# Patient Record
Sex: MALE | Race: WHITE | Employment: OTHER | URBAN - METROPOLITAN AREA
[De-identification: names, ages, dates, MRNs, and addresses within clinical notes are randomized per-mention and may not be internally consistent; named-entity substitution may affect disease eponyms.]

---

## 2017-01-12 ENCOUNTER — TELEPHONE (OUTPATIENT)
Dept: FAMILY MEDICINE CLINIC | Facility: CLINIC | Age: 72
End: 2017-01-12

## 2017-01-12 DIAGNOSIS — B00.1 COLD SORE: Primary | ICD-10-CM

## 2017-01-12 NOTE — TELEPHONE ENCOUNTER
Pt is calling state that he have a appt to see the Derm Dr Hayley Tamez for cold sore on mouth  Pt state that he have a appt on Monday and need this referral ASAP

## 2017-01-16 ENCOUNTER — OFFICE VISIT (OUTPATIENT)
Dept: DERMATOLOGY CLINIC | Facility: CLINIC | Age: 72
End: 2017-01-16

## 2017-01-16 DIAGNOSIS — K13.0 ANGULAR CHEILITIS: Primary | ICD-10-CM

## 2017-01-16 PROCEDURE — 99212 OFFICE O/P EST SF 10 MIN: CPT | Performed by: DERMATOLOGY

## 2017-01-16 PROCEDURE — 99202 OFFICE O/P NEW SF 15 MIN: CPT | Performed by: DERMATOLOGY

## 2017-01-16 NOTE — PROGRESS NOTES
HPI:     Chief Complaint     Derm Problem        HPI     Derm Problem    Additional comments: cracked skin and blistery eruption both corners of the mouth, started about 3 mo ago, never had blistery eruption       Last edited by Joe Rodriguez RN on 1/16/2 Disp:  Rfl:    Multiple Vitamins-Minerals (CENTRUM SILVER) Oral Tab Take 1 tablet by mouth daily. Disp:  Rfl:    TH COD LIVER OIL Oral Cap Take  by mouth. Disp:  Rfl:    Cinnamon (SM CINNAMON) 500 MG Oral Cap Take 500 mg by mouth daily.  Disp:  Rfl:      Al HISTORY      Comment esophageal dilatation    HAND/FINGER SURGERY UNLISTED Left 9/29/2011    Comment LRF trigger finger release    OTHER SURGICAL HISTORY  6/15/2010    Comment release triggers: RMF, RRF, LMF/ RRF Dupuytren's nodule excision    FOREARM/WRIS his mouth. Given Econizole cream to use twice daily along with hydrocortisone cream.  Patient will let me know in a couple weeks if not healing. No orders of the defined types were placed in this encounter.        Results From Past 48 Hours:  No results

## 2017-01-16 NOTE — PROGRESS NOTES
Past Medical History   Diagnosis Date   • Type II or unspecified type diabetes mellitus without mention of complication, not stated as uncontrolled    • Other and unspecified hyperlipidemia    • Unspecified essential hypertension    • Arthritis    • BPH (b surgery    EYE SURGERY Right 2013    Comment repair detached retina    LASER SURGERY OF EYE  2014    Comment OD YAG laser    ELECTROCARDIOGRAM, COMPLETE  4/25/2013    KNEE ARTHROSCOPY  1990       Social History   Marital Status:   Spouse Name: N/A

## 2017-01-20 ENCOUNTER — APPOINTMENT (OUTPATIENT)
Dept: LAB | Facility: HOSPITAL | Age: 72
End: 2017-01-20
Attending: UROLOGY
Payer: COMMERCIAL

## 2017-01-20 PROCEDURE — 84154 ASSAY OF PSA FREE: CPT | Performed by: UROLOGY

## 2017-01-20 PROCEDURE — 36415 COLL VENOUS BLD VENIPUNCTURE: CPT | Performed by: UROLOGY

## 2017-01-20 PROCEDURE — 84153 ASSAY OF PSA TOTAL: CPT | Performed by: UROLOGY

## 2017-02-01 ENCOUNTER — OFFICE VISIT (OUTPATIENT)
Dept: SURGERY | Facility: CLINIC | Age: 72
End: 2017-02-01

## 2017-02-01 VITALS
BODY MASS INDEX: 27.57 KG/M2 | TEMPERATURE: 98 F | RESPIRATION RATE: 16 BRPM | HEART RATE: 66 BPM | DIASTOLIC BLOOD PRESSURE: 74 MMHG | HEIGHT: 73 IN | SYSTOLIC BLOOD PRESSURE: 154 MMHG | WEIGHT: 208 LBS

## 2017-02-01 DIAGNOSIS — N35.9 URETHRAL STRICTURE, UNSPECIFIED STRICTURE TYPE: Primary | ICD-10-CM

## 2017-02-01 DIAGNOSIS — N40.1 BENIGN NON-NODULAR PROSTATIC HYPERPLASIA WITH LOWER URINARY TRACT SYMPTOMS: ICD-10-CM

## 2017-02-01 DIAGNOSIS — R97.20 ELEVATED PSA: ICD-10-CM

## 2017-02-01 DIAGNOSIS — R35.1 NOCTURIA: ICD-10-CM

## 2017-02-01 PROCEDURE — 99214 OFFICE O/P EST MOD 30 MIN: CPT | Performed by: UROLOGY

## 2017-02-01 PROCEDURE — 99213 OFFICE O/P EST LOW 20 MIN: CPT | Performed by: UROLOGY

## 2017-02-01 RX ORDER — ALFUZOSIN HYDROCHLORIDE 10 MG/1
10 TABLET, EXTENDED RELEASE ORAL DAILY
Qty: 90 TABLET | Refills: 3 | Status: SHIPPED | OUTPATIENT
Start: 2017-02-01 | End: 2017-02-24

## 2017-02-01 NOTE — PATIENT INSTRUCTIONS
1.  Please start alfuzosin 10 mg daily, immediately after same meal every day. This belonged to the same family of medications such as Flomax/tamsulosin but it is not identical.  Stop medication if you develop rash or worsening in your sinusitis    2.   Co

## 2017-02-01 NOTE — PROGRESS NOTES
HPI:    Patient ID: Jasvir Minaya is a 66year old male. HPI     1. Nocturia  Patient's AUA score today was 15, moderate voiding dysfunction category better than AUA 19 on chart review (4/18/2016).   The patient has urinary frequency less than every 2 erich previous records:     PSA was 2.2 on April 9, 2008, corrected for finasteride PSA was 4.4. The patient had the same PSA as that of September 20, 2007.  The patient started finasteride July 10, 2006, because of voiding dysfunction, and he could not tolerate possible urethroplasty/reconstruction of urethra.  9/28/2016 [facility] cystoscopy under general anesthesia by Dr. Brian Mckeon  for possible urethral stricture, no stricture found; prostatic urethra showed minimal 3-lobe BPH with a shortened ( ?)  length, consisten Capsular opacification 2014     YAG laser OD          Past Surgical History    HIP REPLACEMENT SURGERY  2008    HERNIA SURGERY      Comment hernia repair, herniorraphy    REPAIR ROTATOR CUFF,ACUTE Left 2011    CATARACT  2012, 2010    Comment 2012 Phaco w/ Rfl: 1   Lovastatin 40 MG Oral Tab Take 1 tablet (40 mg total) by mouth nightly. Disp: 90 tablet Rfl: 3   NIFEdipine ER 60 MG Oral Tablet 24 Hr Take 1 tablet (60 mg total) by mouth once daily.  Disp: 90 tablet Rfl: 3   lansoprazole 30 MG Oral Capsule Delaye 1/20/2017 PSA = 6.2;  20 % free PSA (16-20 % probability of prostate cancer)  9/13/2016 UA RBC < 1  1/2/2016 PSA 5.4:  30 % free PSA (8 % probability of prostate cancer)     I spent 25 minutes with patient, and majority of this time was spent discussing he will stop it. I ordered urinalysis before next visit in 1 year. 3. (R97.20) Elevated PSA  Plan: History of 2 negative prostate biopsies. On 1/20/2017 PSA was 6.2 and % free PSA was 20 % (16-20 % probability of prostate cancer). No nodules on LAURA. this documentation has been prepared under the direction and in the presence of Connie Martin MD.   Electronically Signed: Beckie Lynn, 2/1/2017, 9:07 AM.    Naren Catalan M.D., have reviewed and agree with this document transcribed by [facility]

## 2017-02-03 ENCOUNTER — PATIENT MESSAGE (OUTPATIENT)
Dept: INTERNAL MEDICINE CLINIC | Facility: CLINIC | Age: 72
End: 2017-02-03

## 2017-02-07 NOTE — TELEPHONE ENCOUNTER
From: Marylee Nettles  To: Shashi Sharif MD  Sent: 2/3/2017 6:34 PM CST  Subject: Prescription Question    Hello, I called in a prescription for \"Breo\" that Dr. Zena Alvarez prescribed for me over a week ago.  I used this medication to help with the flem

## 2017-02-07 NOTE — TELEPHONE ENCOUNTER
Refill Protocol Appointment Criteria  · Appointment scheduled in the past 6 months or in the next 3 months  Recent Visits       Provider Department Primary Dx    2 months ago Shashi Sharif MD [facility], [facility], [address], [place] Type 2 diabetes nicolas

## 2017-02-24 ENCOUNTER — OFFICE VISIT (OUTPATIENT)
Dept: INTERNAL MEDICINE CLINIC | Facility: CLINIC | Age: 72
End: 2017-02-24

## 2017-02-24 VITALS
OXYGEN SATURATION: 95 % | BODY MASS INDEX: 27.9 KG/M2 | DIASTOLIC BLOOD PRESSURE: 82 MMHG | WEIGHT: 210.5 LBS | HEART RATE: 76 BPM | SYSTOLIC BLOOD PRESSURE: 146 MMHG | HEIGHT: 73 IN | TEMPERATURE: 98 F

## 2017-02-24 DIAGNOSIS — J01.90 ACUTE SINUSITIS, RECURRENCE NOT SPECIFIED, UNSPECIFIED LOCATION: Primary | ICD-10-CM

## 2017-02-24 PROCEDURE — 99212 OFFICE O/P EST SF 10 MIN: CPT | Performed by: INTERNAL MEDICINE

## 2017-02-24 PROCEDURE — 99213 OFFICE O/P EST LOW 20 MIN: CPT | Performed by: INTERNAL MEDICINE

## 2017-02-24 RX ORDER — FLUTICASONE PROPIONATE 50 MCG
2 SPRAY, SUSPENSION (ML) NASAL DAILY
Qty: 1 INHALER | Refills: 0 | Status: SHIPPED | OUTPATIENT
Start: 2017-02-24 | End: 2018-02-05

## 2017-02-24 RX ORDER — DOXYCYCLINE HYCLATE 50 MG/1
50 CAPSULE ORAL 2 TIMES DAILY
Qty: 14 CAPSULE | Refills: 0 | Status: SHIPPED | OUTPATIENT
Start: 2017-02-24 | End: 2017-03-03

## 2017-02-24 NOTE — PATIENT INSTRUCTIONS
Please take doxycycline twice daily for 7 days. Please use Flonase 2 sprays each nostril once daily. Call if not soon better. Follow-up with Dr. Megha Bliss soon as your blood pressure was elevated today.

## 2017-02-24 NOTE — PROGRESS NOTES
Cassandra Ascencio is a 66year old male. Patient presents with:  Sinus Problem  Cough    HPI:   For the past 3 weeks, he has had persistent symptoms of nasal congestion with yellow drainage, sore throat, and occasional cough productive of yellow sputum.   Sympto mouth daily. Disp:  Rfl:    TH COD LIVER OIL Oral Cap Take  by mouth. Disp:  Rfl:    Cholecalciferol (VITAMIN D) 1000 UNITS Oral Cap Take  by mouth daily. Disp:  Rfl:    Cinnamon (SM CINNAMON) 500 MG Oral Cap Take 500 mg by mouth daily.  Disp:  Rfl: (95.482 kg)  BMI 27.78 kg/m2  SpO2 95%  HEENT: Anicteric, conjunctiva pink, canals and TMs normal bilaterally, turbinates moderately swollen bilaterally without purulent drainage, no maxillary or frontal sinus tenderness, oropharynx normal without erythema

## 2017-04-24 ENCOUNTER — OFFICE VISIT (OUTPATIENT)
Dept: INTERNAL MEDICINE CLINIC | Facility: CLINIC | Age: 72
End: 2017-04-24

## 2017-04-24 VITALS
WEIGHT: 213 LBS | SYSTOLIC BLOOD PRESSURE: 132 MMHG | HEIGHT: 73 IN | RESPIRATION RATE: 16 BRPM | DIASTOLIC BLOOD PRESSURE: 60 MMHG | HEART RATE: 90 BPM | BODY MASS INDEX: 28.23 KG/M2

## 2017-04-24 DIAGNOSIS — E11.319 TYPE 2 DIABETES MELLITUS WITH RIGHT EYE AFFECTED BY RETINOPATHY WITHOUT MACULAR EDEMA, WITHOUT LONG-TERM CURRENT USE OF INSULIN, UNSPECIFIED RETINOPATHY SEVERITY (HCC): Primary | ICD-10-CM

## 2017-04-24 DIAGNOSIS — G89.29 CHRONIC LEFT-SIDED LOW BACK PAIN WITH LEFT-SIDED SCIATICA: ICD-10-CM

## 2017-04-24 DIAGNOSIS — I10 ESSENTIAL HYPERTENSION WITH GOAL BLOOD PRESSURE LESS THAN 130/85: ICD-10-CM

## 2017-04-24 DIAGNOSIS — M54.42 CHRONIC LEFT-SIDED LOW BACK PAIN WITH LEFT-SIDED SCIATICA: ICD-10-CM

## 2017-04-24 DIAGNOSIS — J30.2 SEASONAL ALLERGIC RHINITIS, UNSPECIFIED ALLERGIC RHINITIS TRIGGER: ICD-10-CM

## 2017-04-24 PROBLEM — M54.41 CHRONIC BILATERAL LOW BACK PAIN WITH BILATERAL SCIATICA: Status: ACTIVE | Noted: 2017-04-24

## 2017-04-24 PROCEDURE — 99212 OFFICE O/P EST SF 10 MIN: CPT | Performed by: INTERNAL MEDICINE

## 2017-04-24 PROCEDURE — 99214 OFFICE O/P EST MOD 30 MIN: CPT | Performed by: INTERNAL MEDICINE

## 2017-04-24 NOTE — PROGRESS NOTES
Anne Reynolds is a 66year old male. HPI:   1.  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  Disp: 90 tablet Rfl: 3   GlipiZIDE ER (GLIPIZIDE XL) 2.5 MG Oral Tablet 24 Hr Take 1 tablet (2.5 mg total) by mouth daily with breakfast. 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 Use neti pot as you have been doing to help loosen drainage.      3. Essential hypertension with goal blood pressure less than 130/85    Patient instructed to take anti-hypertensive medicines exactly as prescr

## 2017-05-12 ENCOUNTER — APPOINTMENT (OUTPATIENT)
Dept: LAB | Facility: HOSPITAL | Age: 72
End: 2017-05-12
Attending: INTERNAL MEDICINE
Payer: COMMERCIAL

## 2017-05-12 DIAGNOSIS — E11.319 TYPE 2 DIABETES MELLITUS WITH RIGHT EYE AFFECTED BY RETINOPATHY WITHOUT MACULAR EDEMA, WITHOUT LONG-TERM CURRENT USE OF INSULIN, UNSPECIFIED RETINOPATHY SEVERITY (HCC): ICD-10-CM

## 2017-05-12 PROCEDURE — 36415 COLL VENOUS BLD VENIPUNCTURE: CPT

## 2017-05-12 PROCEDURE — 83036 HEMOGLOBIN GLYCOSYLATED A1C: CPT

## 2017-05-16 ENCOUNTER — OFFICE VISIT (OUTPATIENT)
Dept: INTERNAL MEDICINE CLINIC | Facility: CLINIC | Age: 72
End: 2017-05-16

## 2017-05-16 VITALS
SYSTOLIC BLOOD PRESSURE: 136 MMHG | BODY MASS INDEX: 28.23 KG/M2 | HEIGHT: 73 IN | WEIGHT: 213 LBS | DIASTOLIC BLOOD PRESSURE: 72 MMHG | RESPIRATION RATE: 16 BRPM | HEART RATE: 87 BPM

## 2017-05-16 DIAGNOSIS — M81.0 OSTEOPOROSIS WITHOUT CURRENT PATHOLOGICAL FRACTURE, UNSPECIFIED OSTEOPOROSIS TYPE: ICD-10-CM

## 2017-05-16 DIAGNOSIS — I10 ESSENTIAL HYPERTENSION WITH GOAL BLOOD PRESSURE LESS THAN 140/90: ICD-10-CM

## 2017-05-16 DIAGNOSIS — M25.561 ACUTE PAIN OF RIGHT KNEE: ICD-10-CM

## 2017-05-16 DIAGNOSIS — E11.9 TYPE 2 DIABETES MELLITUS WITHOUT COMPLICATION, WITHOUT LONG-TERM CURRENT USE OF INSULIN (HCC): Primary | ICD-10-CM

## 2017-05-16 DIAGNOSIS — E78.2 MIXED HYPERLIPIDEMIA: ICD-10-CM

## 2017-05-16 PROCEDURE — 99214 OFFICE O/P EST MOD 30 MIN: CPT | Performed by: INTERNAL MEDICINE

## 2017-05-16 PROCEDURE — 99212 OFFICE O/P EST SF 10 MIN: CPT | Performed by: INTERNAL MEDICINE

## 2017-05-16 NOTE — PROGRESS NOTES
Elaina Brumfield is a 66year old male. HPI:   1.  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 Disp: 90 capsule Rfl: 3   Diclofenac Sodium 1 % Transdermal Gel Apply 2 g topically 4 (four) times daily.  Disp: 1 IOL OD   • Posterior subcapsular cataract    • Posterior subcapsular cataract 2010, 1998 2010 Phaco w/ PC IOL OS   • Capsular opacification 2014     YAG laser OD      Social History:    Smoking Status: Never Smoker                      Smokeless Status at least 3 times weekly will help to curb one's appetite, control weight and lead to better blood pressure control. Record blood pressures at ,me and bring readings to your next office visit to review.     3. Mixed hyperlipidemia    Patient instructed to

## 2017-05-22 ENCOUNTER — TELEPHONE (OUTPATIENT)
Dept: INTERNAL MEDICINE CLINIC | Facility: CLINIC | Age: 72
End: 2017-05-22

## 2017-05-22 DIAGNOSIS — M25.561 RIGHT KNEE PAIN, UNSPECIFIED CHRONICITY: Primary | ICD-10-CM

## 2017-05-22 NOTE — TELEPHONE ENCOUNTER
Please see my chart message:    Comments: To Dr. Marti Henley,  From: Aquiles Pulse  I have completed taking the Ibuprofen for  3 days. There has not been much improvement in my conduction.  My right knee hurts to walk on and ached me at night, making going to s

## 2017-05-22 NOTE — TELEPHONE ENCOUNTER
Call and tell patient the refrral to orthopedics is in system. I would wait for orthopedics evaluation to order x rays and they may want to do an MRI as well.

## 2017-05-22 NOTE — TELEPHONE ENCOUNTER
Spoke with patient informed ortho referral completed by Dr GLORIA and that they will order testing for him. Pt verbalized understanding.

## 2017-05-22 NOTE — TELEPHONE ENCOUNTER
Please advise/see Pt's Message,unable to reach to Triage and ask if he has called Ortho yet per OV Note below

## 2017-05-22 NOTE — TELEPHONE ENCOUNTER
5. Acute pain of right knee    Ice joint area intermittently as tolerated for short periods of time to decrease any inflammation and give some pain relief.  Take ibuprofen 400 mg (2 of the 200 mg pills or capsules) every 6 hours or tylenol XS (500 mg) up

## 2017-05-22 NOTE — TELEPHONE ENCOUNTER
Spoke with patient still having persistent knee pain, worse with walking. Pain can vary from 2-8. Has been taking OTC ibuprofen, only helps a little. Referral for ortho pended.  Pt would like to see ortho specialist. Also wants to know if Dr Ayesha Lindsey can or

## 2017-05-30 ENCOUNTER — HOSPITAL ENCOUNTER (OUTPATIENT)
Dept: GENERAL RADIOLOGY | Facility: HOSPITAL | Age: 72
Discharge: HOME | End: 2017-05-30
Attending: ORTHOPAEDIC SURGERY
Payer: COMMERCIAL

## 2017-05-30 ENCOUNTER — OFFICE VISIT (OUTPATIENT)
Dept: ORTHOPEDICS CLINIC | Facility: CLINIC | Age: 72
End: 2017-05-30

## 2017-05-30 DIAGNOSIS — M25.561 ACUTE PAIN OF RIGHT KNEE: ICD-10-CM

## 2017-05-30 DIAGNOSIS — M25.561 ACUTE PAIN OF RIGHT KNEE: Primary | ICD-10-CM

## 2017-05-30 PROCEDURE — 99243 OFF/OP CNSLTJ NEW/EST LOW 30: CPT | Performed by: ORTHOPAEDIC SURGERY

## 2017-05-30 PROCEDURE — 73560 X-RAY EXAM OF KNEE 1 OR 2: CPT | Performed by: ORTHOPAEDIC SURGERY

## 2017-05-30 PROCEDURE — 73565 X-RAY EXAM OF KNEES: CPT | Performed by: ORTHOPAEDIC SURGERY

## 2017-05-30 PROCEDURE — 99212 OFFICE O/P EST SF 10 MIN: CPT | Performed by: ORTHOPAEDIC SURGERY

## 2017-05-30 NOTE — PROGRESS NOTES
5/30/2017  Amber Solders  3/17/1945  67year old   male  Susi Manley MD    HPI:   Patient presents with:  Consult: here for evaluation of right knee pain. no known injury, just woke up with pain after working one day. this was about 3 weeks ago. 1 Tube Rfl: 3   Multiple Vitamins-Minerals (EYE VITAMINS) Oral Cap Take  by mouth daily. Disp:  Rfl:    Cholecalciferol (VITAMIN D) 1000 UNITS Oral Cap Take  by mouth daily.  Disp:  Rfl:    Sildenafil Citrate (VIAGRA) 100 MG Oral Tab Take 1 - 2 hours before Comment hernia repair, herniorraphy    REPAIR ROTATOR CUFF,ACUTE Left 2011    CATARACT  2012, 2010    Comment 2012 Phaco w/ PC IOL OD, 2010 Phaco w/ PC IOL OS    HIP SURGERY  12/2008    Comment left hip surface repair    Evelyn 5258 strength in tibialis anterior, gastrocsoleus complex, EHL, and FHL. The patient has medial joint line tenderness to palpation.   Patient has pain with hyperflexion with medial joint line and a positive Denise's test.  Patient has medial patella facet ten

## 2017-06-02 ENCOUNTER — HOSPITAL ENCOUNTER (OUTPATIENT)
Dept: MRI IMAGING | Age: 72
Discharge: HOME | End: 2017-06-02
Attending: ORTHOPAEDIC SURGERY
Payer: COMMERCIAL

## 2017-06-02 DIAGNOSIS — M25.561 ACUTE PAIN OF RIGHT KNEE: ICD-10-CM

## 2017-06-02 PROCEDURE — 73721 MRI JNT OF LWR EXTRE W/O DYE: CPT | Performed by: ORTHOPAEDIC SURGERY

## 2017-06-06 ENCOUNTER — OFFICE VISIT (OUTPATIENT)
Dept: OPHTHALMOLOGY | Facility: CLINIC | Age: 72
End: 2017-06-06

## 2017-06-06 DIAGNOSIS — E11.9 DIABETES MELLITUS TYPE 2 WITHOUT RETINOPATHY (HCC): ICD-10-CM

## 2017-06-06 DIAGNOSIS — Z86.69 HISTORY OF RETINAL TEAR: ICD-10-CM

## 2017-06-06 DIAGNOSIS — H35.371 EPIRETINAL MEMBRANE, RIGHT: ICD-10-CM

## 2017-06-06 DIAGNOSIS — H40.003 GLAUCOMA SUSPECT OF BOTH EYES: Primary | ICD-10-CM

## 2017-06-06 DIAGNOSIS — Z96.1 PSEUDOPHAKIA OF BOTH EYES: ICD-10-CM

## 2017-06-06 PROBLEM — E11.319: Status: RESOLVED | Noted: 2017-04-24 | Resolved: 2017-06-06

## 2017-06-06 PROCEDURE — 92250 FUNDUS PHOTOGRAPHY W/I&R: CPT | Performed by: OPHTHALMOLOGY

## 2017-06-06 PROCEDURE — 99243 OFF/OP CNSLTJ NEW/EST LOW 30: CPT | Performed by: OPHTHALMOLOGY

## 2017-06-06 PROCEDURE — 99212 OFFICE O/P EST SF 10 MIN: CPT | Performed by: OPHTHALMOLOGY

## 2017-06-06 NOTE — PATIENT INSTRUCTIONS
Glaucoma suspect of both eyes  Discussed with patient that he is a glaucoma suspect based on increased cupping of the optic nerves in both eyes. Retinal photos taken today to document optic nerves.   Will have patient back in 1 year for visual field, OCT a

## 2017-06-06 NOTE — ASSESSMENT & PLAN NOTE
Discussed with patient that he is a glaucoma suspect based on increased cupping of the optic nerves in both eyes. Retinal photos taken today to document optic nerves.   Will have patient back in 1 year for visual field, OCT and diabetic eye exam.

## 2017-06-06 NOTE — PROGRESS NOTES
Cydney Piedra is a 67year old male.     HPI:     HPI     Consult    Additional comments: Referred by Dr. Abida Wisdom    Additional comments: Pt has been a diabetic for 7 years  7 years on pills/  0 years on Insulin   Pt checks his/h replacement surgery (2008); hernia surgery (hernia repair, herniorraphy); repair rotator cuff,acute (Left, 2011); hip surgery (12/2008) (left hip surface repair); other surgical history (1992) (L arm torn bicep repair); knee surgery (Bilateral) (x2); other Tab Take 1 tablet (40 mg total) by mouth nightly. Disp: 90 tablet Rfl: 3   NIFEdipine ER 60 MG Oral Tablet 24 Hr Take 1 tablet (60 mg total) by mouth once daily.  Disp: 90 tablet Rfl: 3   lansoprazole 30 MG Oral Capsule Delayed Release Take 1 capsule (30 mg and 2.5% Jake Synephrine @ 10:41 AM            Additional Tests     Amsler      Right Left   Amsler Wavy lines Normal               Slit Lamp and Fundus Exam     Slit Lamp Exam      Right Left    Lids/Lashes Dermatochalasis, Meibomian gland dysfunction Derm Encounter  Fundus Photos - OU - Both Eyes    Meds This Visit:    No prescriptions requested or ordered in this encounter     Follow up instructions:  Return in about 1 year (around 6/6/2018) for Visual field, OCT, Diabetic eye exam.    6/6/2017  Scribed by

## 2017-06-09 ENCOUNTER — OFFICE VISIT (OUTPATIENT)
Dept: ORTHOPEDICS CLINIC | Facility: CLINIC | Age: 72
End: 2017-06-09

## 2017-06-09 DIAGNOSIS — M25.561 ACUTE PAIN OF RIGHT KNEE: ICD-10-CM

## 2017-06-09 DIAGNOSIS — S83.241A ACUTE MEDIAL MENISCUS TEAR, RIGHT, INITIAL ENCOUNTER: Primary | ICD-10-CM

## 2017-06-09 PROCEDURE — 99213 OFFICE O/P EST LOW 20 MIN: CPT | Performed by: ORTHOPAEDIC SURGERY

## 2017-06-09 PROCEDURE — 99212 OFFICE O/P EST SF 10 MIN: CPT | Performed by: ORTHOPAEDIC SURGERY

## 2017-06-09 RX ORDER — TRAMADOL HYDROCHLORIDE 50 MG/1
50 TABLET ORAL EVERY 12 HOURS PRN
Qty: 40 TABLET | Refills: 0 | Status: ON HOLD | OUTPATIENT
Start: 2017-06-09 | End: 2017-07-10

## 2017-06-09 NOTE — PROGRESS NOTES
6/9/2017  Elaina Brumfield  3/17/1945  67year old   male  Fadia Gutiérrez MD    HPI:   Patient presents with:  Knee Pain: Right f/u and MRI results - still has the same pain rated as 2-4/10 at all the time.     This is a pleasant 76-year-old male who comes encouraged to use a walker for the next 4 weeks and be protected weightbearing to rest the knee. The patient will follow-up in 4 weeks for repeat evaluation. The patient was given a prescription for tramadol for pain control.   If the patient continues to

## 2017-06-27 ENCOUNTER — OFFICE VISIT (OUTPATIENT)
Dept: ORTHOPEDICS CLINIC | Facility: CLINIC | Age: 72
End: 2017-06-27

## 2017-06-27 DIAGNOSIS — S83.241A ACUTE MEDIAL MENISCUS TEAR, RIGHT, INITIAL ENCOUNTER: Primary | ICD-10-CM

## 2017-06-27 PROCEDURE — 99213 OFFICE O/P EST LOW 20 MIN: CPT | Performed by: ORTHOPAEDIC SURGERY

## 2017-06-27 PROCEDURE — 99212 OFFICE O/P EST SF 10 MIN: CPT | Performed by: ORTHOPAEDIC SURGERY

## 2017-06-27 NOTE — H&P
6/27/2017  Alvino Snellen  3/17/1945  67year old   male  Magda Myers MD    HPI:   Patient presents with:  Knee Pain: Right f/u - he is herer to discuss sx - he still ahs pain rated as 2-6/10 at all the time, worse with walking,     This is a pleasan Rfl: 0   Naproxen Sodium (ALEVE) 220 MG Oral Tab Take 1-2 tablets by mouth as needed. Disp:  Rfl:    Multiple Vitamins-Minerals (CENTRUM SILVER) Oral Tab Take 1 tablet by mouth daily. Disp:  Rfl:    TH COD LIVER OIL Oral Cap Take  by mouth.  Disp:  Rfl: HAND/FINGER SURGERY UNLISTED Left      Comment: LRF trigger finger release  No date: HERNIA SURGERY      Comment: hernia repair, herniorraphy  2008: HIP REPLACEMENT SURGERY  12/2008: HIP SURGERY      Comment: left hip surface repair  1990: KNEE ARTHROSCOPY patient has 5/5 strength in tibialis anterior, gastrocsoleus complex, EHL, and FHL. The patient has medial joint line tenderness to palpation. There is no proximal medial tibia tenderness to palpation. Patient has pain with hyperflexion.   The patient ha

## 2017-07-06 ENCOUNTER — TELEPHONE (OUTPATIENT)
Dept: INTERNAL MEDICINE CLINIC | Facility: CLINIC | Age: 72
End: 2017-07-06

## 2017-07-06 NOTE — TELEPHONE ENCOUNTER
Patient called and stated need a letter/note for medical clearance per -    Patient is schedule for surgery on 7/10/17-

## 2017-07-07 ENCOUNTER — OFFICE VISIT (OUTPATIENT)
Dept: INTERNAL MEDICINE CLINIC | Facility: CLINIC | Age: 72
End: 2017-07-07

## 2017-07-07 ENCOUNTER — TELEPHONE (OUTPATIENT)
Dept: ORTHOPEDICS CLINIC | Facility: CLINIC | Age: 72
End: 2017-07-07

## 2017-07-07 VITALS
WEIGHT: 209 LBS | DIASTOLIC BLOOD PRESSURE: 68 MMHG | HEART RATE: 68 BPM | HEIGHT: 73 IN | TEMPERATURE: 98 F | BODY MASS INDEX: 27.7 KG/M2 | SYSTOLIC BLOOD PRESSURE: 128 MMHG

## 2017-07-07 DIAGNOSIS — Z01.818 PREOP EXAM FOR INTERNAL MEDICINE: Primary | ICD-10-CM

## 2017-07-07 DIAGNOSIS — I10 ESSENTIAL HYPERTENSION WITH GOAL BLOOD PRESSURE LESS THAN 140/90: ICD-10-CM

## 2017-07-07 DIAGNOSIS — E11.9 TYPE 2 DIABETES MELLITUS WITHOUT COMPLICATION, WITHOUT LONG-TERM CURRENT USE OF INSULIN (HCC): ICD-10-CM

## 2017-07-07 DIAGNOSIS — S83.241A TEAR OF MEDIAL MENISCUS OF RIGHT KNEE, CURRENT, UNSPECIFIED TEAR TYPE, INITIAL ENCOUNTER: ICD-10-CM

## 2017-07-07 DIAGNOSIS — M25.561 RIGHT KNEE PAIN, UNSPECIFIED CHRONICITY: ICD-10-CM

## 2017-07-07 PROCEDURE — 93005 ELECTROCARDIOGRAM TRACING: CPT | Performed by: INTERNAL MEDICINE

## 2017-07-07 PROCEDURE — 99214 OFFICE O/P EST MOD 30 MIN: CPT | Performed by: INTERNAL MEDICINE

## 2017-07-07 PROCEDURE — 99212 OFFICE O/P EST SF 10 MIN: CPT | Performed by: INTERNAL MEDICINE

## 2017-07-07 PROCEDURE — 93000 ELECTROCARDIOGRAM COMPLETE: CPT | Performed by: INTERNAL MEDICINE

## 2017-07-07 NOTE — PATIENT INSTRUCTIONS
1.  Stop any aspirin or nonsteroidal anti-inflammatories as of now. 2.  Take your medications the day before the surgery as usual.  3.  Okay to take her blood pressure medications (nifedipine) the morning of the surgery with a few sips of water.   4.  No n Exercises help strengthen the muscles of the leg to help support the knee joint. If these treatments don’t help relieve symptoms or the injury is severe, you may need surgery.  This can repair the meniscus to relieve symptoms and restore movement.     When

## 2017-07-07 NOTE — PROGRESS NOTES
Patient ID: Dre Velásquez is a 67year old male. Patient presents with:  Pre-Op Exam: R Knee surgery on July 10, 2017       HISTORY OF PRESENT ILLNESS:   HPI  Patient presents for above.   Patient having right knee arthroscope on July 10, 2017 by Dr. Yasmine Colmenares bilateral, hand stiffness & weakness post trigger release   • Trigger finger 9/29/2011    Irf-trigger finger, recurrent - LRF trigger finger release   • Type II or unspecified type diabetes mellitus without mention of complication, not stated as uncontroll mouth once daily. , Disp: 90 tablet, Rfl: 3  •  lansoprazole 30 MG Oral Capsule Delayed Release, Take 1 capsule (30 mg total) by mouth every morning before breakfast., Disp: 90 capsule, Rfl: 3  •  Sildenafil Citrate (VIAGRA) 100 MG Oral Tab, Take 1 - 2 hour Caffeine Concern No    Comment: soda, 8 oz daily    Exercise No    Pt has a pacemaker No    Reaction to local anesthetic No     Social History Narrative    The patient does not use an assistive device. .      The patient does live in a home with stairs. water.    Return if symptoms worsen or fail to improve.     Chau Pérez MD  7/7/2017

## 2017-07-07 NOTE — TELEPHONE ENCOUNTER
pt is scheduled for sx: 7/10 and pt is to be seen in 2-3 days. No appt's available please advise, thank you.

## 2017-07-10 ENCOUNTER — SURGERY (OUTPATIENT)
Age: 72
End: 2017-07-10

## 2017-07-10 ENCOUNTER — HOSPITAL ENCOUNTER (OUTPATIENT)
Facility: HOSPITAL | Age: 72
Setting detail: HOSPITAL OUTPATIENT SURGERY
Discharge: HOME | End: 2017-07-10
Attending: ORTHOPAEDIC SURGERY | Admitting: ORTHOPAEDIC SURGERY
Payer: COMMERCIAL

## 2017-07-10 ENCOUNTER — ANESTHESIA EVENT (OUTPATIENT)
Dept: SURGERY | Facility: HOSPITAL | Age: 72
End: 2017-07-10
Payer: COMMERCIAL

## 2017-07-10 ENCOUNTER — ANESTHESIA (OUTPATIENT)
Dept: SURGERY | Facility: HOSPITAL | Age: 72
End: 2017-07-10
Payer: COMMERCIAL

## 2017-07-10 VITALS
BODY MASS INDEX: 27.21 KG/M2 | TEMPERATURE: 98 F | DIASTOLIC BLOOD PRESSURE: 75 MMHG | OXYGEN SATURATION: 98 % | HEIGHT: 73 IN | RESPIRATION RATE: 16 BRPM | SYSTOLIC BLOOD PRESSURE: 150 MMHG | WEIGHT: 205.31 LBS | HEART RATE: 60 BPM

## 2017-07-10 DIAGNOSIS — S83.249A MEDIAL MENISCUS TEAR: Primary | ICD-10-CM

## 2017-07-10 LAB — GLUCOSE BLDC GLUCOMTR-MCNC: 99 MG/DL (ref 70–99)

## 2017-07-10 PROCEDURE — 94010 BREATHING CAPACITY TEST: CPT | Performed by: ORTHOPAEDIC SURGERY

## 2017-07-10 PROCEDURE — 82962 GLUCOSE BLOOD TEST: CPT

## 2017-07-10 PROCEDURE — 0SBC4ZZ EXCISION OF RIGHT KNEE JOINT, PERCUTANEOUS ENDOSCOPIC APPROACH: ICD-10-PCS | Performed by: ORTHOPAEDIC SURGERY

## 2017-07-10 RX ORDER — HYDROCODONE BITARTRATE AND ACETAMINOPHEN 5; 325 MG/1; MG/1
1 TABLET ORAL AS NEEDED
Status: COMPLETED | OUTPATIENT
Start: 2017-07-10 | End: 2017-07-10

## 2017-07-10 RX ORDER — HYDROMORPHONE HYDROCHLORIDE 1 MG/ML
0.6 INJECTION, SOLUTION INTRAMUSCULAR; INTRAVENOUS; SUBCUTANEOUS EVERY 5 MIN PRN
Status: DISCONTINUED | OUTPATIENT
Start: 2017-07-10 | End: 2017-07-10

## 2017-07-10 RX ORDER — FAMOTIDINE 20 MG/1
20 TABLET ORAL ONCE
Status: DISCONTINUED | OUTPATIENT
Start: 2017-07-10 | End: 2017-07-10 | Stop reason: HOSPADM

## 2017-07-10 RX ORDER — HYDROMORPHONE HYDROCHLORIDE 1 MG/ML
0.2 INJECTION, SOLUTION INTRAMUSCULAR; INTRAVENOUS; SUBCUTANEOUS EVERY 5 MIN PRN
Status: DISCONTINUED | OUTPATIENT
Start: 2017-07-10 | End: 2017-07-10

## 2017-07-10 RX ORDER — CLINDAMYCIN PHOSPHATE 900 MG/50ML
900 INJECTION INTRAVENOUS ONCE
Status: DISCONTINUED | OUTPATIENT
Start: 2017-07-10 | End: 2017-07-10 | Stop reason: HOSPADM

## 2017-07-10 RX ORDER — SODIUM CHLORIDE, SODIUM LACTATE, POTASSIUM CHLORIDE, CALCIUM CHLORIDE 600; 310; 30; 20 MG/100ML; MG/100ML; MG/100ML; MG/100ML
INJECTION, SOLUTION INTRAVENOUS CONTINUOUS
Status: DISCONTINUED | OUTPATIENT
Start: 2017-07-10 | End: 2017-07-10

## 2017-07-10 RX ORDER — HALOPERIDOL 5 MG/ML
0.25 INJECTION INTRAMUSCULAR ONCE AS NEEDED
Status: DISCONTINUED | OUTPATIENT
Start: 2017-07-10 | End: 2017-07-10

## 2017-07-10 RX ORDER — ONDANSETRON 2 MG/ML
4 INJECTION INTRAMUSCULAR; INTRAVENOUS EVERY 6 HOURS PRN
Status: DISCONTINUED | OUTPATIENT
Start: 2017-07-10 | End: 2017-07-10

## 2017-07-10 RX ORDER — SODIUM CHLORIDE, SODIUM LACTATE, POTASSIUM CHLORIDE, CALCIUM CHLORIDE 600; 310; 30; 20 MG/100ML; MG/100ML; MG/100ML; MG/100ML
INJECTION, SOLUTION INTRAVENOUS CONTINUOUS PRN
Status: DISCONTINUED | OUTPATIENT
Start: 2017-07-10 | End: 2017-07-10 | Stop reason: SURG

## 2017-07-10 RX ORDER — NALOXONE HYDROCHLORIDE 0.4 MG/ML
80 INJECTION, SOLUTION INTRAMUSCULAR; INTRAVENOUS; SUBCUTANEOUS AS NEEDED
Status: DISCONTINUED | OUTPATIENT
Start: 2017-07-10 | End: 2017-07-10

## 2017-07-10 RX ORDER — HYDROCODONE BITARTRATE AND ACETAMINOPHEN 5; 325 MG/1; MG/1
1-2 TABLET ORAL EVERY 6 HOURS PRN
Qty: 40 TABLET | Refills: 0 | Status: SHIPPED | OUTPATIENT
Start: 2017-07-10 | End: 2017-07-20

## 2017-07-10 RX ORDER — MORPHINE SULFATE 4 MG/ML
6 INJECTION, SOLUTION INTRAMUSCULAR; INTRAVENOUS EVERY 10 MIN PRN
Status: DISCONTINUED | OUTPATIENT
Start: 2017-07-10 | End: 2017-07-10

## 2017-07-10 RX ORDER — HYDROCODONE BITARTRATE AND ACETAMINOPHEN 5; 325 MG/1; MG/1
2 TABLET ORAL AS NEEDED
Status: COMPLETED | OUTPATIENT
Start: 2017-07-10 | End: 2017-07-10

## 2017-07-10 RX ORDER — BUPIVACAINE HYDROCHLORIDE AND EPINEPHRINE 2.5; 5 MG/ML; UG/ML
INJECTION, SOLUTION INFILTRATION; PERINEURAL AS NEEDED
Status: DISCONTINUED | OUTPATIENT
Start: 2017-07-10 | End: 2017-07-10 | Stop reason: HOSPADM

## 2017-07-10 RX ORDER — MORPHINE SULFATE 4 MG/ML
4 INJECTION, SOLUTION INTRAMUSCULAR; INTRAVENOUS EVERY 10 MIN PRN
Status: DISCONTINUED | OUTPATIENT
Start: 2017-07-10 | End: 2017-07-10

## 2017-07-10 RX ORDER — ONDANSETRON 2 MG/ML
4 INJECTION INTRAMUSCULAR; INTRAVENOUS ONCE AS NEEDED
Status: DISCONTINUED | OUTPATIENT
Start: 2017-07-10 | End: 2017-07-10

## 2017-07-10 RX ORDER — ONDANSETRON 2 MG/ML
INJECTION INTRAMUSCULAR; INTRAVENOUS AS NEEDED
Status: DISCONTINUED | OUTPATIENT
Start: 2017-07-10 | End: 2017-07-10 | Stop reason: SURG

## 2017-07-10 RX ORDER — METOCLOPRAMIDE 10 MG/1
10 TABLET ORAL ONCE
Status: DISCONTINUED | OUTPATIENT
Start: 2017-07-10 | End: 2017-07-10 | Stop reason: HOSPADM

## 2017-07-10 RX ORDER — MIDAZOLAM HYDROCHLORIDE 1 MG/ML
INJECTION INTRAMUSCULAR; INTRAVENOUS AS NEEDED
Status: DISCONTINUED | OUTPATIENT
Start: 2017-07-10 | End: 2017-07-10 | Stop reason: SURG

## 2017-07-10 RX ORDER — HYDROMORPHONE HYDROCHLORIDE 1 MG/ML
0.4 INJECTION, SOLUTION INTRAMUSCULAR; INTRAVENOUS; SUBCUTANEOUS EVERY 5 MIN PRN
Status: DISCONTINUED | OUTPATIENT
Start: 2017-07-10 | End: 2017-07-10

## 2017-07-10 RX ORDER — ACETAMINOPHEN 325 MG/1
650 TABLET ORAL ONCE
Status: COMPLETED | OUTPATIENT
Start: 2017-07-10 | End: 2017-07-10

## 2017-07-10 RX ORDER — LIDOCAINE HYDROCHLORIDE 10 MG/ML
INJECTION, SOLUTION EPIDURAL; INFILTRATION; INTRACAUDAL; PERINEURAL AS NEEDED
Status: DISCONTINUED | OUTPATIENT
Start: 2017-07-10 | End: 2017-07-10 | Stop reason: SURG

## 2017-07-10 RX ORDER — MORPHINE SULFATE 2 MG/ML
2 INJECTION, SOLUTION INTRAMUSCULAR; INTRAVENOUS EVERY 10 MIN PRN
Status: DISCONTINUED | OUTPATIENT
Start: 2017-07-10 | End: 2017-07-10

## 2017-07-10 RX ORDER — DEXAMETHASONE SODIUM PHOSPHATE 4 MG/ML
VIAL (ML) INJECTION AS NEEDED
Status: DISCONTINUED | OUTPATIENT
Start: 2017-07-10 | End: 2017-07-10 | Stop reason: SURG

## 2017-07-10 RX ADMIN — MIDAZOLAM HYDROCHLORIDE 2 MG: 1 INJECTION INTRAMUSCULAR; INTRAVENOUS at 16:49:00

## 2017-07-10 RX ADMIN — ONDANSETRON 4 MG: 2 INJECTION INTRAMUSCULAR; INTRAVENOUS at 16:49:00

## 2017-07-10 RX ADMIN — LIDOCAINE HYDROCHLORIDE 50 MG: 10 INJECTION, SOLUTION EPIDURAL; INFILTRATION; INTRACAUDAL; PERINEURAL at 16:49:00

## 2017-07-10 RX ADMIN — DEXAMETHASONE SODIUM PHOSPHATE 4 MG: 4 MG/ML VIAL (ML) INJECTION at 16:49:00

## 2017-07-10 RX ADMIN — SODIUM CHLORIDE, SODIUM LACTATE, POTASSIUM CHLORIDE, CALCIUM CHLORIDE: 600; 310; 30; 20 INJECTION, SOLUTION INTRAVENOUS at 16:49:00

## 2017-07-10 NOTE — ANESTHESIA PREPROCEDURE EVALUATION
Anesthesia PreOp Note    HPI:     Alfredo Tubbs is a 67year old male who presents for preoperative consultation requested by: Scott Andre MD    Date of Surgery: 7/10/2017    Procedure(s):  KNEE ARTHROSCOPY  Indication: Right knee medial meniscal t Date Noted: 02/04/2015      Nocturia         Date Noted: 09/07/2014      BPH (benign prostatic hyperplasia)         Date Noted: 09/07/2014      Erectile dysfunction         Date Noted: 09/07/2014        Past Medical History:   Diagnosis Date   • Acute medi Comment: left wrist surgery  9/29/2011: HAND/FINGER SURGERY UNLISTED Left      Comment: LRF trigger finger release  No date: HERNIA SURGERY      Comment: hernia repair, herniorraphy  No date: LASER SURGERY OF EYE  No date: OTHER SURGICAL HISTORY      Comm Transdermal Gel Apply 2 g topically 4 (four) times daily. Disp: 1 Tube Rfl: 3 7/7/2017   Multiple Vitamins-Minerals (EYE VITAMINS) Oral Cap Take  by mouth daily.  Disp:  Rfl:  7/7/2017 at Unknown time   Cholecalciferol (VITAMIN D) 1000 UNITS Oral Cap Take No    Sexual activity: Not on file     Other Topics Concern    Caffeine Concern No    Comment: soda, 8 oz daily    Exercise No    Pt has a pacemaker No    Reaction to local anesthetic No     Social History Narrative    The patient does not use an assistive

## 2017-07-10 NOTE — H&P (VIEW-ONLY)
6/27/2017  Kathryn Levy  3/17/1945  67year old   male  Jessica Watson MD    HPI:   Patient presents with:  Knee Pain: Right f/u - he is herer to discuss sx - he still ahs pain rated as 2-6/10 at all the time, worse with walking,     This is a pleasan Rfl: 0   Naproxen Sodium (ALEVE) 220 MG Oral Tab Take 1-2 tablets by mouth as needed. Disp:  Rfl:    Multiple Vitamins-Minerals (CENTRUM SILVER) Oral Tab Take 1 tablet by mouth daily. Disp:  Rfl:    TH COD LIVER OIL Oral Cap Take  by mouth.  Disp:  Rfl: HAND/FINGER SURGERY UNLISTED Left      Comment: LRF trigger finger release  No date: HERNIA SURGERY      Comment: hernia repair, herniorraphy  2008: HIP REPLACEMENT SURGERY  12/2008: HIP SURGERY      Comment: left hip surface repair  1990: KNEE ARTHROSCOPY patient has 5/5 strength in tibialis anterior, gastrocsoleus complex, EHL, and FHL. The patient has medial joint line tenderness to palpation. There is no proximal medial tibia tenderness to palpation. Patient has pain with hyperflexion.   The patient ha

## 2017-07-10 NOTE — ANESTHESIA POSTPROCEDURE EVALUATION
Patient: Warren Alexander    Procedure Summary     Date:  07/10/17 Room / Location:  [facility] OR  / [facility] OR    Anesthesia Start:  0864 Anesthesia Stop:      Procedure:  KNEE ARTHROSCOPY (Right ) Diagnosis:  (Right knee medial meniscal tear)    Surgeon:

## 2017-07-10 NOTE — BRIEF OP NOTE
Pre-Operative Diagnosis: Right knee medial meniscal tear     Post-Operative Diagnosis: Right knee medial meniscal tear     Procedure Performed:   Procedure(s):  Right knee arthroscopy, partial medial meniscectomy    Surgeon(s) and Role:     * Jolly Reyes

## 2017-07-11 ENCOUNTER — TELEPHONE (OUTPATIENT)
Dept: ORTHOPEDICS CLINIC | Facility: CLINIC | Age: 72
End: 2017-07-11

## 2017-07-11 ENCOUNTER — OFFICE VISIT (OUTPATIENT)
Dept: ORTHOPEDICS CLINIC | Facility: CLINIC | Age: 72
End: 2017-07-11

## 2017-07-11 DIAGNOSIS — S83.241A ACUTE MEDIAL MENISCUS TEAR, RIGHT, INITIAL ENCOUNTER: Primary | ICD-10-CM

## 2017-07-11 PROCEDURE — 99024 POSTOP FOLLOW-UP VISIT: CPT | Performed by: ORTHOPAEDIC SURGERY

## 2017-07-11 PROCEDURE — 99212 OFFICE O/P EST SF 10 MIN: CPT | Performed by: ORTHOPAEDIC SURGERY

## 2017-07-11 NOTE — OPERATIVE REPORT
[facility]    PATIENT'S NAME: Forest Kingstonrupali   ATTENDING PHYSICIAN: Brittany Thacker MD   OPERATING PHYSICIAN: Brittany Thacker MD   PATIENT ACCOUNT#:   [id number]    LOCATION:  [address]  MEDICAL RECORD #:   [id number]       DATE OF BI and lateral portals were entered at the anterior aspect of the right knee. Diagnostic arthroscopy then ensued. There were grade 2 and 3 changes along the superior aspect of the medial patellar facet.   There were no loose bodies in the medial or lateral g obtaining a DVT.     Dictated By Celeste Tapia MD  d: 07/10/2017 17:27:02  t: 07/10/2017 19:50:58  Job [id number]  /

## 2017-07-11 NOTE — PROGRESS NOTES
This is a pleasant 67-year-old male that is 1 day status post right knee arthroscopy and partial medial meniscectomy. Patient has had no fevers, chills, or markers of infection. Patient returns today for his first postoperative evaluation.     On exam, th

## 2017-07-12 ENCOUNTER — TELEPHONE (OUTPATIENT)
Dept: INTERNAL MEDICINE CLINIC | Facility: CLINIC | Age: 72
End: 2017-07-12

## 2017-07-12 DIAGNOSIS — S83.241D TEAR OF MEDIAL MENISCUS OF RIGHT KNEE, UNSPECIFIED TEAR TYPE, UNSPECIFIED WHETHER OLD OR CURRENT TEAR, SUBSEQUENT ENCOUNTER: Primary | ICD-10-CM

## 2017-07-12 NOTE — TELEPHONE ENCOUNTER
Pt stts he needs referral for a follow up on Dr.Gregory De León for orthopedic.  Please call when in the system

## 2017-07-13 ENCOUNTER — OFFICE VISIT (OUTPATIENT)
Dept: PHYSICAL THERAPY | Facility: HOSPITAL | Age: 72
End: 2017-07-13
Attending: ORTHOPAEDIC SURGERY
Payer: COMMERCIAL

## 2017-07-13 DIAGNOSIS — S83.241A ACUTE MEDIAL MENISCUS TEAR, RIGHT, INITIAL ENCOUNTER: ICD-10-CM

## 2017-07-13 PROCEDURE — 97161 PT EVAL LOW COMPLEX 20 MIN: CPT | Performed by: PHYSICAL THERAPIST

## 2017-07-13 PROCEDURE — 97110 THERAPEUTIC EXERCISES: CPT | Performed by: PHYSICAL THERAPIST

## 2017-07-13 NOTE — PROGRESS NOTES
KNEE EVALUATION:   Referring Physician: Dr. Donna Amor  Diagnosis: Acute medial meniscus tear, right, initial encounter (L97.821E)      Onset Date: 7/10/17 Evaluation Date: 7/13/2017  Visit # 1  Scheduled Visits 8  Insurance Authorized visits HMO (in pendin support    Strength(R not tested secondary to surgery)   Right Left Comments   Hip flexion       5/5    Knee flexion  5/5    Knee ext  5/5    Ankle DF  5/5    Ankle PF  5/5    Hip abd                        AROM/PROM   Right Left Comments   Knee flexion advised of these findings, precautions, and treatment options and has agreed to actively participate in planning and for this course of care.     Thank you for your referral. Please co-sign or sign and return this letter via fax as soon as possible to [phone]

## 2017-07-18 ENCOUNTER — PATIENT MESSAGE (OUTPATIENT)
Dept: DERMATOLOGY CLINIC | Facility: CLINIC | Age: 72
End: 2017-07-18

## 2017-07-18 RX ORDER — HYDROCORTISONE ACETATE, ALOE VERA LEAF AND IODOQUINOL 20; 10; 10 MG/G; MG/G; MG/G
GEL TOPICAL
Qty: 48 G | Refills: 0 | Status: SHIPPED | OUTPATIENT
Start: 2017-07-18 | End: 2018-08-21

## 2017-07-18 NOTE — TELEPHONE ENCOUNTER
Contacted [facility], telephone number looked up on-line. Telephone is [phone], fax number per Alfredo Ahmadi is [phone]. Pt contacted and informed that Dr. Maryruth Claude would like him to try a medication called Alcortin-A gel to affected areas to co

## 2017-07-18 NOTE — TELEPHONE ENCOUNTER
Can give pt alcortin to be applied 2x/day- sen to [facility]- let pt know it will be $0.   If not improving after 2 weeks, recommend he call for appointment to reevaluate

## 2017-07-18 NOTE — TELEPHONE ENCOUNTER
Pt last seen 1/2017 for Angular cheilitis. Denies improvement. Pt asking for additional medications. Would you like to have pt seen in office?

## 2017-07-19 ENCOUNTER — OFFICE VISIT (OUTPATIENT)
Dept: PHYSICAL THERAPY | Facility: HOSPITAL | Age: 72
End: 2017-07-19
Attending: ORTHOPAEDIC SURGERY
Payer: COMMERCIAL

## 2017-07-19 DIAGNOSIS — S83.241A ACUTE MEDIAL MENISCUS TEAR, RIGHT, INITIAL ENCOUNTER: ICD-10-CM

## 2017-07-19 PROCEDURE — 97110 THERAPEUTIC EXERCISES: CPT | Performed by: PHYSICAL THERAPIST

## 2017-07-19 NOTE — PROGRESS NOTES
Dx: Acute medial meniscus tear, right, initial encounter (P34.117Q)            Visit # 2  Fall Risk: standard          Scheduled Visits 8  Precautions: n/a       Insurance Authorized visits  8 HMO        Next MD visit: none scheduled       Evaluation Date

## 2017-07-21 ENCOUNTER — OFFICE VISIT (OUTPATIENT)
Dept: PHYSICAL THERAPY | Facility: HOSPITAL | Age: 72
End: 2017-07-21
Attending: ORTHOPAEDIC SURGERY
Payer: COMMERCIAL

## 2017-07-21 DIAGNOSIS — S83.241A ACUTE MEDIAL MENISCUS TEAR, RIGHT, INITIAL ENCOUNTER: ICD-10-CM

## 2017-07-21 PROCEDURE — 97110 THERAPEUTIC EXERCISES: CPT

## 2017-07-21 NOTE — PROGRESS NOTES
Dx: Acute medial meniscus tear, right, initial encounter (I62.841F)            Visit # 3  Fall Risk: standard          Scheduled Visits 8  Precautions: n/a       Insurance Authorized visits  8 HMO        Next MD visit: none scheduled       Evaluation Date

## 2017-07-23 RX ORDER — NIFEDIPINE 60 MG/1
60 TABLET, FILM COATED, EXTENDED RELEASE ORAL
Qty: 90 TABLET | Refills: 0 | Status: SHIPPED
Start: 2017-07-23 | End: 2017-11-07

## 2017-07-23 RX ORDER — GLIPIZIDE 2.5 MG/1
2.5 TABLET, EXTENDED RELEASE ORAL
Qty: 90 TABLET | Refills: 0 | Status: SHIPPED
Start: 2017-07-23 | End: 2017-11-07

## 2017-07-23 NOTE — TELEPHONE ENCOUNTER
Hypertensive Medications  Protocol Criteria:  · Appointment scheduled in the past 6 months or in the next 3 months  · BMP or CMP in the past 12 months  · Creatinine result < 2  Recent Outpatient Visits            2 days ago Acute medial meniscus tear, righ  09/13/2016   K 4.4 09/13/2016    09/13/2016   CO2 27 09/13/2016   GLOBULIN 2.7 09/13/2016   AGRATIO 1.5 09/13/2016   ANIONGAP 9 09/13/2016   OSMOCALC 295 09/13/2016     Diabetes Medications  Protocol Criteria:  · Appointment scheduled in the 09/13/2016

## 2017-07-23 NOTE — TELEPHONE ENCOUNTER
From: Jessica Gillette  Sent: 7/21/2017 7:18 AM CDT  Subject: Medication Renewal Request    Stacy Song.  Hero Rose would like a refill of the following medications:  NIFEdipine ER 60 MG Oral Tablet 24 Hr [Raúl Gonzalez MD]  GlipiZIDE ER (GLIPIZIDE XL) 2.5 MG Ora

## 2017-07-23 NOTE — TELEPHONE ENCOUNTER
Signed Prescriptions Disp Refills    NIFEdipine ER 60 MG Oral Tablet 24 Hr 90 tablet 0      Sig: Take 1 tablet (60 mg total) by mouth once daily.         Authorizing Provider: MIRI Borden        Ordering User: Zita Velasco      GlipiZIDE ER (GLIP

## 2017-07-25 ENCOUNTER — OFFICE VISIT (OUTPATIENT)
Dept: PHYSICAL THERAPY | Facility: HOSPITAL | Age: 72
End: 2017-07-25
Attending: ORTHOPAEDIC SURGERY
Payer: COMMERCIAL

## 2017-07-25 DIAGNOSIS — S83.241A ACUTE MEDIAL MENISCUS TEAR, RIGHT, INITIAL ENCOUNTER: ICD-10-CM

## 2017-07-25 PROCEDURE — 97110 THERAPEUTIC EXERCISES: CPT | Performed by: PHYSICAL THERAPIST

## 2017-07-25 PROCEDURE — 97140 MANUAL THERAPY 1/> REGIONS: CPT | Performed by: PHYSICAL THERAPIST

## 2017-07-25 NOTE — PROGRESS NOTES
Dx: Acute medial meniscus tear, right, initial encounter (U57.250J)            Visit # 4  Fall Risk: standard          Scheduled Visits 8  Precautions: n/a       Insurance Authorized visits  8 HMO        Next MD visit: none scheduled       Evaluation Date

## 2017-07-28 ENCOUNTER — OFFICE VISIT (OUTPATIENT)
Dept: PHYSICAL THERAPY | Facility: HOSPITAL | Age: 72
End: 2017-07-28
Attending: ORTHOPAEDIC SURGERY
Payer: COMMERCIAL

## 2017-07-28 DIAGNOSIS — S83.241A ACUTE MEDIAL MENISCUS TEAR, RIGHT, INITIAL ENCOUNTER: ICD-10-CM

## 2017-07-28 PROCEDURE — 97014 ELECTRIC STIMULATION THERAPY: CPT | Performed by: PHYSICAL THERAPIST

## 2017-07-28 PROCEDURE — 97110 THERAPEUTIC EXERCISES: CPT | Performed by: PHYSICAL THERAPIST

## 2017-07-28 NOTE — PROGRESS NOTES
Dx: Acute medial meniscus tear, right, initial encounter (Y05.574I)            Visit # 5  Fall Risk: standard          Scheduled Visits 8  Precautions: n/a       Insurance Authorized visits  8 HMO        Next MD visit: none scheduled       Evaluation Date

## 2017-08-01 ENCOUNTER — TELEPHONE (OUTPATIENT)
Dept: ORTHOPEDICS CLINIC | Facility: CLINIC | Age: 72
End: 2017-08-01

## 2017-08-01 ENCOUNTER — OFFICE VISIT (OUTPATIENT)
Dept: PHYSICAL THERAPY | Facility: HOSPITAL | Age: 72
End: 2017-08-01
Attending: ORTHOPAEDIC SURGERY
Payer: COMMERCIAL

## 2017-08-01 DIAGNOSIS — S83.241A ACUTE MEDIAL MENISCUS TEAR, RIGHT, INITIAL ENCOUNTER: Primary | ICD-10-CM

## 2017-08-01 DIAGNOSIS — S83.241A ACUTE MEDIAL MENISCUS TEAR, RIGHT, INITIAL ENCOUNTER: ICD-10-CM

## 2017-08-01 PROCEDURE — 97110 THERAPEUTIC EXERCISES: CPT | Performed by: PHYSICAL THERAPIST

## 2017-08-01 PROCEDURE — 97014 ELECTRIC STIMULATION THERAPY: CPT | Performed by: PHYSICAL THERAPIST

## 2017-08-01 NOTE — PROGRESS NOTES
PATIENT HAS HMO PLEASE APPROVE 10 VISIT  Patient Name: Marylee Nettles, : 3/17/1945, MRN: [id number]   Date:  2017  Referring Physician:  Aspen Saul    Diagnosis: Acute medial meniscus tear, right, initial encounter (G17.708H)       Progress S modalities prn       Patient was advised of these findings, precautions, and treatment options and has agreed to actively participate in planning and for this course of care.     Thank you for your referral. Please co-sign or sign and return this letter via Total Timed Treatment: 38 min  Total Treatment Time: 38 min

## 2017-08-04 ENCOUNTER — OFFICE VISIT (OUTPATIENT)
Dept: PHYSICAL THERAPY | Facility: HOSPITAL | Age: 72
End: 2017-08-04
Attending: INTERNAL MEDICINE
Payer: COMMERCIAL

## 2017-08-04 ENCOUNTER — APPOINTMENT (OUTPATIENT)
Dept: PHYSICAL THERAPY | Facility: HOSPITAL | Age: 72
End: 2017-08-04
Attending: ORTHOPAEDIC SURGERY
Payer: COMMERCIAL

## 2017-08-04 PROCEDURE — 97110 THERAPEUTIC EXERCISES: CPT | Performed by: PHYSICAL THERAPIST

## 2017-08-04 PROCEDURE — 97014 ELECTRIC STIMULATION THERAPY: CPT | Performed by: PHYSICAL THERAPIST

## 2017-08-04 NOTE — PROGRESS NOTES
Dx: Acute medial meniscus tear, right, initial encounter (S57.970V)            Visit # 7  Fall Risk: standard          Scheduled Visits 8  Precautions: n/a       Insurance Authorized visits  8 HMO        Next MD visit: none scheduled       Evaluation Date

## 2017-08-07 ENCOUNTER — HOSPITAL ENCOUNTER (OUTPATIENT)
Dept: BONE DENSITY | Facility: HOSPITAL | Age: 72
Discharge: HOME | End: 2017-08-07
Attending: INTERNAL MEDICINE
Payer: COMMERCIAL

## 2017-08-07 ENCOUNTER — OFFICE VISIT (OUTPATIENT)
Dept: PHYSICAL THERAPY | Facility: HOSPITAL | Age: 72
End: 2017-08-07
Attending: ORTHOPAEDIC SURGERY
Payer: COMMERCIAL

## 2017-08-07 DIAGNOSIS — S83.241A ACUTE MEDIAL MENISCUS TEAR, RIGHT, INITIAL ENCOUNTER: ICD-10-CM

## 2017-08-07 DIAGNOSIS — M81.0 OSTEOPOROSIS WITHOUT CURRENT PATHOLOGICAL FRACTURE, UNSPECIFIED OSTEOPOROSIS TYPE: ICD-10-CM

## 2017-08-07 PROCEDURE — 97110 THERAPEUTIC EXERCISES: CPT | Performed by: PHYSICAL THERAPIST

## 2017-08-07 PROCEDURE — 97014 ELECTRIC STIMULATION THERAPY: CPT | Performed by: PHYSICAL THERAPIST

## 2017-08-07 PROCEDURE — 77080 DXA BONE DENSITY AXIAL: CPT | Performed by: INTERNAL MEDICINE

## 2017-08-07 NOTE — PROGRESS NOTES
Dx: Acute medial meniscus tear, right, initial encounter (Q43.044Q)            Visit # 8  Fall Risk: standard          Scheduled Visits 8  Precautions: n/a       Insurance Authorized visits  8 HMO        Next MD visit: none scheduled       Evaluation Date

## 2017-08-10 ENCOUNTER — PATIENT MESSAGE (OUTPATIENT)
Dept: DERMATOLOGY CLINIC | Facility: CLINIC | Age: 72
End: 2017-08-10

## 2017-08-11 ENCOUNTER — OFFICE VISIT (OUTPATIENT)
Dept: ORTHOPEDICS CLINIC | Facility: CLINIC | Age: 72
End: 2017-08-11

## 2017-08-11 DIAGNOSIS — S83.241A ACUTE MEDIAL MENISCUS TEAR, RIGHT, INITIAL ENCOUNTER: Primary | ICD-10-CM

## 2017-08-11 PROCEDURE — 99024 POSTOP FOLLOW-UP VISIT: CPT | Performed by: ORTHOPAEDIC SURGERY

## 2017-08-11 PROCEDURE — 99212 OFFICE O/P EST SF 10 MIN: CPT | Performed by: ORTHOPAEDIC SURGERY

## 2017-08-11 NOTE — TELEPHONE ENCOUNTER
From: Dickson Serrano  To: Angela Pacheoc MD  Sent: 8/10/2017 6:48 PM CDT  Subject: Visit [facility] Host Dr. Golden Caldwell,  I've used the Alcortin A Gel for about 3 weeks now.  While it has helped to heal the cracks in the corners of my mouth, it has no

## 2017-08-11 NOTE — PROGRESS NOTES
This is a pleasant 58-year-old male that is 4 weeks status post right knee arthroscopy and partial medial meniscectomy. The patient has had no fevers, chills, or markers of infection.   The patient has been participating in physical therapy and returns tod

## 2017-08-13 NOTE — TELEPHONE ENCOUNTER
Let patient know that many skin problems such as eczema are controlled, but not cured by topical medications, and he may need to use intermittently when recurs.   Since I only saw him once, and over 6 months ago, would recommend an appt(non-urgent) to farhat

## 2017-08-16 NOTE — PROGRESS NOTES
Patient Name: Caryl Larios, : 3/17/1945, MRN: [id number]   Date:  2017  Referring Physician:  Dwaine Paige    Diagnosis:     ICD-10-CM    1.  Acute medial meniscus tear, right, initial encounter S83.241A        Discharge note    Pt has atte

## 2017-08-18 ENCOUNTER — APPOINTMENT (OUTPATIENT)
Dept: PHYSICAL THERAPY | Facility: HOSPITAL | Age: 72
End: 2017-08-18
Attending: ORTHOPAEDIC SURGERY
Payer: COMMERCIAL

## 2017-08-21 ENCOUNTER — PATIENT MESSAGE (OUTPATIENT)
Dept: INTERNAL MEDICINE CLINIC | Facility: CLINIC | Age: 72
End: 2017-08-21

## 2017-08-21 ENCOUNTER — APPOINTMENT (OUTPATIENT)
Dept: PHYSICAL THERAPY | Facility: HOSPITAL | Age: 72
End: 2017-08-21
Attending: ORTHOPAEDIC SURGERY
Payer: COMMERCIAL

## 2017-08-23 ENCOUNTER — APPOINTMENT (OUTPATIENT)
Dept: PHYSICAL THERAPY | Facility: HOSPITAL | Age: 72
End: 2017-08-23
Attending: ORTHOPAEDIC SURGERY
Payer: COMMERCIAL

## 2017-08-28 ENCOUNTER — APPOINTMENT (OUTPATIENT)
Dept: PHYSICAL THERAPY | Facility: HOSPITAL | Age: 72
End: 2017-08-28
Attending: ORTHOPAEDIC SURGERY
Payer: COMMERCIAL

## 2017-08-28 RX ORDER — LANSOPRAZOLE 30 MG/1
30 CAPSULE, DELAYED RELEASE ORAL
Qty: 90 CAPSULE | Refills: 0 | Status: SHIPPED | OUTPATIENT
Start: 2017-08-28 | End: 2017-08-29

## 2017-08-28 NOTE — TELEPHONE ENCOUNTER
Refill protocol criteria met, rx sent.       Refill Protocol Appointment Criteria  · Appointment scheduled in the past 12 months or in the next 3 months  Recent Outpatient Visits            2 weeks ago Acute medial meniscus tear, right, initial encounter

## 2017-08-28 NOTE — TELEPHONE ENCOUNTER
Refilled per protocol.       Refill Protocol Appointment Criteria  · Appointment scheduled in the past 6 months or in the next 3 months  Recent Outpatient Visits            2 weeks ago Acute medial meniscus tear, right, initial encounter    [address]

## 2017-08-28 NOTE — TELEPHONE ENCOUNTER
From: Richmond Garcia  Sent: 8/25/2017 11:43 AM CDT  Subject: Medication Renewal Request    Jorge Ljj Richard.  Betsey Sargent would like a refill of the following medications:  lansoprazole 30 MG Oral Capsule Delayed Release [Raúl Gil MD]    Preferred pharmacy: Other

## 2017-08-30 ENCOUNTER — APPOINTMENT (OUTPATIENT)
Dept: PHYSICAL THERAPY | Facility: HOSPITAL | Age: 72
End: 2017-08-30
Attending: ORTHOPAEDIC SURGERY
Payer: COMMERCIAL

## 2017-08-30 NOTE — TELEPHONE ENCOUNTER
From: Cassandra Ascencio  Sent: 8/29/2017 3:49 PM CDT  Subject: Medication Renewal Request    Jassi Arias would like a refill of the following medications:  lansoprazole 30 MG Oral Capsule Delayed Release [Raúl Mendez MD]    Preferred pharmacy: [facility]

## 2017-08-30 NOTE — TELEPHONE ENCOUNTER
Per pt message, Lansoprazole is no longer covered under his insurance, asking if there is an alternative med. Please advise.

## 2017-08-31 RX ORDER — LANSOPRAZOLE 30 MG/1
30 CAPSULE, DELAYED RELEASE ORAL
Qty: 90 CAPSULE | Refills: 0 | Status: SHIPPED
Start: 2017-08-31 | End: 2017-09-13

## 2017-09-05 ENCOUNTER — APPOINTMENT (OUTPATIENT)
Dept: PHYSICAL THERAPY | Facility: HOSPITAL | Age: 72
End: 2017-09-05
Attending: ORTHOPAEDIC SURGERY
Payer: COMMERCIAL

## 2017-09-07 ENCOUNTER — APPOINTMENT (OUTPATIENT)
Dept: PHYSICAL THERAPY | Facility: HOSPITAL | Age: 72
End: 2017-09-07
Attending: ORTHOPAEDIC SURGERY
Payer: COMMERCIAL

## 2017-09-11 ENCOUNTER — APPOINTMENT (OUTPATIENT)
Dept: PHYSICAL THERAPY | Facility: HOSPITAL | Age: 72
End: 2017-09-11
Attending: ORTHOPAEDIC SURGERY
Payer: COMMERCIAL

## 2017-09-13 ENCOUNTER — APPOINTMENT (OUTPATIENT)
Dept: PHYSICAL THERAPY | Facility: HOSPITAL | Age: 72
End: 2017-09-13
Attending: ORTHOPAEDIC SURGERY
Payer: COMMERCIAL

## 2017-09-13 ENCOUNTER — OFFICE VISIT (OUTPATIENT)
Dept: INTERNAL MEDICINE CLINIC | Facility: CLINIC | Age: 72
End: 2017-09-13

## 2017-09-13 VITALS
BODY MASS INDEX: 28.44 KG/M2 | HEIGHT: 72 IN | SYSTOLIC BLOOD PRESSURE: 138 MMHG | DIASTOLIC BLOOD PRESSURE: 71 MMHG | WEIGHT: 210 LBS | HEART RATE: 78 BPM | RESPIRATION RATE: 16 BRPM

## 2017-09-13 DIAGNOSIS — Z96.651 S/P TOTAL KNEE REPLACEMENT, RIGHT: ICD-10-CM

## 2017-09-13 DIAGNOSIS — E11.9 TYPE 2 DIABETES MELLITUS WITHOUT COMPLICATION, WITHOUT LONG-TERM CURRENT USE OF INSULIN (HCC): Primary | ICD-10-CM

## 2017-09-13 DIAGNOSIS — E78.2 MIXED HYPERLIPIDEMIA: ICD-10-CM

## 2017-09-13 DIAGNOSIS — Z23 NEED FOR VACCINATION: ICD-10-CM

## 2017-09-13 DIAGNOSIS — M85.80 OSTEOPENIA DETERMINED BY X-RAY: ICD-10-CM

## 2017-09-13 DIAGNOSIS — I10 ESSENTIAL HYPERTENSION WITH GOAL BLOOD PRESSURE LESS THAN 130/85: ICD-10-CM

## 2017-09-13 PROCEDURE — 99215 OFFICE O/P EST HI 40 MIN: CPT | Performed by: INTERNAL MEDICINE

## 2017-09-13 PROCEDURE — 90471 IMMUNIZATION ADMIN: CPT | Performed by: INTERNAL MEDICINE

## 2017-09-13 PROCEDURE — 90653 IIV ADJUVANT VACCINE IM: CPT | Performed by: INTERNAL MEDICINE

## 2017-09-13 PROCEDURE — 99212 OFFICE O/P EST SF 10 MIN: CPT | Performed by: INTERNAL MEDICINE

## 2017-09-13 RX ORDER — OMEPRAZOLE 40 MG/1
40 CAPSULE, DELAYED RELEASE ORAL DAILY
Qty: 90 CAPSULE | Refills: 3 | Status: SHIPPED | OUTPATIENT
Start: 2017-09-13 | End: 2018-10-03

## 2017-09-13 RX ORDER — RISEDRONATE SODIUM 150 MG/1
150 TABLET, FILM COATED ORAL
Qty: 3 TABLET | Refills: 3 | Status: SHIPPED | OUTPATIENT
Start: 2017-09-13 | End: 2018-10-25

## 2017-09-13 NOTE — PROGRESS NOTES
Krista Welch is a 66year old male. HPI:   1.  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 Disp: 90 tablet Rfl: 3   lansoprazole 30 MG Oral Capsule Delayed Release Take 1 capsule (30 mg total) by mouth every morning before breakfast. Disp: 90 capsule Rfl: 3   Diclofenac Sodium 1 % Jaime Ly injection   • Cataract 2012, 1998 2012 Phaco w/ PC IOL OD   • Posterior subcapsular cataract    • Posterior subcapsular cataract 2010, 1998 2010 Phaco w/ PC IOL OS   • Capsular opacification 2014     YAG laser OD      Social History:    Smoking Sta carbohydrate controlled diet and exercise, refer to Ophthalmology, check feet daily.    - HEMOGLOBIN A1C; Future  - MICROALB/CREAT RATIO, RANDOM URINE; Future    2.  Essential hypertension with goal blood pressure less than 130/85    Patient instructed to t

## 2017-09-26 ENCOUNTER — LAB ENCOUNTER (OUTPATIENT)
Dept: LAB | Facility: HOSPITAL | Age: 72
End: 2017-09-26
Attending: INTERNAL MEDICINE
Payer: COMMERCIAL

## 2017-09-26 DIAGNOSIS — I10 ESSENTIAL HYPERTENSION WITH GOAL BLOOD PRESSURE LESS THAN 130/85: ICD-10-CM

## 2017-09-26 DIAGNOSIS — E78.2 MIXED HYPERLIPIDEMIA: ICD-10-CM

## 2017-09-26 DIAGNOSIS — E11.9 TYPE 2 DIABETES MELLITUS WITHOUT COMPLICATION, WITHOUT LONG-TERM CURRENT USE OF INSULIN (HCC): ICD-10-CM

## 2017-09-26 DIAGNOSIS — M85.80 OSTEOPENIA DETERMINED BY X-RAY: ICD-10-CM

## 2017-09-26 LAB
ALBUMIN SERPL BCP-MCNC: 4 G/DL (ref 3.5–4.8)
ALBUMIN/GLOB SERPL: 1.4 {RATIO} (ref 1–2)
ALP SERPL-CCNC: 69 U/L (ref 32–100)
ALT SERPL-CCNC: 16 U/L (ref 17–63)
ANION GAP SERPL CALC-SCNC: 7 MMOL/L (ref 0–18)
AST SERPL-CCNC: 18 U/L (ref 15–41)
BASOPHILS # BLD: 0.1 K/UL (ref 0–0.2)
BASOPHILS NFR BLD: 1 %
BILIRUB SERPL-MCNC: 0.9 MG/DL (ref 0.3–1.2)
BILIRUB UR QL: NEGATIVE
BUN SERPL-MCNC: 15 MG/DL (ref 8–20)
BUN/CREAT SERPL: 14.9 (ref 10–20)
CALCIUM SERPL-MCNC: 9.5 MG/DL (ref 8.5–10.5)
CHLORIDE SERPL-SCNC: 106 MMOL/L (ref 95–110)
CHOLEST SERPL-MCNC: 165 MG/DL (ref 110–200)
CLARITY UR: CLEAR
CO2 SERPL-SCNC: 29 MMOL/L (ref 22–32)
COLOR UR: YELLOW
CREAT SERPL-MCNC: 1.01 MG/DL (ref 0.5–1.5)
CREAT UR-MCNC: 67.6 MG/DL
EOSINOPHIL # BLD: 1 K/UL (ref 0–0.7)
EOSINOPHIL NFR BLD: 12 %
ERYTHROCYTE [DISTWIDTH] IN BLOOD BY AUTOMATED COUNT: 13.3 % (ref 11–15)
GLOBULIN PLAS-MCNC: 2.9 G/DL (ref 2.5–3.7)
GLUCOSE SERPL-MCNC: 123 MG/DL (ref 70–99)
GLUCOSE UR-MCNC: NEGATIVE MG/DL
HBA1C MFR BLD: 5.8 % (ref 4–6)
HCT VFR BLD AUTO: 44.6 % (ref 41–52)
HDLC SERPL-MCNC: 41 MG/DL
HGB BLD-MCNC: 15.2 G/DL (ref 13.5–17.5)
HGB UR QL STRIP.AUTO: NEGATIVE
KETONES UR-MCNC: NEGATIVE MG/DL
LDLC SERPL CALC-MCNC: 103 MG/DL (ref 0–99)
LEUKOCYTE ESTERASE UR QL STRIP.AUTO: NEGATIVE
LYMPHOCYTES # BLD: 2 K/UL (ref 1–4)
LYMPHOCYTES NFR BLD: 25 %
MCH RBC QN AUTO: 33.5 PG (ref 27–32)
MCHC RBC AUTO-ENTMCNC: 34.1 G/DL (ref 32–37)
MCV RBC AUTO: 98.2 FL (ref 80–100)
MICROALBUMIN UR-MCNC: 0 MG/DL (ref 0–1.8)
MICROALBUMIN/CREAT UR: 0 MG/G{CREAT} (ref 0–20)
MONOCYTES # BLD: 0.7 K/UL (ref 0–1)
MONOCYTES NFR BLD: 9 %
NEUTROPHILS # BLD AUTO: 4.4 K/UL (ref 1.8–7.7)
NEUTROPHILS NFR BLD: 54 %
NITRITE UR QL STRIP.AUTO: NEGATIVE
NONHDLC SERPL-MCNC: 124 MG/DL
OSMOLALITY UR CALC.SUM OF ELEC: 296 MOSM/KG (ref 275–295)
PH UR: 6 [PH] (ref 5–8)
PLATELET # BLD AUTO: 213 K/UL (ref 140–400)
PMV BLD AUTO: 9.9 FL (ref 7.4–10.3)
POTASSIUM SERPL-SCNC: 5 MMOL/L (ref 3.3–5.1)
PROT SERPL-MCNC: 6.9 G/DL (ref 5.9–8.4)
PROT UR-MCNC: NEGATIVE MG/DL
RBC # BLD AUTO: 4.54 M/UL (ref 4.5–5.9)
SODIUM SERPL-SCNC: 142 MMOL/L (ref 136–144)
SP GR UR STRIP: 1.01 (ref 1–1.03)
TRIGL SERPL-MCNC: 105 MG/DL (ref 1–149)
TSH SERPL-ACNC: 1.17 UIU/ML (ref 0.45–5.33)
UROBILINOGEN UR STRIP-ACNC: <2
VIT C UR-MCNC: NEGATIVE MG/DL
WBC # BLD AUTO: 8 K/UL (ref 4–11)

## 2017-09-26 PROCEDURE — 83036 HEMOGLOBIN GLYCOSYLATED A1C: CPT

## 2017-09-26 PROCEDURE — 82570 ASSAY OF URINE CREATININE: CPT

## 2017-09-26 PROCEDURE — 80053 COMPREHEN METABOLIC PANEL: CPT

## 2017-09-26 PROCEDURE — 81003 URINALYSIS AUTO W/O SCOPE: CPT

## 2017-09-26 PROCEDURE — 82043 UR ALBUMIN QUANTITATIVE: CPT

## 2017-09-26 PROCEDURE — 36415 COLL VENOUS BLD VENIPUNCTURE: CPT

## 2017-09-26 PROCEDURE — 80061 LIPID PANEL: CPT

## 2017-09-26 PROCEDURE — 84443 ASSAY THYROID STIM HORMONE: CPT

## 2017-09-26 PROCEDURE — 85060 BLOOD SMEAR INTERPRETATION: CPT

## 2017-09-26 PROCEDURE — 82306 VITAMIN D 25 HYDROXY: CPT

## 2017-09-26 PROCEDURE — 85025 COMPLETE CBC W/AUTO DIFF WBC: CPT

## 2017-09-27 LAB — 25(OH)D3 SERPL-MCNC: 46.5 NG/ML

## 2017-11-07 ENCOUNTER — TELEPHONE (OUTPATIENT)
Dept: INTERNAL MEDICINE CLINIC | Facility: CLINIC | Age: 72
End: 2017-11-07

## 2017-11-07 RX ORDER — NIFEDIPINE 60 MG/1
60 TABLET, FILM COATED, EXTENDED RELEASE ORAL
Qty: 90 TABLET | Refills: 0 | Status: SHIPPED | OUTPATIENT
Start: 2017-11-07 | End: 2018-01-23

## 2017-11-07 RX ORDER — GLIPIZIDE 2.5 MG/1
2.5 TABLET, EXTENDED RELEASE ORAL
Qty: 90 TABLET | Refills: 0 | Status: SHIPPED | OUTPATIENT
Start: 2017-11-07 | End: 2018-01-23

## 2017-11-07 NOTE — TELEPHONE ENCOUNTER
Please advise on Pt's MyChart message below    Comments:   I would like to see him for the annual ,but more importantly, I am having pain on my right shoulder up around my neck, which is causing me to lose sleep at night and I re-injured my right knee this

## 2017-11-07 NOTE — TELEPHONE ENCOUNTER
From: Judie Saravia  Sent: 11/7/2017 8:50 AM CST  Subject: Medication Renewal Request    Jairo Dominguez.  Loi Walsh would like a refill of the following medications:     NIFEdipine ER 60 MG Oral Tablet 24 Hr [Raúl Fernandes MD]   Patient Comment: no refills left

## 2017-11-07 NOTE — TELEPHONE ENCOUNTER
Hypertensive Medications  Protocol Criteria:  · Appointment scheduled in the past 6 months or in the next 3 months  · BMP or CMP in the past 12 months  · Creatinine result < 2  Recent Outpatient Visits            1 month ago Type 2 diabetes mellitus withou past 6 months  · Creatinine in the past 12 months  · Creatinine result < 1.5   Recent Outpatient Visits            1 month ago Type 2 diabetes mellitus without complication, without long-term current use of insulin [facility])    [address], [address] [facility]

## 2017-11-07 NOTE — TELEPHONE ENCOUNTER
Pt states that his shoulder pain actually originates in his neck and is most problematic when he is trying to sleep. Aleve is not providing enough relief. Advised and scheduled appt for this acute problem.   Pt will also schedule annual physical for early

## 2017-11-09 ENCOUNTER — OFFICE VISIT (OUTPATIENT)
Dept: INTERNAL MEDICINE CLINIC | Facility: CLINIC | Age: 72
End: 2017-11-09

## 2017-11-09 VITALS
HEART RATE: 84 BPM | RESPIRATION RATE: 16 BRPM | SYSTOLIC BLOOD PRESSURE: 130 MMHG | DIASTOLIC BLOOD PRESSURE: 64 MMHG | HEIGHT: 72 IN | BODY MASS INDEX: 28.04 KG/M2 | WEIGHT: 207 LBS

## 2017-11-09 DIAGNOSIS — I10 ESSENTIAL HYPERTENSION WITH GOAL BLOOD PRESSURE LESS THAN 130/85: ICD-10-CM

## 2017-11-09 DIAGNOSIS — E11.9 TYPE 2 DIABETES MELLITUS WITHOUT COMPLICATION, WITHOUT LONG-TERM CURRENT USE OF INSULIN (HCC): Primary | ICD-10-CM

## 2017-11-09 DIAGNOSIS — G89.29 CHRONIC RIGHT SHOULDER PAIN: ICD-10-CM

## 2017-11-09 DIAGNOSIS — E78.2 MIXED HYPERLIPIDEMIA: ICD-10-CM

## 2017-11-09 DIAGNOSIS — G89.29 CHRONIC PAIN OF RIGHT KNEE: ICD-10-CM

## 2017-11-09 DIAGNOSIS — M25.561 CHRONIC PAIN OF RIGHT KNEE: ICD-10-CM

## 2017-11-09 DIAGNOSIS — M25.511 CHRONIC RIGHT SHOULDER PAIN: ICD-10-CM

## 2017-11-09 PROCEDURE — 99212 OFFICE O/P EST SF 10 MIN: CPT | Performed by: INTERNAL MEDICINE

## 2017-11-09 PROCEDURE — 99214 OFFICE O/P EST MOD 30 MIN: CPT | Performed by: INTERNAL MEDICINE

## 2017-11-09 NOTE — PROGRESS NOTES
Verito Telles is a 66year old male. HPI:   1.  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 Disp: 90 capsule Rfl: 3   Diclofenac Sodium 1 % Transdermal Gel Apply 2 g topically 4 (four) times daily.  Disp: 1 Tube Rfl: 3   M Posterior subcapsular cataract    • Posterior subcapsular cataract 2010, 1998 2010 Phaco w/ PC IOL OS   • Capsular opacification 2014     YAG laser OD      Social History:    Smoking Status: Never Smoker                      Smokeless Status: Never Use daily.    - HEMOGLOBIN A1C; Future  - MICROALB/CREAT RATIO, RANDOM URINE; Future    2.  Essential hypertension with goal blood pressure less than 130/85    Patient instructed to take anti-hypertensive medicines exactly as prescribed and to follow a low salt indicates understanding of these issues and agrees to the plan.     The patient is asked to return in 3 months

## 2017-11-24 ENCOUNTER — HOSPITAL ENCOUNTER (OUTPATIENT)
Dept: GENERAL RADIOLOGY | Facility: HOSPITAL | Age: 72
Discharge: HOME | End: 2017-11-24
Attending: ORTHOPAEDIC SURGERY
Payer: COMMERCIAL

## 2017-11-24 ENCOUNTER — OFFICE VISIT (OUTPATIENT)
Dept: ORTHOPEDICS CLINIC | Facility: CLINIC | Age: 72
End: 2017-11-24

## 2017-11-24 DIAGNOSIS — M25.561 RIGHT KNEE PAIN, UNSPECIFIED CHRONICITY: ICD-10-CM

## 2017-11-24 DIAGNOSIS — S83.411A SPRAIN OF MEDIAL COLLATERAL LIGAMENT OF RIGHT KNEE, INITIAL ENCOUNTER: Primary | ICD-10-CM

## 2017-11-24 DIAGNOSIS — M54.2 NECK PAIN: ICD-10-CM

## 2017-11-24 DIAGNOSIS — M50.30 DDD (DEGENERATIVE DISC DISEASE), CERVICAL: ICD-10-CM

## 2017-11-24 PROCEDURE — 99214 OFFICE O/P EST MOD 30 MIN: CPT | Performed by: ORTHOPAEDIC SURGERY

## 2017-11-24 PROCEDURE — 99212 OFFICE O/P EST SF 10 MIN: CPT | Performed by: ORTHOPAEDIC SURGERY

## 2017-11-24 PROCEDURE — 73565 X-RAY EXAM OF KNEES: CPT | Performed by: ORTHOPAEDIC SURGERY

## 2017-11-24 PROCEDURE — 73560 X-RAY EXAM OF KNEE 1 OR 2: CPT | Performed by: ORTHOPAEDIC SURGERY

## 2017-11-24 PROCEDURE — 72040 X-RAY EXAM NECK SPINE 2-3 VW: CPT | Performed by: ORTHOPAEDIC SURGERY

## 2017-11-24 RX ORDER — ASPIRIN 325 MG
325 TABLET ORAL AS NEEDED
COMMUNITY
End: 2018-07-03 | Stop reason: ALTCHOICE

## 2017-11-24 NOTE — PROGRESS NOTES
11/24/2017  Judie Saravia  3/17/1945  67year old   male  Leticia Norman MD    HPI:   Patient presents with:  Knee Pain: Right -- Went to PT. States middle of September, his dog ran into side of right knee.  States now he has pain especially ambulating Capsule Delayed Release Take 1 capsule (40 mg total) by mouth daily.  Disp: 90 capsule Rfl: 3   Iodoquinol-HC-Aloe Polysacch (ALCORTIN A) 1-2-1 % External Gel Apply one application to affected areas 2 times daily Disp: 48 g Rfl: 0   Calcium Carbonate-Vit D- arthritis - [facility] aristospan / lidocaine injection   • Esophageal reflux    • Eye problem 1998    increased C/D ratio   • Gout    • High blood pressure    • High cholesterol    • Hx of eye surgery 2013    repair detached retina   • Macula-on rhegmatogen LASER PROCEDURE  2008: TOTAL HIP REPLACEMENT Left  4/30/14: YAG CAPSULOTOMY - OD - RIGHT EYE Right      Comment: GREGORIO   Family History   Problem Relation Age of Onset   • Colon Cancer Mother    • pulmonary Embolism [OTHER] Father    • Glaucoma Neg    • Diab of the right knee reveals relative preservation of the medial, lateral, patellofemoral joint space.     ASSESSMENT AND PLAN:   Sprain of medial collateral ligament of right knee, initial encounter  (primary encounter diagnosis)  Ddd (degenerative disc disea

## 2017-12-03 ENCOUNTER — PATIENT MESSAGE (OUTPATIENT)
Dept: INTERNAL MEDICINE CLINIC | Facility: CLINIC | Age: 72
End: 2017-12-03

## 2017-12-04 ENCOUNTER — OFFICE VISIT (OUTPATIENT)
Dept: PHYSICAL THERAPY | Facility: HOSPITAL | Age: 72
End: 2017-12-04
Attending: ORTHOPAEDIC SURGERY
Payer: COMMERCIAL

## 2017-12-04 DIAGNOSIS — S83.411A SPRAIN OF MEDIAL COLLATERAL LIGAMENT OF RIGHT KNEE, INITIAL ENCOUNTER: ICD-10-CM

## 2017-12-04 PROCEDURE — 97162 PT EVAL MOD COMPLEX 30 MIN: CPT

## 2017-12-04 PROCEDURE — 97110 THERAPEUTIC EXERCISES: CPT

## 2017-12-04 NOTE — PROGRESS NOTES
LOWER EXTREMITY EVALUATION:   Referring Physician: Dr. Chris Baez  Date of Onset: September 2017 Date of Service: 12/4/2017   Diagnosis: Sprain of medial collateral ligament of right knee, initial encounter (F49.398L)  PATIENT SUMMARY:   Krista jordan a 67 function. Precautions:  None     OBJECTIVE:   Observation: Asymmetrical squat, shifts weight to L  Gait: L lateral trunk lean onto SC.  Decreased stance time on R, lack of R terminal knee extension  Stairs - ascends reciprocally with B handrails, descend Walking and Moving Around CJ: 20-39% impaired, limited, or restricted    Patient was advised of these findings, precautions, and treatment options and has agreed to actively participate in planning and for this course of care.     Thank you for your referra

## 2017-12-05 NOTE — TELEPHONE ENCOUNTER
Please see [email or web address] message and advise    LOV 11/09/17    LR 4/07/16    Med pended for phone in

## 2017-12-05 NOTE — TELEPHONE ENCOUNTER
From: Jasvir Minaya  To: Brenda Arteaga MD  Sent: 12/3/2017 7:56 PM CST  Subject: Prescription Question    Dr. Kary Ramos,  I have a prescription that Dr. Rangel Cuevas gave to help control the coughing from the filem that builds up in my throat.  It does he

## 2017-12-06 RX ORDER — PROMETHAZINE HYDROCHLORIDE AND CODEINE PHOSPHATE 6.25; 1 MG/5ML; MG/5ML
SYRUP ORAL
Qty: 240 ML | Refills: 1 | OUTPATIENT
Start: 2017-12-06 | End: 2018-04-16

## 2017-12-07 ENCOUNTER — OFFICE VISIT (OUTPATIENT)
Dept: PHYSICAL THERAPY | Facility: HOSPITAL | Age: 72
End: 2017-12-07
Attending: ORTHOPAEDIC SURGERY
Payer: COMMERCIAL

## 2017-12-07 PROCEDURE — 97110 THERAPEUTIC EXERCISES: CPT

## 2017-12-07 NOTE — PROGRESS NOTES
Diagnosis: Sprain of medial collateral ligament of right knee, initial encounter (E09.396Y)  Authorized # of Visits:  2/10         Next MD visit: January 5, 2018  Fall Risk: standard         Precautions: n/a           Medication Changes since last visit?:

## 2017-12-11 ENCOUNTER — OFFICE VISIT (OUTPATIENT)
Dept: PHYSICAL THERAPY | Facility: HOSPITAL | Age: 72
End: 2017-12-11
Attending: ORTHOPAEDIC SURGERY
Payer: COMMERCIAL

## 2017-12-11 PROCEDURE — 97110 THERAPEUTIC EXERCISES: CPT

## 2017-12-11 NOTE — PROGRESS NOTES
Diagnosis: Sprain of medial collateral ligament of right knee, initial encounter (Y88.577P)  Authorized # of Visits:  3/10         Next MD visit: January 5, 2018  Fall Risk: standard         Precautions: n/a           Medication Changes since last visit?:

## 2017-12-12 RX ORDER — PROMETHAZINE HYDROCHLORIDE AND CODEINE PHOSPHATE 6.25; 1 MG/5ML; MG/5ML
SYRUP ORAL
Qty: 240 ML | Refills: 1 | Status: CANCELLED
Start: 2017-12-12

## 2017-12-13 ENCOUNTER — APPOINTMENT (OUTPATIENT)
Dept: PHYSICAL THERAPY | Facility: HOSPITAL | Age: 72
End: 2017-12-13
Attending: ORTHOPAEDIC SURGERY
Payer: COMMERCIAL

## 2017-12-14 ENCOUNTER — TELEPHONE (OUTPATIENT)
Dept: INTERNAL MEDICINE CLINIC | Facility: CLINIC | Age: 72
End: 2017-12-14

## 2017-12-14 DIAGNOSIS — H33.001 RHEGMATOGENOUS RETINAL DETACHMENT OF RIGHT EYE: Primary | ICD-10-CM

## 2017-12-14 DIAGNOSIS — N40.1 BENIGN PROSTATIC HYPERPLASIA WITH NOCTURIA: ICD-10-CM

## 2017-12-14 DIAGNOSIS — M25.561 CHRONIC PAIN OF RIGHT KNEE: ICD-10-CM

## 2017-12-14 DIAGNOSIS — G89.29 CHRONIC PAIN OF RIGHT KNEE: ICD-10-CM

## 2017-12-14 DIAGNOSIS — R35.1 BENIGN PROSTATIC HYPERPLASIA WITH NOCTURIA: ICD-10-CM

## 2017-12-14 NOTE — TELEPHONE ENCOUNTER
Please see 12/3/17 mychart message was addressed. promethazine-codeine 6.25-10 MG/5ML Oral Syrup was ordered on 12/6/17 and pharmacy stated never received the order. Verbal order given.

## 2017-12-14 NOTE — TELEPHONE ENCOUNTER
Patient needs 3 referrals:  Dr. Valentino Loots Dr. Megha Paredes. Please sign pending referrals.   Thank Sedrick Kay

## 2017-12-18 ENCOUNTER — OFFICE VISIT (OUTPATIENT)
Dept: PHYSICAL THERAPY | Facility: HOSPITAL | Age: 72
End: 2017-12-18
Attending: ORTHOPAEDIC SURGERY
Payer: COMMERCIAL

## 2017-12-18 PROCEDURE — 97110 THERAPEUTIC EXERCISES: CPT

## 2017-12-18 PROCEDURE — 97140 MANUAL THERAPY 1/> REGIONS: CPT

## 2017-12-18 NOTE — PROGRESS NOTES
Diagnosis: Sprain of medial collateral ligament of right knee, initial encounter (V52.677Q)  Authorized # of Visits:  4/10         Next MD visit: January 5, 2018  Fall Risk: standard         Precautions: n/a           Medication Changes since last visit?: least 4+/5 to be able to squat without deviation. 4. Patient will demo reciprocal stair negotiation with one handrail to be able to climb stairs at home.     Plan: Continue PT for R knee strengthening, stretching, gait and balance training    Charges: CHANDU sandoval

## 2017-12-20 ENCOUNTER — OFFICE VISIT (OUTPATIENT)
Dept: PHYSICAL THERAPY | Facility: HOSPITAL | Age: 72
End: 2017-12-20
Attending: ORTHOPAEDIC SURGERY
Payer: COMMERCIAL

## 2017-12-20 DIAGNOSIS — M50.30 DDD (DEGENERATIVE DISC DISEASE), CERVICAL: ICD-10-CM

## 2017-12-20 PROCEDURE — 97110 THERAPEUTIC EXERCISES: CPT | Performed by: PHYSICAL THERAPIST

## 2017-12-20 NOTE — PROGRESS NOTES
Diagnosis: Sprain of medial collateral ligament of right knee, initial encounter (R48.834Y)  Authorized # of Visits:  6/10 O/medicare        Next MD visit: January 5, 2018  Fall Risk: standard         Precautions: n/a           Medication Changes since l strength at least 4+/5 to be able to squat without deviation. 4. Patient will demo reciprocal stair negotiation with one handrail to be able to climb stairs at home.     Plan: Continue PT for R knee strengthening, stretching, gait and balance training    C

## 2017-12-26 ENCOUNTER — TELEPHONE (OUTPATIENT)
Dept: INTERNAL MEDICINE CLINIC | Facility: CLINIC | Age: 72
End: 2017-12-26

## 2017-12-26 NOTE — TELEPHONE ENCOUNTER
Please see pt's MyChart message below. Reason: To address the following health maintenance concerns. Pneumococcal Ppsv20/Pcv15 65+ Years / Low And Medium Risk      Comments:   Do I need to receive this shot?    Thank you

## 2017-12-27 ENCOUNTER — OFFICE VISIT (OUTPATIENT)
Dept: PHYSICAL THERAPY | Facility: HOSPITAL | Age: 72
End: 2017-12-27
Attending: ORTHOPAEDIC SURGERY
Payer: COMMERCIAL

## 2017-12-27 PROCEDURE — 97110 THERAPEUTIC EXERCISES: CPT | Performed by: PHYSICAL THERAPIST

## 2017-12-27 NOTE — PROGRESS NOTES
Patient Name: Shruti Mercedes, : 3/17/1945, MRN: [id number]   Date:  2017  Referring Physician:  Pillo Menendez    Diagnosis: Sprain of medial collateral ligament of right knee, initial encounter (K48.310Z)    Discharge note    Pt has attended me at Dept: [phone].     Sincerely,  Patricia Valera PT, DPT, cert MDT      Electronically signed by therapist: Patricia Valera PT        Diagnosis: Sprain of medial collateral ligament of right knee, initial encounter (Q55.114E)  Authorized #

## 2017-12-27 NOTE — TELEPHONE ENCOUNTER
Message left to return our call If patient calls back please assist in making nurse visit for pneumovax 23

## 2017-12-29 ENCOUNTER — APPOINTMENT (OUTPATIENT)
Dept: PHYSICAL THERAPY | Facility: HOSPITAL | Age: 72
End: 2017-12-29
Attending: ORTHOPAEDIC SURGERY
Payer: COMMERCIAL

## 2018-01-05 ENCOUNTER — OFFICE VISIT (OUTPATIENT)
Dept: ORTHOPEDICS CLINIC | Facility: CLINIC | Age: 73
End: 2018-01-05

## 2018-01-05 DIAGNOSIS — M25.561 ACUTE PAIN OF RIGHT KNEE: Primary | ICD-10-CM

## 2018-01-05 PROCEDURE — 99213 OFFICE O/P EST LOW 20 MIN: CPT | Performed by: ORTHOPAEDIC SURGERY

## 2018-01-05 PROCEDURE — 99212 OFFICE O/P EST SF 10 MIN: CPT | Performed by: ORTHOPAEDIC SURGERY

## 2018-01-05 NOTE — PROGRESS NOTES
1/5/2018  Keenan Bran  3/17/1945  67year old   male  Abner Rubio MD    HPI:   Patient presents with:  Knee Pain: Pt is here for a follow up on right knee sprain. Pt has completed physical therapy.  Pt is wearing an elastic type knee brace to the r negative Homans sign. The patient has full range of motion of the right knee. There is no right knee effusion.   ASSESSMENT AND PLAN:   Acute pain of right knee  (primary encounter diagnosis)    This is a pleasant 79-year-old male with acute right knee pa

## 2018-01-10 ENCOUNTER — OFFICE VISIT (OUTPATIENT)
Dept: NEUROLOGY | Facility: CLINIC | Age: 73
End: 2018-01-10

## 2018-01-10 VITALS
HEART RATE: 60 BPM | HEIGHT: 72 IN | SYSTOLIC BLOOD PRESSURE: 132 MMHG | DIASTOLIC BLOOD PRESSURE: 62 MMHG | BODY MASS INDEX: 28.17 KG/M2 | WEIGHT: 208 LBS | RESPIRATION RATE: 14 BRPM

## 2018-01-10 DIAGNOSIS — M54.12 CERVICAL RADICULOPATHY: Primary | ICD-10-CM

## 2018-01-10 DIAGNOSIS — M54.2 NECK PAIN ON RIGHT SIDE: ICD-10-CM

## 2018-01-10 DIAGNOSIS — M48.02 CERVICAL STENOSIS OF SPINE: ICD-10-CM

## 2018-01-10 DIAGNOSIS — M50.90 CERVICAL DISC DISEASE: ICD-10-CM

## 2018-01-10 DIAGNOSIS — M50.20 CERVICAL HERNIATED DISC: ICD-10-CM

## 2018-01-10 PROCEDURE — 99214 OFFICE O/P EST MOD 30 MIN: CPT | Performed by: PHYSICAL MEDICINE & REHABILITATION

## 2018-01-10 NOTE — PATIENT INSTRUCTIONS
As of October 6th 2014, the Drug Enforcement Agency [facility]) is reclassifying all hydrocodone combination medications from Schedule III to Schedule II. This includes medications such as Norco, Vicodin, Lortab, Zohydro, and Vicoprofen.     What this means for y chart.      Plan  He will get back into PT for the cervical spine. If the PT does not help, then I will do a right C7-T1 ILESI again. He will continue with the Aleve as needed for the pain.     He will follow up after one month of the PT with the nurs

## 2018-01-10 NOTE — PROGRESS NOTES
Cervical Pain H & P    Chief Complaint:  Patient presents with:  Neck Pain: LOV 8/4/15. C/o neck pain radiating to right shoulder on daily basisi for last 1-2 months. Unable to sleep on right side. C7-T1 ILESI on 6/19/15. Has not been doing HEP since LOV. jack 2006    laser photocoagulation OD   • Rotator cuff tear, left 2011    repair   • Stiffness of joint, hand 8/2010    bilateral, hand stiffness & weakness post trigger release   • Trigger finger 9/29/2011    Irf-trigger finger, recurrent - LRF trigger f Occupational History  None on file     Social History Main Topics   Smoking status: Never Smoker    Smokeless tobacco: Never Used    Alcohol use Yes  1.2 - 2.4 oz/week    2 - 4 Cans of beer per week         Comment: 2-3 times per week    Drug use:  No extremities. Pin Prick: Not tested. UE Muscle Strength: All Upper Extremity strength measurements 5/5 except:  ABP Right: 4+/5  ABP Left: 4+/5  Triceps Right: 4/5  Serratus anterior Right: 4+/5   Reflexes: 2+ In the bilateral upper extremities.    Geovanna Patterson

## 2018-01-15 ENCOUNTER — OFFICE VISIT (OUTPATIENT)
Dept: PHYSICAL THERAPY | Facility: HOSPITAL | Age: 73
End: 2018-01-15
Attending: PHYSICAL MEDICINE & REHABILITATION
Payer: COMMERCIAL

## 2018-01-15 DIAGNOSIS — M50.90 CERVICAL DISC DISEASE: ICD-10-CM

## 2018-01-15 DIAGNOSIS — M54.12 CERVICAL RADICULOPATHY: ICD-10-CM

## 2018-01-15 DIAGNOSIS — M50.20 CERVICAL HERNIATED DISC: ICD-10-CM

## 2018-01-15 DIAGNOSIS — M48.02 CERVICAL STENOSIS OF SPINE: ICD-10-CM

## 2018-01-15 DIAGNOSIS — M54.2 NECK PAIN ON RIGHT SIDE: ICD-10-CM

## 2018-01-15 PROCEDURE — 97162 PT EVAL MOD COMPLEX 30 MIN: CPT

## 2018-01-15 PROCEDURE — 97110 THERAPEUTIC EXERCISES: CPT

## 2018-01-15 PROCEDURE — 97140 MANUAL THERAPY 1/> REGIONS: CPT

## 2018-01-15 NOTE — PROGRESS NOTES
CERVICAL SPINE EVALUATION:   Jeanie Meredith    3/17/1945  Referring Physician:  Essence Campos  Diagnosis: Cervical radiculopathy (M54.12)  Cervical stenosis of spine (M48.02)  Cervical disc disease (M50.90)  Cervical herniated disc (M50.20)  Neck pain on r complaints and functional limitations as noted above.   Pt displays altered posture with FHP, R shoulder elevated, B scapular protraction R>L, head in slight L rotation, R SB, mild R scoliosis; TTP R scalenes, UT, lev scap, suboccipitals R>L, R post/inf cer Chin tuck  X        Chin tuck neck retraction H X        Shoulder rolls up, back, down H X        Sternal lift, axial elongation H X       Standing Doorway stretch H X       Neuro 1. Posture  1.  Importance of neutral alignment for joint function       Man Pectorals L Mod +       ASSESSMENT     Physical Therapy Goals:    1. Pt will be independent in beginning level of HEP for stretching, posture and strengthening. 2. Pt will demonstrate good body mechanics awareness for prevention of reinjury.   3. Pt kya care.    X______________________________________________ Date________________  Certification From: 3/12/3635      To: 4/15/2018          Charges: ANA MARÍA Briones 2, TE, MT    Total Timed treatment: 35 min      Total Treatment Time: 60 min

## 2018-01-17 ENCOUNTER — PATIENT MESSAGE (OUTPATIENT)
Dept: INTERNAL MEDICINE CLINIC | Facility: CLINIC | Age: 73
End: 2018-01-17

## 2018-01-18 NOTE — TELEPHONE ENCOUNTER
From: Anne Reynolds  To: Guille Penny MD  Sent: 1/17/2018 5:04 PM CST  Subject: Referral Canelo Bar,  I saw Dr. Ivan Jennings and he is sending me for P.T., but I need to see his APN on 2/9/18 for evaluation.  Do I need a referral for thi

## 2018-01-23 ENCOUNTER — OFFICE VISIT (OUTPATIENT)
Dept: PHYSICAL THERAPY | Facility: HOSPITAL | Age: 73
End: 2018-01-23
Attending: PHYSICAL MEDICINE & REHABILITATION
Payer: COMMERCIAL

## 2018-01-23 DIAGNOSIS — M54.2 NECK PAIN ON RIGHT SIDE: ICD-10-CM

## 2018-01-23 DIAGNOSIS — M50.90 CERVICAL DISC DISEASE: ICD-10-CM

## 2018-01-23 DIAGNOSIS — M50.20 CERVICAL HERNIATED DISC: ICD-10-CM

## 2018-01-23 DIAGNOSIS — M54.12 CERVICAL RADICULOPATHY: ICD-10-CM

## 2018-01-23 DIAGNOSIS — M48.02 CERVICAL STENOSIS OF SPINE: ICD-10-CM

## 2018-01-23 PROCEDURE — 97140 MANUAL THERAPY 1/> REGIONS: CPT

## 2018-01-23 PROCEDURE — 97112 NEUROMUSCULAR REEDUCATION: CPT

## 2018-01-23 PROCEDURE — 97110 THERAPEUTIC EXERCISES: CPT

## 2018-01-23 NOTE — PROGRESS NOTES
Alvino Snellen    3/17/1945  Referring Physician:  Chuck Campos  Diagnosis: Cervical radiculopathy (M54.12)  Cervical stenosis of spine (M48.02)  Cervical disc disease (M50.90)  Cervical herniated disc (M50.20)  Neck pain on right side (M54.2)  Date of On pressures w FHP      Man Tx 1. MET  2. Suboccipital release  3. Manual traction  4. Joint mob  1. R elevated 1st rib, C0-1 F/ErotRSBL, C1-2 L rot 1. C3-C6 FRSL,  2. X  3. X  4. Mid cerv L SB sheer mob      H = HEP.  Pt given copies of this exercise for home symptoms     PLAN OF CARE:        Continue PT per original plan for therapeutic exercises, posture retraining, therapeutic activities, manual treatment, neuromuscular reeducation, therapeutic pain neuroscience education, patient education, modalities as ne Serratus R 5-   Serratus L 5   *pain limiting      UE flexibility 1/15/2018   Pectorals R Mod -   Pectorals L Mod +

## 2018-01-24 ENCOUNTER — LAB ENCOUNTER (OUTPATIENT)
Dept: LAB | Facility: HOSPITAL | Age: 73
End: 2018-01-24
Attending: UROLOGY
Payer: COMMERCIAL

## 2018-01-24 DIAGNOSIS — R97.20 ELEVATED PSA: ICD-10-CM

## 2018-01-24 DIAGNOSIS — R35.1 NOCTURIA: ICD-10-CM

## 2018-01-24 LAB
BILIRUB UR QL: NEGATIVE
CLARITY UR: CLEAR
COLOR UR: YELLOW
GLUCOSE UR-MCNC: NEGATIVE MG/DL
HGB UR QL STRIP.AUTO: NEGATIVE
KETONES UR-MCNC: NEGATIVE MG/DL
LEUKOCYTE ESTERASE UR QL STRIP.AUTO: NEGATIVE
NITRITE UR QL STRIP.AUTO: NEGATIVE
PH UR: 6 [PH] (ref 5–8)
PROT UR-MCNC: NEGATIVE MG/DL
PSA FREE MFR SERPL: 18 %
PSA FREE SERPL-MCNC: 0.93 NG/ML
PSA SERPL-MCNC: 5.1 NG/ML (ref 0–4)
SP GR UR STRIP: 1.01 (ref 1–1.03)
UROBILINOGEN UR STRIP-ACNC: <2
VIT C UR-MCNC: NEGATIVE MG/DL

## 2018-01-24 PROCEDURE — 84153 ASSAY OF PSA TOTAL: CPT

## 2018-01-24 PROCEDURE — 81003 URINALYSIS AUTO W/O SCOPE: CPT

## 2018-01-24 PROCEDURE — 84154 ASSAY OF PSA FREE: CPT

## 2018-01-24 PROCEDURE — 36415 COLL VENOUS BLD VENIPUNCTURE: CPT

## 2018-01-24 RX ORDER — NIFEDIPINE 60 MG/1
60 TABLET, FILM COATED, EXTENDED RELEASE ORAL
Qty: 90 TABLET | Refills: 0 | Status: SHIPPED | OUTPATIENT
Start: 2018-01-24 | End: 2018-04-23

## 2018-01-24 RX ORDER — GLIPIZIDE 2.5 MG/1
2.5 TABLET, EXTENDED RELEASE ORAL
Qty: 90 TABLET | Refills: 0 | Status: SHIPPED | OUTPATIENT
Start: 2018-01-24 | End: 2018-04-23

## 2018-01-24 NOTE — TELEPHONE ENCOUNTER
From: Rosana Hardin  Sent: 1/23/2018 6:55 AM CST  Subject: Medication Renewal Request    Luciana Lock.  Kath Diaz would like a refill of the following medications:     NIFEdipine ER 60 MG Oral Tablet 24 Hr [Raúl Grossman MD]     GlipiZIDE ER (GLIPIZIDE XL) 2.5

## 2018-01-24 NOTE — TELEPHONE ENCOUNTER
Requesting Nifedipine and Glipizide XL refills    Last OV 11/9/17    Prescription refilled per IM/FM refill protocol    Diabetes Medications  Protocol Criteria:  · Appointment scheduled in the past 6 months or the next 3 months  · A1C < 7.5 in the past 6 m [address] [facility]     In 2 months Chuck Barnes MD [facility], [address], [address] Follow Up     In 4 months Rodrigo Thacker MD [facility] Ophthalmology EE EP (pt will get re [address]     In 3 weeks [facility], [address] [facility]     [facility], [facility] [address] [facility]     In [facility] weeks [facility], [address] [place] [address]     In 1 month Gricel [place] YFN

## 2018-01-25 ENCOUNTER — OFFICE VISIT (OUTPATIENT)
Dept: PHYSICAL THERAPY | Facility: HOSPITAL | Age: 73
End: 2018-01-25
Attending: PHYSICAL MEDICINE & REHABILITATION
Payer: COMMERCIAL

## 2018-01-25 DIAGNOSIS — M54.2 NECK PAIN ON RIGHT SIDE: ICD-10-CM

## 2018-01-25 DIAGNOSIS — M48.02 CERVICAL STENOSIS OF SPINE: ICD-10-CM

## 2018-01-25 DIAGNOSIS — M50.90 CERVICAL DISC DISEASE: ICD-10-CM

## 2018-01-25 DIAGNOSIS — M50.20 CERVICAL HERNIATED DISC: ICD-10-CM

## 2018-01-25 DIAGNOSIS — M54.12 CERVICAL RADICULOPATHY: ICD-10-CM

## 2018-01-25 PROCEDURE — 97140 MANUAL THERAPY 1/> REGIONS: CPT

## 2018-01-25 PROCEDURE — 97110 THERAPEUTIC EXERCISES: CPT

## 2018-01-25 NOTE — PROGRESS NOTES
Judie Saravia    3/17/1945  Referring Physician:  Jose Antonio Campos  Diagnosis: Cervical radiculopathy (M54.12)  Cervical stenosis of spine (M48.02)  Cervical disc disease (M50.90)  Cervical herniated disc (M50.20)  Neck pain on right side (M54.2)  Date of On 1. Posture  1. Importance of neutral alignment for joint function 1. Discussion of pelvic alignment impact on cervical, & increased cerv pressures w FHP     Man Tx 1. MET  2. Suboccipital release  3. Manual traction  4. Joint mob  1.  R elevated 1st rib, C0 symptoms  9.  Pt will be able to use the computer in 15 min increments with brief exercise break without increased symptoms     PLAN OF CARE:        Continue PT per original plan for therapeutic exercises, posture retraining, therapeutic activities, manual Biceps L 5   C7 Triceps R 4+   Triceps L 5   ER R 4*   ER L 5   IR R 5   IR L 5   C8 EPL R 5   EPL L 5   Serratus R 5-   Serratus L 5   *pain limiting      UE flexibility 1/15/2018   Pectorals R Mod -   Pectorals L Mod +

## 2018-01-29 ENCOUNTER — OFFICE VISIT (OUTPATIENT)
Dept: PHYSICAL THERAPY | Facility: HOSPITAL | Age: 73
End: 2018-01-29
Attending: PHYSICAL MEDICINE & REHABILITATION
Payer: COMMERCIAL

## 2018-01-29 DIAGNOSIS — M50.90 CERVICAL DISC DISEASE: ICD-10-CM

## 2018-01-29 DIAGNOSIS — M50.20 CERVICAL HERNIATED DISC: ICD-10-CM

## 2018-01-29 DIAGNOSIS — M48.02 CERVICAL STENOSIS OF SPINE: ICD-10-CM

## 2018-01-29 DIAGNOSIS — M54.2 NECK PAIN ON RIGHT SIDE: ICD-10-CM

## 2018-01-29 DIAGNOSIS — M54.12 CERVICAL RADICULOPATHY: ICD-10-CM

## 2018-01-29 PROCEDURE — 97110 THERAPEUTIC EXERCISES: CPT

## 2018-01-31 ENCOUNTER — OFFICE VISIT (OUTPATIENT)
Dept: PHYSICAL THERAPY | Facility: HOSPITAL | Age: 73
End: 2018-01-31
Attending: PHYSICAL MEDICINE & REHABILITATION
Payer: COMMERCIAL

## 2018-01-31 DIAGNOSIS — M50.90 CERVICAL DISC DISEASE: ICD-10-CM

## 2018-01-31 DIAGNOSIS — M50.20 CERVICAL HERNIATED DISC: ICD-10-CM

## 2018-01-31 DIAGNOSIS — M54.2 NECK PAIN ON RIGHT SIDE: ICD-10-CM

## 2018-01-31 DIAGNOSIS — M54.12 CERVICAL RADICULOPATHY: ICD-10-CM

## 2018-01-31 DIAGNOSIS — M48.02 CERVICAL STENOSIS OF SPINE: ICD-10-CM

## 2018-01-31 PROCEDURE — 97110 THERAPEUTIC EXERCISES: CPT

## 2018-01-31 PROCEDURE — 97140 MANUAL THERAPY 1/> REGIONS: CPT

## 2018-01-31 NOTE — PROGRESS NOTES
Tariq Brooks    3/17/1945  Referring Physician:  Jen Campos  Diagnosis: Cervical radiculopathy (M54.12)  Cervical stenosis of spine (M48.02)  Cervical disc disease (M50.90)  Cervical herniated disc (M50.20)  Neck pain on right side (M54.2)  Date of On chair H  X X X    Thoracic extn on chair H   X X    Cerv lat flex arm behind/across back H   X    Standing Doorway stretch H    X    Shldr extn on table H   X    Supine  Chin tuck H X X  X    Thoracic decompression H X X  X    T decomprs head press H X X motivated, performing HEP consistently. Pt will continue to benefit from PT intervention to achieve goals. Physical Therapy Goals:    1. Pt will be independent in beginning level of HEP for stretching, posture and strengthening.    2. Pt will demonstrat Mid cervical TBA. TTP R lower cervical.  Sensation: Intact to light touch of the UE dermatomes.     SC joint: Abd L wnl, R mod limit; Horiz flex L wnl, R mod limit    First rib mobility: R elevated    UE Neural Glides 1/15/2018   ULTT 1 R mod   ULTT 1 L wnl

## 2018-02-01 ENCOUNTER — TELEPHONE (OUTPATIENT)
Dept: OTHER | Age: 73
End: 2018-02-01

## 2018-02-01 DIAGNOSIS — M54.2 NECK PAIN: Primary | ICD-10-CM

## 2018-02-01 NOTE — TELEPHONE ENCOUNTER
Pt stts he was referred by Dr. Alpa Payan to see Addie LUBIN to see if he should continue with physical therapy or needs to get injections for neck and shoulder pain. Pt needs a referral.Please sign off if approved.

## 2018-02-05 ENCOUNTER — OFFICE VISIT (OUTPATIENT)
Dept: INTERNAL MEDICINE CLINIC | Facility: CLINIC | Age: 73
End: 2018-02-05

## 2018-02-05 ENCOUNTER — OFFICE VISIT (OUTPATIENT)
Dept: SURGERY | Facility: CLINIC | Age: 73
End: 2018-02-05

## 2018-02-05 ENCOUNTER — HOSPITAL ENCOUNTER (OUTPATIENT)
Dept: GENERAL RADIOLOGY | Facility: HOSPITAL | Age: 73
Discharge: HOME | End: 2018-02-05
Attending: INTERNAL MEDICINE
Payer: COMMERCIAL

## 2018-02-05 VITALS
DIASTOLIC BLOOD PRESSURE: 60 MMHG | BODY MASS INDEX: 28.17 KG/M2 | SYSTOLIC BLOOD PRESSURE: 138 MMHG | RESPIRATION RATE: 17 BRPM | HEIGHT: 72 IN | WEIGHT: 208 LBS | HEART RATE: 68 BPM

## 2018-02-05 VITALS
DIASTOLIC BLOOD PRESSURE: 78 MMHG | BODY MASS INDEX: 28 KG/M2 | SYSTOLIC BLOOD PRESSURE: 136 MMHG | TEMPERATURE: 98 F | RESPIRATION RATE: 16 BRPM | HEART RATE: 76 BPM | WEIGHT: 208 LBS

## 2018-02-05 DIAGNOSIS — R05.9 COUGH: ICD-10-CM

## 2018-02-05 DIAGNOSIS — N35.9 URETHRAL STRICTURE, UNSPECIFIED STRICTURE TYPE: ICD-10-CM

## 2018-02-05 DIAGNOSIS — R35.1 NOCTURIA: ICD-10-CM

## 2018-02-05 DIAGNOSIS — Z00.00 PHYSICAL EXAM, ANNUAL: Primary | ICD-10-CM

## 2018-02-05 DIAGNOSIS — R97.20 ELEVATED PSA: Primary | ICD-10-CM

## 2018-02-05 DIAGNOSIS — N40.1 BENIGN NON-NODULAR PROSTATIC HYPERPLASIA WITH LOWER URINARY TRACT SYMPTOMS: ICD-10-CM

## 2018-02-05 PROCEDURE — 99212 OFFICE O/P EST SF 10 MIN: CPT | Performed by: UROLOGY

## 2018-02-05 PROCEDURE — 99397 PER PM REEVAL EST PAT 65+ YR: CPT | Performed by: INTERNAL MEDICINE

## 2018-02-05 PROCEDURE — 71046 X-RAY EXAM CHEST 2 VIEWS: CPT | Performed by: INTERNAL MEDICINE

## 2018-02-05 PROCEDURE — 99214 OFFICE O/P EST MOD 30 MIN: CPT | Performed by: UROLOGY

## 2018-02-05 RX ORDER — LOVASTATIN 40 MG/1
40 TABLET ORAL NIGHTLY
Qty: 90 TABLET | Refills: 0 | Status: SHIPPED | OUTPATIENT
Start: 2018-02-05 | End: 2018-12-30

## 2018-02-05 RX ORDER — ALFUZOSIN HYDROCHLORIDE 10 MG/1
10 TABLET, EXTENDED RELEASE ORAL DAILY
Qty: 30 TABLET | Refills: 11 | Status: SHIPPED | OUTPATIENT
Start: 2018-02-05 | End: 2018-02-15

## 2018-02-05 NOTE — PROGRESS NOTES
HPI:    Patient ID: Dacia Vieira is a 67year old male.     HPI    Voiding Dysfunction  Patient has current AUA score of 14.5, moderate voiding dysfunction category, similar compared to previous score of 15, moderate voiding dysfunction category, on 02/01/2 urinates in order to control the spraying. He is unhappy but disheartened about this condition.     Elevated PSA   Denies associated symptoms to suggest prostate cancer such as weight loss or loss of appetite or bone pain and denies associated symptoms to s prostate volume 73.02 mL with no focal lesions.     2/17/16 office cystoscopy with endoscopic dilation of severe stricture (6 [place] Coumadin) of distal urethra/fossa navicularis; 1--2+ trabeculation without tumors; moderate BPH obstruction.    Lateral lob 7/18/2011    [facility] arthritis - [facility] aristospan / lidocaine injection   • Esophageal reflux    • Eye problem 1998    increased C/D ratio   • Gout    • High blood pressure    • High cholesterol    • Hx of eye surgery 2013    repair detached retina   • excision  No date: RETINAL LASER PROCEDURE  2008: TOTAL HIP REPLACEMENT Left  4/30/14: YAG CAPSULOTOMY - OD - RIGHT EYE Right      Comment: GREGORIO   Family History   Problem Relation Age of Onset   • Colon Cancer Mother    • pulmonary Embolism [OTHER] Father each Rfl: 1   Lovastatin 40 MG Oral Tab Take 1 tablet (40 mg total) by mouth nightly. Disp: 90 tablet Rfl: 3   Diclofenac Sodium 1 % Transdermal Gel Apply 2 g topically 4 (four) times daily.  Disp: 1 Tube Rfl: 3   Multiple Vitamins-Minerals (EYE VITAMINS) O prostate cancer)  10/27/2015 PSA = 4.7  01/26/2015 PSA = 4.3         ASSESSMENT/PLAN:   (N35.9) Urethral stricture, unspecified stricture type  (primary encounter diagnosis)  He has had obstructive urethral stricture, and I have performed dilation of the s symptoms  Prostate 45-50g no palpable nodules or indurations. Patient tried Finasteride in the past but stopped Finasteride on his own August 2014.   Discussed greenlight laser ablation vs Rezum procedure vs observation with patient including risks, benefit

## 2018-02-05 NOTE — PROGRESS NOTES
Hien Guardian is a 67year old male who presents for a complete physical exam.   HPI:   Pt complains of cough that is persistent.       Immunization History  Administered            Date(s) Administered    Flu Vacc 4 DEMI Split Virus 3 YR+ same meal daily Disp: 30 tablet Rfl: 11   NIFEdipine ER 60 MG Oral Tablet 24 Hr Take 1 tablet (60 mg total) by mouth once daily.  Disp: 90 tablet Rfl: 0   GlipiZIDE ER (GLIPIZIDE XL) 2.5 MG Oral Tablet 24 Hr Take 1 tablet (2.5 mg total) by mouth daily with 500 MG Oral Cap Take 500 mg by mouth daily.  Disp:  Rfl:       Past Medical History:   Diagnosis Date   • Acute medial meniscus tear of right knee    • BPH (benign prostatic hyperplasia)    • Capsular opacification 2014    YAG laser OD   • Cataract    • [facility] SURGERY      Comment: hernia repair, herniorraphy  No date: LASER SURGERY OF EYE  No date: OTHER SURGICAL HISTORY      Comment: esophageal dilatation  6/15/2010: OTHER SURGICAL HISTORY      Comment: release triggers: RMF, RRF, LMF/ CELENAF                Meg clear  EYES:PERRLA, EOMI, normal optic disk,conjunctiva are clear  NECK: supple,no adenopathy,no bruits  CHEST: no chest tenderness  BREAST: no dominant or suspicious mass  LUNGS: some rhonchi on  auscultation  CARDIO: RRR without murmur  GI: good BS's,no

## 2018-02-05 NOTE — PATIENT INSTRUCTIONS
1.  Start alfuzosin 10 mg daily--to improve bladder emptying; if medication causes worsening in nasal congestion, stop the medication. 2.  Visit in 6 months.   Bloods are for PSA--total and free 1--7 days before visit; no sexual stimulation whatsoever fo

## 2018-02-05 NOTE — TELEPHONE ENCOUNTER
Cholesterol Medications Medication refilled for 90 days per protocol.  Pt had OV appt today 2/5/18      Protocol Criteria:  · Appointment scheduled in the past 12 months or in the next 3 months  · ALT & LDL on file in the past 12 months  · ALT result < 80 Refill Protocol Appointment Criteria Medication refilled for 90 days per protocol.     · Appointment scheduled in the past 6 months or in the next 3 months  Recent Outpatient Visits            5 days ago Cervical radiculopathy    Kimberley Yoo

## 2018-02-06 ENCOUNTER — OFFICE VISIT (OUTPATIENT)
Dept: PHYSICAL THERAPY | Facility: HOSPITAL | Age: 73
End: 2018-02-06
Attending: PHYSICAL MEDICINE & REHABILITATION
Payer: COMMERCIAL

## 2018-02-06 DIAGNOSIS — M54.12 CERVICAL RADICULOPATHY: ICD-10-CM

## 2018-02-06 DIAGNOSIS — M50.90 CERVICAL DISC DISEASE: ICD-10-CM

## 2018-02-06 DIAGNOSIS — M50.20 CERVICAL HERNIATED DISC: ICD-10-CM

## 2018-02-06 DIAGNOSIS — M48.02 CERVICAL STENOSIS OF SPINE: ICD-10-CM

## 2018-02-06 DIAGNOSIS — M54.2 NECK PAIN ON RIGHT SIDE: ICD-10-CM

## 2018-02-06 PROCEDURE — 97140 MANUAL THERAPY 1/> REGIONS: CPT

## 2018-02-06 PROCEDURE — 97110 THERAPEUTIC EXERCISES: CPT

## 2018-02-06 NOTE — PROGRESS NOTES
Cassandra Ascencio    3/17/1945  Referring Physician:  Ingris Campos  Diagnosis: Cervical radiculopathy (M54.12)  Cervical stenosis of spine (M48.02)  Cervical disc disease (M50.90)  Cervical herniated disc (M50.20)  Neck pain on right side (M54.2)  Date of On extn on chair H  X X X    Cerv lat flex arm behind/across back H  X      Thoracic decompression     X 1.5 folded towels    T decmprs head press     X    T decmprs w mild rotation     X    Diaphragmatic breathing     X    Deep breathing chest expansion without a pillow if possible. Pt educated to avoid neural impingement which most often radiates to the R medial scapula. Pt able to add cervical extension and rotation to sitting self mobilization.   He demonstrates decreased rib mobility upper on R and l Primary Functional Limitation CJ: 20-39% impaired, limited, or restricted    Posture:  FHP, R shoulder elevated, B scapular protraction R>L, head in slight L rotation, apparent mild R scoliosis.    Palpation: mod tight and tender in R scalenes, UT and levat

## 2018-02-08 ENCOUNTER — TELEPHONE (OUTPATIENT)
Dept: NEUROLOGY | Facility: CLINIC | Age: 73
End: 2018-02-08

## 2018-02-08 ENCOUNTER — OFFICE VISIT (OUTPATIENT)
Dept: PHYSICAL THERAPY | Facility: HOSPITAL | Age: 73
End: 2018-02-08
Attending: PHYSICAL MEDICINE & REHABILITATION
Payer: COMMERCIAL

## 2018-02-08 DIAGNOSIS — M50.20 CERVICAL HERNIATED DISC: ICD-10-CM

## 2018-02-08 DIAGNOSIS — M50.90 CERVICAL DISC DISEASE: ICD-10-CM

## 2018-02-08 DIAGNOSIS — M54.12 CERVICAL RADICULOPATHY: ICD-10-CM

## 2018-02-08 DIAGNOSIS — M48.02 CERVICAL STENOSIS OF SPINE: ICD-10-CM

## 2018-02-08 DIAGNOSIS — M54.2 NECK PAIN ON RIGHT SIDE: ICD-10-CM

## 2018-02-08 PROCEDURE — 97110 THERAPEUTIC EXERCISES: CPT

## 2018-02-08 PROCEDURE — 97140 MANUAL THERAPY 1/> REGIONS: CPT

## 2018-02-08 NOTE — PROGRESS NOTES
Patient Name: Syeda Darling  YOB: 1945       MRN number:  [id number]  Date: 2/8/2018  Referring Physician:  Matheus Fuentes    Progress Summary    Pt has attended 6, cancelled 0, and no shown 0 visits in Physical Therapy.      Subjective: Pt s behavior.      Objective:     UE Neural Glides 1/15/2018 2/8/2018    ULTT 1 R mod min        Cervical ROM 1/15/2018 2/8/2018    Fwd head 35 23   Flexion 44 50   Extension 35 all upper C 53   R SB 17 23   L SB 16 17   R Rotation 30* 44   L Rotation 62 62   * Physical Therapy 1-2 x/week or a total of 18 visits over a 90 day period.  Treatment will include: Continue PT per original plan for therapeutic exercises, posture retraining, therapeutic activities, manual treatment, neuromuscular reeducation, therapeutic 11/24/2017 - Endplate osteophytes throughout the cervical spine most pronounced at C6-7. Partial collapse the C6-7 disc. Moderate narrowing of the C5-6 disc. Minimal narrowing of the C4-5 disc and C7-T1 disc.       SUBJECTIVE:     Pt states that he has been postural control. Pect release focus next 48 hours   Man Tx 1. MET  2. Suboccipital release  3. Manual traction  4. Joint mob  5. IASTM  6. MFR   1. Multiple mid cervical  2. X  3. X  4. Mid cervical  5. PROM cervical supine w HP 4. B brachial chain 5.  R p able to turn head B directions for conversation at dinner without increased symptoms PROGRESSING  9.  Pt will be able to use the computer in 15 min increments with brief exercise break without increased symptoms MET     PLAN OF CARE:        Continue PT per Extn L 55 66   Abd R 105 stiff 140   Abd L 144 stiff 146   ER R 85    ER L 80    IR R 56    IR L 60    *pain limiting    MMT 5/5 1/15/2018 2/8/2018    C5 shldr abd R 4* 4+   Shldr abd L 5- 5   Shldr flex R 4+ 5-   Shldr flex L 5- 5   C6 biceps R 4+ 5   B

## 2018-02-12 ENCOUNTER — TELEPHONE (OUTPATIENT)
Dept: PHYSICAL THERAPY | Facility: HOSPITAL | Age: 73
End: 2018-02-12

## 2018-02-12 NOTE — TELEPHONE ENCOUNTER
Left messages for pt on cell and at home regarding missed appt. Offered two later times this date and advised of next appt.

## 2018-02-14 ENCOUNTER — OFFICE VISIT (OUTPATIENT)
Dept: PHYSICAL THERAPY | Facility: HOSPITAL | Age: 73
End: 2018-02-14
Attending: PHYSICAL MEDICINE & REHABILITATION
Payer: COMMERCIAL

## 2018-02-14 PROCEDURE — 97140 MANUAL THERAPY 1/> REGIONS: CPT

## 2018-02-14 PROCEDURE — 97110 THERAPEUTIC EXERCISES: CPT

## 2018-02-14 NOTE — PROGRESS NOTES
Minesh Ferrell    3/17/1945  Referring Physician:  Alber Campos  Diagnosis: Cervical radiculopathy (M54.12)  Cervical stenosis of spine (M48.02)  Cervical disc disease (M50.90)  Cervical herniated disc (M50.20)  Neck pain on right side (M54.2)  Date of w pull on chair H X       Thoracic extn on chair H X X      Cerv lat flex arm behind/across back H   X X    Diaphragmatic breathing   X      Deep breathing chest expansion   X      Deep neck flexors H  X     Standing Doorway stretch H X  X X    Shldr extn adults - Men: 38.9 seconds, Women: 29.4 seconds     1/25/2018   AC joint: abd R mod, L min; IR R mod, L min; ER R mod, L wnl    ASSESSMENT     Pt continues to have R sided myofascial tension.   He tolerated man tx well to the area with IASTM and MFR after U FABQ = 19 where >43-48 = fear avoidance behavior. Current status G Code: Initial, Other PT/OT Primary Functional Limitation, CK: 40-59% impaired, limited, or restricted  Goal status G Code:  Other PT/OT Primary Functional Limitation CJ: 20-39% impaired, li

## 2018-02-15 ENCOUNTER — OFFICE VISIT (OUTPATIENT)
Dept: NEUROLOGY | Facility: CLINIC | Age: 73
End: 2018-02-15

## 2018-02-15 VITALS
WEIGHT: 208 LBS | HEART RATE: 73 BPM | DIASTOLIC BLOOD PRESSURE: 58 MMHG | SYSTOLIC BLOOD PRESSURE: 130 MMHG | RESPIRATION RATE: 18 BRPM | BODY MASS INDEX: 28 KG/M2

## 2018-02-15 DIAGNOSIS — M50.90 CERVICAL DISC DISEASE: ICD-10-CM

## 2018-02-15 DIAGNOSIS — M48.02 CERVICAL STENOSIS OF SPINE: ICD-10-CM

## 2018-02-15 DIAGNOSIS — M50.20 CERVICAL HERNIATED DISC: ICD-10-CM

## 2018-02-15 DIAGNOSIS — M54.12 CERVICAL RADICULOPATHY: Primary | ICD-10-CM

## 2018-02-15 PROCEDURE — 99214 OFFICE O/P EST MOD 30 MIN: CPT | Performed by: PHYSICAL MEDICINE & REHABILITATION

## 2018-02-15 NOTE — PATIENT INSTRUCTIONS
As of October 6th 2014, the Drug Enforcement Agency [facility]) is reclassifying all hydrocodone combination medications from Schedule III to Schedule II. This includes medications such as Norco, Vicodin, Lortab, Zohydro, and Vicoprofen.     What this means for y will do a right C7-T1 ILESI. He might need more PT after the injection depending on how he is doing. The patient does not need any pain medications at this time. He will follow up as scheduled.     He will call 2 weeks after the injection to let me

## 2018-02-15 NOTE — PROGRESS NOTES
Cervical Pain H & P    Chief Complaint:  Patient presents with:  Neck Pain: F/U with complaints of neck pain radiating to right shoulder increasing with turning head and sleeping on it LOV: 1/10/2018 Completed PT with increase to symptoms      Patient was mention of complication, not stated as uncontrolled        Past Surgical History   Past Surgical History:  1990: ARTHROSCOPY OF JOINT UNLISTED      Comment: knee  2011: ARTHROSCOPY OF JOINT UNLISTED Left      Comment: rotator cuff repair  1992: ARTHROSCOPY on file     Other Topics Concern    Caffeine Concern Yes    Comment: soda, 8 oz daily    Exercise No    Pt has a pacemaker No    Reaction to local anesthetic No     Social History Narrative    The patient does  use an assistive device. Paloma Osgood 50% of time f Right: Negative   Mcguire's sigh Left: Negative     Assessment  1. right C7 radiculopathy    2. C3-4 right mild-mod, C5-6 right mod, C6-7 left mod-severe foraminal stenosis    3.  C5-6 right > left mod with right mod foraminal, C6-7 left mod-large, C4-5 lef

## 2018-02-16 ENCOUNTER — TELEPHONE (OUTPATIENT)
Dept: NEUROLOGY | Facility: CLINIC | Age: 73
End: 2018-02-16

## 2018-02-16 NOTE — TELEPHONE ENCOUNTER
Patient has been scheduled for a right C7-T1 ILESI  on 03/02/18 at the [facility]. Medications and allergies reviewed. Patient informed to hold aspirins, nsaids, blood thinners, vitamins and fish oils 5-7 days prior to procedure.  Patient informed we will need ve

## 2018-02-19 ENCOUNTER — APPOINTMENT (OUTPATIENT)
Dept: PHYSICAL THERAPY | Facility: HOSPITAL | Age: 73
End: 2018-02-19
Attending: PHYSICAL MEDICINE & REHABILITATION
Payer: COMMERCIAL

## 2018-02-21 ENCOUNTER — TELEPHONE (OUTPATIENT)
Dept: NEUROLOGY | Facility: CLINIC | Age: 73
End: 2018-02-21

## 2018-02-21 NOTE — TELEPHONE ENCOUNTER
Rec'd in basket from [facility] that patients injection procedure was Approved REFERRAL APPROVED ORALIA MARQUEZ    Please call patient to inform of the Approval and to schedule @ [facility]

## 2018-02-21 NOTE — TELEPHONE ENCOUNTER
Received in basket from ORALIA [email or web address]  advising of approval for right C7-T1 ILESI for one unit/DOS. Will inform Nursing.

## 2018-02-22 ENCOUNTER — APPOINTMENT (OUTPATIENT)
Dept: PHYSICAL THERAPY | Facility: HOSPITAL | Age: 73
End: 2018-02-22
Attending: PHYSICAL MEDICINE & REHABILITATION
Payer: COMMERCIAL

## 2018-02-26 ENCOUNTER — APPOINTMENT (OUTPATIENT)
Dept: PHYSICAL THERAPY | Facility: HOSPITAL | Age: 73
End: 2018-02-26
Attending: PHYSICAL MEDICINE & REHABILITATION
Payer: COMMERCIAL

## 2018-03-02 ENCOUNTER — OFFICE VISIT (OUTPATIENT)
Dept: SURGERY | Facility: CLINIC | Age: 73
End: 2018-03-02

## 2018-03-02 DIAGNOSIS — M50.90 CERVICAL DISC DISEASE: ICD-10-CM

## 2018-03-02 DIAGNOSIS — M54.12 CERVICAL RADICULOPATHY: Primary | ICD-10-CM

## 2018-03-02 DIAGNOSIS — M50.20 CERVICAL HERNIATED DISC: ICD-10-CM

## 2018-03-02 PROCEDURE — 62321 NJX INTERLAMINAR CRV/THRC: CPT | Performed by: PHYSICAL MEDICINE & REHABILITATION

## 2018-03-02 NOTE — PROCEDURES
Maynor BAXTER [address].    CERVICAL INTERLAMINAR  NAME:  Dre Velásquez    MR #:    [id number] :  3/17/1945     PHYSICIAN:  Randolph Campos        Operative Report    DATE OF PROCEDURE: 3/2/2018   PREOPERATIVE DIAGNOSES: 1. right C7 radiculopath transferred to the cart and into PAR. The patient was given discharge instructions and will follow up in the clinic as scheduled.   Throughout the whole procedure, the patient's pulse oximetry and vital signs were monitored and they remained completely sta

## 2018-04-16 ENCOUNTER — OFFICE VISIT (OUTPATIENT)
Dept: NEUROLOGY | Facility: CLINIC | Age: 73
End: 2018-04-16

## 2018-04-16 ENCOUNTER — TELEPHONE (OUTPATIENT)
Dept: NEUROLOGY | Facility: CLINIC | Age: 73
End: 2018-04-16

## 2018-04-16 VITALS
HEIGHT: 72 IN | HEART RATE: 84 BPM | BODY MASS INDEX: 28.17 KG/M2 | RESPIRATION RATE: 16 BRPM | WEIGHT: 208 LBS | DIASTOLIC BLOOD PRESSURE: 64 MMHG | SYSTOLIC BLOOD PRESSURE: 130 MMHG

## 2018-04-16 DIAGNOSIS — M54.41 CHRONIC BILATERAL LOW BACK PAIN WITH BILATERAL SCIATICA: ICD-10-CM

## 2018-04-16 DIAGNOSIS — M54.12 CERVICAL RADICULOPATHY: ICD-10-CM

## 2018-04-16 DIAGNOSIS — M50.20 CERVICAL HERNIATED DISC: ICD-10-CM

## 2018-04-16 DIAGNOSIS — M67.911 DYSFUNCTION OF RIGHT ROTATOR CUFF: ICD-10-CM

## 2018-04-16 DIAGNOSIS — M48.02 CERVICAL STENOSIS OF SPINE: ICD-10-CM

## 2018-04-16 DIAGNOSIS — R29.898 HAND WEAKNESS: ICD-10-CM

## 2018-04-16 DIAGNOSIS — G89.29 CHRONIC RIGHT SHOULDER PAIN: Primary | ICD-10-CM

## 2018-04-16 DIAGNOSIS — M25.511 CHRONIC RIGHT SHOULDER PAIN: Primary | ICD-10-CM

## 2018-04-16 DIAGNOSIS — M50.90 CERVICAL DISC DISEASE: ICD-10-CM

## 2018-04-16 DIAGNOSIS — M54.42 CHRONIC BILATERAL LOW BACK PAIN WITH BILATERAL SCIATICA: ICD-10-CM

## 2018-04-16 DIAGNOSIS — G89.29 CHRONIC BILATERAL LOW BACK PAIN WITH BILATERAL SCIATICA: ICD-10-CM

## 2018-04-16 DIAGNOSIS — M54.16 LUMBAR RADICULOPATHY: ICD-10-CM

## 2018-04-16 PROCEDURE — 99214 OFFICE O/P EST MOD 30 MIN: CPT | Performed by: PHYSICAL MEDICINE & REHABILITATION

## 2018-04-16 NOTE — PATIENT INSTRUCTIONS
As of October 6th 2014, the Drug Enforcement Agency [facility]) is reclassifying all hydrocodone combination medications from Schedule III to Schedule II. This includes medications such as Norco, Vicodin, Lortab, Zohydro, and Vicoprofen.     What this means for y chart.      Plan  She will start PT on the right shoulder and the lumbar spine. He will get a right shoulder x-ray. I will do a diagnostic right shoulder ultrasound examination. He will get lumbar spine x-rays and a MRI scan.     He will call me on

## 2018-04-16 NOTE — TELEPHONE ENCOUNTER
Called Kana for authorization of approval of  Talked to [employer]. Who states no authorization is required for in office ultrasound/procedure cpt code 18035.  Will inform Nursing

## 2018-04-16 NOTE — PROGRESS NOTES
Low Back Pain H & P    Chief Complaint: Patient presents with:  Neck Pain: Patient last seen on 2/15/18. Patient had right C7-T1 ILESI on 3/2/18. Patient states that he had about 25% improvement with injection, not as much as he was hoping.  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      Vascular upper extremity:   Right radial pulses: 2+   Left radial pulses: 2+      Neurological Upper Extremity:    Light Touch: Intact in Bilateral upper Internal Rotation: mildly limited   Left Internal Rotation: mild-moderately limited   Right External Rotation: normal   Left External Rotation: normal   Shoulder Special Tests: Right Vargas test: Positive  Left Vargas test: Negative  Right Neers test: Po

## 2018-04-17 ENCOUNTER — HOSPITAL ENCOUNTER (OUTPATIENT)
Dept: GENERAL RADIOLOGY | Facility: HOSPITAL | Age: 73
Discharge: HOME | End: 2018-04-17
Attending: PHYSICAL MEDICINE & REHABILITATION
Payer: COMMERCIAL

## 2018-04-17 DIAGNOSIS — G89.29 CHRONIC BILATERAL LOW BACK PAIN WITH BILATERAL SCIATICA: ICD-10-CM

## 2018-04-17 DIAGNOSIS — M54.41 CHRONIC BILATERAL LOW BACK PAIN WITH BILATERAL SCIATICA: ICD-10-CM

## 2018-04-17 DIAGNOSIS — G89.29 CHRONIC RIGHT SHOULDER PAIN: ICD-10-CM

## 2018-04-17 DIAGNOSIS — M54.16 LUMBAR RADICULOPATHY: ICD-10-CM

## 2018-04-17 DIAGNOSIS — M54.42 CHRONIC BILATERAL LOW BACK PAIN WITH BILATERAL SCIATICA: ICD-10-CM

## 2018-04-17 DIAGNOSIS — M67.911 DYSFUNCTION OF RIGHT ROTATOR CUFF: ICD-10-CM

## 2018-04-17 DIAGNOSIS — M25.511 CHRONIC RIGHT SHOULDER PAIN: ICD-10-CM

## 2018-04-17 PROCEDURE — 73030 X-RAY EXAM OF SHOULDER: CPT | Performed by: PHYSICAL MEDICINE & REHABILITATION

## 2018-04-17 PROCEDURE — 72120 X-RAY BEND ONLY L-S SPINE: CPT | Performed by: PHYSICAL MEDICINE & REHABILITATION

## 2018-04-20 ENCOUNTER — TELEPHONE (OUTPATIENT)
Dept: NEUROLOGY | Facility: CLINIC | Age: 73
End: 2018-04-20

## 2018-04-20 NOTE — TELEPHONE ENCOUNTER
Received in basket  from [email or web address] advising of approval for physical therapy. Will call Pt. to inform. L/m advising 8 PT sessions were approved. Can proceed with scheduling appt.

## 2018-04-24 ENCOUNTER — OFFICE VISIT (OUTPATIENT)
Dept: PHYSICAL THERAPY | Facility: HOSPITAL | Age: 73
End: 2018-04-24
Attending: PHYSICAL MEDICINE & REHABILITATION
Payer: COMMERCIAL

## 2018-04-24 DIAGNOSIS — R29.898 HAND WEAKNESS: ICD-10-CM

## 2018-04-24 DIAGNOSIS — M25.511 CHRONIC RIGHT SHOULDER PAIN: ICD-10-CM

## 2018-04-24 DIAGNOSIS — M50.20 CERVICAL HERNIATED DISC: ICD-10-CM

## 2018-04-24 DIAGNOSIS — M48.02 CERVICAL STENOSIS OF SPINE: ICD-10-CM

## 2018-04-24 DIAGNOSIS — G89.29 CHRONIC RIGHT SHOULDER PAIN: ICD-10-CM

## 2018-04-24 DIAGNOSIS — M54.12 CERVICAL RADICULOPATHY: ICD-10-CM

## 2018-04-24 DIAGNOSIS — M54.41 CHRONIC BILATERAL LOW BACK PAIN WITH BILATERAL SCIATICA: ICD-10-CM

## 2018-04-24 DIAGNOSIS — M50.90 CERVICAL DISC DISEASE: ICD-10-CM

## 2018-04-24 DIAGNOSIS — M54.42 CHRONIC BILATERAL LOW BACK PAIN WITH BILATERAL SCIATICA: ICD-10-CM

## 2018-04-24 DIAGNOSIS — M54.16 LUMBAR RADICULOPATHY: ICD-10-CM

## 2018-04-24 DIAGNOSIS — M67.911 DYSFUNCTION OF RIGHT ROTATOR CUFF: ICD-10-CM

## 2018-04-24 DIAGNOSIS — G89.29 CHRONIC BILATERAL LOW BACK PAIN WITH BILATERAL SCIATICA: ICD-10-CM

## 2018-04-24 PROCEDURE — 97162 PT EVAL MOD COMPLEX 30 MIN: CPT | Performed by: PHYSICAL THERAPIST

## 2018-04-24 PROCEDURE — 97110 THERAPEUTIC EXERCISES: CPT | Performed by: PHYSICAL THERAPIST

## 2018-04-24 PROCEDURE — 97140 MANUAL THERAPY 1/> REGIONS: CPT | Performed by: PHYSICAL THERAPIST

## 2018-04-24 NOTE — PROGRESS NOTES
CERVICAL SPINE EVALUATION:   Referring Physician: Beatriz Aleman MD    Diagnosis:  Chronic right shoulder pain (M25.511,G89.29)  Dysfunction of right rotator cuff (M67.911)  Cervical radiculopathy (M54.12)  Hand weakness (R29.898)  Cervical stenosis of position of the R first rib, myospasm in the R UT, poor GH/scapular position and long UT. Functionally, he reports limited ability to tolerate computer work, lift and carry objects, lift his R arm OH and reach behind him.   He will benefit from skilled PT t needed. Education or treatment limitation: None  Rehab Potential: good  Current status G Code: Initial, Mobility: Walking and Moving Around, CK: 40-59% impaired, limited, or restricted  Goal status G Code:  Mobility: Walking and Moving Around CJ: 20-39%

## 2018-04-25 RX ORDER — GLIPIZIDE 2.5 MG/1
2.5 TABLET, EXTENDED RELEASE ORAL
Qty: 90 TABLET | Refills: 0 | Status: SHIPPED
Start: 2018-04-25 | End: 2018-07-23

## 2018-04-25 RX ORDER — NIFEDIPINE 60 MG/1
60 TABLET, FILM COATED, EXTENDED RELEASE ORAL
Qty: 90 TABLET | Refills: 0 | Status: SHIPPED
Start: 2018-04-25 | End: 2018-07-27

## 2018-04-25 NOTE — TELEPHONE ENCOUNTER
REFILL FAILED PROTOCOL  Last A1C greater than 6 months ago        Diabetes Medications  Protocol Criteria:  · Appointment scheduled in the past 6 months or the next 3 months  · A1C < 7.5 in the past 6 months  · Creatinine in the past 12 months  · Creatinin [place] chronic cough/pt will call for referral     In 3 weeks Felton Ortiz, [address] [facility] Ref #[id number] authorized for 8 visits    OUTPATIENT REHAB THERAPY = NO COPAY LIMITED TO 61 VISITS - ALL TYPES    In 1 month MAVIS Walsh

## 2018-04-25 NOTE — TELEPHONE ENCOUNTER
Hypertensive Medications  Protocol Criteria:  · Appointment scheduled in the past 6 months or in the next 3 months  · BMP or CMP in the past 12 months  · Creatinine result < 2  Recent Outpatient Visits            1 week ago Chronic right shoulder pain    [facility] Ref #[id number] authorized for 8 visits    OUTPATIENT REHAB THERAPY = NO COPAY LIMITED TO 61 VISITS - ALL TYPES    In 1 month Johnny Sullivan MD [facility] EP (pt will get referral)    In 2 mon

## 2018-04-27 ENCOUNTER — OFFICE VISIT (OUTPATIENT)
Dept: PHYSICAL THERAPY | Facility: HOSPITAL | Age: 73
End: 2018-04-27
Attending: PHYSICAL MEDICINE & REHABILITATION
Payer: COMMERCIAL

## 2018-04-27 ENCOUNTER — TELEPHONE (OUTPATIENT)
Dept: INTERNAL MEDICINE CLINIC | Facility: CLINIC | Age: 73
End: 2018-04-27

## 2018-04-27 ENCOUNTER — HOSPITAL ENCOUNTER (OUTPATIENT)
Dept: MRI IMAGING | Facility: HOSPITAL | Age: 73
Discharge: HOME | End: 2018-04-27
Attending: PHYSICAL MEDICINE & REHABILITATION
Payer: COMMERCIAL

## 2018-04-27 DIAGNOSIS — H26.9 CATARACT, UNSPECIFIED CATARACT TYPE, UNSPECIFIED LATERALITY: ICD-10-CM

## 2018-04-27 DIAGNOSIS — R05.3 CHRONIC COUGH: Primary | ICD-10-CM

## 2018-04-27 DIAGNOSIS — M54.16 LUMBAR RADICULOPATHY: ICD-10-CM

## 2018-04-27 PROCEDURE — 97110 THERAPEUTIC EXERCISES: CPT | Performed by: PHYSICAL THERAPIST

## 2018-04-27 PROCEDURE — 97140 MANUAL THERAPY 1/> REGIONS: CPT | Performed by: PHYSICAL THERAPIST

## 2018-04-27 PROCEDURE — 72148 MRI LUMBAR SPINE W/O DYE: CPT | Performed by: PHYSICAL MEDICINE & REHABILITATION

## 2018-04-27 NOTE — TELEPHONE ENCOUNTER
Patient called requesting a referral to see pulmonary for chronic cough and ophthalmology for follow up; had cataract surgery.      Please sign off on referral request.     Thank you, Mikey

## 2018-04-27 NOTE — PROGRESS NOTES
4/27/2018  Dx: Chronic right shoulder pain (M25.511,G89.29)  Dysfunction of right rotator cuff (M67.911)  Cervical radiculopathy (M54.12)  Hand weakness (R29.898)  Cervical stenosis of spine (M48.02)  Cervical disc disease (M50.90)  Cervical herniated disc Manual Therapy; Therapeutic Exercises; Neuromuscular Re-education; Therapeutic Activity; Patient education; Home exercise program instruction; TNE Education, Modalities as needed.     Education or treatment limitation: None  Rehab Potential: good  Current s perform computer work, unable to sleep through the night. Difficulty turning his head while driving. 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 Ovidio Lay describes prior level of function independent.  Pt goals include impr

## 2018-04-29 NOTE — PROGRESS NOTES
4/27/2018  Dx: Chronic right shoulder pain (M25.511,G89.29)  Dysfunction of right rotator cuff (M67.911)  Cervical radiculopathy (M54.12)  Hand weakness (R29.898)  Cervical stenosis of spine (M48.02)  Cervical disc disease (M50.90)  Cervical herniated disc or a total of 10 visits over a 90 day period. Treatment will include: Manual Therapy; Therapeutic Exercises; Neuromuscular Re-education; Therapeutic Activity; Patient education; Home exercise program instruction; TNE Education, Modalities as needed.     Edu limited ability to perform housework/yard work, limited ability to perform computer work, unable to sleep through the night. Difficulty turning his head while driving. 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       Jose fowler UT  Accessory Motion: decreased mobility R first rib

## 2018-05-01 ENCOUNTER — OFFICE VISIT (OUTPATIENT)
Dept: PHYSICAL THERAPY | Facility: HOSPITAL | Age: 73
End: 2018-05-01
Attending: PHYSICAL MEDICINE & REHABILITATION
Payer: COMMERCIAL

## 2018-05-01 PROCEDURE — 97140 MANUAL THERAPY 1/> REGIONS: CPT | Performed by: PHYSICAL THERAPIST

## 2018-05-01 PROCEDURE — 97110 THERAPEUTIC EXERCISES: CPT | Performed by: PHYSICAL THERAPIST

## 2018-05-01 NOTE — PROGRESS NOTES
5/1/2018  Dx: Chronic right shoulder pain (M25.511,G89.29)  Dysfunction of right rotator cuff (M67.911)  Cervical radiculopathy (M54.12)  Hand weakness (R29.898)  Cervical stenosis of spine (M48.02)  Cervical disc disease (M50.90)  Cervical herniated disc the R to look over his head while driving. 5.  The pt will be independent with his HEP. Plan:   Patient will be seen for 1-2 x/week or a total of 10 visits over a 90 day period. Treatment will include: Manual Therapy; Therapeutic Exercises;  Neuromuscul massage with hot oil helped. Also uses Alieve and aspirin.      Current functional limitations include avoiding heavy lifting, throwing, limited ability to perform housework/yard work, limited ability to perform computer work, unable to sleep through the ni Flexibility:   R L   Upper trap long long   Pec Major short short   Pec Minor short short   Lats short short     Palpation: TTP over the R UT  Accessory Motion: decreased mobility R first rib

## 2018-05-04 ENCOUNTER — OFFICE VISIT (OUTPATIENT)
Dept: PHYSICAL THERAPY | Facility: HOSPITAL | Age: 73
End: 2018-05-04
Attending: PHYSICAL MEDICINE & REHABILITATION
Payer: COMMERCIAL

## 2018-05-04 PROCEDURE — 97112 NEUROMUSCULAR REEDUCATION: CPT | Performed by: PHYSICAL THERAPIST

## 2018-05-04 PROCEDURE — 97140 MANUAL THERAPY 1/> REGIONS: CPT | Performed by: PHYSICAL THERAPIST

## 2018-05-04 NOTE — PROGRESS NOTES
5/4/2018  Dx: Chronic right shoulder pain (M25.511,G89.29)  Dysfunction of right rotator cuff (M67.911)  Cervical radiculopathy (M54.12)  Hand weakness (R29.898)  Cervical stenosis of spine (M48.02)  Cervical disc disease (M50.90)  Cervical herniated disc Centralization of symptoms to the cervical spine. 2.  The pt will be able to tolerate 1 hour of computer work without an increase in pain.   3.  The pt will be able to reach behind him with the R UE.  4.  The pt will be able to turn his head 70 degrees to work on that first.    History of current condition: reports an insidious onset of pain, no injury that started 6 month ago. Had an injection 1 month ago. Will wake up if he lies on his R side. Reports supporting his arm and resting helps.   Reports a Strength UE:   5/5 MMT Scale   R  L   Shoulder flex  4 5   Shoulder ext NT NT   Abduction (C5) 4 5   ER 4- 4   IR 4- 4   Biceps (C6) 5 5   Wrist ext (C6) 5 5   Triceps (C7) 4 5   Wrist flex (C7) 4+ 5   EPL (C8)  4 4   Interossei (T1) 4 4       Flexib

## 2018-05-09 PROBLEM — M19.011 ARTHRITIS OF RIGHT ACROMIOCLAVICULAR JOINT: Status: ACTIVE | Noted: 2018-05-09

## 2018-05-09 PROBLEM — M51.9 LUMBAR DISC DISEASE: Status: ACTIVE | Noted: 2018-05-09

## 2018-05-09 PROBLEM — M43.10 RETROLISTHESIS: Status: ACTIVE | Noted: 2018-05-09

## 2018-05-09 PROBLEM — M48.061 SPINAL STENOSIS OF LUMBAR REGION WITHOUT NEUROGENIC CLAUDICATION: Status: ACTIVE | Noted: 2018-05-09

## 2018-05-09 PROBLEM — M43.16 SPONDYLOLISTHESIS OF LUMBAR REGION: Status: ACTIVE | Noted: 2018-05-09

## 2018-05-09 PROBLEM — M48.061 LUMBAR FORAMINAL STENOSIS: Status: ACTIVE | Noted: 2018-05-09

## 2018-05-11 ENCOUNTER — OFFICE VISIT (OUTPATIENT)
Dept: PULMONOLOGY | Facility: CLINIC | Age: 73
End: 2018-05-11

## 2018-05-11 ENCOUNTER — OFFICE VISIT (OUTPATIENT)
Dept: PHYSICAL THERAPY | Facility: HOSPITAL | Age: 73
End: 2018-05-11
Attending: PHYSICAL MEDICINE & REHABILITATION
Payer: COMMERCIAL

## 2018-05-11 VITALS
RESPIRATION RATE: 18 BRPM | BODY MASS INDEX: 25.4 KG/M2 | HEIGHT: 76 IN | OXYGEN SATURATION: 96 % | HEART RATE: 77 BPM | DIASTOLIC BLOOD PRESSURE: 65 MMHG | SYSTOLIC BLOOD PRESSURE: 142 MMHG | WEIGHT: 208.63 LBS

## 2018-05-11 DIAGNOSIS — R05.9 COUGH: Primary | ICD-10-CM

## 2018-05-11 PROCEDURE — 97140 MANUAL THERAPY 1/> REGIONS: CPT | Performed by: PHYSICAL THERAPIST

## 2018-05-11 PROCEDURE — 97110 THERAPEUTIC EXERCISES: CPT | Performed by: PHYSICAL THERAPIST

## 2018-05-11 PROCEDURE — 99244 OFF/OP CNSLTJ NEW/EST MOD 40: CPT | Performed by: INTERNAL MEDICINE

## 2018-05-11 RX ORDER — FLUTICASONE PROPIONATE 50 MCG
2 SPRAY, SUSPENSION (ML) NASAL DAILY
Qty: 1 INHALER | Refills: 4 | Status: SHIPPED | OUTPATIENT
Start: 2018-05-11 | End: 2018-06-10

## 2018-05-11 NOTE — H&P
Referring Physician  Jeancarlos Woodard MD    Chief Complaint  Cough    History of Present Illness  Patient is a 54-year-old  male who presents with chief complaint of cough. He admits to cough since this previous winter.   He also admits to [place] ARTHROSCOPY OF JOINT UNLISTED Left      Comment: hip resurfacing  09/17/2012: CATARACT Right      Comment: [facility]  01/11/2010: CATARACT Left      Comment: [facility]  04/2015: COLONOSCOPY      Comment: repeat in 10 years.   2013: EYE SURGERY Right      Comment: janay (ALCORTIN A) 1-2-1 % External Gel Apply one application to affected areas 2 times daily Disp: 48 g Rfl: 0   Calcium Carbonate-Vit D-Min (CALCIUM 1200 OR) Take 1 tablet by mouth daily.  Disp:  Rfl:    Diclofenac Sodium 1 % Transdermal Gel Apply 2 g topically multifactorial in nature with underlying upper airway cough syndrome, GERD and possible reactive airway disease/mild asthma as contributing symptoms. I will start him on daily Flonase and antihistamine therapy along with ICS/L LAUREN and monitor response.   I

## 2018-05-11 NOTE — PROGRESS NOTES
5/11/2018  Dx: Chronic right shoulder pain (M25.511,G89.29)  Dysfunction of right rotator cuff (M67.911)  Cervical radiculopathy (M54.12)  Hand weakness (R29.898)  Cervical stenosis of spine (M48.02)  Cervical disc disease (M50.90)  Cervical herniated disc stabilization exercises but the pt experienced L sided kim-scapular pain from the rowing motion. Therefore, advised to continue his current HEP. Symptoms have centralized but myospasm in the R UT persists. 9Goals:  1.   Centralization of symptoms to t computer work especially if his arm is hanging down. Pain started a few months ago. Has an US scheduled for July to check his rotator cuff muscles.   Also reports LBP buts states the shoulder is bothering him more and therefore he wants to work on that [employer] Repeated Motion Testing: Repeated R rotation increased R cervical rotation ROM and centralized pain to the cervical spine      Shoulder AROM:      R    L   Flex 160 180   Abd 160 180   ER 60 75   IR waist line Mid back   Hort abd NT NT            Stren

## 2018-05-18 ENCOUNTER — OFFICE VISIT (OUTPATIENT)
Dept: PHYSICAL THERAPY | Facility: HOSPITAL | Age: 73
End: 2018-05-18
Attending: PHYSICAL MEDICINE & REHABILITATION
Payer: COMMERCIAL

## 2018-05-18 PROCEDURE — 97140 MANUAL THERAPY 1/> REGIONS: CPT | Performed by: PHYSICAL THERAPIST

## 2018-05-18 PROCEDURE — 97110 THERAPEUTIC EXERCISES: CPT | Performed by: PHYSICAL THERAPIST

## 2018-05-18 NOTE — PROGRESS NOTES
5/18/2018  Dx: Chronic right shoulder pain (M25.511,G89.29)  Dysfunction of right rotator cuff (M67.911)  Cervical radiculopathy (M54.12)  Hand weakness (R29.898)  Cervical stenosis of spine (M48.02)  Cervical disc disease (M50.90)  Cervical herniated disc HEP:  Cervical retraction  DNF activation - 30 degree turns, yes/no motion    Assessment: No adverse effects to treatment. The pt had no pain with theraband exercises and no longer reporting pain.   He continues to c/o stiffness and difficulty turning today with complaints of c/o pain in the R UT. Gonzalo Fitzpatrick is worse with computer work especially if his arm is hanging down. Pain started a few months ago. Has an US scheduled for July to check his rotator cuff muscles.   Also reports LBP buts states the should L Rotation 70    Protrusion WFL    Retraction Decreased 50%      Repeated Motion Testing: Repeated R rotation increased R cervical rotation ROM and centralized pain to the cervical spine      Shoulder AROM:      R    L   Flex 160 180   Abd 160 180   ER 6

## 2018-05-22 ENCOUNTER — PATIENT MESSAGE (OUTPATIENT)
Dept: INTERNAL MEDICINE CLINIC | Facility: CLINIC | Age: 73
End: 2018-05-22

## 2018-05-23 ENCOUNTER — NURSE TRIAGE (OUTPATIENT)
Dept: OTHER | Age: 73
End: 2018-05-23

## 2018-05-23 NOTE — TELEPHONE ENCOUNTER
LMTCB- Email sent to patient to call the office and speak to a triage nurse regarding symptoms. transfer to triage.       From: Jessica Gillette  To: Demar Uribe MD  Sent: 5/22/2018  4:30 PM CDT  Subject: Shital Amador,

## 2018-05-23 NOTE — TELEPHONE ENCOUNTER
From: Rosana Hardin  To: Leslee Feng MD  Sent: 5/22/2018 4:30 PM CDT  Subject: Jenna Arnold,  I am having a lot of bone and joint pain in my thumbs. Could taking Risedronate Sodium be the cause.  The left thumb is kvng

## 2018-05-23 NOTE — TELEPHONE ENCOUNTER
OV appt made for tomorrow 18. Pt calling back (Name and  verified) with c/o moderate to severe burning pain to right distal thumb joint for the past few months increasing in severity with limited mobility.  Pt also states that he has joint pain t

## 2018-05-24 ENCOUNTER — OFFICE VISIT (OUTPATIENT)
Dept: INTERNAL MEDICINE CLINIC | Facility: CLINIC | Age: 73
End: 2018-05-24

## 2018-05-24 VITALS
DIASTOLIC BLOOD PRESSURE: 71 MMHG | HEART RATE: 72 BPM | HEIGHT: 72 IN | SYSTOLIC BLOOD PRESSURE: 137 MMHG | BODY MASS INDEX: 28.17 KG/M2 | RESPIRATION RATE: 16 BRPM | WEIGHT: 208 LBS

## 2018-05-24 DIAGNOSIS — E78.2 MIXED HYPERLIPIDEMIA: ICD-10-CM

## 2018-05-24 DIAGNOSIS — E11.9 TYPE 2 DIABETES MELLITUS WITHOUT COMPLICATION, WITHOUT LONG-TERM CURRENT USE OF INSULIN (HCC): Primary | ICD-10-CM

## 2018-05-24 DIAGNOSIS — I10 ESSENTIAL HYPERTENSION WITH GOAL BLOOD PRESSURE LESS THAN 130/85: ICD-10-CM

## 2018-05-24 DIAGNOSIS — M79.645 THUMB PAIN, LEFT: ICD-10-CM

## 2018-05-24 PROCEDURE — 99212 OFFICE O/P EST SF 10 MIN: CPT | Performed by: INTERNAL MEDICINE

## 2018-05-24 PROCEDURE — 99214 OFFICE O/P EST MOD 30 MIN: CPT | Performed by: INTERNAL MEDICINE

## 2018-05-24 NOTE — PROGRESS NOTES
Syeda Darling is a 66year old male. HPI:   1.  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 Disp: 1 Tube Rfl: 3   Multiple Vitamins-Minerals (EYE VITAMINS) Oral Cap Take  by mouth daily.  Disp:  Rfl:    Cholecalciferol (VITAMIN D) 1000 UNITS Oral Cap Take  by laser OD      Social History:    Smoking Status: Never Smoker                      Smokeless Status: Never Used                        Alcohol Use: Yes           0.0 oz/week       0 Standard drinks or equivalent per week       Comment: weekly, 2-4 beers/ w 130/85    Patient instructed to take anti-hypertensive medicines exactly as prescribed and to follow a low salt diet as discussed.  Regular exercise at least 3 times weekly will help to curb one's appetite, control weight and lead to better blood pressure c

## 2018-05-25 ENCOUNTER — OFFICE VISIT (OUTPATIENT)
Dept: PHYSICAL THERAPY | Facility: HOSPITAL | Age: 73
End: 2018-05-25
Attending: PHYSICAL MEDICINE & REHABILITATION
Payer: COMMERCIAL

## 2018-05-25 PROCEDURE — 97140 MANUAL THERAPY 1/> REGIONS: CPT | Performed by: PHYSICAL THERAPIST

## 2018-05-25 PROCEDURE — 97110 THERAPEUTIC EXERCISES: CPT | Performed by: PHYSICAL THERAPIST

## 2018-05-25 NOTE — PROGRESS NOTES
5/25/2018  Dx: Chronic right shoulder pain (M25.511,G89.29)  Dysfunction of right rotator cuff (M67.911)  Cervical radiculopathy (M54.12)  Hand weakness (R29.898)  Cervical stenosis of spine (M48.02)  Cervical disc disease (M50.90)  Cervical herniated disc Neuromuscular Education   DNF activation in sitting  1.  30 degree turns  2. Yes motion  3.   No motion      TNE Education           HEP:  Cervical retraction  DNF activation - 30 degree turns, yes/no motion  pec stretch in doorway x 2 positions    Asse right shoulder pain (M25.511,G89.29)  Dysfunction of right rotator cuff (M67.911)  Cervical radiculopathy (M54.12)  Hand weakness (R29.898)  Cervical stenosis of spine (M48.02)  Cervical disc disease (M50.90)  Cervical herniated disc (M50.20)    Date of Se Functionally, he reports limited ability to tolerate computer work, lift and carry objects, lift his R arm OH and reach behind him. He will benefit from skilled PT to return to this PLOF.       Precautions: None       OBJECTIVE:   Observation/Posture: long

## 2018-05-31 ENCOUNTER — OFFICE VISIT (OUTPATIENT)
Dept: PHYSICAL THERAPY | Facility: HOSPITAL | Age: 73
End: 2018-05-31
Attending: PHYSICAL MEDICINE & REHABILITATION
Payer: COMMERCIAL

## 2018-05-31 PROCEDURE — 97110 THERAPEUTIC EXERCISES: CPT | Performed by: PHYSICAL THERAPIST

## 2018-05-31 PROCEDURE — 97140 MANUAL THERAPY 1/> REGIONS: CPT | Performed by: PHYSICAL THERAPIST

## 2018-05-31 NOTE — PROGRESS NOTES
5/31/2018  Dx: Chronic right shoulder pain (M25.511,G89.29)  Dysfunction of right rotator cuff (M67.911)  Cervical radiculopathy (M54.12)  Hand weakness (R29.898)  Cervical stenosis of spine (M48.02)  Cervical disc disease (M50.90)  Cervical herniated disc standing     Repeated R cervical rotation in sitting x 20 reps    Repeated retraction in sitting to end ROM      Prone  1. Shoulder extension  2. Rowing motion Tband (green)  1. Rows  2.   Shoulder extension pec stretch in the doorway - 2 positions    Th Time: 38 min                FOR REFERENCE ONLY  CERVICAL SPINE EVALUATION:   Referring Physician: Raghav Ramirez MD    Diagnosis:  Chronic right shoulder pain (M25.511,G89.29)  Dysfunction of right rotator cuff (M67.911)  Cervical radiculopathy (M54.12) restricted R cervical rotation ROM, elevated position of the R first rib, myospasm in the R UT, poor GH/scapular position and long UT.  Functionally, he reports limited ability to tolerate computer work, lift and carry objects, lift his R arm OH and reach b

## 2018-06-01 ENCOUNTER — APPOINTMENT (OUTPATIENT)
Dept: PHYSICAL THERAPY | Facility: HOSPITAL | Age: 73
End: 2018-06-01
Attending: PHYSICAL MEDICINE & REHABILITATION
Payer: COMMERCIAL

## 2018-06-06 ENCOUNTER — OFFICE VISIT (OUTPATIENT)
Dept: NEUROLOGY | Facility: CLINIC | Age: 73
End: 2018-06-06

## 2018-06-06 VITALS
WEIGHT: 203 LBS | SYSTOLIC BLOOD PRESSURE: 136 MMHG | DIASTOLIC BLOOD PRESSURE: 86 MMHG | HEART RATE: 80 BPM | HEIGHT: 72 IN | BODY MASS INDEX: 27.5 KG/M2 | RESPIRATION RATE: 16 BRPM

## 2018-06-06 DIAGNOSIS — M51.9 LUMBAR DISC DISEASE: ICD-10-CM

## 2018-06-06 DIAGNOSIS — M43.10 RETROLISTHESIS: ICD-10-CM

## 2018-06-06 DIAGNOSIS — M54.16 LUMBAR RADICULOPATHY: ICD-10-CM

## 2018-06-06 DIAGNOSIS — M43.16 SPONDYLOLISTHESIS OF LUMBAR REGION: ICD-10-CM

## 2018-06-06 DIAGNOSIS — G89.29 CHRONIC LEFT-SIDED LOW BACK PAIN WITH LEFT-SIDED SCIATICA: ICD-10-CM

## 2018-06-06 DIAGNOSIS — M50.90 CERVICAL DISC DISEASE: ICD-10-CM

## 2018-06-06 DIAGNOSIS — M48.061 SPINAL STENOSIS OF LUMBAR REGION WITHOUT NEUROGENIC CLAUDICATION: ICD-10-CM

## 2018-06-06 DIAGNOSIS — M48.02 CERVICAL STENOSIS OF SPINE: Primary | ICD-10-CM

## 2018-06-06 DIAGNOSIS — M54.12 CERVICAL RADICULOPATHY: ICD-10-CM

## 2018-06-06 DIAGNOSIS — M48.061 LUMBAR FORAMINAL STENOSIS: ICD-10-CM

## 2018-06-06 DIAGNOSIS — M54.42 CHRONIC LEFT-SIDED LOW BACK PAIN WITH LEFT-SIDED SCIATICA: ICD-10-CM

## 2018-06-06 DIAGNOSIS — M50.20 CERVICAL HERNIATED DISC: ICD-10-CM

## 2018-06-06 PROCEDURE — 99214 OFFICE O/P EST MOD 30 MIN: CPT | Performed by: PHYSICAL MEDICINE & REHABILITATION

## 2018-06-06 NOTE — PATIENT INSTRUCTIONS
As of October 6th 2014, the Drug Enforcement Agency [facility]) is reclassifying all hydrocodone combination medications from Schedule III to Schedule II. This includes medications such as Norco, Vicodin, Lortab, Zohydro, and Vicoprofen.     What this means for y will perform left L5 TFESI(s) if he is not getting better with the PT. He will start PT on the lumbar spine. He will call me after he has done one month of the PT. He will follow up in 3 months or sooner if needed.     The patient does not need an

## 2018-06-06 NOTE — PROGRESS NOTES
Low Back Pain H & P    Chief Complaint: Patient presents with:  Low Back Pain: Patient presents for follow up on low back pain, has pain on left side low back radiates down left leg, pt c/o numbness in both legs intermittently.  Had MRI 4/27/18, would like repair   • Stiffness of joint, hand 8/2010    bilateral, hand stiffness & weakness post trigger release   • Trigger finger 9/29/2011    Irf-trigger finger, recurrent - LRF trigger finger release   • Type II or unspecified type diabetes mellitus without men History  None on file     Social History Main Topics   Smoking status: Never Smoker    Smokeless tobacco: Never Used    Alcohol use Yes  1.2 - 2.4 oz/week    2 - 4 Cans of beer per week         Comment: 2-3 times per week    Drug use: No    Sexual activity right mild-mod, C5-6 right mod, C6-7 left mod-severe foraminal stenosis    2. C5-6 right > left mod with right mod foraminal, C6-7 left mod-large, C4-5 left mild, C3-4 right mild-mod foraminal bulging discs    3.  C6-7 mild-mod HNP    4. right C7 radiculopa

## 2018-06-07 ENCOUNTER — OFFICE VISIT (OUTPATIENT)
Dept: OPHTHALMOLOGY | Facility: CLINIC | Age: 73
End: 2018-06-07

## 2018-06-07 ENCOUNTER — APPOINTMENT (OUTPATIENT)
Dept: LAB | Facility: HOSPITAL | Age: 73
End: 2018-06-07
Attending: INTERNAL MEDICINE
Payer: COMMERCIAL

## 2018-06-07 DIAGNOSIS — E11.9 TYPE 2 DIABETES MELLITUS WITHOUT COMPLICATION, WITHOUT LONG-TERM CURRENT USE OF INSULIN (HCC): ICD-10-CM

## 2018-06-07 DIAGNOSIS — H35.371 EPIRETINAL MEMBRANE, RIGHT: ICD-10-CM

## 2018-06-07 DIAGNOSIS — H40.003 GLAUCOMA SUSPECT OF BOTH EYES: Primary | ICD-10-CM

## 2018-06-07 DIAGNOSIS — Z86.69 HISTORY OF RETINAL TEAR: ICD-10-CM

## 2018-06-07 DIAGNOSIS — E11.9 DIABETES MELLITUS TYPE 2 WITHOUT RETINOPATHY (HCC): ICD-10-CM

## 2018-06-07 DIAGNOSIS — Z96.1 PSEUDOPHAKIA OF BOTH EYES: ICD-10-CM

## 2018-06-07 PROCEDURE — 99212 OFFICE O/P EST SF 10 MIN: CPT | Performed by: OPHTHALMOLOGY

## 2018-06-07 PROCEDURE — 99243 OFF/OP CNSLTJ NEW/EST LOW 30: CPT | Performed by: OPHTHALMOLOGY

## 2018-06-07 PROCEDURE — 83036 HEMOGLOBIN GLYCOSYLATED A1C: CPT

## 2018-06-07 PROCEDURE — 92083 EXTENDED VISUAL FIELD XM: CPT | Performed by: OPHTHALMOLOGY

## 2018-06-07 PROCEDURE — 92133 CPTRZD OPH DX IMG PST SGM ON: CPT | Performed by: OPHTHALMOLOGY

## 2018-06-07 PROCEDURE — 82570 ASSAY OF URINE CREATININE: CPT

## 2018-06-07 PROCEDURE — 36415 COLL VENOUS BLD VENIPUNCTURE: CPT

## 2018-06-07 PROCEDURE — 82043 UR ALBUMIN QUANTITATIVE: CPT

## 2018-06-07 PROCEDURE — 92015 DETERMINE REFRACTIVE STATE: CPT | Performed by: OPHTHALMOLOGY

## 2018-06-07 NOTE — PATIENT INSTRUCTIONS
Glaucoma suspect of both eyes  Visual field and OCT completed in office today with stable, normal results that are negative for glaucoma. Results were discussed with patient in office today. Will not start any glaucoma medications.   Will have patient b

## 2018-06-07 NOTE — PROGRESS NOTES
Tariq Brooks is a 75year old male.     HPI:     HPI     Consult    Additional comments: Consult per Dr. Cyndi York           Diabetic Eye Exam    Additional comments: Pt has been a diabetic for 7 years  7 years on pills/ 0 years on Insulin   Pt checks his B hand/finger surgery unlisted (Left, 9/29/2011) (LRF trigger finger release); other surgical history (6/15/2010) (release triggers: RMF, RRF, LMF/ RRF Dupuytren's nodule excision); forearm/wrist surgery unlisted (Left) (left wrist surgery); laser surgery of 0   GlipiZIDE ER (GLIPIZIDE XL) 2.5 MG Oral Tablet 24 Hr Take 1 tablet (2.5 mg total) by mouth daily with breakfast. Disp: 90 tablet Rfl: 0   Lovastatin 40 MG Oral Tab Take 1 tablet (40 mg total) by mouth nightly.  Disp: 90 tablet Rfl: 0   Fluticasone Furoa Right Left    Dist cc 20/20 -2 20/20    Near cc 20/20 20/20    Correction:  Glasses          Tonometry (Applanation, 9:05 AM)       Right Left    Pressure 15 15          Pachymetry (11/20/07)       Right Left    Thickness 560/ -1 559/ -1          Pupi were discussed with patient in office today. Will not start any glaucoma medications. Will have patient back in 1 year for diabetic eye exam and photos.      Diabetes mellitus type 2 without retinopathy ([address]  Diabetes type II: no background of retinopa

## 2018-06-07 NOTE — ASSESSMENT & PLAN NOTE
Visual field and OCT completed in office today with stable, normal results that are negative for glaucoma. Results were discussed with patient in office today. Will not start any glaucoma medications.   Will have patient back in 1 year for diabetic eye

## 2018-06-08 ENCOUNTER — APPOINTMENT (OUTPATIENT)
Dept: PHYSICAL THERAPY | Facility: HOSPITAL | Age: 73
End: 2018-06-08
Attending: PHYSICAL MEDICINE & REHABILITATION
Payer: COMMERCIAL

## 2018-06-12 ENCOUNTER — TELEPHONE (OUTPATIENT)
Dept: NEUROLOGY | Facility: CLINIC | Age: 73
End: 2018-06-12

## 2018-06-12 NOTE — TELEPHONE ENCOUNTER
Received in basket from [email or web address]  advising of approval for physical therapy. Will call Pt. to inform. Pt. Informed 8 PT sessions were approved. States he has appt. on 06/15/18.

## 2018-06-15 ENCOUNTER — OFFICE VISIT (OUTPATIENT)
Dept: PHYSICAL THERAPY | Facility: HOSPITAL | Age: 73
End: 2018-06-15
Attending: PHYSICAL MEDICINE & REHABILITATION
Payer: COMMERCIAL

## 2018-06-15 DIAGNOSIS — M51.9 LUMBAR DISC DISEASE: ICD-10-CM

## 2018-06-15 DIAGNOSIS — M54.42 CHRONIC LEFT-SIDED LOW BACK PAIN WITH LEFT-SIDED SCIATICA: ICD-10-CM

## 2018-06-15 DIAGNOSIS — M48.061 SPINAL STENOSIS OF LUMBAR REGION WITHOUT NEUROGENIC CLAUDICATION: ICD-10-CM

## 2018-06-15 DIAGNOSIS — M43.16 SPONDYLOLISTHESIS OF LUMBAR REGION: ICD-10-CM

## 2018-06-15 DIAGNOSIS — M54.16 LUMBAR RADICULOPATHY: ICD-10-CM

## 2018-06-15 DIAGNOSIS — M43.10 RETROLISTHESIS: ICD-10-CM

## 2018-06-15 DIAGNOSIS — G89.29 CHRONIC LEFT-SIDED LOW BACK PAIN WITH LEFT-SIDED SCIATICA: ICD-10-CM

## 2018-06-15 DIAGNOSIS — M48.061 LUMBAR FORAMINAL STENOSIS: ICD-10-CM

## 2018-06-15 PROCEDURE — 97162 PT EVAL MOD COMPLEX 30 MIN: CPT | Performed by: PHYSICAL THERAPIST

## 2018-06-15 PROCEDURE — 97110 THERAPEUTIC EXERCISES: CPT | Performed by: PHYSICAL THERAPIST

## 2018-06-15 NOTE — PROGRESS NOTES
LUMBAR SPINE EVALUATION:   Referring Physician: Dr. John Hsieh left-sided low back pain with left-sided sciatica (M54.42,G89.29)  Lumbar radiculopathy (M54.16)  Lumbar foraminal stenosis (M99.83)  Spinal stenosis of lumbar region without n skilled PT to improve his walking tolerance.     Precautions: None and Fall Risk     OBJECTIVE:   Observation/Posture: forward flexed posture with FHP  Gait: decreased heel toe pattern, uses SC on the R UE  ROM: in standing    Trunk         Pain (+/-)   Fle Therapy; Therapeutic Exercises; Neuromuscular Re-education; Therapeutic Activity; Patient education: Home exercise program instruction; TNE Education; Modalities as needed.     Education or treatment limitation: None  Rehab Potential:good  FOTO: 53% limita

## 2018-06-22 ENCOUNTER — OFFICE VISIT (OUTPATIENT)
Dept: PHYSICAL THERAPY | Facility: HOSPITAL | Age: 73
End: 2018-06-22
Attending: PHYSICAL MEDICINE & REHABILITATION
Payer: COMMERCIAL

## 2018-06-22 PROCEDURE — 97110 THERAPEUTIC EXERCISES: CPT | Performed by: PHYSICAL THERAPIST

## 2018-06-22 PROCEDURE — 97140 MANUAL THERAPY 1/> REGIONS: CPT | Performed by: PHYSICAL THERAPIST

## 2018-06-22 NOTE — PROGRESS NOTES
6/22/2018  Dx:Chronic left-sided low back pain with left-sided sciatica (M54.42,G89.29)  Lumbar radiculopathy (M54.16)  Lumbar foraminal stenosis (M99.83)  Spinal stenosis of lumbar region without neurogenic claudication (M48.061)  Spondylolisthesis of lum Re-education; Therapeutic Activity; Patient education: Home exercise program instruction; TNE Education; Modalities as needed.     Education or treatment limitation: None  Rehab Potential:good  FOTO: 53% limitation currently, 38% predicted  Modified Oswest walking tolerance and decrease his numbness in his legs. Past medical history was reviewed with Suellen Mcleod. Significant findings include neck pain, DM, HTN, cough - on different meds and is feeling better, hip replacement L, R knee surgery.         ASSESSMENT

## 2018-06-29 ENCOUNTER — OFFICE VISIT (OUTPATIENT)
Dept: PHYSICAL THERAPY | Facility: HOSPITAL | Age: 73
End: 2018-06-29
Attending: PHYSICAL MEDICINE & REHABILITATION
Payer: COMMERCIAL

## 2018-06-29 PROCEDURE — 97140 MANUAL THERAPY 1/> REGIONS: CPT | Performed by: PHYSICAL THERAPIST

## 2018-06-29 PROCEDURE — 97110 THERAPEUTIC EXERCISES: CPT | Performed by: PHYSICAL THERAPIST

## 2018-06-29 NOTE — PROGRESS NOTES
6/29/2018  Dx:Chronic left-sided low back pain with left-sided sciatica (M54.42,G89.29)  Lumbar radiculopathy (M54.16)  Lumbar foraminal stenosis (M99.83)  Spinal stenosis of lumbar region without neurogenic claudication (M48.061)  Spondylolisthesis of lum Therapeutic Exercises; Neuromuscular Re-education; Therapeutic Activity; Patient education: Home exercise program instruction; TNE Education; Modalities as needed.     Education or treatment limitation: None  Rehab Potential:good  FOTO: 53% limitation curr activities. Pt goals include improve his walking tolerance and decrease his numbness in his legs. Past medical history was reviewed with Larissa Mercer.  Significant findings include neck pain, DM, HTN, cough - on different meds and is feeling better, hip replace

## 2018-06-30 NOTE — PROGRESS NOTES
6/29/2018  Dx:Chronic left-sided low back pain with left-sided sciatica (M54.42,G89.29)  Lumbar radiculopathy (M54.16)  Lumbar foraminal stenosis (M99.83)  Spinal stenosis of lumbar region without neurogenic claudication (M48.061)  Spondylolisthesis of lum to ascend/descend a fight of stairs step over step method with one railing. Frequency / Duration: Patient will be seen for 1-2 x/week or a total of 18 visits over a 90 day period. Treatment will include: Manual Therapy; Therapeutic Exercises;  Neuromusc History of current condition: insidious onset of pain. Current functional limitations include limited walking tolerance and some difficulty with stairs. vOidio Lay describes prior level of function independent in all activities.  Pt goals include improve (+) toe walking and heel walking for weakness L  Accessory Motion: NT  Special Tests: (-) SLR bilaterally

## 2018-07-03 ENCOUNTER — OFFICE VISIT (OUTPATIENT)
Dept: NEUROLOGY | Facility: CLINIC | Age: 73
End: 2018-07-03

## 2018-07-03 VITALS — HEART RATE: 78 BPM | RESPIRATION RATE: 18 BRPM | SYSTOLIC BLOOD PRESSURE: 140 MMHG | DIASTOLIC BLOOD PRESSURE: 62 MMHG

## 2018-07-03 DIAGNOSIS — M19.011 ARTHRITIS OF RIGHT ACROMIOCLAVICULAR JOINT: ICD-10-CM

## 2018-07-03 DIAGNOSIS — M75.111 INCOMPLETE TEAR OF RIGHT ROTATOR CUFF: Primary | ICD-10-CM

## 2018-07-03 PROCEDURE — 76881 US COMPL JOINT R-T W/IMG: CPT | Performed by: PHYSICAL MEDICINE & REHABILITATION

## 2018-07-03 RX ORDER — IBUPROFEN 200 MG
200 TABLET ORAL EVERY 6 HOURS PRN
COMMUNITY
End: 2019-04-23 | Stop reason: ALTCHOICE

## 2018-07-05 PROBLEM — M75.111 INCOMPLETE TEAR OF RIGHT ROTATOR CUFF: Status: ACTIVE | Noted: 2018-07-05

## 2018-07-05 NOTE — PROCEDURES
I did an ultrasound examination of the right shoulder in the office today:   Biceps Tendon:   There is mild fluid around the tendon with negative power doppler imaging  Subscapularis Tendon:  Mild-moderate full thickness subscapularis inferior tear  EITAN Howell

## 2018-07-13 ENCOUNTER — OFFICE VISIT (OUTPATIENT)
Dept: PHYSICAL THERAPY | Facility: HOSPITAL | Age: 73
End: 2018-07-13
Attending: PHYSICAL MEDICINE & REHABILITATION
Payer: COMMERCIAL

## 2018-07-13 PROCEDURE — 97110 THERAPEUTIC EXERCISES: CPT | Performed by: PHYSICAL THERAPIST

## 2018-07-13 PROCEDURE — 97140 MANUAL THERAPY 1/> REGIONS: CPT | Performed by: PHYSICAL THERAPIST

## 2018-07-13 NOTE — PROGRESS NOTES
7/13/2018  Dx:Chronic left-sided low back pain with left-sided sciatica (M54.42,G89.29)  Lumbar radiculopathy (M54.16)  Lumbar foraminal stenosis (M99.83)  Spinal stenosis of lumbar region without neurogenic claudication (M48.061)  Spondylolisthesis of lum instructions. Goals:    1. Centralization of symptoms to the lumbar spine. 2.  The pt will be able to walk for 20 minutes without an increase in symptoms. 3.  The pt will be able to perform sit to stand transitions without an increase in pain.   4.  T most of the times and on uneven surfaces. States if he sits for 5 minutes than he can walk again. Denies any falls. States he gets L hip pain when he sleeps on his L side. Feels the pain in his both sides of his LB.   Reports his LE's will get numb wh Ankle DF (L4) 4 5    EHL (L5) 4- 5    Ankle PF (S1) 4- 5    Hip Abduction NT NT    Hip Extension NT NT      Flexibility:      R L   Hamstrings 60 degrees 60 degrees   Piriformis Decreased length Decreased length   Hip Flexor Decreased length Decreased le

## 2018-07-16 ENCOUNTER — APPOINTMENT (OUTPATIENT)
Dept: PHYSICAL THERAPY | Facility: HOSPITAL | Age: 73
End: 2018-07-16
Attending: PHYSICAL MEDICINE & REHABILITATION
Payer: COMMERCIAL

## 2018-07-16 RX ORDER — HYDROCORTISONE ACETATE, ALOE VERA LEAF AND IODOQUINOL 20; 10; 10 MG/G; MG/G; MG/G
GEL TOPICAL
Qty: 48 G | Refills: 0
Start: 2018-07-16

## 2018-07-16 NOTE — TELEPHONE ENCOUNTER
LOV 01/16/17 pt requesting refill for Iodoquinol-HC-Aloe Polysacch (ALCORTIN A) 1-2-1 % External Gel please advise thank you

## 2018-07-19 ENCOUNTER — TELEPHONE (OUTPATIENT)
Dept: PHYSICAL THERAPY | Facility: HOSPITAL | Age: 73
End: 2018-07-19

## 2018-07-20 ENCOUNTER — APPOINTMENT (OUTPATIENT)
Dept: PHYSICAL THERAPY | Facility: HOSPITAL | Age: 73
End: 2018-07-20
Attending: PHYSICAL MEDICINE & REHABILITATION
Payer: COMMERCIAL

## 2018-07-23 ENCOUNTER — PATIENT MESSAGE (OUTPATIENT)
Dept: INTERNAL MEDICINE CLINIC | Facility: CLINIC | Age: 73
End: 2018-07-23

## 2018-07-23 ENCOUNTER — APPOINTMENT (OUTPATIENT)
Dept: PHYSICAL THERAPY | Facility: HOSPITAL | Age: 73
End: 2018-07-23
Attending: PHYSICAL MEDICINE & REHABILITATION
Payer: COMMERCIAL

## 2018-07-23 DIAGNOSIS — K13.79 MOUTH SORES: ICD-10-CM

## 2018-07-23 DIAGNOSIS — R35.1 BENIGN PROSTATIC HYPERPLASIA WITH NOCTURIA: Primary | ICD-10-CM

## 2018-07-23 DIAGNOSIS — N40.1 BENIGN PROSTATIC HYPERPLASIA WITH NOCTURIA: Primary | ICD-10-CM

## 2018-07-23 RX ORDER — GLIPIZIDE 2.5 MG/1
2.5 TABLET, EXTENDED RELEASE ORAL
Qty: 90 TABLET | Refills: 0 | Status: SHIPPED | OUTPATIENT
Start: 2018-07-23 | End: 2018-11-13

## 2018-07-23 NOTE — TELEPHONE ENCOUNTER
From: Dickson Serrano  Sent: 7/23/2018 8:48 AM CDT  Subject: Medication Renewal Request    Ashley Gardner would like a refill of the following medications:     GlipiZIDE ER (GLIPIZIDE XL) 2.5 MG Oral Tablet 24 Hr [Raúl Ryan MD]   Patient Comment: no

## 2018-07-24 NOTE — TELEPHONE ENCOUNTER
Diabetes Medications  Protocol Criteria:  · Appointment scheduled in the past 6 months or the next 3 months  · A1C < 7.5 in the past 6 months  · Creatinine in the past 12 months  · Creatinine result < 1.5   Recent Outpatient Visits            1 week ago

## 2018-07-24 NOTE — TELEPHONE ENCOUNTER
Please sign off on pended referrals     From: Warren Alexander  To: Sea Dunn MD  Sent: 7/23/2018  3:22 PM CDT  Subject: Referral Request    Akanksha Pérez needs referrals to see Dr. Nelson Ceja on 8/6/18 and Dr. Alexis Moore, for a follow up on the s

## 2018-07-27 ENCOUNTER — LAB ENCOUNTER (OUTPATIENT)
Dept: LAB | Facility: HOSPITAL | Age: 73
End: 2018-07-27
Attending: UROLOGY
Payer: COMMERCIAL

## 2018-07-27 DIAGNOSIS — R97.20 ELEVATED PSA: ICD-10-CM

## 2018-07-27 LAB
PSA FREE MFR SERPL: 23 %
PSA FREE SERPL-MCNC: 0.88 NG/ML
PSA SERPL-MCNC: 3.8 NG/ML (ref 0–4)

## 2018-07-27 PROCEDURE — 36415 COLL VENOUS BLD VENIPUNCTURE: CPT

## 2018-07-27 PROCEDURE — 84154 ASSAY OF PSA FREE: CPT

## 2018-07-27 PROCEDURE — 84153 ASSAY OF PSA TOTAL: CPT

## 2018-07-27 NOTE — TELEPHONE ENCOUNTER
From: Dacia Vieira  Sent: 7/27/2018 2:00 PM CDT  Subject: Medication Renewal Request    Eviefra Torres.  Bobbi Ovalle would like a refill of the following medications:     NIFEdipine ER 60 MG Oral Tablet 24 Hr [Raúl Mello MD]   Patient Comment: no refills left

## 2018-07-27 NOTE — TELEPHONE ENCOUNTER
Please advise on refill request.     Hypertensive Medications  Protocol Criteria:  · Appointment scheduled in the past 6 months or in the next 3 months  · BMP or CMP in the past 12 months  · Creatinine result < 2  Recent Outpatient Visits            2 week 09/26/2017   TP 6.9 09/26/2017   ALB 4.0 09/26/2017    09/26/2017   K 5.0 09/26/2017    09/26/2017   CO2 29 09/26/2017   GLOBULIN 2.9 09/26/2017   AGRATIO 1.5 09/13/2016   ANIONGAP 7 09/26/2017   OSMOCALC 296 (H) 09/26/2017

## 2018-07-30 RX ORDER — NIFEDIPINE 60 MG/1
60 TABLET, FILM COATED, EXTENDED RELEASE ORAL
Qty: 90 TABLET | Refills: 3 | Status: SHIPPED | OUTPATIENT
Start: 2018-07-30 | End: 2019-08-13

## 2018-08-03 ENCOUNTER — OFFICE VISIT (OUTPATIENT)
Dept: PHYSICAL THERAPY | Facility: HOSPITAL | Age: 73
End: 2018-08-03
Attending: PHYSICAL MEDICINE & REHABILITATION
Payer: COMMERCIAL

## 2018-08-03 PROCEDURE — 97140 MANUAL THERAPY 1/> REGIONS: CPT | Performed by: PHYSICAL THERAPIST

## 2018-08-03 NOTE — PROGRESS NOTES
8/3/2018  Dx:Chronic left-sided low back pain with left-sided sciatica (M54.42,G89.29)  Lumbar radiculopathy (M54.16)  Lumbar foraminal stenosis (M99.83)  Spinal stenosis of lumbar region without neurogenic claudication (M48.061)  Spondylolisthesis of lumb Beth.    Goals:    1. Centralization of symptoms to the lumbar spine. 2.  The pt will be able to walk for 20 minutes without an increase in symptoms. 3.  The pt will be able to perform sit to stand transitions without an increase in pain.   4.  The pt w times and on uneven surfaces. States if he sits for 5 minutes than he can walk again. Denies any falls. States he gets L hip pain when he sleeps on his L side. Feels the pain in his both sides of his LB.   Reports his LE's will get numb when he is sle (L4) 4 5    EHL (L5) 4- 5    Ankle PF (S1) 4- 5    Hip Abduction NT NT    Hip Extension NT NT      Flexibility:      R L   Hamstrings 60 degrees 60 degrees   Piriformis Decreased length Decreased length   Hip Flexor Decreased length Decreased length

## 2018-08-06 ENCOUNTER — TELEPHONE (OUTPATIENT)
Dept: SURGERY | Facility: CLINIC | Age: 73
End: 2018-08-06

## 2018-08-06 ENCOUNTER — APPOINTMENT (OUTPATIENT)
Dept: PHYSICAL THERAPY | Facility: HOSPITAL | Age: 73
End: 2018-08-06
Attending: PHYSICAL MEDICINE & REHABILITATION
Payer: COMMERCIAL

## 2018-08-06 ENCOUNTER — OFFICE VISIT (OUTPATIENT)
Dept: SURGERY | Facility: CLINIC | Age: 73
End: 2018-08-06

## 2018-08-06 VITALS
SYSTOLIC BLOOD PRESSURE: 122 MMHG | BODY MASS INDEX: 27.5 KG/M2 | HEART RATE: 75 BPM | RESPIRATION RATE: 16 BRPM | TEMPERATURE: 98 F | HEIGHT: 72 IN | WEIGHT: 203 LBS | DIASTOLIC BLOOD PRESSURE: 60 MMHG

## 2018-08-06 DIAGNOSIS — R97.20 ELEVATED PSA: ICD-10-CM

## 2018-08-06 DIAGNOSIS — N35.9 URETHRAL STRICTURE, UNSPECIFIED STRICTURE TYPE: Primary | ICD-10-CM

## 2018-08-06 DIAGNOSIS — N52.01 ERECTILE DYSFUNCTION DUE TO ARTERIAL INSUFFICIENCY: ICD-10-CM

## 2018-08-06 DIAGNOSIS — R39.12 WEAK URINARY STREAM: ICD-10-CM

## 2018-08-06 DIAGNOSIS — N40.1 BENIGN NON-NODULAR PROSTATIC HYPERPLASIA WITH LOWER URINARY TRACT SYMPTOMS: ICD-10-CM

## 2018-08-06 DIAGNOSIS — R35.1 NOCTURIA: ICD-10-CM

## 2018-08-06 PROCEDURE — 99214 OFFICE O/P EST MOD 30 MIN: CPT | Performed by: UROLOGY

## 2018-08-06 PROCEDURE — 99212 OFFICE O/P EST SF 10 MIN: CPT | Performed by: UROLOGY

## 2018-08-06 NOTE — PATIENT INSTRUCTIONS
1.  Transrectal ultrasound prostate--to more precisely measure volume of the prostate; this needs to be scheduled --please check with my office nurses if you need advice how to do so.    2   bladder ultrasound at the time of your prostate ultrasound--to do

## 2018-08-06 NOTE — PROGRESS NOTES
HPI:    Patient ID: Anne Reynolds is a 75year old male.     HPI     Voiding Dysfunction  Patient has current AUA score of 17, moderate voiding dysfunction category, slightly worse compared to previous score of 14.5, moderate voiding dysfunction category, on associated symptoms to suggest prostatitis such as dysuria. He is not aware of anything that makes the PSA better or worse. He considers problem to be mild since PSA is <10.  He has had 2 negative biopsies in the past. Problem began 2008 with PSA was 2.2 o stricture (6 [facility]) of distal urethra/fossa navicularis; 1--2+ trabeculation without tumors; moderate BPH obstruction.    Lateral lobes enlarged.    3/24/2016 office visit; complaining of spraying of stream after dilation of severe stricture; in l GlipiZIDE ER (GLIPIZIDE XL) 2.5 MG Oral Tablet 24 Hr Take 1 tablet (2.5 mg total) by mouth daily with breakfast. Disp: 90 tablet Rfl: 0   ibuprofen (ADVIL) 200 MG Oral Tab Take 200 mg by mouth every 6 (six) hours as needed for Pain.  Disp:  Rfl:    Zuri Ragsdale knee    • BPH (benign prostatic hyperplasia)    • Capsular opacification 2014    YAG laser OD   • Cataract    • [facility] arthritis 7/18/2011    [place] [facility] [place] [facility] aristospan / lidocaine injection   • Esophageal reflux    • Eye problem 1998    ivania Comment: hernia repair, herniorraphy  No date: LASER SURGERY OF EYE  No date: OTHER SURGICAL HISTORY      Comment: esophageal dilatation  6/15/2010: OTHER SURGICAL HISTORY      Comment: release triggers: RMF, RRF, LMF/ RRF                Dupuytren's nodule Dissatisfied         PHYSICAL EXAM:    Physical Exam   Constitutional: He is oriented to person, place, and time. He appears well-developed and well-nourished. No distress. HENT:   Head: Normocephalic and atraumatic.    Eyes: EOM are normal.   Neck: [facility] patient evaluated there September--October 2016 and had Riley cystoscopy under general anesthesia and Dr. Faizan Amaya did not find any stricture as per patient's account and as per notes in epic.   Patient still complaining of spraying of stream. I explained t focal lesions.      Discussed starting long term dutasteride/Avodart or finasteride vs greenlight laser ablation of prostate under general anesthesia possible transurethral incision or dilation of recurrent urethral stricture and if a stricture reocurs see you about that; purpose would be to document the strength of your stream which seems to be weak and suspicious for either stricture and/or BPH–enlarged prostate obstruction    5. No aspirin or NSAIDs for 7--10 days before the procedure.   NSAIDs = medicati

## 2018-08-06 NOTE — TELEPHONE ENCOUNTER
Nurses, please call patient and schedule him for uroflow test; ideally to be done when I am in the office, so that I can interpret it and so that we can tell him when he is in the office, but I do not officially have to see patient; you can double book; st

## 2018-08-06 NOTE — TELEPHONE ENCOUNTER
Patient seen in office today, was not able to schedule patient do to patient having another appointment scheduled, per patient' request will call Wednesday to schedule cystoscopy, greenlight laser ablation of prostate, possible transurethral incision or di

## 2018-08-08 NOTE — TELEPHONE ENCOUNTER
Spoke with pt and offered him 2 other appts that he declined and then he accepted [facility]. 9/11 at 1:40 pm and then stated that he would complete the bladder and prostate US's after that visit.  I explained that [address] wants him to have the US's before the Uroflo

## 2018-08-08 NOTE — TELEPHONE ENCOUNTER
Patient calling back. Patient states he is unable to keep 08/14 appt. Wonders if he can come in the following week. Please call. Thank you.

## 2018-08-08 NOTE — TELEPHONE ENCOUNTER
Spoke with pt and offered him 2 other appts that he declined and then he accepted [facility]. 9/11 at 1:40 pm and then stated that he would complete the bladder and prostate US's after that visit.  I explained that [address] wants him to have the US's before the Uroflo

## 2018-08-08 NOTE — TELEPHONE ENCOUNTER
I spoke with pt and informed him of REGINA's msg below and I offered him an appt for tues 8/14 at 2 pm and told him that he can view the prostate and bladder US and the [facility] [place] [facility] ph # to call on my chart.  Pt states that he is not able to see his calendar right no

## 2018-08-10 ENCOUNTER — OFFICE VISIT (OUTPATIENT)
Dept: PHYSICAL THERAPY | Facility: HOSPITAL | Age: 73
End: 2018-08-10
Attending: PHYSICAL MEDICINE & REHABILITATION
Payer: COMMERCIAL

## 2018-08-10 PROCEDURE — 97140 MANUAL THERAPY 1/> REGIONS: CPT | Performed by: PHYSICAL THERAPIST

## 2018-08-10 PROCEDURE — 97110 THERAPEUTIC EXERCISES: CPT | Performed by: PHYSICAL THERAPIST

## 2018-08-10 NOTE — PROGRESS NOTES
8/10/2018  Dx:Chronic left-sided low back pain with left-sided sciatica (M54.42,G89.29)  Lumbar radiculopathy (M54.16)  Lumbar foraminal stenosis (M99.83)  Spinal stenosis of lumbar region without neurogenic claudication (M48.061)  Spondylolisthesis of lum abd/add in sitting with Tband  Clam shells L  Standing hip abduction  IO/TA recruitment in hooklying    Assessment: No adverse effects to treatment. The pt choice to hold off on seeing the Dr. Jae Leone at this time and did report less pain overall this date. of lumbar region without neurogenic claudication (M48.061)  Spondylolisthesis of lumbar region (M43.16)  Retrolisthesis (M43.10)  Lumbar disc disease (M51.9)  Date of Service: 6/15/2018   Date of Onset: 10 years of LBP    PATIENT SUMMARY   Ramone Reinier is Pain (+/-)   Flexion                 Limited 50%     Decreased reversal of the lumbar lordosis   Extension Limited 90%    R Sidebend Limited 75%    L Sidebend Limited 75%    R Rotation NT    L Rotation NT    R Sideglide NT    L Sideglide NT      Rep

## 2018-08-17 ENCOUNTER — APPOINTMENT (OUTPATIENT)
Dept: PHYSICAL THERAPY | Facility: HOSPITAL | Age: 73
End: 2018-08-17
Attending: PHYSICAL MEDICINE & REHABILITATION
Payer: COMMERCIAL

## 2018-08-17 PROCEDURE — 97140 MANUAL THERAPY 1/> REGIONS: CPT | Performed by: PHYSICAL THERAPIST

## 2018-08-17 PROCEDURE — 97110 THERAPEUTIC EXERCISES: CPT | Performed by: PHYSICAL THERAPIST

## 2018-08-17 NOTE — PROGRESS NOTES
8/17/2018    Dx:Chronic left-sided low back pain with left-sided sciatica (M54.42,G89.29)  Lumbar radiculopathy (M54.16)  Lumbar foraminal stenosis (M99.83)  Spinal stenosis of lumbar region without neurogenic claudication (M48.061)  Spondylolisthesis of l L LE STM/TP release L gluts    ITB rolls L LE    DKTC with manual OP    Rib depression with IR during exhalation STM/TP release L gluts    ITB rolls L LE    DKTC with manual OP    Rib depression with IR during exhalation   Therapeutic Exercise Hip adductio of stairs step over step method with one railing. MET 8/17/2018    Plan:  Please advise if further therapy is warranted. The pt would need 8 visits added to his referral if he continues PT.     Education or treatment limitation: None  Rehab Potential:good

## 2018-08-21 ENCOUNTER — OFFICE VISIT (OUTPATIENT)
Dept: DERMATOLOGY CLINIC | Facility: CLINIC | Age: 73
End: 2018-08-21

## 2018-08-21 DIAGNOSIS — K13.0 ANGULAR CHEILITIS: Primary | ICD-10-CM

## 2018-08-21 PROCEDURE — 99212 OFFICE O/P EST SF 10 MIN: CPT | Performed by: DERMATOLOGY

## 2018-08-21 RX ORDER — HYDROCORTISONE ACETATE, ALOE VERA LEAF AND IODOQUINOL 20; 10; 10 MG/G; MG/G; MG/G
GEL TOPICAL
Qty: 48 G | Refills: 10 | Status: SHIPPED | OUTPATIENT
Start: 2018-08-21 | End: 2019-09-06

## 2018-08-21 NOTE — PROGRESS NOTES
HPI:     Chief Complaint     Derm Problem        HPI     Derm Problem    Additional comments: LOV: 1-16-17. Pt presents today for f/u with reoccurring rash to garth mouth currently using Alcortin cream which controls condtion but don't cure it.  Pt will need Inhalation Aerosol Powder, Breath Activated Inhale 1 puff into the lungs daily. Disp: 1 each Rfl: 2   Risedronate Sodium (ACTONEL) 150 MG Oral Tab Take 1 tablet (150 mg total) by mouth every 30 (thirty) days.  Disp: 3 tablet Rfl: 3   Omeprazole 40 MG Oral C post trigger release   • Trigger finger 9/29/2011    Irf-trigger finger, recurrent - LRF trigger finger release   • Type II or unspecified type diabetes mellitus without mention of complication, not stated as uncontrolled      Past Surgical History:  1990: Not on file     Other Topics Concern    Caffeine Concern Yes    Comment: soda, 8 oz daily    Exercise No    Pt has a pacemaker No    Reaction to local anesthetic No     Social History Narrative    The patient does  use an assistive device. Carlos Query 50% of ti

## 2018-08-21 NOTE — PROGRESS NOTES
Past Medical History:   Diagnosis Date   • Acute medial meniscus tear of right knee    • BPH (benign prostatic hyperplasia)    • Capsular opacification 2014    YAG laser OD   • Cataract    • C[facility] arthritis 7/18/2011    [place] [facility] [place] [facility] [place]osp [place]      Comment: LRF trigger finger release  No date: HERNIA SURGERY      Comment: hernia repair, herniorraphy  No date: LASER SURGERY OF EYE  No date: OTHER SURGICAL HISTORY      Comment: esophageal dilatation  6/15/2010: OTHER SURGICAL HISTORY      Com

## 2018-08-27 ENCOUNTER — NURSE TRIAGE (OUTPATIENT)
Dept: OTHER | Age: 73
End: 2018-08-27

## 2018-08-27 NOTE — TELEPHONE ENCOUNTER
Action Requested: Summary for Provider     []  Critical Lab, Recommendations Needed  [x] Need Additional Advice  []   FYI    []   Need Orders  [] Need Medications Sent to Pharmacy  []  Other     SUMMARY: Per pt right arm pain x couple days after lifting a

## 2018-08-29 ENCOUNTER — TELEPHONE (OUTPATIENT)
Dept: NEUROLOGY | Facility: CLINIC | Age: 73
End: 2018-08-29

## 2018-08-29 NOTE — TELEPHONE ENCOUNTER
Called patient. He stated he lifted a 40 lb bag of dog food and felt a \"snap\" in his right shoulder which then lead to a sharp pain in his right bicep area. If he reaches for anything he experiences a sharp pain to his bicep.      States he has ace wrappe

## 2018-08-29 NOTE — TELEPHONE ENCOUNTER
Regarding: RE: Visit Follow-up Question  Contact: [phone]  ----- Message from Femi England MA sent at 8/29/2018  7:06 AM CDT -----       ----- Message from Warren Rodrigez to Caitlin Daniel MD sent at 8/28/2018  7:52 PM -----   Well just after i talked

## 2018-10-04 RX ORDER — OMEPRAZOLE 40 MG/1
CAPSULE, DELAYED RELEASE ORAL
Qty: 90 CAPSULE | Refills: 2 | Status: SHIPPED | OUTPATIENT
Start: 2018-10-04 | End: 2019-07-06

## 2018-10-05 ENCOUNTER — IMMUNIZATION (OUTPATIENT)
Dept: INTERNAL MEDICINE CLINIC | Facility: CLINIC | Age: 73
End: 2018-10-05

## 2018-10-05 ENCOUNTER — TELEPHONE (OUTPATIENT)
Dept: INTERNAL MEDICINE CLINIC | Facility: CLINIC | Age: 73
End: 2018-10-05

## 2018-10-05 ENCOUNTER — MED REC SCAN ONLY (OUTPATIENT)
Dept: INTERNAL MEDICINE CLINIC | Facility: CLINIC | Age: 73
End: 2018-10-05

## 2018-10-05 DIAGNOSIS — M79.601 RIGHT ARM PAIN: Primary | ICD-10-CM

## 2018-10-05 PROCEDURE — 90471 IMMUNIZATION ADMIN: CPT | Performed by: INTERNAL MEDICINE

## 2018-10-05 PROCEDURE — 90653 IIV ADJUVANT VACCINE IM: CPT | Performed by: INTERNAL MEDICINE

## 2018-10-05 NOTE — TELEPHONE ENCOUNTER
Pt was in here for his flu shot, and wanted to ask Dr. Aleksandra Keen if he can write a referral to see Dr. Jenny Hand since he does have an appointment coming up

## 2018-10-11 ENCOUNTER — OFFICE VISIT (OUTPATIENT)
Dept: NEUROLOGY | Facility: CLINIC | Age: 73
End: 2018-10-11

## 2018-10-11 ENCOUNTER — HOSPITAL ENCOUNTER (OUTPATIENT)
Dept: GENERAL RADIOLOGY | Facility: HOSPITAL | Age: 73
Discharge: HOME | End: 2018-10-11
Attending: PHYSICAL MEDICINE & REHABILITATION
Payer: COMMERCIAL

## 2018-10-11 VITALS — HEART RATE: 94 BPM | DIASTOLIC BLOOD PRESSURE: 60 MMHG | SYSTOLIC BLOOD PRESSURE: 134 MMHG | RESPIRATION RATE: 17 BRPM

## 2018-10-11 DIAGNOSIS — M51.9 LUMBAR DISC DISEASE: ICD-10-CM

## 2018-10-11 DIAGNOSIS — M54.16 LUMBAR RADICULOPATHY: Primary | ICD-10-CM

## 2018-10-11 DIAGNOSIS — R29.898 HAND WEAKNESS: ICD-10-CM

## 2018-10-11 DIAGNOSIS — M48.061 SPINAL STENOSIS OF LUMBAR REGION WITHOUT NEUROGENIC CLAUDICATION: ICD-10-CM

## 2018-10-11 DIAGNOSIS — M43.16 SPONDYLOLISTHESIS OF LUMBAR REGION: ICD-10-CM

## 2018-10-11 DIAGNOSIS — G89.29 CHRONIC PAIN OF LEFT THUMB: ICD-10-CM

## 2018-10-11 DIAGNOSIS — M67.911 DYSFUNCTION OF RIGHT ROTATOR CUFF: ICD-10-CM

## 2018-10-11 DIAGNOSIS — M54.42 CHRONIC BILATERAL LOW BACK PAIN WITH BILATERAL SCIATICA: ICD-10-CM

## 2018-10-11 DIAGNOSIS — M75.21 BICEPS TENDONITIS ON RIGHT: ICD-10-CM

## 2018-10-11 DIAGNOSIS — G89.29 CHRONIC LEFT-SIDED LOW BACK PAIN WITH LEFT-SIDED SCIATICA: ICD-10-CM

## 2018-10-11 DIAGNOSIS — M54.42 CHRONIC LEFT-SIDED LOW BACK PAIN WITH LEFT-SIDED SCIATICA: ICD-10-CM

## 2018-10-11 DIAGNOSIS — M79.645 CHRONIC PAIN OF LEFT THUMB: ICD-10-CM

## 2018-10-11 DIAGNOSIS — M50.90 CERVICAL DISC DISEASE: ICD-10-CM

## 2018-10-11 DIAGNOSIS — M50.20 CERVICAL HERNIATED DISC: ICD-10-CM

## 2018-10-11 DIAGNOSIS — M48.061 LUMBAR FORAMINAL STENOSIS: ICD-10-CM

## 2018-10-11 DIAGNOSIS — M19.011 ARTHRITIS OF RIGHT ACROMIOCLAVICULAR JOINT: ICD-10-CM

## 2018-10-11 DIAGNOSIS — M25.511 CHRONIC RIGHT SHOULDER PAIN: ICD-10-CM

## 2018-10-11 DIAGNOSIS — M75.111 INCOMPLETE TEAR OF RIGHT ROTATOR CUFF: ICD-10-CM

## 2018-10-11 DIAGNOSIS — M54.41 CHRONIC BILATERAL LOW BACK PAIN WITH BILATERAL SCIATICA: ICD-10-CM

## 2018-10-11 DIAGNOSIS — M48.02 CERVICAL STENOSIS OF SPINE: ICD-10-CM

## 2018-10-11 DIAGNOSIS — G89.29 CHRONIC RIGHT SHOULDER PAIN: ICD-10-CM

## 2018-10-11 DIAGNOSIS — M43.10 RETROLISTHESIS: ICD-10-CM

## 2018-10-11 DIAGNOSIS — G89.29 CHRONIC BILATERAL LOW BACK PAIN WITH BILATERAL SCIATICA: ICD-10-CM

## 2018-10-11 PROCEDURE — 99214 OFFICE O/P EST MOD 30 MIN: CPT | Performed by: PHYSICAL MEDICINE & REHABILITATION

## 2018-10-11 PROCEDURE — 73130 X-RAY EXAM OF HAND: CPT | Performed by: PHYSICAL MEDICINE & REHABILITATION

## 2018-10-11 NOTE — PATIENT INSTRUCTIONS
As of October 6th 2014, the Drug Enforcement Agency [facility]) is reclassifying all hydrocodone combination medications from Schedule III to Schedule II. This includes medications such as Norco, Vicodin, Lortab, Zohydro, and Vicoprofen.     What this means for y will perform left L5 TFESI(s). He will get a left hand x-ray. He will call me once he has had the imaging studies and I will review them and my office will get back in touch with the patient with any changes in the plan within 7-10 days.     He will g

## 2018-10-11 NOTE — PROGRESS NOTES
Low Back Pain H & P    Chief Complaint: Patient presents with:  Low Back Pain: pt here for follow up with c/o low back pain radiating in both hips when walking and when sitting there is aching pain in the left buttocks and thigh.  pt also c/o right shoulder prostatic hyperplasia)    • Capsular opacification 2014    YAG laser OD   • Cataract    • [facility] arthritis 7/18/2011    [place] [facility] [place] [facility] [place] aristospan / lidocaine injection   • Esophageal reflux    • Eye problem 1998    increased C/D ratio   • Gout Roslyn Aguiar MD at [facility] OR   • LASER SURGERY OF EYE     • OTHER SURGICAL HISTORY      esophageal dilatation   • OTHER SURGICAL HISTORY  6/15/2010    release triggers: RMF, RRF, LMF/ RRF Dupuytren's nodule excision   • RETINAL LASER PROCEDURE     • TOTAL Outdoor occupation: Not Asked        Pt has a pacemaker: No        Pt has a defibrillator: Not Asked        Reaction to local anesthetic: No    Social History Narrative      The patient does  use an assistive device. Oli Sails 50% of time for knee pain. Vascular upper extremity:   Right radial pulses: 2+   Left radial pulses: 2+      Neurological Upper Extremity:    Light Touch: Intact in Bilateral upper extremities. Pin Prick: Not tested. UE Muscle Strength:  All Upper Extremity strength measureme bulging discs    4. C6-7 mild-mod HNP    5. Hand weakness: right > left, ? bilateral ulnar neuropathies and right CTS    6. Chronic bilateral low back pain with bilateral sciatica    7. Chronic left-sided low back pain with left-sided sciatica    8.  Chroni

## 2018-10-15 ENCOUNTER — TELEPHONE (OUTPATIENT)
Dept: NEUROLOGY | Facility: CLINIC | Age: 73
End: 2018-10-15

## 2018-10-15 NOTE — TELEPHONE ENCOUNTER
Patient has been scheduled for a Left L5(L5-S1) transforaminal epidural steroid injections under fluoroscopy and local    on 10/26/2018 at the [facility]. Medications and allergies reviewed.  Patient informed to hold aspirins, nsaids, blood thinners, vitamins and

## 2018-10-16 ENCOUNTER — OFFICE VISIT (OUTPATIENT)
Dept: PHYSICAL THERAPY | Facility: HOSPITAL | Age: 73
End: 2018-10-16
Attending: PHYSICAL MEDICINE & REHABILITATION
Payer: COMMERCIAL

## 2018-10-16 DIAGNOSIS — G89.29 CHRONIC RIGHT SHOULDER PAIN: ICD-10-CM

## 2018-10-16 DIAGNOSIS — M75.21 BICEPS TENDONITIS ON RIGHT: ICD-10-CM

## 2018-10-16 DIAGNOSIS — M19.011 ARTHRITIS OF RIGHT ACROMIOCLAVICULAR JOINT: ICD-10-CM

## 2018-10-16 DIAGNOSIS — M25.511 CHRONIC RIGHT SHOULDER PAIN: ICD-10-CM

## 2018-10-16 DIAGNOSIS — M67.911 DYSFUNCTION OF RIGHT ROTATOR CUFF: ICD-10-CM

## 2018-10-16 DIAGNOSIS — M75.111 INCOMPLETE TEAR OF RIGHT ROTATOR CUFF: ICD-10-CM

## 2018-10-16 PROCEDURE — 97140 MANUAL THERAPY 1/> REGIONS: CPT | Performed by: PHYSICAL THERAPIST

## 2018-10-16 PROCEDURE — 97161 PT EVAL LOW COMPLEX 20 MIN: CPT | Performed by: PHYSICAL THERAPIST

## 2018-10-16 PROCEDURE — 97110 THERAPEUTIC EXERCISES: CPT | Performed by: PHYSICAL THERAPIST

## 2018-10-16 NOTE — PROGRESS NOTES
SHOULDER EVALUATION:   Referring Physician: Dr. Nandini Cevallos  Diagnosis:    Date of Service: 10/16/2018     PATIENT SUMMARY:   Dre Velásquez is a 75year old y/o male who presents to therapy today with c/o R shoulder pain and achiness.   Pt describes pain level 5/1 sitting.   Patient was instructed in and issued HEP for: scapular retraction in sitting, pec minor stretch with doorway,   Charges: PT Anselmo, TE1, Man1 Total Timed Treatment: 45 min Total Treatment Time: 45 min         PLAN OF CARE:    Goals:    · Pt will re [phone]    Sincerely,  Electronically signed by therapist: Adina Giang, PT    [de-identified] certification required: Yes  I certify the need for these services furnished under this plan of treatment and while under my care.         X_____________________

## 2018-10-20 PROBLEM — M19.042 PRIMARY OSTEOARTHRITIS OF LEFT HAND: Status: ACTIVE | Noted: 2018-10-20

## 2018-10-22 ENCOUNTER — OFFICE VISIT (OUTPATIENT)
Dept: PHYSICAL THERAPY | Facility: HOSPITAL | Age: 73
End: 2018-10-22
Attending: PHYSICAL MEDICINE & REHABILITATION
Payer: COMMERCIAL

## 2018-10-22 PROCEDURE — 97112 NEUROMUSCULAR REEDUCATION: CPT | Performed by: PHYSICAL THERAPIST

## 2018-10-22 PROCEDURE — 97140 MANUAL THERAPY 1/> REGIONS: CPT | Performed by: PHYSICAL THERAPIST

## 2018-10-22 NOTE — PROGRESS NOTES
10/22/2018  Dx:     Chronic right shoulder pain (M25.511,G89.29)  Dysfunction of right rotator cuff (M67.911)  Arthritis of right acromioclavicular joint (M19.011)  Incomplete tear of right rotator cuff (M75.111)  Biceps tendonitis on right (M75.21)    in PT    Frequency / Duration: Patient will be seen for 1-2 x/week or a total of 8 visits over a 90 day period. Treatment will include: therapeutic exercise, therapeutic activity, manual therapy, scapular strengthening.      Education or treatment limitati the long head biceps tendon R and pec insertion  Sensation: intact to light touch  Scapulohumeral Rhythm: poor with decreased inferior glide of the [facility] joint with OH movement    AROM:  Shoulder  Elbow   Flexion: R 150; L 165  Extension: R NT; L NT  Abductio

## 2018-10-25 ENCOUNTER — TELEPHONE (OUTPATIENT)
Dept: INTERNAL MEDICINE CLINIC | Facility: CLINIC | Age: 73
End: 2018-10-25

## 2018-10-25 RX ORDER — RISEDRONATE SODIUM 150 MG/1
150 TABLET, FILM COATED ORAL
Qty: 3 TABLET | Refills: 3 | Status: SHIPPED | OUTPATIENT
Start: 2018-10-25 | End: 2019-09-06

## 2018-10-26 ENCOUNTER — OFFICE VISIT (OUTPATIENT)
Dept: SURGERY | Facility: CLINIC | Age: 73
End: 2018-10-26

## 2018-10-26 DIAGNOSIS — M51.9 LUMBAR DISC DISEASE: ICD-10-CM

## 2018-10-26 DIAGNOSIS — M48.061 LUMBAR FORAMINAL STENOSIS: ICD-10-CM

## 2018-10-26 DIAGNOSIS — M48.061 SPINAL STENOSIS OF LUMBAR REGION WITHOUT NEUROGENIC CLAUDICATION: ICD-10-CM

## 2018-10-26 DIAGNOSIS — M54.16 LUMBAR RADICULOPATHY: Primary | ICD-10-CM

## 2018-10-26 PROCEDURE — 64483 NJX AA&/STRD TFRM EPI L/S 1: CPT | Performed by: PHYSICAL MEDICINE & REHABILITATION

## 2018-10-26 NOTE — PROCEDURES
Maynor [address].    LUMBAR TRANSFORAMINAL   NAME:  Ban Salinas    MR #:    [id number] :  3/17/1945     PHYSICIAN:  Durwin Collet A. Couri        Operative Report    DATE OF PROCEDURE: 10/26/2018   PREOPERATIVE DIAGNOSES: 1. left > right S1 ra obtain a good epidurogram indicating correct needle placement. Then, aspiration was performed. No blood, fluid, or air was aspirated.   Then, the patient was injected with a 4 cc solution of 2 cc of 40 mg/cc of Kenalog and 2 cc of 1% PF lidocaine without

## 2018-10-26 NOTE — TELEPHONE ENCOUNTER
Patient returned call, advised of notes below per Dr GLORIA. Medication directions reviewed, patient verbalized understanding.

## 2018-10-26 NOTE — TELEPHONE ENCOUNTER
LMTCB transfer to triage    One [address] elena Phillips [facility], [phone]   Medication Detail      Disp Refills Start End    Risedronate Sodium (ACTONEL) 150 MG Oral Tab 3 tablet 3 10/25/2018     Sig - Route:  Take 1 t

## 2018-10-29 ENCOUNTER — OFFICE VISIT (OUTPATIENT)
Dept: PHYSICAL THERAPY | Facility: HOSPITAL | Age: 73
End: 2018-10-29
Attending: PHYSICAL MEDICINE & REHABILITATION
Payer: COMMERCIAL

## 2018-10-29 PROCEDURE — 97140 MANUAL THERAPY 1/> REGIONS: CPT | Performed by: PHYSICAL THERAPIST

## 2018-10-29 NOTE — PROGRESS NOTES
10/29/2018  Dx:       Chronic right shoulder pain (M25.511,G89.29)  Dysfunction of right rotator cuff (M67.911)  Arthritis of right acromioclavicular joint (M19.011)  Incomplete tear of right rotator cuff (M75.111)  Biceps tendonitis on right (M75.21)    back pocket, tuck in shirt, and turn steering wheel without pain  · Pt will improve shoulder strength throughout to 4+/5 to improve function with ADLs including lifting and carrying   · Pt will demonstrate increased mid/low trap strength to 4/5 to promote therapy with c/o R anterior shoulder and biceps pain. He presents with limited R biceps strength, TTP of the proximal biceps tendons and pec insertion.   He also presents with a forward position of the R shoulder, a protracted R shoulder, weak scapular sta

## 2018-11-02 ENCOUNTER — OFFICE VISIT (OUTPATIENT)
Dept: PHYSICAL THERAPY | Facility: HOSPITAL | Age: 73
End: 2018-11-02
Attending: PHYSICAL MEDICINE & REHABILITATION
Payer: COMMERCIAL

## 2018-11-02 PROCEDURE — 97140 MANUAL THERAPY 1/> REGIONS: CPT | Performed by: PHYSICAL THERAPIST

## 2018-11-02 NOTE — PROGRESS NOTES
11/2/2018  Dx:       Chronic right shoulder pain (M25.511,G89.29)  Dysfunction of right rotator cuff (M67.911)  Arthritis of right acromioclavicular joint (M19.011)  Incomplete tear of right rotator cuff (M75.111)  Biceps tendonitis on right (M75.21)    4+/5 to improve function with ADLs including lifting and carrying   · Pt will demonstrate increased mid/low trap strength to 4/5 to promote improved shoulder mechanics and stabilization with ADL such as lifting and reaching  · Pt will be independent and co proximal biceps tendons and pec insertion. He also presents with a forward position of the R shoulder, a protracted R shoulder, weak scapular stabilizer and increase pain that limits his functional mobility.     Precautions: None     OBJECTIVE:   Devendra El

## 2018-11-05 ENCOUNTER — OFFICE VISIT (OUTPATIENT)
Dept: PHYSICAL THERAPY | Facility: HOSPITAL | Age: 73
End: 2018-11-05
Attending: PHYSICAL MEDICINE & REHABILITATION
Payer: COMMERCIAL

## 2018-11-05 PROCEDURE — 97110 THERAPEUTIC EXERCISES: CPT | Performed by: PHYSICAL THERAPIST

## 2018-11-05 PROCEDURE — 97140 MANUAL THERAPY 1/> REGIONS: CPT | Performed by: PHYSICAL THERAPIST

## 2018-11-05 NOTE — PROGRESS NOTES
11/5/2018  Dx:       Chronic right shoulder pain (M25.511,G89.29)  Dysfunction of right rotator cuff (M67.911)  Arthritis of right acromioclavicular joint (M19.011)  Incomplete tear of right rotator cuff (M75.111)  Biceps tendonitis on right (M75.21)    10/29/2018  · Pt will improve shoulder flexion AROM to >170 degrees to be able to reach into overhead cabinets without pain or restriction  · Pt will improve shoulder abduction AROM to >170 egrees to improve ability to don deodorant, don/doff shirts, and w lifted again. Current functional limitations include lifting, carrying, holding. Sudeep Hernandez describes prior level of function independent . Pt goals include decrease pain. Past medical history was reviewed with Sudeep Hernandez.  Significant findings include LBP - ge

## 2018-11-09 ENCOUNTER — OFFICE VISIT (OUTPATIENT)
Dept: PHYSICAL THERAPY | Facility: HOSPITAL | Age: 73
End: 2018-11-09
Attending: PHYSICAL MEDICINE & REHABILITATION
Payer: COMMERCIAL

## 2018-11-09 PROCEDURE — 97140 MANUAL THERAPY 1/> REGIONS: CPT | Performed by: PHYSICAL THERAPIST

## 2018-11-09 NOTE — PROGRESS NOTES
11/9/2018  Dx:       Chronic right shoulder pain (M25.511,G89.29)  Dysfunction of right rotator cuff (M67.911)  Arthritis of right acromioclavicular joint (M19.011)  Incomplete tear of right rotator cuff (M75.111)  Biceps tendonitis on right (M75.21)    MET 10/29/2018  · Pt will improve shoulder flexion AROM to >170 degrees to be able to reach into overhead cabinets without pain or restriction  · Pt will improve shoulder abduction AROM to >170 egrees to improve ability to don deodorant, don/doff shirts, a lifted again. Current functional limitations include lifting, carrying, holding. Jose Burgos describes prior level of function independent . Pt goals include decrease pain. Past medical history was reviewed with Jose Burgos.  Significant findings include LBP - ge

## 2018-11-12 ENCOUNTER — OFFICE VISIT (OUTPATIENT)
Dept: PHYSICAL THERAPY | Facility: HOSPITAL | Age: 73
End: 2018-11-12
Attending: PHYSICAL MEDICINE & REHABILITATION
Payer: COMMERCIAL

## 2018-11-12 PROCEDURE — 97140 MANUAL THERAPY 1/> REGIONS: CPT | Performed by: PHYSICAL THERAPIST

## 2018-11-12 PROCEDURE — 97110 THERAPEUTIC EXERCISES: CPT | Performed by: PHYSICAL THERAPIST

## 2018-11-12 NOTE — PROGRESS NOTES
11/12/2018  Dx:       Chronic right shoulder pain (M25.511,G89.29)  Dysfunction of right rotator cuff (M67.911)  Arthritis of right acromioclavicular joint (M19.011)  Incomplete tear of right rotator cuff (M75.111)  Biceps tendonitis on right (M75.21)    Therapeutic Activity         Neuromuscular Education         TNE Education   HP prior to manual therapy          Assessment: No adverse effects to treatment.   The pt was advised to perform 10-20 reps of sitting flexion with exhalation followed by self ro Dr. Gibson Boy  Diagnosis:    Date of Service: 10/16/2018     PATIENT SUMMARY:   Judie Saravia is a 75year old y/o male who presents to therapy today with c/o R shoulder pain and achiness.   Pt describes pain level 4/27 VAS during certain movements and when his

## 2018-11-15 RX ORDER — GLIPIZIDE 2.5 MG/1
TABLET, EXTENDED RELEASE ORAL
Qty: 90 TABLET | Refills: 0 | Status: SHIPPED | OUTPATIENT
Start: 2018-11-15 | End: 2018-11-20

## 2018-11-16 ENCOUNTER — OFFICE VISIT (OUTPATIENT)
Dept: PHYSICAL THERAPY | Facility: HOSPITAL | Age: 73
End: 2018-11-16
Attending: PHYSICAL MEDICINE & REHABILITATION
Payer: COMMERCIAL

## 2018-11-16 PROCEDURE — 97140 MANUAL THERAPY 1/> REGIONS: CPT | Performed by: PHYSICAL THERAPIST

## 2018-11-16 PROCEDURE — 97110 THERAPEUTIC EXERCISES: CPT | Performed by: PHYSICAL THERAPIST

## 2018-11-16 NOTE — PROGRESS NOTES
11/16/2018  Dx:       Chronic right shoulder pain (M25.511,G89.29)  Dysfunction of right rotator cuff (M67.911)  Arthritis of right acromioclavicular joint (M19.011)  Incomplete tear of right rotator cuff (M75.111)  Biceps tendonitis on right (M75.21)    UT    Rib depression with IR during exhalation PROM R shoulder    First rib mobs R shoulder    STM R UT    Bilaterally iliopsoas release     PROM R shoulder    First rib mobs R shoulder    STM R UT    Bilaterally iliopsoas release   Therapeutic Exercise Sc Duration: Patient will be seen for 1-2 x/week or a total of 8 visits over a 90 day period. Treatment will include: therapeutic exercise, therapeutic activity, manual therapy, scapular strengthening.      Education or treatment limitation: None  Rehab Miguelito tendon R and pec insertion  Sensation: intact to light touch  Scapulohumeral Rhythm: poor with decreased inferior glide of the [address] joint with OH movement    AROM:  Shoulder  Elbow   Flexion: R 150; L 165  Extension: R NT; L NT  Abduction: R 150; L 165  ER:

## 2018-11-20 RX ORDER — GLIPIZIDE 2.5 MG/1
TABLET, EXTENDED RELEASE ORAL
Qty: 90 TABLET | Refills: 0 | Status: SHIPPED | OUTPATIENT
Start: 2018-11-20 | End: 2019-05-11

## 2018-11-21 RX ORDER — GLIPIZIDE 2.5 MG/1
TABLET, EXTENDED RELEASE ORAL
Qty: 90 TABLET | Refills: 0 | Status: SHIPPED | OUTPATIENT
Start: 2018-11-21 | End: 2019-04-23

## 2018-12-06 ENCOUNTER — TELEPHONE (OUTPATIENT)
Dept: INTERNAL MEDICINE CLINIC | Facility: CLINIC | Age: 73
End: 2018-12-06

## 2018-12-06 DIAGNOSIS — H40.2291: Primary | ICD-10-CM

## 2018-12-06 NOTE — TELEPHONE ENCOUNTER
pt is requesting referrals to :    Dr. Anthony Escoto in regards of detached retna. He has appt scheduled for Jan/19. Dr. Moe Pac on a f/u on injection for back and shoulder.

## 2018-12-06 NOTE — TELEPHONE ENCOUNTER
Dr. Chris Roman,    Please see message below and sign off on referral if you agree.        Thank you, Lobo Carrasquillo Small-Referral Specialist.

## 2018-12-30 RX ORDER — LOVASTATIN 40 MG/1
TABLET ORAL
Qty: 90 TABLET | Refills: 0 | Status: SHIPPED | OUTPATIENT
Start: 2018-12-30 | End: 2019-08-14

## 2019-01-21 ENCOUNTER — OFFICE VISIT (OUTPATIENT)
Dept: INTERNAL MEDICINE CLINIC | Facility: CLINIC | Age: 74
End: 2019-01-21

## 2019-01-21 VITALS
SYSTOLIC BLOOD PRESSURE: 168 MMHG | RESPIRATION RATE: 16 BRPM | WEIGHT: 209 LBS | HEART RATE: 72 BPM | DIASTOLIC BLOOD PRESSURE: 76 MMHG | BODY MASS INDEX: 28 KG/M2

## 2019-01-21 DIAGNOSIS — G89.29 CHRONIC LEFT-SIDED LOW BACK PAIN WITH LEFT-SIDED SCIATICA: ICD-10-CM

## 2019-01-21 DIAGNOSIS — M54.42 CHRONIC LEFT-SIDED LOW BACK PAIN WITH LEFT-SIDED SCIATICA: ICD-10-CM

## 2019-01-21 DIAGNOSIS — I10 ESSENTIAL HYPERTENSION WITH GOAL BLOOD PRESSURE LESS THAN 130/85: ICD-10-CM

## 2019-01-21 DIAGNOSIS — R39.9 LOWER URINARY TRACT SYMPTOMS (LUTS): ICD-10-CM

## 2019-01-21 DIAGNOSIS — E78.2 MIXED HYPERLIPIDEMIA: ICD-10-CM

## 2019-01-21 DIAGNOSIS — E11.9 DIABETES MELLITUS TYPE 2 WITHOUT RETINOPATHY (HCC): Primary | ICD-10-CM

## 2019-01-21 PROCEDURE — 99212 OFFICE O/P EST SF 10 MIN: CPT | Performed by: INTERNAL MEDICINE

## 2019-01-21 PROCEDURE — 99214 OFFICE O/P EST MOD 30 MIN: CPT | Performed by: INTERNAL MEDICINE

## 2019-01-22 ENCOUNTER — TELEPHONE (OUTPATIENT)
Dept: SURGERY | Facility: CLINIC | Age: 74
End: 2019-01-22

## 2019-01-22 ENCOUNTER — MED REC SCAN ONLY (OUTPATIENT)
Dept: INTERNAL MEDICINE CLINIC | Facility: CLINIC | Age: 74
End: 2019-01-22

## 2019-01-22 DIAGNOSIS — R35.1 NOCTURIA: Primary | ICD-10-CM

## 2019-01-22 DIAGNOSIS — R39.12 WEAK URINARY STREAM: ICD-10-CM

## 2019-01-22 NOTE — TELEPHONE ENCOUNTER
Pt states he had to cancel US ordered by [facility] last year due to shoulder injury, also cancelled uroflow appt,  pt would like to resume plans from last year, requesting to speak to RN for instructions. Pls call thank you.

## 2019-01-24 ENCOUNTER — OFFICE VISIT (OUTPATIENT)
Dept: NEUROLOGY | Facility: CLINIC | Age: 74
End: 2019-01-24

## 2019-01-24 ENCOUNTER — TELEPHONE (OUTPATIENT)
Dept: NEUROLOGY | Facility: CLINIC | Age: 74
End: 2019-01-24

## 2019-01-24 VITALS — HEART RATE: 72 BPM | RESPIRATION RATE: 16 BRPM | DIASTOLIC BLOOD PRESSURE: 56 MMHG | SYSTOLIC BLOOD PRESSURE: 140 MMHG

## 2019-01-24 DIAGNOSIS — M48.061 LUMBAR FORAMINAL STENOSIS: ICD-10-CM

## 2019-01-24 DIAGNOSIS — M67.911 DYSFUNCTION OF RIGHT ROTATOR CUFF: ICD-10-CM

## 2019-01-24 DIAGNOSIS — M75.111 INCOMPLETE TEAR OF RIGHT ROTATOR CUFF: ICD-10-CM

## 2019-01-24 DIAGNOSIS — M43.16 SPONDYLOLISTHESIS OF LUMBAR REGION: ICD-10-CM

## 2019-01-24 DIAGNOSIS — M54.16 LUMBAR RADICULOPATHY: Primary | ICD-10-CM

## 2019-01-24 DIAGNOSIS — M54.42 CHRONIC LEFT-SIDED LOW BACK PAIN WITH LEFT-SIDED SCIATICA: ICD-10-CM

## 2019-01-24 DIAGNOSIS — M51.9 LUMBAR DISC DISEASE: ICD-10-CM

## 2019-01-24 DIAGNOSIS — M43.10 RETROLISTHESIS: ICD-10-CM

## 2019-01-24 DIAGNOSIS — M25.511 CHRONIC RIGHT SHOULDER PAIN: ICD-10-CM

## 2019-01-24 DIAGNOSIS — M50.20 CERVICAL HERNIATED DISC: ICD-10-CM

## 2019-01-24 DIAGNOSIS — G89.29 CHRONIC LEFT-SIDED LOW BACK PAIN WITH LEFT-SIDED SCIATICA: ICD-10-CM

## 2019-01-24 DIAGNOSIS — M48.02 CERVICAL STENOSIS OF SPINE: ICD-10-CM

## 2019-01-24 DIAGNOSIS — G89.29 CHRONIC RIGHT SHOULDER PAIN: ICD-10-CM

## 2019-01-24 DIAGNOSIS — M48.061 SPINAL STENOSIS OF LUMBAR REGION WITHOUT NEUROGENIC CLAUDICATION: ICD-10-CM

## 2019-01-24 DIAGNOSIS — M50.90 CERVICAL DISC DISEASE: ICD-10-CM

## 2019-01-24 PROBLEM — R39.9 LOWER URINARY TRACT SYMPTOMS (LUTS): Status: ACTIVE | Noted: 2019-01-24

## 2019-01-24 PROCEDURE — 99214 OFFICE O/P EST MOD 30 MIN: CPT | Performed by: PHYSICAL MEDICINE & REHABILITATION

## 2019-01-24 NOTE — TELEPHONE ENCOUNTER
Right shoulder steroid injection cpt codes 73885, [id number], . Received fax from [employer] advising no authorization is required for in office injections  Will inform Nursing.

## 2019-01-24 NOTE — PROGRESS NOTES
Elaina Brumfield is a 66year old male. HPI:   1.  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 Disp: 90 tablet Rfl: 3   lansoprazole 30 MG Oral Capsule Delayed Release Take 1 capsule (30 mg total) by mouth every morning before breakfast. Disp: 90 caps [facility] [place] - R TH [facility] aristospan / lidocaine injection   • Cataract 2012, 1998 2012 Phaco w/ PC IOL OD   • Posterior subcapsular cataract    • Posterior subcapsular cataract 2010, 1998 2010 Phaco w/ PC IOL OS   • Capsular opacification 2014 present meds, check HgbA1C, lose wgt with carbohydrate controlled diet and exercise, refer to Ophthalmology, check feet daily.    - HEMOGLOBIN A1C; Future  - MICROALB/CREAT RATIO, RANDOM URINE; Future    2.  Essential hypertension with goal blood pressure l indicates understanding of these issues and agrees to the plan.     The patient is asked to return in 3 months

## 2019-01-24 NOTE — PATIENT INSTRUCTIONS
As of October 6th 2014, the Drug Enforcement Agency [facility]) is reclassifying all hydrocodone combination medications from Schedule III to Schedule II. This includes medications such as Norco, Vicodin, Lortab, Zohydro, and Vicoprofen.     What this means for y chart.        Plan  I will perform bilateral L5 TFESI(s). He will start PT on the lumbar spine. The patient will continue with his home exercise program for the right shoulder.     I will do a right shoulder injection, but it will be spaced out for th

## 2019-01-24 NOTE — PROGRESS NOTES
Low Back Pain H & P    Chief Complaint: Patient presents with:  Low Back Pain: pt here for follow up after left L5 transforaminal epidural steroid injection 10/26/18 with 25 % relief.  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 The patient does live in a home with stairs. Review of Systems      PE: The patient does appear in his stated age in no distress. The patient is well groomed. Psychiatric:  The patient is alert and oriented x 3.   The pat antalgic gait   Sit to Stand: mild-moderate difficulty      Vascular lower extremity:   Dorsalis pedis pulse-RIGHT 2+   Dorsalis pedis pulse-LEFT 2+   Tibialis posterior pulse-RIGHT 2+   Tibialis posterior pulse-LEFT 2+     Neurological Lower Extremity: pain medications at this time. The patient will follow up in 3 months, but the patient will call me 2 weeks after having the injection to let me know how the injection worked. The patient understands and agrees with the stated plan. Caryl Campos,

## 2019-01-24 NOTE — TELEPHONE ENCOUNTER
Routing to front office staff to schedule right shoulder injection in office.    fyi-once Bilateral L5 TFESIs are approved by insurance they will need to be scheduled at least 2 weeks apart

## 2019-01-28 ENCOUNTER — TELEPHONE (OUTPATIENT)
Dept: NEUROLOGY | Facility: CLINIC | Age: 74
End: 2019-01-28

## 2019-01-28 NOTE — TELEPHONE ENCOUNTER
Received in basket from [email or web address] advising of approval for physical therapy. Will call Pt. To inform. L/m advising 8 PT sessions were approved. Can proceed with scheduling appts.

## 2019-01-28 NOTE — TELEPHONE ENCOUNTER
Received in basket from [email or web address]  advising of approval for Bilateral L5 TFESIs for one unit/DOS. . Will  inform Nursing.

## 2019-01-30 NOTE — TELEPHONE ENCOUNTER
Patient seen about 6 months ago, Last office visit = 8/6/2018 for Urethral Stricture, Nocturia, Elevated PSA, BPH, ED.      Dr. Ellie Murphy, patient was supposed to have Uroflow, Transrectal US of prostate, Bladder Ultrasound; all before scheduling cysto, gre

## 2019-01-31 NOTE — TELEPHONE ENCOUNTER
Nurse [facility], please call patient back. Laudable is your concern  that patient, not having seen me for almost half a year, and not having performed required testing, is asking to schedule surgery. ..   Since it is been almost 6 months since his last visit w

## 2019-01-31 NOTE — TELEPHONE ENCOUNTER
Patient has been scheduled for a Bilateral L5 TFESI on 2/15/19 at the [facility]. Medications and allergies reviewed. Patient informed to hold aspirins, nsaids, blood thinners, vitamins and fish oils 3-7 days prior to procedure.  Patient informed we will need kvng

## 2019-02-04 ENCOUNTER — TELEPHONE (OUTPATIENT)
Dept: SURGERY | Facility: CLINIC | Age: 74
End: 2019-02-04

## 2019-02-04 NOTE — TELEPHONE ENCOUNTER
Patient's insurance is a [facility] HMO through [facility]. No authorization or referral is required for ultrasound.

## 2019-02-04 NOTE — TELEPHONE ENCOUNTER
Contacted patient. Relayed Dr. Sea Snider' recommendations below to the patient. Number to Central Scheduling given to patient. F/u visit made for 4/3/19 @ 11:00 am.for possible uroflow at that visit. (referral generated).   Patient verbalized understandi

## 2019-02-15 ENCOUNTER — OFFICE VISIT (OUTPATIENT)
Dept: SURGERY | Facility: CLINIC | Age: 74
End: 2019-02-15

## 2019-02-15 DIAGNOSIS — M51.9 LUMBAR DISC DISEASE: ICD-10-CM

## 2019-02-15 DIAGNOSIS — M54.16 LUMBAR RADICULOPATHY: Primary | ICD-10-CM

## 2019-02-15 DIAGNOSIS — M48.061 SPINAL STENOSIS OF LUMBAR REGION WITHOUT NEUROGENIC CLAUDICATION: ICD-10-CM

## 2019-02-15 PROCEDURE — 64483 NJX AA&/STRD TFRM EPI L/S 1: CPT | Performed by: PHYSICAL MEDICINE & REHABILITATION

## 2019-02-15 NOTE — PROCEDURES
Maynor [address].    BILATERAL LUMBAR TRANSFORAMINAL   NAME:  Warren Alexander    MR #:    [id number] :  3/17/1945     PHYSICIAN:  Antoinette Campos        Operative Report    DATE OF PROCEDURE: 2/15/2019   PREOPERATIVE DIAGNOSES: 1. left > ri aspirated. Then, the patient was injected with a 3 cc solution of 1.5 cc of 40 mg/cc of Kenalog and 1.5 cc of 1% PF lidocaine without epinephrine. After this, the needle was removed.   Then  fluoroscopy was right anterior obliqued opening up the left L5-S

## 2019-02-26 ENCOUNTER — OFFICE VISIT (OUTPATIENT)
Dept: NEUROLOGY | Facility: CLINIC | Age: 74
End: 2019-02-26

## 2019-02-26 VITALS — DIASTOLIC BLOOD PRESSURE: 82 MMHG | RESPIRATION RATE: 17 BRPM | HEART RATE: 66 BPM | SYSTOLIC BLOOD PRESSURE: 130 MMHG

## 2019-02-26 DIAGNOSIS — M75.111 INCOMPLETE TEAR OF RIGHT ROTATOR CUFF: ICD-10-CM

## 2019-02-26 DIAGNOSIS — M25.511 CHRONIC RIGHT SHOULDER PAIN: Primary | ICD-10-CM

## 2019-02-26 DIAGNOSIS — G89.29 CHRONIC RIGHT SHOULDER PAIN: Primary | ICD-10-CM

## 2019-02-26 DIAGNOSIS — M54.16 LUMBAR RADICULOPATHY: ICD-10-CM

## 2019-02-26 PROCEDURE — 20611 DRAIN/INJ JOINT/BURSA W/US: CPT | Performed by: PHYSICAL MEDICINE & REHABILITATION

## 2019-02-26 RX ORDER — LIDOCAINE HYDROCHLORIDE 10 MG/ML
2.5 INJECTION, SOLUTION INFILTRATION; PERINEURAL ONCE
Status: COMPLETED | OUTPATIENT
Start: 2019-02-26 | End: 2019-02-26

## 2019-02-26 RX ORDER — TRIAMCINOLONE ACETONIDE 40 MG/ML
60 INJECTION, SUSPENSION INTRA-ARTICULAR; INTRAMUSCULAR ONCE
Status: COMPLETED | OUTPATIENT
Start: 2019-02-26 | End: 2019-02-26

## 2019-02-26 RX ADMIN — TRIAMCINOLONE ACETONIDE 60 MG: 40 INJECTION, SUSPENSION INTRA-ARTICULAR; INTRAMUSCULAR at 10:58:00

## 2019-02-26 RX ADMIN — LIDOCAINE HYDROCHLORIDE 2.5 ML: 10 INJECTION, SOLUTION INFILTRATION; PERINEURAL at 10:58:00

## 2019-02-26 NOTE — PROCEDURES
The patient is here for a right shoulder injection done under ultrasound guidance. Under ultrasound guidance the right posterior glenohumeral joint was identified and a adal was placed on the patient's skin. The skin was cleaned with alcohol swabs x 3.   Bernett Scheuermann

## 2019-02-27 ENCOUNTER — MED REC SCAN ONLY (OUTPATIENT)
Dept: NEUROLOGY | Facility: CLINIC | Age: 74
End: 2019-02-27

## 2019-02-28 ENCOUNTER — OFFICE VISIT (OUTPATIENT)
Dept: PHYSICAL THERAPY | Facility: HOSPITAL | Age: 74
End: 2019-02-28
Attending: PHYSICAL MEDICINE & REHABILITATION
Payer: COMMERCIAL

## 2019-02-28 DIAGNOSIS — M54.42 CHRONIC LEFT-SIDED LOW BACK PAIN WITH LEFT-SIDED SCIATICA: ICD-10-CM

## 2019-02-28 DIAGNOSIS — M25.511 CHRONIC RIGHT SHOULDER PAIN: ICD-10-CM

## 2019-02-28 DIAGNOSIS — G89.29 CHRONIC LEFT-SIDED LOW BACK PAIN WITH LEFT-SIDED SCIATICA: ICD-10-CM

## 2019-02-28 DIAGNOSIS — M75.111 INCOMPLETE TEAR OF RIGHT ROTATOR CUFF: ICD-10-CM

## 2019-02-28 DIAGNOSIS — M51.9 LUMBAR DISC DISEASE: ICD-10-CM

## 2019-02-28 DIAGNOSIS — M67.911 DYSFUNCTION OF RIGHT ROTATOR CUFF: ICD-10-CM

## 2019-02-28 DIAGNOSIS — G89.29 CHRONIC RIGHT SHOULDER PAIN: ICD-10-CM

## 2019-02-28 DIAGNOSIS — M43.16 SPONDYLOLISTHESIS OF LUMBAR REGION: ICD-10-CM

## 2019-02-28 DIAGNOSIS — M43.10 RETROLISTHESIS: ICD-10-CM

## 2019-02-28 DIAGNOSIS — M54.16 LUMBAR RADICULOPATHY: ICD-10-CM

## 2019-02-28 DIAGNOSIS — M48.061 LUMBAR FORAMINAL STENOSIS: ICD-10-CM

## 2019-02-28 DIAGNOSIS — M48.061 SPINAL STENOSIS OF LUMBAR REGION WITHOUT NEUROGENIC CLAUDICATION: ICD-10-CM

## 2019-02-28 PROCEDURE — 97162 PT EVAL MOD COMPLEX 30 MIN: CPT | Performed by: PHYSICAL THERAPIST

## 2019-02-28 PROCEDURE — 97110 THERAPEUTIC EXERCISES: CPT | Performed by: PHYSICAL THERAPIST

## 2019-02-28 NOTE — PROGRESS NOTES
LUMBAR SPINE EVALUATION:   Referring Physician: Dr. Nelida Pimentel  Diagnosis: left > right S1 radiculopathies  (primary encounter diagnosis)  C3-4 right mild-mod, C5-6 right mod, C6-7 left mod-severe foraminal stenosis  C5-6 right > left mod with right mod fora goals include improve his walking tolerance. Past medical history was reviewed with Chelsea Hebert.  Significant findings include DM, RCT L shoulder, eye issues, HTN, Gout, relfux, BPH, meniscus tear R knee        ASSESSMENT:   Mr. Cesar Perez presents to therapy after evaluation involved Moderate Complexity decision making due to 3+ personal factors/comorbidities, 4+ body structures involved/activity limitations, and unstable symptoms including vital sign response and changing pain levels.        PLAN OF CARE:    Goals:

## 2019-03-05 ENCOUNTER — OFFICE VISIT (OUTPATIENT)
Dept: PHYSICAL THERAPY | Facility: HOSPITAL | Age: 74
End: 2019-03-05
Attending: PHYSICAL MEDICINE & REHABILITATION
Payer: COMMERCIAL

## 2019-03-05 PROCEDURE — 97140 MANUAL THERAPY 1/> REGIONS: CPT | Performed by: PHYSICAL THERAPIST

## 2019-03-05 PROCEDURE — 97110 THERAPEUTIC EXERCISES: CPT | Performed by: PHYSICAL THERAPIST

## 2019-03-05 NOTE — PROGRESS NOTES
3/5/2019  Dx: left > right S1 radiculopathies  (primary encounter diagnosis)  C3-4 right mild-mod, C5-6 right mod, C6-7 left mod-severe foraminal stenosis  C5-6 right > left mod with right mod foraminal, C6-7 left mod-large, C4-5 left mild, C3-4 right mild goals for therapy, precautions for therapy. All questions were answered. 1.  The pt will be independent in their HEP. 2.  The pt will be able to walk for 20 minutes without an increase in pain to allow him to walk his dog.   3.  The pt will be able to bilateral sciatica     2-3 times/week for 4-6 weeks     Lumbar stabilization with neural mobilization.   [facility]    Date of Service: 2/28/2019   Date of Onset: several years ago    PATIENT SUMMARY   Cydney Piedra is a 75year old y/o male who presents to therapy symptoms    STRENGTH:   5/5 MMT Scale   Left  Right Comments   Hip Flexion (L2) 4 4+    Knee Extension (L3) 4 4+    Knee Flexion 5 5    Ankle DF (L4) 4 4+    EHL (L5) 4 4+    Ankle PF (S1) 4 4+    Hip Abduction NT NT    Hip Extension NT NT      Flexibility

## 2019-03-08 ENCOUNTER — OFFICE VISIT (OUTPATIENT)
Dept: PHYSICAL THERAPY | Facility: HOSPITAL | Age: 74
End: 2019-03-08
Attending: PHYSICAL MEDICINE & REHABILITATION
Payer: COMMERCIAL

## 2019-03-08 PROCEDURE — 97140 MANUAL THERAPY 1/> REGIONS: CPT | Performed by: PHYSICAL THERAPIST

## 2019-03-08 PROCEDURE — 97110 THERAPEUTIC EXERCISES: CPT | Performed by: PHYSICAL THERAPIST

## 2019-03-08 PROCEDURE — 97112 NEUROMUSCULAR REEDUCATION: CPT | Performed by: PHYSICAL THERAPIST

## 2019-03-08 NOTE — PROGRESS NOTES
3/8/2019  Dx: left > right S1 radiculopathies  (primary encounter diagnosis)  C3-4 right mild-mod, C5-6 right mod, C6-7 left mod-severe foraminal stenosis  C5-6 right > left mod with right mod foraminal, C6-7 left mod-large, C4-5 left mild, C3-4 right mild squeeze with exhale Neuro flossing in sitting    Running stick rolls       Assessment: No adverse effects to treatment. Added neutral flossing to HEP secondary to continues leg symptoms.   Also taught pt how to use the running stick to decrease STR's in th diffuse, L1-2 mild diffuse bulging discs  L5-S1 right severe/left mod, L4-5 bilateral mod foraminal stenosis  right subscapularis mild-mod full thickness inferior tear, right supraspinatus anterior full thickness complete tear, right infraspinatus mild art Precautions: None     OBJECTIVE:   Observation/Posture: forward flexed at the hips  Gait: decreased ability to WB on the L LE  ROM:     Trunk         Pain (+/-)   Flexion 50% limited    Extension 75% limitted    R Sidebend 75% limited    L Sidebend 75%

## 2019-03-11 ENCOUNTER — OFFICE VISIT (OUTPATIENT)
Dept: PHYSICAL THERAPY | Facility: HOSPITAL | Age: 74
End: 2019-03-11
Attending: PHYSICAL MEDICINE & REHABILITATION
Payer: COMMERCIAL

## 2019-03-11 PROCEDURE — 97112 NEUROMUSCULAR REEDUCATION: CPT | Performed by: PHYSICAL THERAPIST

## 2019-03-11 NOTE — PROGRESS NOTES
3/11/2019    Dx: left > right S1 radiculopathies  (primary encounter diagnosis)  C3-4 right mild-mod, C5-6 right mod, C6-7 left mod-severe foraminal stenosis  C5-6 right > left mod with right mod foraminal, C6-7 left mod-large, C4-5 left mild, C3-4 right m lumbar flexion (segmental) with exhale    Bilateral arm reach in sitting with lumbar flexion   TNE Education      HEP Adductor squeeze with exhale Neuro flossing in sitting    Running stick rolls PME against the wall    Sitting lumbar flexion (segmental) w mod with right mod foraminal, C6-7 left mod-large, C4-5 left mild, C3-4 right mild-mod foraminal bulging discs  C6-7 mild-mod HNP  L1-2 & L2-3 stable grade 1 retrolisthesis  L4-5 grade 1 unstable spondylolisthesis  L4-5 severe central stenosis  L5-S1 right presents to therapy after having injections in his LB. He continues to experience symptoms in the LE's but they are intermittent in nature. He presents with decreased ability to WB through the L LE during ambulation.   He displays ability to walk, stand, levels.

## 2019-03-18 ENCOUNTER — APPOINTMENT (OUTPATIENT)
Dept: PHYSICAL THERAPY | Facility: HOSPITAL | Age: 74
End: 2019-03-18
Attending: PHYSICAL MEDICINE & REHABILITATION
Payer: COMMERCIAL

## 2019-03-19 ENCOUNTER — HOSPITAL ENCOUNTER (OUTPATIENT)
Dept: ULTRASOUND IMAGING | Facility: HOSPITAL | Age: 74
Discharge: HOME | End: 2019-03-19
Attending: UROLOGY
Payer: COMMERCIAL

## 2019-03-19 DIAGNOSIS — N40.1 BENIGN NON-NODULAR PROSTATIC HYPERPLASIA WITH LOWER URINARY TRACT SYMPTOMS: ICD-10-CM

## 2019-03-19 PROCEDURE — 76872 US TRANSRECTAL: CPT | Performed by: UROLOGY

## 2019-03-21 ENCOUNTER — OFFICE VISIT (OUTPATIENT)
Dept: PHYSICAL THERAPY | Facility: HOSPITAL | Age: 74
End: 2019-03-21
Attending: PHYSICAL MEDICINE & REHABILITATION
Payer: COMMERCIAL

## 2019-03-21 PROCEDURE — 97110 THERAPEUTIC EXERCISES: CPT | Performed by: PHYSICAL THERAPIST

## 2019-03-21 PROCEDURE — 97140 MANUAL THERAPY 1/> REGIONS: CPT | Performed by: PHYSICAL THERAPIST

## 2019-03-21 NOTE — PROGRESS NOTES
3/21/2019  Dx: left > right S1 radiculopathies  (primary encounter diagnosis)  C3-4 right mild-mod, C5-6 right mod, C6-7 left mod-severe foraminal stenosis  C5-6 right > left mod with right mod foraminal, C6-7 left mod-large, C4-5 left mild, C3-4 right mil Education  Neuro flossing in sitting    Running stick rolls PME against the wall    Sitting lumbar flexion (segmental) with exhale    Bilateral arm reach in sitting with lumbar flexion    TNE Education       HEP Adductor squeeze with exhale Neuro flossing foraminal, C6-7 left mod-large, C4-5 left mild, C3-4 right mild-mod foraminal bulging discs  C6-7 mild-mod HNP  L1-2 & L2-3 stable grade 1 retrolisthesis  L4-5 grade 1 unstable spondylolisthesis  L4-5 severe central stenosis  L5-S1 right large far lateral after having injections in his LB. He continues to experience symptoms in the LE's but they are intermittent in nature. He presents with decreased ability to WB through the L LE during ambulation. He displays ability to walk, stand, sit and bend.   He al

## 2019-03-25 ENCOUNTER — HOSPITAL ENCOUNTER (OUTPATIENT)
Dept: ULTRASOUND IMAGING | Facility: HOSPITAL | Age: 74
Discharge: HOME | End: 2019-03-25
Attending: UROLOGY
Payer: COMMERCIAL

## 2019-03-25 DIAGNOSIS — R39.12 WEAK URINARY STREAM: ICD-10-CM

## 2019-03-25 DIAGNOSIS — N40.1 BENIGN NON-NODULAR PROSTATIC HYPERPLASIA WITH LOWER URINARY TRACT SYMPTOMS: ICD-10-CM

## 2019-03-25 DIAGNOSIS — R35.1 NOCTURIA: ICD-10-CM

## 2019-03-25 PROCEDURE — 76857 US EXAM PELVIC LIMITED: CPT | Performed by: UROLOGY

## 2019-04-01 ENCOUNTER — APPOINTMENT (OUTPATIENT)
Dept: LAB | Facility: HOSPITAL | Age: 74
End: 2019-04-01
Attending: UROLOGY
Payer: COMMERCIAL

## 2019-04-01 DIAGNOSIS — R35.1 NOCTURIA: ICD-10-CM

## 2019-04-01 DIAGNOSIS — R39.12 WEAK URINARY STREAM: ICD-10-CM

## 2019-04-01 PROCEDURE — 81003 URINALYSIS AUTO W/O SCOPE: CPT

## 2019-04-02 ENCOUNTER — TELEPHONE (OUTPATIENT)
Dept: INTERNAL MEDICINE CLINIC | Facility: CLINIC | Age: 74
End: 2019-04-02

## 2019-04-02 DIAGNOSIS — L60.0 INGROWN TOENAIL: Primary | ICD-10-CM

## 2019-04-02 NOTE — TELEPHONE ENCOUNTER
Patient requesting a referral to see Dr. Pallavi Friend- podiatrist. He has an appt Monday. April 8th at 10:50am for ingrown toenail. He had seen another podiatrist but not happy with him so he would like to see Dr. Pallavi Friend.       Please sign off on this referral.    Thanks,    Vincent

## 2019-04-03 ENCOUNTER — OFFICE VISIT (OUTPATIENT)
Dept: SURGERY | Facility: CLINIC | Age: 74
End: 2019-04-03

## 2019-04-03 VITALS
TEMPERATURE: 98 F | SYSTOLIC BLOOD PRESSURE: 129 MMHG | DIASTOLIC BLOOD PRESSURE: 74 MMHG | HEIGHT: 72 IN | BODY MASS INDEX: 28.31 KG/M2 | HEART RATE: 73 BPM | WEIGHT: 209 LBS

## 2019-04-03 DIAGNOSIS — N52.01 ERECTILE DYSFUNCTION DUE TO ARTERIAL INSUFFICIENCY: ICD-10-CM

## 2019-04-03 DIAGNOSIS — R39.198 URINE STREAM SPRAYING: ICD-10-CM

## 2019-04-03 DIAGNOSIS — R35.1 NOCTURIA: ICD-10-CM

## 2019-04-03 DIAGNOSIS — Z87.448 HISTORY OF URETHRAL STRICTURE: ICD-10-CM

## 2019-04-03 DIAGNOSIS — N40.1 BENIGN NON-NODULAR PROSTATIC HYPERPLASIA WITH LOWER URINARY TRACT SYMPTOMS: Primary | ICD-10-CM

## 2019-04-03 PROCEDURE — 99212 OFFICE O/P EST SF 10 MIN: CPT | Performed by: UROLOGY

## 2019-04-03 PROCEDURE — 99215 OFFICE O/P EST HI 40 MIN: CPT | Performed by: UROLOGY

## 2019-04-03 NOTE — PATIENT INSTRUCTIONS
Murrel Apgar, M.D.      1.  I am asking my staff to double book you on a day that I am in the office and I would like you to come to the office with a full bladder to perform a UROFLOW test--my intention would be to notify y

## 2019-04-03 NOTE — PROGRESS NOTES
HPI:    Patient ID: Abdoulaye Griffin is a 75year old male.     HPI   Urethral stricture  3/24/2016 office visit; complaining of spraying of stream after dilation of severe stricture; in light of spraying of stream, recurrent stricture of fossa navicularis was Patient was on finasteride in 2014 but stopped after noticing ED. He denies any pelvic pain or discomfort. He feels this is stable.     Erectile Dysfunction  Noticed after starting finasteride in 2014 causing him to discontinue use of the prescription on hi navicularis; 1--2+ trabeculation without tumors; moderate BPH obstruction.    Lateral lobes enlarged.    3/24/2016 office visit; complaining of spraying of stream after dilation of severe stricture; in light of spraying of stream, recurrent stricture of fos nervous/anxious.             Current Outpatient Medications:  LOVASTATIN 40 MG Oral Tab TAKE ONE TABLET BY MOUTH AT BEDTIME  Disp: 90 tablet Rfl: 0   GlipiZIDE ER 2.5 MG Oral Tablet 24 Hr TAKE ONE TABLET BY MOUTH DAILY WITH BREAKFAST Disp: 90 tablet Rfl: 0 Multiple Vitamins-Minerals (CENTRUM SILVER) Oral Tab Take 1 tablet by mouth daily. Disp:  Rfl:    Cinnamon (SM CINNAMON) 500 MG Oral Cap Take 500 mg by mouth daily.  Disp:  Rfl:      Allergies:  Penicillins             HIVES    HISTORY:  Past Medical Hist repair (S/P PPVx, cryo, GFX, SF6 OD 4/25/13) Dr. Maggi Cintron    • FOREARM/WRIST SURGERY UNLISTED Left     left wrist surgery   • HAND/FINGER SURGERY UNLISTED Left 9/29/2011    LRF trigger finger release   • HERNIA SURGERY      hernia repair, herniorraphy   • the time you got up in the morning?: Half the time  Total Symptom Score: 21  If you were to spend the rest of your life with your urinary condition just the way it is now, how would you feel about that?: Mostly Dissatisfied         PHYSICAL EXAM:    Physic lesions.         I spent 40   minutes with patient, and majority of this time was spent discussing treatment options, answering questions about treatment and coordinating care.         ASSESSMENT/PLAN:     (N40.1) Benign non-nodular prostatic hyperplasia wi of spraying stream, referred to [facility] for possible urethroplasty or reconstruction of urethra. Patient states he went to [facility] and had a cystoscopy, but felt that his complaint was not taken seriously and has not had any further treatment.  On close quest category. I explained that greenlight laser ablation of prostate should help this problem. Patient indicates his understanding.       I explained to patient the risks, side effects, and alternatives, and I answered questions concerning them; patient underst reviewed the chart and discharge instructions (if applicable) and agree that the record reflects my personal performance and is accurate and complete.   Juan C Arevalo MD, 4/3/2019, 6:12 PM

## 2019-04-08 ENCOUNTER — TELEPHONE (OUTPATIENT)
Dept: SURGERY | Facility: CLINIC | Age: 74
End: 2019-04-08

## 2019-04-08 DIAGNOSIS — N40.1 BENIGN PROSTATIC HYPERPLASIA WITH URINARY OBSTRUCTION: Primary | ICD-10-CM

## 2019-04-08 DIAGNOSIS — N13.8 BENIGN PROSTATIC HYPERPLASIA WITH URINARY OBSTRUCTION: Primary | ICD-10-CM

## 2019-04-08 NOTE — TELEPHONE ENCOUNTER
Spoke to patient scheduled, CYSTOSCOPY, GREENLIGHT LASER ABLATION OF PROSTATE, POSSIBLE TRANSURETHRAL INCISION OR DILATION OF POSSIBLE URETHRAL STRICTURE, Tuesday, April [address] outpatient, went over preop instructions with patient, all questi

## 2019-04-12 ENCOUNTER — TELEPHONE (OUTPATIENT)
Dept: SURGERY | Facility: CLINIC | Age: 74
End: 2019-04-12

## 2019-04-12 NOTE — TELEPHONE ENCOUNTER
Urology update    Patient came to the office to perform uroflow test today, but he only urinated 45 mL's during the study, so that the data results   are not accurate.   Patient  explains to medical assistant that he can never urinate amounts larger than wh

## 2019-04-12 NOTE — TELEPHONE ENCOUNTER
Called patient no answer left him message that we do not need to reschedule todays uroflow, after speaking to Dr Kay Hardy he agreed with patient that  he is voiding several times just small amounts so does not want to proceed with uroflow .

## 2019-04-18 ENCOUNTER — OFFICE VISIT (OUTPATIENT)
Dept: PHYSICAL THERAPY | Facility: HOSPITAL | Age: 74
End: 2019-04-18
Attending: PHYSICAL MEDICINE & REHABILITATION
Payer: COMMERCIAL

## 2019-04-18 PROCEDURE — 97110 THERAPEUTIC EXERCISES: CPT | Performed by: PHYSICAL THERAPIST

## 2019-04-18 PROCEDURE — 97140 MANUAL THERAPY 1/> REGIONS: CPT | Performed by: PHYSICAL THERAPIST

## 2019-04-18 NOTE — PROGRESS NOTES
4/18/2019    Patient Name: Elaina Brumfield  YOB: 1945          MRN number:  [id number]  Referring Physician:  Robert Montana    Progress  Summary    Pt has attended 6, cancelled 0, and no shown 0 visits in Physical Therapy.      Dx: left > right Sidebend 75% limited   L Sidebend 75% limited   R Rotation LTR limited 25%   L Rotation LTR limited 25%   R Sideglide NT   L Sideglide NT     Repeated Motion Testing: RFIS decreased symptoms    STRENGTH:   5/5 MMT Scale   Left  Right Comments   Hip Flexion cramps. States he performs his stretching HEP and is able to relieve the cramp and go back to sleep. He continues to use a running stick to help decrease ITB spasm and reports increased ease of walking.   Reports he will start to walk the dog when the wea

## 2019-04-23 RX ORDER — SODIUM CHLORIDE, SODIUM LACTATE, POTASSIUM CHLORIDE, CALCIUM CHLORIDE 600; 310; 30; 20 MG/100ML; MG/100ML; MG/100ML; MG/100ML
INJECTION, SOLUTION INTRAVENOUS CONTINUOUS
Status: CANCELLED | OUTPATIENT
Start: 2019-04-23

## 2019-04-25 ENCOUNTER — OFFICE VISIT (OUTPATIENT)
Dept: NEUROLOGY | Facility: CLINIC | Age: 74
End: 2019-04-25

## 2019-04-25 VITALS — DIASTOLIC BLOOD PRESSURE: 90 MMHG | HEART RATE: 86 BPM | RESPIRATION RATE: 17 BRPM | SYSTOLIC BLOOD PRESSURE: 148 MMHG

## 2019-04-25 DIAGNOSIS — M19.011 ARTHRITIS OF RIGHT ACROMIOCLAVICULAR JOINT: ICD-10-CM

## 2019-04-25 DIAGNOSIS — M43.16 SPONDYLOLISTHESIS OF LUMBAR REGION: ICD-10-CM

## 2019-04-25 DIAGNOSIS — M25.512 CHRONIC LEFT SHOULDER PAIN: Primary | ICD-10-CM

## 2019-04-25 DIAGNOSIS — M50.20 CERVICAL HERNIATED DISC: ICD-10-CM

## 2019-04-25 DIAGNOSIS — M51.9 LUMBAR DISC DISEASE: ICD-10-CM

## 2019-04-25 DIAGNOSIS — M43.10 RETROLISTHESIS: ICD-10-CM

## 2019-04-25 DIAGNOSIS — M75.111 NONTRAUMATIC INCOMPLETE TEAR OF RIGHT ROTATOR CUFF: ICD-10-CM

## 2019-04-25 DIAGNOSIS — M50.90 CERVICAL DISC DISEASE: ICD-10-CM

## 2019-04-25 DIAGNOSIS — G89.29 CHRONIC LEFT SHOULDER PAIN: Primary | ICD-10-CM

## 2019-04-25 DIAGNOSIS — M48.02 CERVICAL STENOSIS OF SPINE: ICD-10-CM

## 2019-04-25 DIAGNOSIS — M48.061 LUMBAR FORAMINAL STENOSIS: ICD-10-CM

## 2019-04-25 DIAGNOSIS — M48.061 SPINAL STENOSIS OF LUMBAR REGION WITHOUT NEUROGENIC CLAUDICATION: ICD-10-CM

## 2019-04-25 PROCEDURE — 99214 OFFICE O/P EST MOD 30 MIN: CPT | Performed by: PHYSICAL MEDICINE & REHABILITATION

## 2019-04-25 NOTE — PATIENT INSTRUCTIONS
As of October 6th 2014, the Drug Enforcement Agency [facility]) is reclassifying all hydrocodone combination medications from Schedule III to Schedule II. This includes medications such as Norco, Vicodin, Lortab, Zohydro, and Vicoprofen.     What this means for y will do PT for the left shoulder. The patient does not need any injections at this time. The patient does not need any pain medications at this time. He will follow up in 3 months or sooner if needed.

## 2019-04-25 NOTE — PROGRESS NOTES
Cervical Pain H & P    Chief Complaint:  Patient presents with:  Back Pain: pt here for follow up after right shoulder injection 2/26/19 with 80-90% relief. c/o left side upper back pain and bilateral leg cramps. Patient was last seen on 1/24/19.   I Retinal hole 2006    laser photocoagulation OD   • Rotator cuff tear, left 2011    repair   • Stiffness of joint, hand 8/2010    bilateral, hand stiffness & weakness post trigger release   • Trigger finger 9/29/2011    Irf-trigger finger, recurrent - LRF t • Diabetes Neg    • Macular degeneration Neg        Social History   Social History    Socioeconomic History      Marital status:       Spouse name: Not on file      Number of children: Not on file      Years of education: Not on file      [facility] Seat Belt: Not Asked        Self-Exams: Not Asked        Grew up on a farm: Not Asked        History of tanning: Not Asked        Outdoor occupation: Not Asked        Pt has a pacemaker: No        Pt has a defibrillator: Not Asked        Reaction to l right mod foraminal, C6-7 left mod-large, C4-5 left mild, C3-4 right mild-mod foraminal bulging discs    3. C3-4 right mild-mod, C5-6 right mod, C6-7 left mod-severe foraminal stenosis    4. C6-7 mild-mod HNP    5.  Arthritis of right acromioclavicular join

## 2019-04-30 ENCOUNTER — TELEPHONE (OUTPATIENT)
Dept: SURGERY | Facility: CLINIC | Age: 74
End: 2019-04-30

## 2019-04-30 ENCOUNTER — APPOINTMENT (OUTPATIENT)
Dept: GENERAL RADIOLOGY | Facility: HOSPITAL | Age: 74
End: 2019-04-30
Attending: UROLOGY
Payer: COMMERCIAL

## 2019-04-30 ENCOUNTER — ANESTHESIA EVENT (OUTPATIENT)
Dept: SURGERY | Facility: HOSPITAL | Age: 74
End: 2019-04-30
Payer: COMMERCIAL

## 2019-04-30 ENCOUNTER — ANESTHESIA (OUTPATIENT)
Dept: SURGERY | Facility: HOSPITAL | Age: 74
End: 2019-04-30
Payer: COMMERCIAL

## 2019-04-30 ENCOUNTER — HOSPITAL ENCOUNTER (OUTPATIENT)
Facility: HOSPITAL | Age: 74
Setting detail: HOSPITAL OUTPATIENT SURGERY
Discharge: HOME | End: 2019-04-30
Attending: UROLOGY | Admitting: UROLOGY
Payer: COMMERCIAL

## 2019-04-30 VITALS
HEART RATE: 74 BPM | TEMPERATURE: 98 F | DIASTOLIC BLOOD PRESSURE: 58 MMHG | SYSTOLIC BLOOD PRESSURE: 127 MMHG | OXYGEN SATURATION: 95 % | HEIGHT: 72 IN | RESPIRATION RATE: 20 BRPM | WEIGHT: 199 LBS | BODY MASS INDEX: 26.95 KG/M2

## 2019-04-30 DIAGNOSIS — N40.1 BENIGN PROSTATIC HYPERPLASIA WITH URINARY OBSTRUCTION: ICD-10-CM

## 2019-04-30 DIAGNOSIS — N13.8 BENIGN PROSTATIC HYPERPLASIA WITH URINARY OBSTRUCTION: ICD-10-CM

## 2019-04-30 PROCEDURE — 52648 LASER SURGERY OF PROSTATE: CPT | Performed by: UROLOGY

## 2019-04-30 PROCEDURE — 0V508ZZ DESTRUCTION OF PROSTATE, VIA NATURAL OR ARTIFICIAL OPENING ENDOSCOPIC: ICD-10-PCS | Performed by: UROLOGY

## 2019-04-30 RX ORDER — HYDROMORPHONE HYDROCHLORIDE 1 MG/ML
0.2 INJECTION, SOLUTION INTRAMUSCULAR; INTRAVENOUS; SUBCUTANEOUS EVERY 5 MIN PRN
Status: DISCONTINUED | OUTPATIENT
Start: 2019-04-30 | End: 2019-04-30

## 2019-04-30 RX ORDER — MORPHINE SULFATE 4 MG/ML
4 INJECTION, SOLUTION INTRAMUSCULAR; INTRAVENOUS EVERY 10 MIN PRN
Status: DISCONTINUED | OUTPATIENT
Start: 2019-04-30 | End: 2019-04-30

## 2019-04-30 RX ORDER — HYDROMORPHONE HYDROCHLORIDE 1 MG/ML
0.4 INJECTION, SOLUTION INTRAMUSCULAR; INTRAVENOUS; SUBCUTANEOUS EVERY 5 MIN PRN
Status: DISCONTINUED | OUTPATIENT
Start: 2019-04-30 | End: 2019-04-30

## 2019-04-30 RX ORDER — GLYCOPYRROLATE 0.2 MG/ML
INJECTION, SOLUTION INTRAMUSCULAR; INTRAVENOUS AS NEEDED
Status: DISCONTINUED | OUTPATIENT
Start: 2019-04-30 | End: 2019-04-30 | Stop reason: SURG

## 2019-04-30 RX ORDER — LIDOCAINE HYDROCHLORIDE 10 MG/ML
INJECTION, SOLUTION EPIDURAL; INFILTRATION; INTRACAUDAL; PERINEURAL AS NEEDED
Status: DISCONTINUED | OUTPATIENT
Start: 2019-04-30 | End: 2019-04-30

## 2019-04-30 RX ORDER — HYDROCODONE BITARTRATE AND ACETAMINOPHEN 5; 325 MG/1; MG/1
1 TABLET ORAL EVERY 6 HOURS PRN
Qty: 6 TABLET | Refills: 0 | Status: SHIPPED | OUTPATIENT
Start: 2019-04-30 | End: 2019-08-12 | Stop reason: ALTCHOICE

## 2019-04-30 RX ORDER — LEVOFLOXACIN 5 MG/ML
500 INJECTION, SOLUTION INTRAVENOUS ONCE
Status: COMPLETED | OUTPATIENT
Start: 2019-04-30 | End: 2019-04-30

## 2019-04-30 RX ORDER — DEXAMETHASONE SODIUM PHOSPHATE 4 MG/ML
VIAL (ML) INJECTION AS NEEDED
Status: DISCONTINUED | OUTPATIENT
Start: 2019-04-30 | End: 2019-04-30 | Stop reason: SURG

## 2019-04-30 RX ORDER — HYDROCODONE BITARTRATE AND ACETAMINOPHEN 5; 325 MG/1; MG/1
2 TABLET ORAL AS NEEDED
Status: DISCONTINUED | OUTPATIENT
Start: 2019-04-30 | End: 2019-04-30

## 2019-04-30 RX ORDER — ACETAMINOPHEN 500 MG
1000 TABLET ORAL ONCE
Status: COMPLETED | OUTPATIENT
Start: 2019-04-30 | End: 2019-04-30

## 2019-04-30 RX ORDER — HYDROCODONE BITARTRATE AND ACETAMINOPHEN 5; 325 MG/1; MG/1
1 TABLET ORAL AS NEEDED
Status: DISCONTINUED | OUTPATIENT
Start: 2019-04-30 | End: 2019-04-30

## 2019-04-30 RX ORDER — ATROPA BELLADONNA AND OPIUM 16.2; 6 MG/1; MG/1
1 SUPPOSITORY RECTAL EVERY 6 HOURS PRN
Status: DISCONTINUED | OUTPATIENT
Start: 2019-04-30 | End: 2019-04-30

## 2019-04-30 RX ORDER — MORPHINE SULFATE 10 MG/ML
6 INJECTION, SOLUTION INTRAMUSCULAR; INTRAVENOUS EVERY 10 MIN PRN
Status: DISCONTINUED | OUTPATIENT
Start: 2019-04-30 | End: 2019-04-30

## 2019-04-30 RX ORDER — LIDOCAINE HYDROCHLORIDE 20 MG/ML
JELLY TOPICAL AS NEEDED
Status: DISCONTINUED | OUTPATIENT
Start: 2019-04-30 | End: 2019-04-30 | Stop reason: HOSPADM

## 2019-04-30 RX ORDER — ONDANSETRON 2 MG/ML
4 INJECTION INTRAMUSCULAR; INTRAVENOUS ONCE AS NEEDED
Status: DISCONTINUED | OUTPATIENT
Start: 2019-04-30 | End: 2019-04-30

## 2019-04-30 RX ORDER — SODIUM CHLORIDE, SODIUM LACTATE, POTASSIUM CHLORIDE, CALCIUM CHLORIDE 600; 310; 30; 20 MG/100ML; MG/100ML; MG/100ML; MG/100ML
INJECTION, SOLUTION INTRAVENOUS CONTINUOUS
Status: DISCONTINUED | OUTPATIENT
Start: 2019-04-30 | End: 2019-04-30

## 2019-04-30 RX ORDER — MORPHINE SULFATE 2 MG/ML
2 INJECTION, SOLUTION INTRAMUSCULAR; INTRAVENOUS EVERY 10 MIN PRN
Status: DISCONTINUED | OUTPATIENT
Start: 2019-04-30 | End: 2019-04-30

## 2019-04-30 RX ORDER — SODIUM CHLORIDE 9 MG/ML
INJECTION, SOLUTION INTRAVENOUS CONTINUOUS
Status: DISCONTINUED | OUTPATIENT
Start: 2019-04-30 | End: 2019-04-30

## 2019-04-30 RX ORDER — PHENAZOPYRIDINE HYDROCHLORIDE 200 MG/1
TABLET, FILM COATED ORAL
Qty: 20 TABLET | Refills: 1 | Status: SHIPPED | OUTPATIENT
Start: 2019-04-30 | End: 2019-08-01

## 2019-04-30 RX ORDER — HYDROMORPHONE HYDROCHLORIDE 1 MG/ML
0.6 INJECTION, SOLUTION INTRAMUSCULAR; INTRAVENOUS; SUBCUTANEOUS EVERY 5 MIN PRN
Status: DISCONTINUED | OUTPATIENT
Start: 2019-04-30 | End: 2019-04-30

## 2019-04-30 RX ORDER — LIDOCAINE HYDROCHLORIDE 10 MG/ML
INJECTION, SOLUTION EPIDURAL; INFILTRATION; INTRACAUDAL; PERINEURAL AS NEEDED
Status: DISCONTINUED | OUTPATIENT
Start: 2019-04-30 | End: 2019-04-30 | Stop reason: SURG

## 2019-04-30 RX ORDER — CIPROFLOXACIN 500 MG/1
TABLET, FILM COATED ORAL
Qty: 4 TABLET | Refills: 0 | Status: SHIPPED | OUTPATIENT
Start: 2019-04-30 | End: 2019-05-31 | Stop reason: ALTCHOICE

## 2019-04-30 RX ORDER — MAGNESIUM HYDROXIDE 1200 MG/15ML
LIQUID ORAL CONTINUOUS PRN
Status: COMPLETED | OUTPATIENT
Start: 2019-04-30 | End: 2019-04-30

## 2019-04-30 RX ORDER — DEXTROSE MONOHYDRATE 25 G/50ML
50 INJECTION, SOLUTION INTRAVENOUS
Status: DISCONTINUED | OUTPATIENT
Start: 2019-04-30 | End: 2019-04-30

## 2019-04-30 RX ORDER — METOCLOPRAMIDE HYDROCHLORIDE 5 MG/ML
INJECTION INTRAMUSCULAR; INTRAVENOUS AS NEEDED
Status: DISCONTINUED | OUTPATIENT
Start: 2019-04-30 | End: 2019-04-30 | Stop reason: SURG

## 2019-04-30 RX ORDER — NALOXONE HYDROCHLORIDE 0.4 MG/ML
80 INJECTION, SOLUTION INTRAMUSCULAR; INTRAVENOUS; SUBCUTANEOUS AS NEEDED
Status: DISCONTINUED | OUTPATIENT
Start: 2019-04-30 | End: 2019-04-30

## 2019-04-30 RX ORDER — METOCLOPRAMIDE 10 MG/1
10 TABLET ORAL ONCE
Status: DISCONTINUED | OUTPATIENT
Start: 2019-04-30 | End: 2019-04-30 | Stop reason: HOSPADM

## 2019-04-30 RX ORDER — FAMOTIDINE 20 MG/1
20 TABLET ORAL ONCE
Status: DISCONTINUED | OUTPATIENT
Start: 2019-04-30 | End: 2019-04-30 | Stop reason: HOSPADM

## 2019-04-30 RX ADMIN — LEVOFLOXACIN 500 MG: 5 INJECTION, SOLUTION INTRAVENOUS at 07:50:00

## 2019-04-30 RX ADMIN — DEXAMETHASONE SODIUM PHOSPHATE 4 MG: 4 MG/ML VIAL (ML) INJECTION at 07:48:00

## 2019-04-30 RX ADMIN — LIDOCAINE HYDROCHLORIDE 25 MG: 10 INJECTION, SOLUTION EPIDURAL; INFILTRATION; INTRACAUDAL; PERINEURAL at 07:48:00

## 2019-04-30 RX ADMIN — METOCLOPRAMIDE HYDROCHLORIDE 10 MG: 5 INJECTION INTRAMUSCULAR; INTRAVENOUS at 07:48:00

## 2019-04-30 RX ADMIN — GLYCOPYRROLATE 0.2 MG: 0.2 INJECTION, SOLUTION INTRAMUSCULAR; INTRAVENOUS at 07:48:00

## 2019-04-30 NOTE — ANESTHESIA PROCEDURE NOTES
Airway  Date/Time: 4/30/2019 7:50 AM  Urgency: elective    Airway not difficult    General Information and Staff    Patient location during procedure: OR  Anesthesiologist: Niru Patel MD  Performed: anesthesiologist     Indications and Patient Condi

## 2019-04-30 NOTE — OPERATIVE REPORT
[facility]    PATIENT'S NAME: Paul Amaya   ATTENDING PHYSICIAN: Tegan Ricketts MD   OPERATING PHYSICIAN: Tegan Ricketts MD   PATIENT ACCOUNT#:   [id number]    LOCATION:  [address]  MEDICAL RECORD #:   [id number]       DATE moderate. The bladder was 2+ trabeculated. I did not see any bladder stones, although there was bleeding at that part of the case, and I did not see any bladder tumors. The ureteral orifices were well away from the bladder neck.   I was able to identify 3-way Romero catheter into the bladder, inflated the 30 mL balloon to 30 mL, placed to gravity drainage with continuous bladder irrigation with the irrigant returning clear.   The patient was returned to the recovery in stable condition having tolerated the

## 2019-04-30 NOTE — TELEPHONE ENCOUNTER
Urology nurses,  5/1/2019 at 8:15 AM, please call patient and if color of urine is good, have him come in to have Romero catheter removed.     4/30/2019 cystoscopy with greenlight laser ablation of BPH tissue    The following are discharge instructions given but it is the same medication.   These medications will turn your urine a bright orange red and will permanently stain white underwear    9. avoid aspirin or NSAIDs (medications such as Advil, Motrin, Aleve, ibuprofen) for 10 days after procedure or longer

## 2019-04-30 NOTE — H&P
Edwar Hayes MD   Physician   UROLOGY          Progress Notes   Signed        Encounter Date:  4/3/2019                                Signed                                     Expand widget buttonCollapse widget button difficulty postponing urination, weak stream, urinary hesitancy and nocturia 3x. The patient denies associated gross hematuria or dysuria. Patient is currently taking sildenafil 100 mg.  The patient feels that the voiding dysfunction is stable compared to production ranging from 8009-4661. Patient stopped Finasteride on his own August 2014. PSA (01/25/15) 4.3, and normal urinalysis.       Office visit 2/4/15 = because of erectile dysfunction, given samples of Viagra 100 mg office visit 1/11/16 moder due to side effect of sinusitis. Review of Systems     Constitutional: Negative for fever. HENT: Negative for voice change. Respiratory: Negative for chest tightness and shortness of breath.       Cardiovascular: Negative for chest sherine Rfl: 5       Mometasone Furo-Formoterol Fum (DULERA) 100-5 MCG/ACT Inhalation Aerosol     Inhale 2 puffs into the lungs 2 (two) times daily.      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             PPVx, Cryo, GFX, SF6       •     Myopia with astigmatism and presbyopia     1957       •     Osteoarthritis             •     Posterior subcapsular cataract             •     Posterior arizmendi SURGERY                         hernia repair, herniorraphy       •     KNEE ARTHROSCOPY     Right     7/10/2017             Performed by Glenys Ocampo MD at [address] [place] [address] OR       •     LASER SURGERY OF EYE                   •     OTHER SURGICAL HISTORY past month, how many times per night did you most typically get up to urinate from the time you went to bed at night until the time you got up in the morning?: Half the time    Total Symptom Score: 21    If you were to spend the rest of your life with your IMAGING    3/25/2019 US Bladder = post void volume 117.8 mL; moderately enlarged prostate. 3/19/2019 Transrectal US Prostate = Prostate volume 87.4 mL consistent with moderate enlargement.          1/21/16 transrectal ultrasound prostate = prost improvement after discontinuing finasteride.         (R39.198) Urine stream spraying    3/24/2016 office visit; complaining of spraying of stream after dilation of severe stricture; in light of spraying of stream, recurrent stricture of fossa navicularis i (R35.1) Nocturia    In the past, patient unable to tolerate Tamsulosin due to side effect of sinusitis -- however, did state that the medication improved emptying of the bladder.  He tried Alfuzosin 10 mg but reported the same side effect of sinusitis a Referrals:    None          ID#[id number]         By signing my name below, Edward Kolb,  attest that this documentation has been prepared under the direction and in the presence of Juan C Arevalo MD.     Electronically Signed: Genoveva Woods, 4

## 2019-04-30 NOTE — INTERVAL H&P NOTE
Pre-op Diagnosis: Benign prostatic hyperplasia with urinary obstruction [N40.1, N13.8]    The above referenced H&P was reviewed by Juan Carlos Addison MD on 4/30/2019, the patient was examined and no significant changes have occurred in the patient's conditi

## 2019-04-30 NOTE — TELEPHONE ENCOUNTER
Dr. Rubio Stuart,    Today 4/30/2019 cystoscopy with greenlight laser ablation of obstructing BPH tissue; case went well; to go home with Romero catheter and plan for removal tomorrow morning in the office if color of urine is good.   Many thanks,  Refugio Bryson, urology

## 2019-04-30 NOTE — ANESTHESIA PREPROCEDURE EVALUATION
Anesthesia PreOp Note    HPI:     Ban Salinas is a 75year old male who presents for preoperative consultation requested by: William Lomax MD    Date of Surgery: 4/30/2019    Procedure(s):  CYSTOSCOPY  GREEN LIGHT LASER OF THE PROSTATE  Indication: B radiculopathies         Date Noted: 04/16/2018      Physical exam, annual         Date Noted: 02/05/2018      Cough         Date Noted: 02/05/2018      Chronic pain of right knee         Date Noted: 11/09/2017      Chronic right shoulder pain         Date Nocturia         Date Noted: 09/07/2014      BPH (benign prostatic hyperplasia)         Date Noted: 09/07/2014      Erectile dysfunction         Date Noted: 09/07/2014        Past Medical History:   Diagnosis Date   • Acute medial meniscus tear of right k FOREARM/WRIST SURGERY UNLISTED Left     left wrist surgery   • HAND/FINGER SURGERY UNLISTED Left 9/29/2011    LRF trigger finger release   • HC INJ EPIDURAL STEROID LUMBAR OR SACRAL W IMG GUIDANCE      x2 2019,x2 2017   • HERNIA SURGERY      hernia repair, Rfl:  4/23/2019   Iodoquinol-HC-Aloe Polysacch (ALCORTIN A) 1-2-1 % External Gel Apply one application to affected areas 2 times daily Disp: 48 g Rfl: 10 More than a month at Unknown time   Diclofenac Sodium 1 % Transdermal Gel Apply 2 g topically 4 (four) file    Lifestyle      Physical activity:        Days per week: Not on file        Minutes per session: Not on file      Stress: Not on file    Relationships      Social connections:        Talks on phone: Not on file        Gets together: Not on file Pulse:  65   Resp:  16   Temp:  97.3 °F (36.3 °C)   TempSrc:  Oral   SpO2:  97%   Weight: 88.5 kg (195 lb) 90.3 kg (199 lb)   Height: 1.829 m (6') 1.829 m (6')        Anesthesia ROS/Med Hx and Physical Exam     Patient summary reviewed and Nursing notes

## 2019-04-30 NOTE — ANESTHESIA POSTPROCEDURE EVALUATION
Patient: Keenan Bran    Procedure Summary     Date:  04/30/19 Room / Location:  [facility] OR 14 / [facility] OR    Anesthesia Start:  2930 Anesthesia Stop:  7171    Procedure:  CYSTOSCOPY (N/A ) Diagnosis:       Benign prostatic hyperplasia with urinary obst

## 2019-04-30 NOTE — BRIEF OP NOTE
[facility] POST ANESTHESIA CARE UNIT  Brief Op Note       Patients Name: Caryl Larios  Attending Physician: Agueda Perez MD  Operating Physician: Asha Woo MD  CSN: [id number]     Location:  OR  MRN: [id number]    Date of Birth: [birth year]

## 2019-05-01 ENCOUNTER — TELEPHONE (OUTPATIENT)
Dept: SURGERY | Facility: CLINIC | Age: 74
End: 2019-05-01

## 2019-05-01 ENCOUNTER — NURSE ONLY (OUTPATIENT)
Dept: SURGERY | Facility: CLINIC | Age: 74
End: 2019-05-01

## 2019-05-01 PROCEDURE — 51700 IRRIGATION OF BLADDER: CPT | Performed by: UROLOGY

## 2019-05-01 NOTE — TELEPHONE ENCOUNTER
Spoke with pt and determined that his urine is yellow in color and I told him that he can then come in for decath. He stated that he would be here in about 30 min.

## 2019-05-01 NOTE — PROGRESS NOTES
Pt presents for nurse visit for voiding trial and decath. Pt brought back to exam room whereby he was properly identified by spelling of last name and  He safely transferred self to exam table.  Draped him for modesty and he lowered his lower clothing

## 2019-05-01 NOTE — TELEPHONE ENCOUNTER
LMTCB on hm # listed, call the cell # and spoke with pt and determined that he did not have questions and that he is urinating well and that the color is light red at the start of the stream and it clears by the end of the stream and he is having mild burning sensation and decided to take the Pyridium for that. Otherwise he is urinating well of good amts. Tasked to Joseph Garcia for his info.

## 2019-05-01 NOTE — TELEPHONE ENCOUNTER
Pt states that he was seen today 5/1/19, for RN visit, and was told to call the office, when he was home, to give an update. Pt. States that he is not having any problems, and that he is doing ok. Pt. Requesting to speak to RN about yesterdays proc and about todays appt. As catheter was removed.

## 2019-05-07 ENCOUNTER — TELEPHONE (OUTPATIENT)
Dept: NEUROLOGY | Facility: CLINIC | Age: 74
End: 2019-05-07

## 2019-05-07 ENCOUNTER — TELEPHONE (OUTPATIENT)
Dept: SURGERY | Facility: CLINIC | Age: 74
End: 2019-05-07

## 2019-05-07 RX ORDER — CLOTRIMAZOLE AND BETAMETHASONE DIPROPIONATE 10; .64 MG/G; MG/G
1 CREAM TOPICAL 2 TIMES DAILY
Qty: 15 G | Refills: 1 | Status: SHIPPED | OUTPATIENT
Start: 2019-05-07 | End: 2019-09-06

## 2019-05-07 NOTE — TELEPHONE ENCOUNTER
Pt states he is having some itching in the area he had sx. Requesting to speak to RN.  mike call thank you

## 2019-05-07 NOTE — TELEPHONE ENCOUNTER
Received fax from [email or web address]  advising of approval for physical therapy. Will call Pt. To inform. Pt. informed 8 PT sessions were approved. He will call to schedule appts.

## 2019-05-07 NOTE — TELEPHONE ENCOUNTER
Patient contacted. Yoanna LUBIN's message to patient. Patient verbalized understanding. All questions answered.

## 2019-05-07 NOTE — TELEPHONE ENCOUNTER
We can try Lotrisone cream.  1 application twice a day for 10 days. I will send to his pharmacy. If no improvement in the next couple days I would be happy to see him.      Thank you,  Cheryl LUBIN  [facility]-Urology

## 2019-05-07 NOTE — TELEPHONE ENCOUNTER
Patient c/o moderate to severe itching on penis, when he pulls back his foreskin. Onset 3 days ago. Patient states tip of penis and foreskin are red, rating itching 7-8/10. Patient denies discharge, drainage.   Patient states he is able to pull back his f

## 2019-05-11 RX ORDER — GLIPIZIDE 2.5 MG/1
TABLET, EXTENDED RELEASE ORAL
Qty: 90 TABLET | Refills: 1 | Status: SHIPPED | OUTPATIENT
Start: 2019-05-11 | End: 2019-11-25

## 2019-05-11 NOTE — TELEPHONE ENCOUNTER
Please review; protocol failed.     Requested Prescriptions   Pending Prescriptions Disp Refills   • glipiZIDE ER 2.5 MG Oral Tablet 24 Hr [Pharmacy Med Name: Glipizide Er 24hr 2.5 Mg Tab Acta] 90 tablet 0     Sig: TAKE ONE TABLET BY MOUTH EVERY DAY IN THE

## 2019-05-16 ENCOUNTER — TELEPHONE (OUTPATIENT)
Dept: INTERNAL MEDICINE CLINIC | Facility: CLINIC | Age: 74
End: 2019-05-16

## 2019-05-16 DIAGNOSIS — G89.29 CHRONIC PAIN OF RIGHT KNEE: ICD-10-CM

## 2019-05-16 DIAGNOSIS — H26.9 CATARACT, UNSPECIFIED CATARACT TYPE, UNSPECIFIED LATERALITY: Primary | ICD-10-CM

## 2019-05-16 DIAGNOSIS — M25.561 CHRONIC PAIN OF RIGHT KNEE: ICD-10-CM

## 2019-05-16 NOTE — TELEPHONE ENCOUNTER
Patient called requesting a referral to see Dr. Umberto Muñoz and Dr. Chris Baez. Pended referral. Please review diagnosis and sign off if you agree.     Thank you,  [facility] [phone]

## 2019-05-31 ENCOUNTER — HOSPITAL ENCOUNTER (OUTPATIENT)
Dept: GENERAL RADIOLOGY | Facility: HOSPITAL | Age: 74
Discharge: HOME | End: 2019-05-31
Attending: ORTHOPAEDIC SURGERY
Payer: COMMERCIAL

## 2019-05-31 ENCOUNTER — OFFICE VISIT (OUTPATIENT)
Dept: ORTHOPEDICS CLINIC | Facility: CLINIC | Age: 74
End: 2019-05-31

## 2019-05-31 DIAGNOSIS — M25.561 RIGHT KNEE PAIN, UNSPECIFIED CHRONICITY: ICD-10-CM

## 2019-05-31 DIAGNOSIS — M22.41 CHONDROMALACIA OF RIGHT PATELLA: Primary | ICD-10-CM

## 2019-05-31 PROCEDURE — 73564 X-RAY EXAM KNEE 4 OR MORE: CPT | Performed by: ORTHOPAEDIC SURGERY

## 2019-05-31 PROCEDURE — 99213 OFFICE O/P EST LOW 20 MIN: CPT | Performed by: ORTHOPAEDIC SURGERY

## 2019-05-31 PROCEDURE — 99212 OFFICE O/P EST SF 10 MIN: CPT | Performed by: ORTHOPAEDIC SURGERY

## 2019-05-31 RX ORDER — ASPIRIN 325 MG
325 TABLET ORAL AS NEEDED
COMMUNITY
End: 2021-07-19

## 2019-05-31 NOTE — PROGRESS NOTES
5/31/2019  Zahra Garg  3/17/1945  75year old   male  Sonia Romo MD    HPI:   Patient presents with:  Knee Pain: right -- Rates pain 4-5/10 at worst level. States knee is a little worse and having problems walking up and down stairs.      This is chronicity    This is a pleasant 41-year-old male with right patellofemoral chondromalacia. The patient will begin a course of physical therapy and follow-up in 4 weeks for repeat evaluation.   If he continues to have pain after the next visit, we will con

## 2019-06-03 ENCOUNTER — OFFICE VISIT (OUTPATIENT)
Dept: PHYSICAL THERAPY | Facility: HOSPITAL | Age: 74
End: 2019-06-03
Attending: PHYSICAL MEDICINE & REHABILITATION
Payer: COMMERCIAL

## 2019-06-03 DIAGNOSIS — M25.512 CHRONIC LEFT SHOULDER PAIN: ICD-10-CM

## 2019-06-03 DIAGNOSIS — G89.29 CHRONIC LEFT SHOULDER PAIN: ICD-10-CM

## 2019-06-03 PROCEDURE — 97161 PT EVAL LOW COMPLEX 20 MIN: CPT | Performed by: PHYSICAL THERAPIST

## 2019-06-03 PROCEDURE — 97110 THERAPEUTIC EXERCISES: CPT | Performed by: PHYSICAL THERAPIST

## 2019-06-03 NOTE — PROGRESS NOTES
POST-OP KNEE EVALUATION:   Referring Physician: Dr. 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 Carolina Triana presents to therapy with c/o      Pre progress achieved in PT    Frequency / Duration: Patient will be seen for 1-2 x/week or a total of 4 visits over a 90 day period.   Treatment will include: Gait training, Manual Therapy, Neuromuscular Re-education, Self-Care Home Management, Therapeutic Act

## 2019-06-04 ENCOUNTER — TELEPHONE (OUTPATIENT)
Dept: PULMONOLOGY | Facility: CLINIC | Age: 74
End: 2019-06-04

## 2019-06-04 ENCOUNTER — TELEPHONE (OUTPATIENT)
Dept: PHYSICAL THERAPY | Facility: HOSPITAL | Age: 74
End: 2019-06-04

## 2019-06-04 NOTE — TELEPHONE ENCOUNTER
Ana Rosa Morrissey has been discontinued in pt's chart as of 4/23/19 & reason states therapy completed. LOV & LR 5/11/18.

## 2019-06-04 NOTE — TELEPHONE ENCOUNTER
Discussed below w/ pt. Tommie Zarate he is doing really well, so he discontinued Dulera. He stts he doesn't need anything right now & if he does that he will let the physician know. Reassured pt. He voiced understanding.

## 2019-06-04 NOTE — TELEPHONE ENCOUNTER
Per Arie Gomez (supervisor) pt's insurance [employer] preferred medication is Advair w/ 90 day supply, she spoke w/ Dr. Noemi Esteban, & he is okay w/ switching Dulera to Advair 90 day supply.

## 2019-06-06 ENCOUNTER — OFFICE VISIT (OUTPATIENT)
Dept: SURGERY | Facility: CLINIC | Age: 74
End: 2019-06-06

## 2019-06-06 VITALS
BODY MASS INDEX: 28.44 KG/M2 | DIASTOLIC BLOOD PRESSURE: 62 MMHG | SYSTOLIC BLOOD PRESSURE: 129 MMHG | HEIGHT: 72 IN | HEART RATE: 76 BPM | TEMPERATURE: 98 F | WEIGHT: 210 LBS

## 2019-06-06 DIAGNOSIS — N52.01 ERECTILE DYSFUNCTION DUE TO ARTERIAL INSUFFICIENCY: Primary | ICD-10-CM

## 2019-06-06 PROCEDURE — 99212 OFFICE O/P EST SF 10 MIN: CPT | Performed by: UROLOGY

## 2019-06-06 RX ORDER — SILDENAFIL CITRATE 20 MG/1
TABLET ORAL
Qty: 50 TABLET | Refills: 11 | Status: SHIPPED | OUTPATIENT
Start: 2019-06-06 | End: 2019-09-06

## 2019-06-06 NOTE — PATIENT INSTRUCTIONS
Travis Corcoran M.D.        1.  May resume sexual activity    2. Sildenafil (generic Viagra) 20 mg tablet, take 4--5 tablets 1.5--2 hours before planned sexual activity    3. Return visit in 6 months.     4.  If at any point

## 2019-06-06 NOTE — PROGRESS NOTES
HPI:    Patient ID: Caryl Larios is a 75year old male. HPI  Voiding Dysfunction  Chronic. Status post GreenLight laser ablation of prostate 4/30/2019.  Patient has current AUA score of 7, mild voiding dysfunction category, greatly improved compared to p of the urethra. Today, patient still has spraying urinary stream. Patient states he is able to control his stream so he does not have to sit to urinate.  Patient noted previously that his spraying is worsened with lower volume urination and that his stream visit 2/4/15 = because of erectile dysfunction, given samples of Viagra 100 mg office visit 1/11/16 moderate voiding dysfunction with AUA voiding score of 11.5 and not on any  medications; prostate 3+ enlarged, 40--45 g without nodules. 1/11/2016 visit d with me;  On LAURA, prostate 3+ enlarged, no palpable nodules or indurations; uroflow test before greenlight laser of prostate;  cystoscopy with GreenLight laser ablation of prostate, possible transurethral incision or dilation of possible urethral stricture- Current Outpatient Medications:  Sildenafil Citrate 20 MG Oral Tab Take 4--5  tablets orally 1.5 ---2 hours before planned sexual activity daily. Disp: 50 tablet Rfl: 11   aspirin 325 MG Oral Tab Take 325 mg by mouth daily.  Disp:  Rfl:    glipiZI for burning pain with urination Disp: 20 tablet Rfl: 1     Allergies:  Penicillins             HIVES    HISTORY:  Past Medical History:   Diagnosis Date   • Acute medial meniscus tear of right knee    • BPH (benign prostatic hyperplasia)    • Capsular opac Erik Bosch    • FOREARM/WRIST SURGERY UNLISTED Left     left wrist surgery   • HAND/FINGER SURGERY UNLISTED Left 9/29/2011    LRF trigger finger release   • HC INJ EPIDURAL STEROID LUMBAR OR SACRAL W IMG GUIDANCE      x2 2019,x2 2017   • HERNIA SURGERY up to urinate from the time you went to bed at night until the time you got up in the morning?: Less than half the time  Total Symptom Score: 7  If you were to spend the rest of your life with your urinary condition just the way it is now, how would you fe focal lesions.          I spent 10 additional minutes with patient, spent discussing erectile dysfunction, answering questions about treatment, and coordinating care.  Erectile dysfunction predated GreenLight laser ablation on 4/30/2019 by several years and complaining of spraying of stream after dilation of severe stricture; in light of spraying of stream, recurrent stricture of fossa navicularis is dilated to a size 24 [place].  Office visit 4/18/2016 patient complained of spraying stream, referred to [facility] is still in the glans penis and the meatus is relatively normal in size. Patient feels this is stable and decides to observe.    (R35.1) Nocturia  Status post GreenLight laser ablation on 4/30/2019.  In the past, patient unable to tolerate Tamsulosin due to Signed: Garth Lind, 6/6/2019, 9:23 AM.    I, Link Brooks MD,  personally performed the services described in this documentation. All medical record entries made by the scribe were at my direction and in my presence.   I have reviewed the chart

## 2019-06-07 ENCOUNTER — TELEPHONE (OUTPATIENT)
Dept: PHYSICAL THERAPY | Facility: HOSPITAL | Age: 74
End: 2019-06-07

## 2019-06-07 ENCOUNTER — APPOINTMENT (OUTPATIENT)
Dept: PHYSICAL THERAPY | Facility: HOSPITAL | Age: 74
End: 2019-06-07
Attending: ORTHOPAEDIC SURGERY
Payer: COMMERCIAL

## 2019-06-10 ENCOUNTER — APPOINTMENT (OUTPATIENT)
Dept: PHYSICAL THERAPY | Facility: HOSPITAL | Age: 74
End: 2019-06-10
Attending: ORTHOPAEDIC SURGERY
Payer: COMMERCIAL

## 2019-06-11 ENCOUNTER — OFFICE VISIT (OUTPATIENT)
Dept: OPHTHALMOLOGY | Facility: CLINIC | Age: 74
End: 2019-06-11

## 2019-06-11 DIAGNOSIS — E11.9 DIABETES MELLITUS TYPE 2 WITHOUT RETINOPATHY (HCC): Primary | ICD-10-CM

## 2019-06-11 DIAGNOSIS — H40.003 GLAUCOMA SUSPECT OF BOTH EYES: ICD-10-CM

## 2019-06-11 DIAGNOSIS — H43.392 FLOATER, VITREOUS, LEFT: ICD-10-CM

## 2019-06-11 DIAGNOSIS — H35.371 EPIRETINAL MEMBRANE, RIGHT: ICD-10-CM

## 2019-06-11 DIAGNOSIS — Z96.1 PSEUDOPHAKIA OF BOTH EYES: ICD-10-CM

## 2019-06-11 DIAGNOSIS — Z86.69 HISTORY OF RETINAL TEAR: ICD-10-CM

## 2019-06-11 PROCEDURE — 92250 FUNDUS PHOTOGRAPHY W/I&R: CPT | Performed by: OPHTHALMOLOGY

## 2019-06-11 PROCEDURE — 92015 DETERMINE REFRACTIVE STATE: CPT | Performed by: OPHTHALMOLOGY

## 2019-06-11 PROCEDURE — 92014 COMPRE OPH EXAM EST PT 1/>: CPT | Performed by: OPHTHALMOLOGY

## 2019-06-11 NOTE — ASSESSMENT & PLAN NOTE
Patient is a glaucoma suspect due to increased cupping of the optic nerves in both eyes. Patient had normal glaucoma diagnostics last year. IOP is normal today. Optic nerves are stable. Photos were taken today to document optic nerves.    There is no diag

## 2019-06-11 NOTE — PATIENT INSTRUCTIONS
Diabetes mellitus type 2 without retinopathy ([facility] [address]  Diabetes type II: no background of retinopathy, no signs of neovascularization noted. Discussed ocular and systemic benefits of blood sugar control.   Diagnosis and treatment discussed in detail with diana

## 2019-06-11 NOTE — PROGRESS NOTES
Ban Salinas is a 75year old male.     HPI:     HPI     Consult      Additional comments: Consult  Per Dr. Sangeetha Soto              Diabetic Eye Exam      Additional comments: Pt has been a diabetic for 6 years  6 years on pills/  0 years on Insulin   Pt checks dilatation); hand/finger surgery unlisted (Left, 9/29/2011) (LRF trigger finger release); other surgical history (6/15/2010) (release triggers: RMF, RRF, LMF/ RRF Dupuytren's nodule excision); forearm/wrist surgery unlisted (Left) (left wrist surgery); las clotrimazole-betamethasone 1-0.05 % External Cream Apply 1 Application topically 2 (two) times daily. Disp: 15 g Rfl: 1   HYDROcodone-acetaminophen 5-325 MG Oral Tab Take 1 tablet by mouth every 6 (six) hours as needed for Pain.  Had surgery 4/30/2019 Dis 6/11/2019  8:10 AM. (History)          PHYSICAL EXAM:     Base Eye Exam     Visual Acuity (Snellen - Linear)       Right Left    Dist cc 20/25 20/20    Near cc 20/20 20/20          Tonometry (Applanation, 8:22 AM)       Right Left    Pressure 16 16 ([address]  Diabetes type II: no background of retinopathy, no signs of neovascularization noted. Discussed ocular and systemic benefits of blood sugar control. Diagnosis and treatment discussed in detail with patient.     Pseudophakia of both eyes  No treatme

## 2019-06-12 ENCOUNTER — APPOINTMENT (OUTPATIENT)
Dept: PHYSICAL THERAPY | Facility: HOSPITAL | Age: 74
End: 2019-06-12
Attending: ORTHOPAEDIC SURGERY
Payer: COMMERCIAL

## 2019-06-13 ENCOUNTER — TELEPHONE (OUTPATIENT)
Dept: INTERNAL MEDICINE CLINIC | Facility: CLINIC | Age: 74
End: 2019-06-13

## 2019-06-13 NOTE — TELEPHONE ENCOUNTER
PT called wanting to verify referral for Dr Ena Pérez, [address]. I informed PT that I could not find any active referrals to doctor and that doctor was out of network - he insisted that he had referrals because he has seen Dr Ena Pérez several times, I informed PT that I would look into it and give him a call back.

## 2019-06-13 NOTE — TELEPHONE ENCOUNTER
I looked into PTs orders and referrals and see no active referral for Dr. Marifer Santillan. I also called [facility] and they confirmed that Dr. Marifer Santillan is not in network. I called PT and left mess to call me back so I can give this information to PT. I will also inform PT that any past visits with Dr. Marifer Santillan will not be covered due to not being in network with insurance.

## 2019-06-14 NOTE — TELEPHONE ENCOUNTER
Called PT back to inform him that per Patrick Frederick in Managed care Dr. Eyal Romero is in network, list was not updated after she joined.  I left mess with PT to call me back so I could inform him and to also start referral request.    [facility]

## 2019-06-17 ENCOUNTER — TELEPHONE (OUTPATIENT)
Dept: INTERNAL MEDICINE CLINIC | Facility: CLINIC | Age: 74
End: 2019-06-17

## 2019-06-17 ENCOUNTER — OFFICE VISIT (OUTPATIENT)
Dept: PHYSICAL THERAPY | Facility: HOSPITAL | Age: 74
End: 2019-06-17
Attending: PHYSICAL MEDICINE & REHABILITATION
Payer: COMMERCIAL

## 2019-06-17 DIAGNOSIS — L03.031 PARONYCHIA OF GREAT TOE OF RIGHT FOOT: ICD-10-CM

## 2019-06-17 DIAGNOSIS — S91.102A OPEN WOUND OF LEFT GREAT TOE, INITIAL ENCOUNTER: Primary | ICD-10-CM

## 2019-06-17 PROCEDURE — 97110 THERAPEUTIC EXERCISES: CPT | Performed by: PHYSICAL THERAPIST

## 2019-06-17 NOTE — TELEPHONE ENCOUNTER
PT called requesting referral to Dr. Nathan Santillan for follow up appt after foot surgery. Please sign off on referral if agree. PT has appt pn Thursday June 20th.      Thank you,  [facility]

## 2019-06-17 NOTE — PROGRESS NOTES
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 Arianna Novak describes prior level of function independent in all activities. Pt goals include decrease pain, avoid injection. Past medical history was reviewed with Arianna Novak.  Significant findings include 2017 meniscus debridement, LBP with L si 09/17/2012     RJM   • CATARACT EXTRACTION W/  INTRAOCULAR LENS IMPLANT Left 01/11/2010     RJM   • COLONOSCOPY   04/2015     repeat in 10 years.    • EYE SURGERY Right 2013     repair detached retina   • EYE SURGERY Right 02/20/2006     S/ P [address] repair

## 2019-06-17 NOTE — TELEPHONE ENCOUNTER
PT called back and I informed him that Dr. Khadar Billings was in network and I sent over referral request to Dr. Gerber Bee. PT verbally understood.     Thanks,  [facility]

## 2019-06-21 ENCOUNTER — APPOINTMENT (OUTPATIENT)
Dept: PHYSICAL THERAPY | Facility: HOSPITAL | Age: 74
End: 2019-06-21
Attending: ORTHOPAEDIC SURGERY
Payer: COMMERCIAL

## 2019-06-24 ENCOUNTER — OFFICE VISIT (OUTPATIENT)
Dept: PHYSICAL THERAPY | Facility: HOSPITAL | Age: 74
End: 2019-06-24
Attending: PHYSICAL MEDICINE & REHABILITATION
Payer: COMMERCIAL

## 2019-06-24 PROCEDURE — 97110 THERAPEUTIC EXERCISES: CPT | Performed by: PHYSICAL THERAPIST

## 2019-06-24 PROCEDURE — 97140 MANUAL THERAPY 1/> REGIONS: CPT | Performed by: PHYSICAL THERAPIST

## 2019-06-24 NOTE — PROGRESS NOTES
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   · Pt will improve quad strength to 5/5 to ascen be painful. No pain in sitting. Doesn't notice much with bending. Pt describes pain level 4-5/10 VAS at the worst.  2/10 VAS at all times. .  History of current condition: 1.5 year ago his dog hit his knee.   Wore a brace for a while which helped init not stated as uncontrolled           Past Surgical History                   Past Surgical History:   Procedure Laterality Date   • ARTHROSCOPY OF JOINT UNLISTED   1990     knee   • ARTHROSCOPY OF JOINT UNLISTED Left 2011     rotator cuff repair   • ARTHRO motion: decreased superior/inferior mobility R knee, decreased tibial rotation R knee    Flexibility:  Hamstrings: R 65 degrees with SLR; L 65 degrees with SLR    Strength/MMT:  Knee   Flexion: R 4/5; L 4/5  Extension: R 4/5; L 4/5

## 2019-06-29 ENCOUNTER — PATIENT MESSAGE (OUTPATIENT)
Dept: INTERNAL MEDICINE CLINIC | Facility: CLINIC | Age: 74
End: 2019-06-29

## 2019-06-29 ENCOUNTER — NURSE TRIAGE (OUTPATIENT)
Dept: OTHER | Age: 74
End: 2019-06-29

## 2019-06-29 NOTE — TELEPHONE ENCOUNTER
Action Requested: Summary for Provider     []  Critical Lab, Recommendations Needed  [] Need Additional Advice  []   FYI    []   Need Orders  [] Need Medications Sent to Pharmacy  []  Other     SUMMARY: OV scheduled for 7/2/19 at 9:20am with Dr. Maricruz Duffy

## 2019-06-29 NOTE — TELEPHONE ENCOUNTER
From: Rosana Hardin  To: Leslee Feng MD  Sent: 6/29/2019 8:44 AM CDT  Subject: Prescription Question    Doctor, I'm having a lot of hip pain that makes walking painful. It has gotten worse this past year. Can it be from Risedronate Sodium?  I've toni

## 2019-06-29 NOTE — TELEPHONE ENCOUNTER
Subject Delivery           Prescription Ariela Woodson 6/29/2019 8:44 AM Reply    To: NELY Lobo      From: Amber Orlando      Created: 6/29/2019 8:44 AM        *-*-*This message has not been handled. *-*-*    Doctor, I'm having a lot of hip pain that makes wal

## 2019-07-02 ENCOUNTER — OFFICE VISIT (OUTPATIENT)
Dept: PHYSICAL THERAPY | Facility: HOSPITAL | Age: 74
End: 2019-07-02
Attending: PHYSICAL MEDICINE & REHABILITATION
Payer: COMMERCIAL

## 2019-07-02 ENCOUNTER — OFFICE VISIT (OUTPATIENT)
Dept: INTERNAL MEDICINE CLINIC | Facility: CLINIC | Age: 74
End: 2019-07-02

## 2019-07-02 ENCOUNTER — HOSPITAL ENCOUNTER (OUTPATIENT)
Dept: GENERAL RADIOLOGY | Facility: HOSPITAL | Age: 74
Discharge: HOME | End: 2019-07-02
Attending: INTERNAL MEDICINE
Payer: COMMERCIAL

## 2019-07-02 VITALS
BODY MASS INDEX: 27.77 KG/M2 | RESPIRATION RATE: 16 BRPM | DIASTOLIC BLOOD PRESSURE: 68 MMHG | HEIGHT: 72 IN | SYSTOLIC BLOOD PRESSURE: 138 MMHG | WEIGHT: 205 LBS | HEART RATE: 72 BPM

## 2019-07-02 DIAGNOSIS — M25.552 CHRONIC HIP PAIN, BILATERAL: ICD-10-CM

## 2019-07-02 DIAGNOSIS — M25.551 CHRONIC HIP PAIN, BILATERAL: ICD-10-CM

## 2019-07-02 DIAGNOSIS — I10 ESSENTIAL HYPERTENSION WITH GOAL BLOOD PRESSURE LESS THAN 130/85: ICD-10-CM

## 2019-07-02 DIAGNOSIS — G89.29 CHRONIC HIP PAIN, BILATERAL: ICD-10-CM

## 2019-07-02 DIAGNOSIS — M25.551 CHRONIC HIP PAIN, BILATERAL: Primary | ICD-10-CM

## 2019-07-02 DIAGNOSIS — E78.2 MIXED HYPERLIPIDEMIA: ICD-10-CM

## 2019-07-02 DIAGNOSIS — G89.29 CHRONIC HIP PAIN, BILATERAL: Primary | ICD-10-CM

## 2019-07-02 DIAGNOSIS — M25.552 CHRONIC HIP PAIN, BILATERAL: Primary | ICD-10-CM

## 2019-07-02 DIAGNOSIS — E11.9 TYPE 2 DIABETES MELLITUS WITHOUT COMPLICATION, WITHOUT LONG-TERM CURRENT USE OF INSULIN (HCC): ICD-10-CM

## 2019-07-02 PROCEDURE — 73521 X-RAY EXAM HIPS BI 2 VIEWS: CPT | Performed by: INTERNAL MEDICINE

## 2019-07-02 PROCEDURE — 97110 THERAPEUTIC EXERCISES: CPT | Performed by: PHYSICAL THERAPIST

## 2019-07-02 PROCEDURE — 99214 OFFICE O/P EST MOD 30 MIN: CPT | Performed by: INTERNAL MEDICINE

## 2019-07-02 PROCEDURE — 97140 MANUAL THERAPY 1/> REGIONS: CPT | Performed by: PHYSICAL THERAPIST

## 2019-07-02 NOTE — PROGRESS NOTES
Caryl Larios is a 66year old male. HPI:   1. Chronic hip pain, bilateral    Has a hx of left hip replacement in 2008. Has been getting more pain in both hips the last few years. Had to rent an electric cart to get around even last fall.      2. Type 2 pramod Take 1 tablet (40 mg total) by mouth nightly. Disp: 90 tablet Rfl: 3   NIFEdipine ER 60 MG Oral Tablet 24 Hr Take 1 tablet (60 mg total) by mouth once daily.  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  Diabetic control is good with HgA1c of 5.8 in December 2016  Recommendations are: continue present meds, check HgbA1C, the back pain persists or gets worse. Will send to physiatry for evaluation.     - PHYSIATRY - INTERNAL    5.  Lower urinary tract symptoms (LUTS)    Will send for urologic evaluation.    - UROLOGY - INTERNAL    The patient indicates understanding of thes

## 2019-07-02 NOTE — PROGRESS NOTES
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 Elizabeth Pierre has completed 4 visits of PT and has progressed very well.   He reports

## 2019-07-08 RX ORDER — OMEPRAZOLE 40 MG/1
CAPSULE, DELAYED RELEASE ORAL
Qty: 90 CAPSULE | Refills: 1 | Status: SHIPPED | OUTPATIENT
Start: 2019-07-08 | End: 2019-12-27

## 2019-07-09 ENCOUNTER — APPOINTMENT (OUTPATIENT)
Dept: PHYSICAL THERAPY | Facility: HOSPITAL | Age: 74
End: 2019-07-09
Attending: PHYSICAL MEDICINE & REHABILITATION
Payer: COMMERCIAL

## 2019-07-11 ENCOUNTER — APPOINTMENT (OUTPATIENT)
Dept: PHYSICAL THERAPY | Facility: HOSPITAL | Age: 74
End: 2019-07-11
Attending: PHYSICAL MEDICINE & REHABILITATION
Payer: COMMERCIAL

## 2019-07-15 ENCOUNTER — TELEPHONE (OUTPATIENT)
Dept: SURGERY | Facility: CLINIC | Age: 74
End: 2019-07-15

## 2019-07-15 DIAGNOSIS — R31.0 GROSS HEMATURIA: Primary | ICD-10-CM

## 2019-07-15 NOTE — TELEPHONE ENCOUNTER
Please call patient back. I agree that he should submit urinalysis and urine culture; to call 3 days after submitting for the results; to call in the morning.   In the meantime, if the aspirin is being taken just as a precaution, he should hold it for 10 d

## 2019-07-15 NOTE — TELEPHONE ENCOUNTER
Phoned pt and spoke with him. Read to him 's instructions as outlined below in this encounter, in it's entirety. Pt verbalized understanding and agrees to plan. He states he only takes asa twice a week, because it helps him sleep.  It is not prescribe

## 2019-07-15 NOTE — TELEPHONE ENCOUNTER
Pt called stating pt had surgery on 4-30-19. Pt has noticed blood in the urine on and off for last 7 days. Call transferred to the rn.

## 2019-07-16 ENCOUNTER — APPOINTMENT (OUTPATIENT)
Dept: PHYSICAL THERAPY | Facility: HOSPITAL | Age: 74
End: 2019-07-16
Attending: PHYSICAL MEDICINE & REHABILITATION
Payer: COMMERCIAL

## 2019-07-16 ENCOUNTER — APPOINTMENT (OUTPATIENT)
Dept: LAB | Facility: HOSPITAL | Age: 74
End: 2019-07-16
Attending: UROLOGY
Payer: COMMERCIAL

## 2019-07-16 ENCOUNTER — TELEPHONE (OUTPATIENT)
Dept: PHYSICAL THERAPY | Facility: HOSPITAL | Age: 74
End: 2019-07-16

## 2019-07-16 DIAGNOSIS — R31.0 GROSS HEMATURIA: ICD-10-CM

## 2019-07-16 LAB
BILIRUB UR QL: NEGATIVE
COLOR UR: YELLOW
GLUCOSE UR-MCNC: NEGATIVE MG/DL
KETONES UR-MCNC: NEGATIVE MG/DL
NITRITE UR QL STRIP.AUTO: NEGATIVE
PH UR: 6 [PH] (ref 5–8)
PROT UR-MCNC: NEGATIVE MG/DL
RBC #/AREA URNS AUTO: 192 /HPF
SP GR UR STRIP: 1.02 (ref 1–1.03)
UROBILINOGEN UR STRIP-ACNC: <2
VIT C UR-MCNC: NEGATIVE MG/DL
WBC #/AREA URNS AUTO: 170 /HPF

## 2019-07-16 PROCEDURE — 87086 URINE CULTURE/COLONY COUNT: CPT

## 2019-07-16 PROCEDURE — 81001 URINALYSIS AUTO W/SCOPE: CPT

## 2019-07-16 NOTE — TELEPHONE ENCOUNTER
Spoke with pt who was told to call this morning to give an update on his condition and about the color of his urine.  Pt informed that he went to the lab this morning and submitted the specimen for the UA and C&S and he also informed that yesterday afternoo

## 2019-07-18 ENCOUNTER — APPOINTMENT (OUTPATIENT)
Dept: PHYSICAL THERAPY | Facility: HOSPITAL | Age: 74
End: 2019-07-18
Attending: PHYSICAL MEDICINE & REHABILITATION
Payer: COMMERCIAL

## 2019-07-18 DIAGNOSIS — R31.0 GROSS HEMATURIA: Primary | ICD-10-CM

## 2019-07-18 DIAGNOSIS — D49.9 TUMOR: ICD-10-CM

## 2019-07-19 ENCOUNTER — TELEPHONE (OUTPATIENT)
Dept: SURGERY | Facility: CLINIC | Age: 74
End: 2019-07-19

## 2019-07-19 ENCOUNTER — OFFICE VISIT (OUTPATIENT)
Dept: ORTHOPEDICS CLINIC | Facility: CLINIC | Age: 74
End: 2019-07-19

## 2019-07-19 DIAGNOSIS — M25.561 ACUTE PAIN OF RIGHT KNEE: Primary | ICD-10-CM

## 2019-07-19 PROCEDURE — 99213 OFFICE O/P EST LOW 20 MIN: CPT | Performed by: ORTHOPAEDIC SURGERY

## 2019-07-19 NOTE — PROGRESS NOTES
This is a pleasant 76-year-old male who comes in today for repeat evaluation of the right knee. Patient reports continues to have pain going up and down stairs. He has performed physical therapy. He comes in today for repeat evaluation.     On exam, diana

## 2019-07-19 NOTE — TELEPHONE ENCOUNTER
Pt states he was told by [facility] he would get call today re: f/u testing that was needed for blood in his urine. Pls advise thank you.

## 2019-07-19 NOTE — TELEPHONE ENCOUNTER
See 7/16/19 urine culture results for plan. (copied and pasted below).      Viewed by Cheryle Cadet on 7/19/2019  7:12 AM   Written by Dianelys Romero MD on 7/18/2019 10:21 PM   Amanda Luz,   We do not have an explanation for the visible blood you have seen in

## 2019-07-25 ENCOUNTER — PATIENT MESSAGE (OUTPATIENT)
Dept: SURGERY | Facility: CLINIC | Age: 74
End: 2019-07-25

## 2019-07-25 ENCOUNTER — HOSPITAL ENCOUNTER (OUTPATIENT)
Dept: MRI IMAGING | Age: 74
Discharge: HOME | End: 2019-07-25
Attending: ORTHOPAEDIC SURGERY
Payer: COMMERCIAL

## 2019-07-25 ENCOUNTER — HOSPITAL ENCOUNTER (OUTPATIENT)
Dept: CT IMAGING | Facility: HOSPITAL | Age: 74
Discharge: HOME | End: 2019-07-25
Attending: UROLOGY
Payer: COMMERCIAL

## 2019-07-25 DIAGNOSIS — M25.561 ACUTE PAIN OF RIGHT KNEE: ICD-10-CM

## 2019-07-25 DIAGNOSIS — R31.0 GROSS HEMATURIA: ICD-10-CM

## 2019-07-25 LAB — CREAT BLD-MCNC: 0.9 MG/DL (ref 0.7–1.3)

## 2019-07-25 PROCEDURE — 82565 ASSAY OF CREATININE: CPT

## 2019-07-25 PROCEDURE — 76376 3D RENDER W/INTRP POSTPROCES: CPT | Performed by: UROLOGY

## 2019-07-25 PROCEDURE — 74178 CT ABD&PLV WO CNTR FLWD CNTR: CPT | Performed by: UROLOGY

## 2019-07-25 PROCEDURE — 73721 MRI JNT OF LWR EXTRE W/O DYE: CPT | Performed by: ORTHOPAEDIC SURGERY

## 2019-07-25 NOTE — TELEPHONE ENCOUNTER
From: Tanesha Jefferson  To: Kee Guillory MD  Sent: 7/25/2019 9:37 AM CDT  Subject: Non-Urgent Medical Question    I'm to get a blood test also, do I need to fast for this?   Thank you,  Cely Urbano  [birth year]

## 2019-07-28 DIAGNOSIS — R93.41 ABNORMAL CT SCAN, BLADDER: Primary | ICD-10-CM

## 2019-07-28 DIAGNOSIS — N21.0 BLADDER STONE: ICD-10-CM

## 2019-07-30 ENCOUNTER — HOSPITAL ENCOUNTER (OUTPATIENT)
Dept: GENERAL RADIOLOGY | Facility: HOSPITAL | Age: 74
Discharge: HOME | End: 2019-07-30
Attending: UROLOGY
Payer: COMMERCIAL

## 2019-07-30 DIAGNOSIS — R93.41 ABNORMAL BLADDER UROGRAPHY: ICD-10-CM

## 2019-07-30 PROCEDURE — 74021 RADEX ABDOMEN 3+ VIEWS: CPT | Performed by: UROLOGY

## 2019-08-01 ENCOUNTER — OFFICE VISIT (OUTPATIENT)
Dept: SURGERY | Facility: CLINIC | Age: 74
End: 2019-08-01

## 2019-08-01 VITALS
SYSTOLIC BLOOD PRESSURE: 137 MMHG | BODY MASS INDEX: 28 KG/M2 | HEART RATE: 76 BPM | DIASTOLIC BLOOD PRESSURE: 73 MMHG | WEIGHT: 205 LBS

## 2019-08-01 DIAGNOSIS — Z87.448 HISTORY OF URETHRAL STRICTURE: ICD-10-CM

## 2019-08-01 DIAGNOSIS — N21.0 BLADDER STONE: Primary | ICD-10-CM

## 2019-08-01 DIAGNOSIS — N40.1 BENIGN NON-NODULAR PROSTATIC HYPERPLASIA WITH LOWER URINARY TRACT SYMPTOMS: ICD-10-CM

## 2019-08-01 DIAGNOSIS — N52.01 ERECTILE DYSFUNCTION DUE TO ARTERIAL INSUFFICIENCY: ICD-10-CM

## 2019-08-01 DIAGNOSIS — R35.1 NOCTURIA: ICD-10-CM

## 2019-08-01 DIAGNOSIS — R39.198 URINE STREAM SPRAYING: ICD-10-CM

## 2019-08-01 PROCEDURE — 99214 OFFICE O/P EST MOD 30 MIN: CPT | Performed by: UROLOGY

## 2019-08-01 NOTE — PROGRESS NOTES
HPI:    Patient ID: Cassandra Ascencio is a 75year old male. HPI    (N21.0) Bladder stone  (primary encounter diagnosis)  Problem started 7/25/2019 when CT urogram (W+WO) showed a 0.9 x 0.6 cm dependent bladder calculus.  Confirmed on 7/30/2019 when XR abdome complaining of spraying of stream after dilation of severe stricture; in light of spraying of stream, recurrent stricture of fossa navicularis was dilated to a size 24 [place].  Office visit 4/18/2016 patient complained of spraying stream, referred to Diogo replacement in the past because of elevated PSA. Known moderate intravesical median lobe of the prostate on ultrasound and postvoid residual 436 ml, office August 20, 2004.  Intake/voiding diary did show polyuria with 24-hour urine production ranging from 3 after he had side effect of sinusitis  08/06/2018 office visit with me; 1/6 systolic murmur; BPH-- On LAURA, prostate mildly >4+ enlarged; >50g, no palpable nodules or indurations; unable to tolerate tamsulosin due to side effect of sinusitis.   3/19/2019 Tra Respiratory: Negative for chest tightness and shortness of breath. Cardiovascular: Negative for chest pain. Gastrointestinal: Negative for constipation. Genitourinary: Negative for dysuria, flank pain and hematuria.         Positive for  sensation Sodium 1 % Transdermal Gel Apply 2 g topically 4 (four) times daily. Disp: 1 Tube Rfl: 3   Cholecalciferol (VITAMIN D) 1000 UNITS Oral Cap Take  by mouth daily. Disp:  Rfl:    Naproxen Sodium (ALEVE) 220 MG Oral Tab Take 1-2 tablets by mouth as needed.  Dis IMPLANT Right 09/17/2012    GREGORIO   • CATARACT EXTRACTION W/  INTRAOCULAR LENS IMPLANT Left 01/11/2010    GREGORIO   • COLONOSCOPY  04/2015    repeat in 10 years.    • CYSTOSCOPY N/A 4/30/2019    Performed by Kee Guillory MD at [address] [place] [address] OR   • [facility] times when you urinated?: Less than 1 time in 5  Over the past month, how often have you found it difficult to postpone urination?: Less than 1 time in 5  Over the past month, how often have you had a weak urinary stream?: Less than 1 time in 5  Over the p UA Microscopic not indicated  1/20/2017 PSA = 6.2;  20 % free PSA (16-20 % probability of prostate cancer)  9/13/2016 UA RBC < 1  1/2/2016 PSA 5.4:  30 % free PSA (8 % probability of prostate cancer)  10/27/2015 PSA = 4.7  01/26/2015 PSA = 4.3        IMAGI density, or significant focal lesion is identified. PELVIC NODES:  Metallic streak artifact compromises overall assessment. No gross pelvic lymphadenopathy is apparent. PELVIC ORGANS: Assessment is significantly degraded by metallic streak artifact. US Bladder = post void volume 117.8 mL; moderately enlarged prostate.     3/19/2019 Transrectal US Prostate = Prostate volume 87.4 mL consistent with moderate enlargement.     1/21/16 transrectal ultrasound prostate = prostate volume 73.02 mL with no focal procedure. He will continue to observe.     (N52.01) Erectile dysfunction due to arterial insufficiency  Problem started after starting finasteride in 2014; discontinued on his own as result. He denies any improvement after discontinuing finasteride.  Mehran spraying of stream. During 4/30/2019 cystoscopy with GreenLight laser ablation, no evidence of any stricture of the urethra; subtle slight hypospadias, but the meatus of the urethra is still in the glans penis and the meatus is relatively normal in size.  P calcifications in your aorta on CT urogram of 7/25/2019        No orders of the defined types were placed in this encounter.       Meds This Visit:  Requested Prescriptions      No prescriptions requested or ordered in this encounter       [facility]

## 2019-08-01 NOTE — PATIENT INSTRUCTIONS
Pablito Multani M.D.      1.  Cystoscopy with holmium laser lithotripsy of bladder stone--General anesthesia                        At [facility], same-day surgery    2.   Please hold cod liver oil, fish oil pil

## 2019-08-03 ENCOUNTER — PATIENT MESSAGE (OUTPATIENT)
Dept: INTERNAL MEDICINE CLINIC | Facility: CLINIC | Age: 74
End: 2019-08-03

## 2019-08-03 ENCOUNTER — TELEPHONE (OUTPATIENT)
Dept: OTHER | Age: 74
End: 2019-08-03

## 2019-08-03 NOTE — TELEPHONE ENCOUNTER
From: Abdoulaye Griffin  To: Nini Smith MD  Sent: 8/3/2019 8:20 AM CDT  Subject: Other    Hello Dr. Dagoberto Quevedo,  I had a CT scan and it came back that I have gale stones. Dr. Cheryl Sy told me to contact you about this. What should I do?   Thank you,

## 2019-08-03 NOTE — TELEPHONE ENCOUNTER
LMTCB, transfer to triage, also sent [employer] message to pt.

## 2019-08-03 NOTE — TELEPHONE ENCOUNTER
----- Message from Brannon Lean. [place] sent at 8/3/2019  8:20 AM CDT -----  Regarding: Other  Contact: [phone]  Beto Ramos,  I had a CT scan and it came back that I have gale stones. Dr. Marie Henderson told me to contact you about this.  What should I do

## 2019-08-05 NOTE — TELEPHONE ENCOUNTER
Spoke with patient ( verified) and relayed PVR message below--patient verbalizes understanding and agreement. F/U appt made for Monday, 19 at 10:40 a.m. With PVR. No further questions/concerns at this time.

## 2019-08-05 NOTE — TELEPHONE ENCOUNTER
Dr Aleksandra Keen, please advise. Please see CT results. Patient stated that he has no symptoms at all-- no fever, abdominal pain, nausea, vomiting, diarrhea.

## 2019-08-07 ENCOUNTER — TELEPHONE (OUTPATIENT)
Dept: INTERNAL MEDICINE CLINIC | Facility: CLINIC | Age: 74
End: 2019-08-07

## 2019-08-07 ENCOUNTER — TELEPHONE (OUTPATIENT)
Dept: SURGERY | Facility: CLINIC | Age: 74
End: 2019-08-07

## 2019-08-07 DIAGNOSIS — L03.031 PARONYCHIA OF GREAT TOE OF RIGHT FOOT: ICD-10-CM

## 2019-08-07 DIAGNOSIS — N21.0 BLADDER STONE: Primary | ICD-10-CM

## 2019-08-07 DIAGNOSIS — S91.102A OPEN WOUND OF LEFT GREAT TOE, INITIAL ENCOUNTER: Primary | ICD-10-CM

## 2019-08-07 NOTE — TELEPHONE ENCOUNTER
Patient called office, scheduled  Cystoscopy with holmium laser lithotripsy of bladder stone, Tuesday 09/10/19, [facility]/outpatient, went over pre-op instructions, all questions answered, will mail out instructions to patient.

## 2019-08-07 NOTE — TELEPHONE ENCOUNTER
Patient called to get referral to Dr Serge Shepard (Podiatrist) for follow up on toe after surgery. Please sign off if agree.     Thank you,  [address]

## 2019-08-09 ENCOUNTER — OFFICE VISIT (OUTPATIENT)
Dept: ORTHOPEDICS CLINIC | Facility: CLINIC | Age: 74
End: 2019-08-09

## 2019-08-09 DIAGNOSIS — M25.561 ACUTE PAIN OF RIGHT KNEE: Primary | ICD-10-CM

## 2019-08-09 PROCEDURE — 99213 OFFICE O/P EST LOW 20 MIN: CPT | Performed by: ORTHOPAEDIC SURGERY

## 2019-08-09 NOTE — PROGRESS NOTES
This is a pleasant 69-year-old male who comes in today for repeat evaluation of the right knee. He recent had an MRI of the knee and comes in today to discuss results. Patient reports his pain is similar to what it was at the last visit.     On exam, diana

## 2019-08-12 ENCOUNTER — OFFICE VISIT (OUTPATIENT)
Dept: INTERNAL MEDICINE CLINIC | Facility: CLINIC | Age: 74
End: 2019-08-12

## 2019-08-12 VITALS
DIASTOLIC BLOOD PRESSURE: 74 MMHG | SYSTOLIC BLOOD PRESSURE: 134 MMHG | HEART RATE: 73 BPM | WEIGHT: 202 LBS | BODY MASS INDEX: 27.36 KG/M2 | HEIGHT: 72 IN | RESPIRATION RATE: 16 BRPM

## 2019-08-12 DIAGNOSIS — E78.2 MIXED HYPERLIPIDEMIA: ICD-10-CM

## 2019-08-12 DIAGNOSIS — G89.29 CHRONIC PAIN OF RIGHT KNEE: Primary | ICD-10-CM

## 2019-08-12 DIAGNOSIS — E11.9 TYPE 2 DIABETES MELLITUS WITHOUT COMPLICATION, WITHOUT LONG-TERM CURRENT USE OF INSULIN (HCC): ICD-10-CM

## 2019-08-12 DIAGNOSIS — M25.561 CHRONIC PAIN OF RIGHT KNEE: Primary | ICD-10-CM

## 2019-08-12 DIAGNOSIS — I10 ESSENTIAL HYPERTENSION WITH GOAL BLOOD PRESSURE LESS THAN 130/85: ICD-10-CM

## 2019-08-12 PROCEDURE — 99214 OFFICE O/P EST MOD 30 MIN: CPT | Performed by: INTERNAL MEDICINE

## 2019-08-12 NOTE — PROGRESS NOTES
Dre Velásquez is a 66year old male. HPI:   1. Chronic Right knee pain    Saw Sarthak and had evaluation of right knee. No injection given. MRI was done and MD suggested a possible scope to clean up[ the joint.    Ice joint and elevate area intermittently a sciatica    Has had continued left sided low back pain going down the left leg. Has been using pain meds and back exercises without full relief.  Pain keeps him up at nite and is moderately severe      Current Outpatient Prescriptions:  Lovastatin 40 MG Ora Myopia with astigmatism and presbyopia 1957   • Hx of eye surgery 2013     repair detached retina   • History of carpal tunnel surgery of left wrist    • Eye problem 1998     increased C/D ratio   • Retinal hole 2006     laser photocoagulation OD   • No di Full ROM both hips. Will continue wesley exercises. EXTREMITIES: no cyanosis, clubbing or edema/ no sore seen. Hips show good movement w/o pain. ASSESSMENT AND PLAN:   1. Chronic Right knee pain    Saw Sarthak and had evaluation of right knee.  No inj exercise at least 3 times a week to build strength, burn calories and help achieve your ideal weight. The patient indicates understanding of these issues and agrees to the plan.     The patient is asked to return in 3 months

## 2019-08-15 ENCOUNTER — OFFICE VISIT (OUTPATIENT)
Dept: NEUROLOGY | Facility: CLINIC | Age: 74
End: 2019-08-15

## 2019-08-15 VITALS
WEIGHT: 202 LBS | HEIGHT: 72 IN | DIASTOLIC BLOOD PRESSURE: 86 MMHG | HEART RATE: 80 BPM | RESPIRATION RATE: 17 BRPM | BODY MASS INDEX: 27.36 KG/M2 | SYSTOLIC BLOOD PRESSURE: 131 MMHG

## 2019-08-15 DIAGNOSIS — M75.111 NONTRAUMATIC INCOMPLETE TEAR OF RIGHT ROTATOR CUFF: ICD-10-CM

## 2019-08-15 DIAGNOSIS — M43.10 RETROLISTHESIS: ICD-10-CM

## 2019-08-15 DIAGNOSIS — M43.16 SPONDYLOLISTHESIS OF LUMBAR REGION: ICD-10-CM

## 2019-08-15 DIAGNOSIS — M50.20 CERVICAL HERNIATED DISC: ICD-10-CM

## 2019-08-15 DIAGNOSIS — M25.511 CHRONIC RIGHT SHOULDER PAIN: ICD-10-CM

## 2019-08-15 DIAGNOSIS — M54.16 LUMBAR RADICULOPATHY: Primary | ICD-10-CM

## 2019-08-15 DIAGNOSIS — M48.061 SPINAL STENOSIS OF LUMBAR REGION WITHOUT NEUROGENIC CLAUDICATION: ICD-10-CM

## 2019-08-15 DIAGNOSIS — M48.061 LUMBAR FORAMINAL STENOSIS: ICD-10-CM

## 2019-08-15 DIAGNOSIS — M50.90 CERVICAL DISC DISEASE: ICD-10-CM

## 2019-08-15 DIAGNOSIS — G89.29 CHRONIC LEFT-SIDED LOW BACK PAIN WITH LEFT-SIDED SCIATICA: ICD-10-CM

## 2019-08-15 DIAGNOSIS — G89.29 CHRONIC RIGHT SHOULDER PAIN: ICD-10-CM

## 2019-08-15 DIAGNOSIS — M48.02 CERVICAL STENOSIS OF SPINE: ICD-10-CM

## 2019-08-15 DIAGNOSIS — M67.911 DYSFUNCTION OF RIGHT ROTATOR CUFF: ICD-10-CM

## 2019-08-15 DIAGNOSIS — M75.21 BICEPS TENDONITIS ON RIGHT: ICD-10-CM

## 2019-08-15 DIAGNOSIS — M54.42 CHRONIC LEFT-SIDED LOW BACK PAIN WITH LEFT-SIDED SCIATICA: ICD-10-CM

## 2019-08-15 DIAGNOSIS — M51.9 LUMBAR DISC DISEASE: ICD-10-CM

## 2019-08-15 DIAGNOSIS — M19.011 ARTHRITIS OF RIGHT ACROMIOCLAVICULAR JOINT: ICD-10-CM

## 2019-08-15 PROCEDURE — 99214 OFFICE O/P EST MOD 30 MIN: CPT | Performed by: PHYSICAL MEDICINE & REHABILITATION

## 2019-08-15 RX ORDER — LOVASTATIN 40 MG/1
TABLET ORAL
Qty: 90 TABLET | Refills: 1 | Status: SHIPPED | OUTPATIENT
Start: 2019-08-15 | End: 2020-11-23

## 2019-08-15 RX ORDER — NIFEDIPINE 60 MG/1
TABLET, FILM COATED, EXTENDED RELEASE ORAL
Qty: 90 TABLET | Refills: 1 | Status: SHIPPED | OUTPATIENT
Start: 2019-08-15 | End: 2020-02-10

## 2019-08-15 NOTE — PROGRESS NOTES
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    PPVx, Cryo, GFX, SF6   • Myopia with astigmatism and presbyopia 1957   • Osteoarthritis    • Posterior subcapsular cataract    • Posterior subcapsu 6/15/2010    release triggers: RMF, RRF, LMF/ RRF Dupuytren's nodule excision   • REPAIR OF BICEPS TENDON AT ELBOW Left 1992   • RETINAL LASER PROCEDURE     • TOTAL HIP REPLACEMENT Left 2008   • YAG CAPSULOTOMY - OD - RIGHT EYE Right 4/30/14    GREGORIO FITZGERALD Concerns:         Service: Not Asked        Blood Transfusions: Not Asked        Caffeine Concern: Yes          soda, 8 oz daily        Occupational Exposure: Not Asked        Hobby Hazards: Not Asked        Sleep Concern: Not Asked        Stress C posterior pulse-RIGHT 2+   Tibialis posterior pulse-LEFT 2+     Neurological Lower Extremity:    Light Touch Sensation: Intact in bilateral Lower Extremities   LE Muscle Strength:  All LE strength measurements 5/5 except:  Hamstring RIGHT:   4/5  Hamstring Dysfunction of right rotator cuff    9. Chronic right shoulder pain    10. Chronic left-sided low back pain with left-sided sciatica    11.  C6-7 mild-mod HNP    12. C5-6 right > left mod with right mod foraminal, C6-7 left mod-large, C4-5 left mild, C3-4 r

## 2019-08-15 NOTE — PATIENT INSTRUCTIONS
Plan  I will perform left L4 and L5 TFESI(s). The patient will continue with his home exercise program for his right shoulder and the lumbar spine. The patient does not need any pain medications at this time.     The patient will follow up in 2-3 mo

## 2019-08-15 NOTE — PROGRESS NOTES
Patient has been scheduled for a left L4 and L5 TFESIs  on 8/27/19 at the [facility]. Medications and allergies reviewed. Patient informed to hold aspirin 325mg, nsaids, blood thinners, multivitamins, vitamin E and fish oils 3-7 days prior to procedure.  Patient Infectious Diseases progress note:    Subjective: WBC trending down, afebrile.  No acute o/n events    ROS:  CONSTITUTIONAL:  No fever, chills, rigors  CARDIOVASCULAR:  No chest pain or palpitations  RESPIRATORY:   No SOB, cough, dyspnea on exertion.  No wheezing  GASTROINTESTINAL:  No abd pain, N/V, diarrhea/constipation  EXTREMITIES:  No swelling or joint pain  GENITOURINARY:  No burning on urination, increased frequency or urgency.  No flank pain  NEUROLOGIC:  No HA, visual disturbances  SKIN: No rashes    Allergies    sulfa drugs (Unknown)    Intolerances    OxyContin (Sedation/Somnol; Drowsiness)      ANTIBIOTICS/RELEVANT:  antimicrobials    immunologic:    OTHER:  acetaminophen   Tablet .. 650 milliGRAM(s) Oral every 6 hours PRN  apixaban 5 milliGRAM(s) Oral every 12 hours  ATENolol  Tablet 100 milliGRAM(s) Oral daily  atorvastatin 10 milliGRAM(s) Oral at bedtime  calcium carbonate 1250 mG  + Vitamin D (OsCal 500 + D) 1 Tablet(s) Oral daily  docusate sodium 100 milliGRAM(s) Oral two times a day  furosemide    Tablet 40 milliGRAM(s) Oral every 24 hours  losartan 100 milliGRAM(s) Oral daily  oxyCODONE    IR 5 milliGRAM(s) Oral every 6 hours PRN  senna 2 Tablet(s) Oral at bedtime  sodium chloride 0.9% lock flush 3 milliLiter(s) IV Push every 8 hours      Objective:  Vital Signs Last 24 Hrs  T(C): 36.6 (01 May 2019 13:00), Max: 36.8 (01 May 2019 06:25)  T(F): 97.8 (01 May 2019 13:00), Max: 98.3 (01 May 2019 06:25)  HR: 89 (01 May 2019 13:00) (80 - 89)  BP: 132/69 (01 May 2019 13:00) (90/48 - 139/70)  BP(mean): --  RR: 18 (01 May 2019 13:00) (18 - 18)  SpO2: 97% (01 May 2019 13:00) (96% - 98%)    PHYSICAL EXAM:  Constitutional:NAD  Eyes:SHAYNA, EOMI  Ear/Nose/Throat: no thrush, mucositis.  Moist mucous membranes	  Neck:no JVD, no lymphadenopathy, supple  Respiratory: CTA po  Cardiovascular: S1S2 RRR, no murmurs  Gastrointestinal:soft, nontender,  nondistended (+) BS  Extremities:no e/e/c  Skin:  no rashes, open wounds or ulcerations, L chest wall PPM        LABS:                        12.2   11.42 )-----------( 307      ( 01 May 2019 06:51 )             37.8     05-    139  |  102  |  27<H>  ----------------------------<  104<H>  3.6   |  25  |  1.03    Ca    8.5      01 May 2019 06:51  Phos  4.4     04-30  Mg     1.6     -        Urinalysis Basic - ( 2019 18:45 )    Color: LIGHT YELLOW / Appearance: CLEAR / S.011 / pH: 6.0  Gluc: NEGATIVE / Ketone: NEGATIVE  / Bili: NEGATIVE / Urobili: NORMAL   Blood: NEGATIVE / Protein: NEGATIVE / Nitrite: NEGATIVE   Leuk Esterase: NEGATIVE / RBC: x / WBC x   Sq Epi: x / Non Sq Epi: x / Bacteria: x                          MICROBIOLOGY:    Culture - Urine (19 @ 20:41)    Culture - Urine:   NO GROWTH AT 24 HOURS    Specimen Source: URINE MIDSTREAM          RADIOLOGY & ADDITIONAL STUDIES:    < from: Xray Chest 1 View AP/PA (19 @ 17:02) >    FINDINGS:    Left chest wall pacemaker. The heart is enlarged. The lungs are clear.   There is no pleural effusion or congestion to indicate CHF. The   visualized bones and soft tissues show no acute findings.      IMPRESSION: Cardiomegaly with clear lungs.    < end of copied text >

## 2019-08-15 NOTE — TELEPHONE ENCOUNTER
Please review; protocol failed.     Requested Prescriptions   Pending Prescriptions Disp Refills   • NIFEDIPINE ER 60 MG Oral Tablet 24 Hr [Pharmacy Med Name: Nifedipine Er 24hr 60 Mg Tab Ocea] 90 tablet 2     Sig: TAKE ONE TABLET BY MOUTH ONE TIME DAILY

## 2019-08-15 NOTE — TELEPHONE ENCOUNTER
Please review; protocol failed.       Requested Prescriptions   Pending Prescriptions Disp Refills   • LOVASTATIN 40 MG Oral Tab [Pharmacy Med Name: Lovastatin 40 Mg Tab Lupi] 90 tablet 0     Sig: TAKE 1 TABLET BY MOUTH EVERY DAY AT BEDTIME       Tj

## 2019-08-20 ENCOUNTER — TELEPHONE (OUTPATIENT)
Dept: NEUROLOGY | Facility: CLINIC | Age: 74
End: 2019-08-20

## 2019-08-20 NOTE — TELEPHONE ENCOUNTER
Received in basket from [email or web address]  advising of approval for left L4 and L5 TFESIs for one unit/DOS. Will  Inform Nursing.

## 2019-08-27 ENCOUNTER — OFFICE VISIT (OUTPATIENT)
Dept: SURGERY | Facility: CLINIC | Age: 74
End: 2019-08-27

## 2019-08-27 DIAGNOSIS — M48.061 LUMBAR FORAMINAL STENOSIS: ICD-10-CM

## 2019-08-27 DIAGNOSIS — M54.16 LUMBAR RADICULOPATHY: Primary | ICD-10-CM

## 2019-08-27 DIAGNOSIS — M48.061 SPINAL STENOSIS OF LUMBAR REGION WITHOUT NEUROGENIC CLAUDICATION: ICD-10-CM

## 2019-08-27 DIAGNOSIS — M51.9 LUMBAR DISC DISEASE: ICD-10-CM

## 2019-08-27 PROCEDURE — 64483 NJX AA&/STRD TFRM EPI L/S 1: CPT | Performed by: PHYSICAL MEDICINE & REHABILITATION

## 2019-08-27 PROCEDURE — 64484 NJX AA&/STRD TFRM EPI L/S EA: CPT | Performed by: PHYSICAL MEDICINE & REHABILITATION

## 2019-08-27 NOTE — PROCEDURES
Maynor BAXTER 7.    2-LEVEL LUMBAR TRANSFORAMINAL   NAME:  Elaina Brumfield    MR #:    [id number] :  3/17/1945     PHYSICIAN:  Suresh Campos        Operative Report    DATE OF PROCEDURE: 2019   PREOPERATIVE DIAGNOSES: 1. left > righ o'clock position on the left L4 and left L5 pedicles. At this point in time, Omnipaque-240 contrast was used to obtain a good epidurogram indicating correct needle placement at each level. Then, aspiration was performed.   No blood, fluid, or air was aspi

## 2019-09-10 ENCOUNTER — TELEPHONE (OUTPATIENT)
Dept: SURGERY | Facility: CLINIC | Age: 74
End: 2019-09-10

## 2019-09-10 ENCOUNTER — ANESTHESIA (OUTPATIENT)
Dept: SURGERY | Facility: HOSPITAL | Age: 74
End: 2019-09-10
Payer: COMMERCIAL

## 2019-09-10 ENCOUNTER — ANESTHESIA EVENT (OUTPATIENT)
Dept: SURGERY | Facility: HOSPITAL | Age: 74
End: 2019-09-10
Payer: COMMERCIAL

## 2019-09-10 ENCOUNTER — HOSPITAL ENCOUNTER (OUTPATIENT)
Facility: HOSPITAL | Age: 74
Setting detail: HOSPITAL OUTPATIENT SURGERY
Discharge: HOME | End: 2019-09-10
Attending: UROLOGY | Admitting: UROLOGY
Payer: COMMERCIAL

## 2019-09-10 VITALS
HEIGHT: 72 IN | RESPIRATION RATE: 12 BRPM | HEART RATE: 64 BPM | WEIGHT: 194 LBS | SYSTOLIC BLOOD PRESSURE: 147 MMHG | BODY MASS INDEX: 26.28 KG/M2 | OXYGEN SATURATION: 98 % | DIASTOLIC BLOOD PRESSURE: 64 MMHG | TEMPERATURE: 98 F

## 2019-09-10 DIAGNOSIS — N21.0 BLADDER STONE: ICD-10-CM

## 2019-09-10 LAB
GLUCOSE BLDC GLUCOMTR-MCNC: 116 MG/DL (ref 70–99)
GLUCOSE BLDC GLUCOMTR-MCNC: 118 MG/DL (ref 70–99)

## 2019-09-10 PROCEDURE — 52317 REMOVE BLADDER STONE: CPT | Performed by: UROLOGY

## 2019-09-10 PROCEDURE — 0TCB8ZZ EXTIRPATION OF MATTER FROM BLADDER, VIA NATURAL OR ARTIFICIAL OPENING ENDOSCOPIC: ICD-10-PCS | Performed by: UROLOGY

## 2019-09-10 RX ORDER — ROCURONIUM BROMIDE 10 MG/ML
INJECTION, SOLUTION INTRAVENOUS AS NEEDED
Status: DISCONTINUED | OUTPATIENT
Start: 2019-09-10 | End: 2019-09-10 | Stop reason: SURG

## 2019-09-10 RX ORDER — GLYCOPYRROLATE 0.2 MG/ML
INJECTION INTRAMUSCULAR; INTRAVENOUS AS NEEDED
Status: DISCONTINUED | OUTPATIENT
Start: 2019-09-10 | End: 2019-09-10 | Stop reason: SURG

## 2019-09-10 RX ORDER — LEVOFLOXACIN 5 MG/ML
INJECTION, SOLUTION INTRAVENOUS AS NEEDED
Status: DISCONTINUED | OUTPATIENT
Start: 2019-09-10 | End: 2019-09-10 | Stop reason: SURG

## 2019-09-10 RX ORDER — HYDROMORPHONE HYDROCHLORIDE 1 MG/ML
0.6 INJECTION, SOLUTION INTRAMUSCULAR; INTRAVENOUS; SUBCUTANEOUS EVERY 5 MIN PRN
Status: DISCONTINUED | OUTPATIENT
Start: 2019-09-10 | End: 2019-09-10

## 2019-09-10 RX ORDER — LEVOFLOXACIN 5 MG/ML
500 INJECTION, SOLUTION INTRAVENOUS ONCE
Status: COMPLETED | OUTPATIENT
Start: 2019-09-10 | End: 2019-09-10

## 2019-09-10 RX ORDER — CLINDAMYCIN PHOSPHATE 150 MG/ML
INJECTION, SOLUTION INTRAVENOUS AS NEEDED
Status: DISCONTINUED | OUTPATIENT
Start: 2019-09-10 | End: 2019-09-10

## 2019-09-10 RX ORDER — HYDROCODONE BITARTRATE AND ACETAMINOPHEN 5; 325 MG/1; MG/1
1 TABLET ORAL AS NEEDED
Status: DISCONTINUED | OUTPATIENT
Start: 2019-09-10 | End: 2019-09-10

## 2019-09-10 RX ORDER — METOCLOPRAMIDE 10 MG/1
10 TABLET ORAL ONCE
Status: DISCONTINUED | OUTPATIENT
Start: 2019-09-10 | End: 2019-09-10 | Stop reason: HOSPADM

## 2019-09-10 RX ORDER — SODIUM CHLORIDE, SODIUM LACTATE, POTASSIUM CHLORIDE, CALCIUM CHLORIDE 600; 310; 30; 20 MG/100ML; MG/100ML; MG/100ML; MG/100ML
INJECTION, SOLUTION INTRAVENOUS CONTINUOUS
Status: DISCONTINUED | OUTPATIENT
Start: 2019-09-10 | End: 2019-09-10

## 2019-09-10 RX ORDER — ONDANSETRON 2 MG/ML
INJECTION INTRAMUSCULAR; INTRAVENOUS AS NEEDED
Status: DISCONTINUED | OUTPATIENT
Start: 2019-09-10 | End: 2019-09-10 | Stop reason: SURG

## 2019-09-10 RX ORDER — MORPHINE SULFATE 4 MG/ML
2 INJECTION, SOLUTION INTRAMUSCULAR; INTRAVENOUS EVERY 10 MIN PRN
Status: DISCONTINUED | OUTPATIENT
Start: 2019-09-10 | End: 2019-09-10

## 2019-09-10 RX ORDER — ACETAMINOPHEN 500 MG
1000 TABLET ORAL ONCE
Status: COMPLETED | OUTPATIENT
Start: 2019-09-10 | End: 2019-09-10

## 2019-09-10 RX ORDER — HYDROMORPHONE HYDROCHLORIDE 1 MG/ML
0.4 INJECTION, SOLUTION INTRAMUSCULAR; INTRAVENOUS; SUBCUTANEOUS EVERY 5 MIN PRN
Status: DISCONTINUED | OUTPATIENT
Start: 2019-09-10 | End: 2019-09-10

## 2019-09-10 RX ORDER — HYDROMORPHONE HYDROCHLORIDE 1 MG/ML
0.2 INJECTION, SOLUTION INTRAMUSCULAR; INTRAVENOUS; SUBCUTANEOUS EVERY 5 MIN PRN
Status: DISCONTINUED | OUTPATIENT
Start: 2019-09-10 | End: 2019-09-10

## 2019-09-10 RX ORDER — LIDOCAINE HYDROCHLORIDE 20 MG/ML
JELLY TOPICAL AS NEEDED
Status: DISCONTINUED | OUTPATIENT
Start: 2019-09-10 | End: 2019-09-10 | Stop reason: HOSPADM

## 2019-09-10 RX ORDER — HALOPERIDOL 5 MG/ML
0.25 INJECTION INTRAMUSCULAR ONCE AS NEEDED
Status: DISCONTINUED | OUTPATIENT
Start: 2019-09-10 | End: 2019-09-10

## 2019-09-10 RX ORDER — MORPHINE SULFATE 4 MG/ML
4 INJECTION, SOLUTION INTRAMUSCULAR; INTRAVENOUS EVERY 10 MIN PRN
Status: DISCONTINUED | OUTPATIENT
Start: 2019-09-10 | End: 2019-09-10

## 2019-09-10 RX ORDER — NALOXONE HYDROCHLORIDE 0.4 MG/ML
80 INJECTION, SOLUTION INTRAMUSCULAR; INTRAVENOUS; SUBCUTANEOUS AS NEEDED
Status: DISCONTINUED | OUTPATIENT
Start: 2019-09-10 | End: 2019-09-10

## 2019-09-10 RX ORDER — ONDANSETRON 2 MG/ML
4 INJECTION INTRAMUSCULAR; INTRAVENOUS ONCE AS NEEDED
Status: DISCONTINUED | OUTPATIENT
Start: 2019-09-10 | End: 2019-09-10

## 2019-09-10 RX ORDER — DEXTROSE MONOHYDRATE 25 G/50ML
50 INJECTION, SOLUTION INTRAVENOUS
Status: DISCONTINUED | OUTPATIENT
Start: 2019-09-10 | End: 2019-09-10

## 2019-09-10 RX ORDER — LIDOCAINE HYDROCHLORIDE 10 MG/ML
INJECTION, SOLUTION EPIDURAL; INFILTRATION; INTRACAUDAL; PERINEURAL AS NEEDED
Status: DISCONTINUED | OUTPATIENT
Start: 2019-09-10 | End: 2019-09-10 | Stop reason: SURG

## 2019-09-10 RX ORDER — PHENAZOPYRIDINE HYDROCHLORIDE 200 MG/1
200 TABLET, FILM COATED ORAL 3 TIMES DAILY PRN
Qty: 20 TABLET | Refills: 1 | Status: SHIPPED | OUTPATIENT
Start: 2019-09-10 | End: 2019-10-31 | Stop reason: ALTCHOICE

## 2019-09-10 RX ORDER — HYDROCODONE BITARTRATE AND ACETAMINOPHEN 5; 325 MG/1; MG/1
2 TABLET ORAL AS NEEDED
Status: DISCONTINUED | OUTPATIENT
Start: 2019-09-10 | End: 2019-09-10

## 2019-09-10 RX ORDER — PROCHLORPERAZINE EDISYLATE 5 MG/ML
5 INJECTION INTRAMUSCULAR; INTRAVENOUS ONCE AS NEEDED
Status: DISCONTINUED | OUTPATIENT
Start: 2019-09-10 | End: 2019-09-10

## 2019-09-10 RX ORDER — MORPHINE SULFATE 10 MG/ML
6 INJECTION, SOLUTION INTRAMUSCULAR; INTRAVENOUS EVERY 10 MIN PRN
Status: DISCONTINUED | OUTPATIENT
Start: 2019-09-10 | End: 2019-09-10

## 2019-09-10 RX ORDER — FAMOTIDINE 20 MG/1
20 TABLET ORAL ONCE
Status: DISCONTINUED | OUTPATIENT
Start: 2019-09-10 | End: 2019-09-10 | Stop reason: HOSPADM

## 2019-09-10 RX ADMIN — SODIUM CHLORIDE, SODIUM LACTATE, POTASSIUM CHLORIDE, CALCIUM CHLORIDE: 600; 310; 30; 20 INJECTION, SOLUTION INTRAVENOUS at 07:40:00

## 2019-09-10 RX ADMIN — SODIUM CHLORIDE, SODIUM LACTATE, POTASSIUM CHLORIDE, CALCIUM CHLORIDE: 600; 310; 30; 20 INJECTION, SOLUTION INTRAVENOUS at 08:52:00

## 2019-09-10 RX ADMIN — ONDANSETRON 4 MG: 2 INJECTION INTRAMUSCULAR; INTRAVENOUS at 07:40:00

## 2019-09-10 RX ADMIN — GLYCOPYRROLATE 0.2 MG: 0.2 INJECTION INTRAMUSCULAR; INTRAVENOUS at 07:40:00

## 2019-09-10 RX ADMIN — LIDOCAINE HYDROCHLORIDE 50 MG: 10 INJECTION, SOLUTION EPIDURAL; INFILTRATION; INTRACAUDAL; PERINEURAL at 07:40:00

## 2019-09-10 RX ADMIN — LEVOFLOXACIN 500 MG: 5 INJECTION, SOLUTION INTRAVENOUS at 07:44:00

## 2019-09-10 RX ADMIN — ROCURONIUM BROMIDE 10 MG: 10 INJECTION, SOLUTION INTRAVENOUS at 07:40:00

## 2019-09-10 NOTE — ANESTHESIA PROCEDURE NOTES
Airway  Date/Time: 9/10/2019 7:44 AM  Urgency: elective    Airway not difficult    General Information and Staff    Patient location during procedure: OR  Anesthesiologist: Adwoa Garibay MD  Performed: anesthesiologist     Indications and Patient Condition

## 2019-09-10 NOTE — TELEPHONE ENCOUNTER
9/10/2019 cystoscopy with laser lithotripsy of prostate/bladder stone. The following are urology discharge instructions given to the patient ----    1.   For burning pain with urination, take either  phenazopyridine 200 mg capsule, 1 capsule every 8 hour

## 2019-09-10 NOTE — INTERVAL H&P NOTE
Pre-op Diagnosis: Bladder stone [N21.0]    The above referenced H&P was reviewed by Jermaine Sams MD on 9/10/2019, the patient was examined and no significant changes have occurred in the patient's condition since the H&P was performed.   I discussed wi

## 2019-09-10 NOTE — ANESTHESIA POSTPROCEDURE EVALUATION
Patient: Zahra Garg    Procedure Summary     Date:  09/10/19 Room / Location:  [address] [place] [address] MAIN OR 14 / [address] [place] [address] MAIN OR    Anesthesia Start:  5838 Anesthesia Stop:  0692    Procedure:  CYSTOSCOPY (N/A ) Diagnosis:       Bladder stone      (Bladder stone [N21.0])

## 2019-09-10 NOTE — ANESTHESIA PREPROCEDURE EVALUATION
Anesthesia PreOp Note    HPI:     Syeda Darling is a 75year old male who presents for preoperative consultation requested by: Michael Plata MD    Date of Surgery: 9/10/2019    Procedure(s):  CYSTOSCOPY  LASER HOLMIUM LITHOTRIPSY  Indication: Bladder s radiculopathies with left L4 radiculitis         Date Noted: 04/16/2018      Physical exam, annual         Date Noted: 02/05/2018      Cough         Date Noted: 02/05/2018      Chronic pain of right knee         Date Noted: 11/09/2017      Chronic right sh Noted: 02/13/2015      Elevated PSA         Date Noted: 02/04/2015      Nocturia         Date Noted: 09/07/2014      BPH with obstruction/lower urinary tract symptoms         Date Noted: 09/07/2014      Erectile dysfunction         Date Noted: 09/07/2014 N/A 4/30/2019    Performed by Artemio Hurtado MD at [facility] MAIN OR   • EYE SURGERY Right 2013    repair detached retina   • EYE SURGERY Right 02/20/2006    S/ P PPV RD repair (S/P PPVx, cryo, GFX, SF6 OD 4/25/13) Dr. Gennaro Mclain    • FOREARM/WRIST SURGERY UNL Multiple Vitamins-Minerals (CENTRUM SILVER) Oral Tab Take 1 tablet by mouth daily. Disp:  Rfl:  8/23/2019   Cinnamon (SM CINNAMON) 500 MG Oral Cap Take 500 mg by mouth daily.  Disp:  Rfl:  8/23/2019       Current Facility-Administered Medications Ordered file        Active member of club or organization: Not on file        Attends meetings of clubs or organizations: Not on file        Relationship status: Not on file      Intimate partner violence:        Fear of current or ex partner: Not on file        E normal exam   Cardiovascular - normal exam  (+) hypertension,     Neuro/Psych    (+)  neuromuscular disease,       GI/Hepatic/Renal    (+) GERD,     Endo/Other    (+) diabetes mellitus,   Abdominal  - normal exam               Anesthesia Plan:   ASA:  2  P

## 2019-09-10 NOTE — OPERATIVE REPORT
[facility]    PATIENT'S NAME: Ranjit Lopez   ATTENDING PHYSICIAN: Ramón Zelaya MD   OPERATING PHYSICIAN: Ramón Zelaya MD   PATIENT ACCOUNT#:   [id number]    LOCATION:  [facility] PACU [address] [place] [address]  MEDICAL RECORD #:   [id number] prostate and likely a thin layer of tissue that was originally over it probably eroded and now this stone is visible. There were no stones in the bladder itself. The bladder was nontrabeculated and there were no tumors in the bladder.   I then pulled that hours.  I am calling this procedure cystoscopy with laser lithotripsy of bladder stone because there is no CPT code to my knowledge of laser lithotripsy of a prostate stone and I did transfer the stone from the prostatic urethra into the bladder.      Lowanda Boeck

## 2019-09-10 NOTE — TELEPHONE ENCOUNTER
Justin,  Today  Cystoscopy with laser lithotripsy of prostate/bladder stone, general anesthesia, same-day surgery; plan is for patient to go home in a couple of hours. Everything went well.     Many thanks,  Angelina Mosley

## 2019-09-10 NOTE — H&P
PREOPERATIVE HISTORY AND PHYSICAL    Signed             HPI:    Patient ID: Rosana Hardin is a 75year old male.     HPI     (N21.0) Bladder stone  (primary encounter diagnosis)  Problem started 7/25/2019 when CT urogram (W+WO) showed a 0.9 x 0.6 cm depende Status post GreenLight laser ablation of prostate 4/30/2019.  On 3/24/2016 office visit; complaining of spraying of stream after dilation of severe stricture; in light of spraying of stream, recurrent stricture of fossa navicularis was dilated to a size 24 Prostate ultrasound and biopsy at [facility] September 15, 2004 - no CA. Known hypotestosteronism and have not recommended testosterone replacement in the past because of elevated PSA.  Known moderate intravesical median lobe of the prostate on ultrasound and post [facility] office visit 4/18/2016 patient complaint of spraying stream, referred to [facility] for possible urethroplasty/reconstruction of urethra.  9/28/2016 [facility] cystoscopy under general anesthesia by Dr. Adali Melissa  for possible urethral stricture, no stricture fou 5/7/2019 Patient c/o moderate to severe itching on penis, when he pulls back his foreskin. Onset 3 days ago. Patient states tip of penis and foreskin are red, rating itching 7-8/10. Patient denies discharge, drainage.   Patient states he is able to pull b clotrimazole-betamethasone 1-0.05 % External Cream Apply 1 Application topically 2 (two) times daily. Disp: 15 g Rfl: 1   HYDROcodone-acetaminophen 5-325 MG Oral Tab Take 1 tablet by mouth every 6 (six) hours as needed for Pain.  Had surgery 4/30/2019 Disp:   PPVx, Cryo, GFX, SF6   • Myopia with astigmatism and presbyopia 1957   • Osteoarthritis     • Posterior subcapsular cataract     • Posterior subcapsular cataract 2010, 1998 2010 Phaco w/ PC IOL OS   • Retinal hole 2006     laser photocoagulation OD • REPAIR OF BICEPS TENDON AT ELBOW Left 1992   • RETINAL LASER PROCEDURE       • TOTAL HIP REPLACEMENT Left 2008   • YAG CAPSULOTOMY - OD - RIGHT EYE Right 4/30/14     GREGORIO               Family History   Problem Relation Age of Onset   • Colon Cancer Mother Neck: Normal range of motion. Cardiovascular: Normal rate, regular rhythm and normal heart sounds. Exam reveals no gallop and no friction rub. No murmur heard. Pulmonary/Chest: Effort normal and breath sounds normal. No respiratory distress.  He has no 7/25/2019 CT urogram (W+WO) = LUNG BASES:  The heart is normal in size. Aortic and mitral annular calcifications are observed. Atherosclerotic vascular calcifications are present in the coronary vessels.  There is dependent subsegmental atelectasis bilatera ABDOMINAL WALL: There is a minute fat-containing umbilical hernia. A small fat containing left inguinal hernia is noted. Scarring in the right inguinal region may reflect previous inguinal herniorrhaphy.  OTHER:  No free air or fluid is seen in the abdomen 7/25/2019 CT urogram (W+WO) = There appears to be a 0.9 x 0.6 cm dependent bladder calculus. 7/30/2019 XR abdomen = Large bladder calculus measuring 13 x 6 mm corresponds to recent CT. I reviewed images with the patient and it is only mildly radiopaque.  Ve Status post GreenLight laser ablation of prostate 4/30/2019.  On 3/24/2016 office visit; complaining of spraying of stream after dilation of severe stricture; in light of spraying of stream, recurrent stricture of fossa navicularis is dilated to a size 24 F Status post GreenLight laser ablation on 4/30/2019. Patient has current AUA score of 13, moderate voiding dysfunction category.   In the past, patient unable to tolerate Tamsulosin due to side effect of sinusitis -- however, did state that the medication im

## 2019-09-10 NOTE — BRIEF OP NOTE
[facility] POST ANESTHESIA CARE UNIT  Brief Op Note       Patients Name: Syedakumar Darling  Attending Physician: Michael Plata MD  Operating Physician: Renetta Krueger MD  CSN: [id number]     Location:  OR  MRN: [id number]    Date of Birth: [birth year]

## 2019-09-11 LAB
CALCULI MASS: 241 MG
CALCULI NUMBER: 3

## 2019-09-16 ENCOUNTER — TELEPHONE (OUTPATIENT)
Dept: CASE MANAGEMENT | Age: 74
End: 2019-09-16

## 2019-09-16 DIAGNOSIS — E78.2 MIXED HYPERLIPIDEMIA: ICD-10-CM

## 2019-09-16 DIAGNOSIS — E11.9 TYPE 2 DIABETES MELLITUS WITHOUT COMPLICATION, WITHOUT LONG-TERM CURRENT USE OF INSULIN (HCC): ICD-10-CM

## 2019-09-16 DIAGNOSIS — I10 ESSENTIAL HYPERTENSION: Primary | ICD-10-CM

## 2019-09-17 NOTE — TELEPHONE ENCOUNTER
Labs ordered, I did not use physical as a diagnosis code, not sure if pt was coming for a physical or follow up.labs ordered under appropriate diagnosis

## 2019-09-18 NOTE — TELEPHONE ENCOUNTER
Advised patient of Any Orellana's note and the need to fast 12 hours. Patient verbalized understanding.

## 2019-09-24 ENCOUNTER — LAB ENCOUNTER (OUTPATIENT)
Dept: LAB | Facility: HOSPITAL | Age: 74
End: 2019-09-24
Attending: PHYSICIAN ASSISTANT
Payer: COMMERCIAL

## 2019-09-24 DIAGNOSIS — E78.2 MIXED HYPERLIPIDEMIA: ICD-10-CM

## 2019-09-24 DIAGNOSIS — E11.9 TYPE 2 DIABETES MELLITUS WITHOUT COMPLICATION, WITHOUT LONG-TERM CURRENT USE OF INSULIN (HCC): ICD-10-CM

## 2019-09-24 DIAGNOSIS — I10 ESSENTIAL HYPERTENSION: ICD-10-CM

## 2019-09-24 LAB
ALBUMIN SERPL-MCNC: 3.4 G/DL (ref 3.4–5)
ALBUMIN/GLOB SERPL: 1 {RATIO} (ref 1–2)
ALP LIVER SERPL-CCNC: 69 U/L (ref 45–117)
ALT SERPL-CCNC: 22 U/L (ref 16–61)
ANION GAP SERPL CALC-SCNC: 5 MMOL/L (ref 0–18)
AST SERPL-CCNC: 12 U/L (ref 15–37)
BASOPHILS # BLD AUTO: 0.03 X10(3) UL (ref 0–0.2)
BASOPHILS NFR BLD AUTO: 0.5 %
BILIRUB SERPL-MCNC: 0.6 MG/DL (ref 0.1–2)
BUN BLD-MCNC: 14 MG/DL (ref 7–18)
BUN/CREAT SERPL: 14.4 (ref 10–20)
CALCIUM BLD-MCNC: 8.9 MG/DL (ref 8.5–10.1)
CHLORIDE SERPL-SCNC: 109 MMOL/L (ref 98–112)
CHOLEST SMN-MCNC: 148 MG/DL (ref ?–200)
CO2 SERPL-SCNC: 30 MMOL/L (ref 21–32)
CREAT BLD-MCNC: 0.97 MG/DL (ref 0.7–1.3)
CREAT UR-SCNC: 71 MG/DL
DEPRECATED RDW RBC AUTO: 46.2 FL (ref 35.1–46.3)
EOSINOPHIL # BLD AUTO: 0.29 X10(3) UL (ref 0–0.7)
EOSINOPHIL NFR BLD AUTO: 4.9 %
ERYTHROCYTE [DISTWIDTH] IN BLOOD BY AUTOMATED COUNT: 12.8 % (ref 11–15)
EST. AVERAGE GLUCOSE BLD GHB EST-MCNC: 151 MG/DL (ref 68–126)
GLOBULIN PLAS-MCNC: 3.3 G/DL (ref 2.8–4.4)
GLUCOSE BLD-MCNC: 100 MG/DL (ref 70–99)
HBA1C MFR BLD HPLC: 6.9 % (ref ?–5.7)
HCT VFR BLD AUTO: 44 % (ref 39–53)
HDLC SERPL-MCNC: 49 MG/DL (ref 40–59)
HGB BLD-MCNC: 14.5 G/DL (ref 13–17.5)
IMM GRANULOCYTES # BLD AUTO: 0.01 X10(3) UL (ref 0–1)
IMM GRANULOCYTES NFR BLD: 0.2 %
LDLC SERPL CALC-MCNC: 82 MG/DL (ref ?–100)
LYMPHOCYTES # BLD AUTO: 1.73 X10(3) UL (ref 1–4)
LYMPHOCYTES NFR BLD AUTO: 29.5 %
M PROTEIN MFR SERPL ELPH: 6.7 G/DL (ref 6.4–8.2)
MCH RBC QN AUTO: 32.4 PG (ref 26–34)
MCHC RBC AUTO-ENTMCNC: 33 G/DL (ref 31–37)
MCV RBC AUTO: 98.4 FL (ref 80–100)
MICROALBUMIN UR-MCNC: 2.53 MG/DL
MICROALBUMIN/CREAT 24H UR-RTO: 35.6 UG/MG (ref ?–30)
MONOCYTES # BLD AUTO: 0.6 X10(3) UL (ref 0.1–1)
MONOCYTES NFR BLD AUTO: 10.2 %
NEUTROPHILS # BLD AUTO: 3.2 X10 (3) UL (ref 1.5–7.7)
NEUTROPHILS # BLD AUTO: 3.2 X10(3) UL (ref 1.5–7.7)
NEUTROPHILS NFR BLD AUTO: 54.7 %
NONHDLC SERPL-MCNC: 99 MG/DL (ref ?–130)
OSMOLALITY SERPL CALC.SUM OF ELEC: 299 MOSM/KG (ref 275–295)
PATIENT FASTING: YES
PATIENT FASTING: YES
PLATELET # BLD AUTO: 216 10(3)UL (ref 150–450)
POTASSIUM SERPL-SCNC: 4.1 MMOL/L (ref 3.5–5.1)
RBC # BLD AUTO: 4.47 X10(6)UL (ref 3.8–5.8)
SODIUM SERPL-SCNC: 144 MMOL/L (ref 136–145)
TRIGL SERPL-MCNC: 86 MG/DL (ref 30–149)
TSI SER-ACNC: 1.24 MIU/ML (ref 0.36–3.74)
VLDLC SERPL CALC-MCNC: 17 MG/DL (ref 0–30)
WBC # BLD AUTO: 5.9 X10(3) UL (ref 4–11)

## 2019-09-24 PROCEDURE — 36415 COLL VENOUS BLD VENIPUNCTURE: CPT

## 2019-09-24 PROCEDURE — 85025 COMPLETE CBC W/AUTO DIFF WBC: CPT

## 2019-09-24 PROCEDURE — 84443 ASSAY THYROID STIM HORMONE: CPT

## 2019-09-24 PROCEDURE — 83036 HEMOGLOBIN GLYCOSYLATED A1C: CPT

## 2019-09-24 PROCEDURE — 82570 ASSAY OF URINE CREATININE: CPT

## 2019-09-24 PROCEDURE — 80061 LIPID PANEL: CPT

## 2019-09-24 PROCEDURE — 80053 COMPREHEN METABOLIC PANEL: CPT

## 2019-09-24 PROCEDURE — 82043 UR ALBUMIN QUANTITATIVE: CPT

## 2019-10-11 ENCOUNTER — MED REC SCAN ONLY (OUTPATIENT)
Dept: INTERNAL MEDICINE CLINIC | Facility: CLINIC | Age: 74
End: 2019-10-11

## 2019-10-11 ENCOUNTER — IMMUNIZATION (OUTPATIENT)
Dept: INTERNAL MEDICINE CLINIC | Facility: CLINIC | Age: 74
End: 2019-10-11

## 2019-10-11 DIAGNOSIS — Z23 NEED FOR VACCINATION: ICD-10-CM

## 2019-10-11 PROCEDURE — 90662 IIV NO PRSV INCREASED AG IM: CPT | Performed by: NURSE PRACTITIONER

## 2019-10-11 PROCEDURE — 90471 IMMUNIZATION ADMIN: CPT | Performed by: NURSE PRACTITIONER

## 2019-10-12 NOTE — TELEPHONE ENCOUNTER
RN pls call pt and verify rx refill, per med list rx was d/c'd, , thanks.        Refill Protocol Appointment Criteria  · Appointment scheduled in the past 12 months or in the next 3 months  Recent Outpatient Visits            3 weeks ago     Zarina Kerr

## 2019-10-15 ENCOUNTER — TELEPHONE (OUTPATIENT)
Dept: INTERNAL MEDICINE CLINIC | Facility: CLINIC | Age: 74
End: 2019-10-15

## 2019-10-15 DIAGNOSIS — M25.561 CHRONIC PAIN OF RIGHT KNEE: Primary | ICD-10-CM

## 2019-10-15 DIAGNOSIS — G89.29 CHRONIC PAIN OF RIGHT KNEE: Primary | ICD-10-CM

## 2019-10-15 DIAGNOSIS — G89.29 CHRONIC RIGHT SHOULDER PAIN: ICD-10-CM

## 2019-10-15 DIAGNOSIS — M25.511 CHRONIC RIGHT SHOULDER PAIN: ICD-10-CM

## 2019-10-15 RX ORDER — RISEDRONATE SODIUM 150 MG/1
TABLET, FILM COATED ORAL
Qty: 3 TABLET | Refills: 3 | Status: SHIPPED | OUTPATIENT
Start: 2019-10-15 | End: 2020-10-08

## 2019-10-15 NOTE — TELEPHONE ENCOUNTER
Per patient he needs a referral to go and see Dr Leticia Schumacher for his right knee and right shoulder and per patient Dr Koby Rodriguez knew all about the issue. Patient has an appointment on 10/31/2019 with Dr Henry Marte.

## 2019-10-15 NOTE — TELEPHONE ENCOUNTER
Dr. Deb Finney, patient is requesting a referral for Dr. Kamilah Partt due to Right knee pain and right shoulder pain.  Please advise on referral.

## 2019-10-31 ENCOUNTER — OFFICE VISIT (OUTPATIENT)
Dept: ORTHOPEDICS CLINIC | Facility: CLINIC | Age: 74
End: 2019-10-31

## 2019-10-31 ENCOUNTER — HOSPITAL ENCOUNTER (OUTPATIENT)
Dept: GENERAL RADIOLOGY | Facility: HOSPITAL | Age: 74
Discharge: HOME | End: 2019-10-31
Attending: ORTHOPAEDIC SURGERY
Payer: COMMERCIAL

## 2019-10-31 VITALS
SYSTOLIC BLOOD PRESSURE: 141 MMHG | WEIGHT: 200 LBS | HEART RATE: 72 BPM | HEIGHT: 72 IN | DIASTOLIC BLOOD PRESSURE: 66 MMHG | BODY MASS INDEX: 27.09 KG/M2

## 2019-10-31 DIAGNOSIS — M25.511 RIGHT SHOULDER PAIN, UNSPECIFIED CHRONICITY: ICD-10-CM

## 2019-10-31 DIAGNOSIS — M19.011 GLENOHUMERAL ARTHRITIS, RIGHT: Primary | ICD-10-CM

## 2019-10-31 PROCEDURE — 20610 DRAIN/INJ JOINT/BURSA W/O US: CPT | Performed by: ORTHOPAEDIC SURGERY

## 2019-10-31 PROCEDURE — 99244 OFF/OP CNSLTJ NEW/EST MOD 40: CPT | Performed by: ORTHOPAEDIC SURGERY

## 2019-10-31 PROCEDURE — 73030 X-RAY EXAM OF SHOULDER: CPT | Performed by: ORTHOPAEDIC SURGERY

## 2019-10-31 RX ORDER — MELOXICAM 7.5 MG/1
7.5 TABLET ORAL DAILY
Qty: 30 TABLET | Refills: 1 | Status: SHIPPED | OUTPATIENT
Start: 2019-10-31 | End: 2019-12-12 | Stop reason: ALTCHOICE

## 2019-10-31 RX ORDER — TRIAMCINOLONE ACETONIDE 40 MG/ML
40 INJECTION, SUSPENSION INTRA-ARTICULAR; INTRAMUSCULAR ONCE
Status: COMPLETED | OUTPATIENT
Start: 2019-10-31 | End: 2019-10-31

## 2019-10-31 NOTE — PROGRESS NOTES
Verbal order given by Dr. Linda Gonzáles to draw up 3 cc 0.5% Marcaine, 2 cc 1% lidocaine, and 1 cc Kenalog 40 for a right shoulder injection.

## 2019-10-31 NOTE — PROGRESS NOTES
NURSING INTAKE COMMENTS: Patient presents with:  Consult: C/o right shoulder pain since Aug 2018. Stts he was picking up a heavy box and felt a pop in the right shoulder.   Stts he rec'd a cortisone injection on the back of the right arm by Dr. Eli Canchola with Irf-trigger finger, recurrent - LRF trigger finger release   • Type II or unspecified type diabetes mellitus without mention of complication, not stated as uncontrolled    • Visual impairment     readers     Past Surgical History:   Procedure Laterality days., Disp: 3 tablet, Rfl: 3  NIFEDIPINE ER 60 MG Oral Tablet 24 Hr, TAKE ONE TABLET BY MOUTH ONE TIME DAILY , Disp: 90 tablet, Rfl: 1  LOVASTATIN 40 MG Oral Tab, TAKE 1 TABLET BY MOUTH EVERY DAY AT BEDTIME, Disp: 90 tablet, Rfl: 1  OMEPRAZOLE 40 MG Oral wheezing  CARDIOVASCULAR: denies chest pain, leg cramps with exertion, palpitations, leg swelling  GI: denies abdominal pain, nausea, vomiting, diarrhea, constipation, hematochezia, worsening heartburn or stomach ulcers  : denies dysuria, hematuria, inco COMPLETE (MIN 2 VIEWS), RIGHT (CPT=73030), 4/17/2018, 7:22. INDICATIONS: Right shoulder pain post lifting injury August  TECHNIQUE: 3 views were obtained. FINDINGS:  BONES: Glenohumeral joint intact.   Moderate AC joint degenerative changes with hypertro exercises. If he has persistent pain surgical reconstruction may be considered. Follow Up: Return in about 6 weeks (around 12/12/2019).     Madalyn Gordon MD

## 2019-11-25 RX ORDER — GLIPIZIDE 2.5 MG/1
TABLET, EXTENDED RELEASE ORAL
Qty: 90 TABLET | Refills: 1 | Status: SHIPPED | OUTPATIENT
Start: 2019-11-25 | End: 2020-05-18

## 2019-11-26 NOTE — TELEPHONE ENCOUNTER
Refill passed per [facility] protocol.   Diabetes Medications  Protocol Criteria:  · Appointment scheduled in the past 6 months or the next 3 months  · A1C < 7.5 in the past 6 months  · Creatinine in the past 12 months  · Creatinine result < 1.5   Rece

## 2019-12-12 ENCOUNTER — OFFICE VISIT (OUTPATIENT)
Dept: ORTHOPEDICS CLINIC | Facility: CLINIC | Age: 74
End: 2019-12-12

## 2019-12-12 VITALS — HEIGHT: 72 IN | WEIGHT: 200 LBS | BODY MASS INDEX: 27.09 KG/M2

## 2019-12-12 DIAGNOSIS — M19.011 GLENOHUMERAL ARTHRITIS, RIGHT: Primary | ICD-10-CM

## 2019-12-12 PROCEDURE — 99213 OFFICE O/P EST LOW 20 MIN: CPT | Performed by: ORTHOPAEDIC SURGERY

## 2019-12-12 NOTE — PROGRESS NOTES
NURSING INTAKE COMMENTS: Patient presents with: Follow - Up: F/u on right shoulder pain. Rec'd cortisone injection 10/31 that helped with the pain in the front portion of the shoulder. Stts there's still pain on the back of the shoulder.       HPI: This Procedure Laterality Date   • ARTHROSCOPY OF JOINT UNLISTED  1990    knee   • ARTHROSCOPY OF JOINT UNLISTED Left 2011    rotator cuff repair   • ARTHROSCOPY OF JOINT UNLISTED Left 1992    hip resurfacing   • ARTHROSCOPY OF JOINT UNLISTED Left 12/2008 1 TABLET BY MOUTH EVERY DAY AT BEDTIME 90 tablet 1   • OMEPRAZOLE 40 MG Oral Capsule Delayed Release TAKE ONE CAPSULE BY MOUTH ONE TIME DAILY 90 capsule 1   • aspirin 325 MG Oral Tab Take 325 mg by mouth as needed.        • Calcium Carbonate-Vit D-Min (CALC tingling, weakness, balance issues, dizziness, memory loss  PSYCHIATRIC: denies Hx of depression, anxiety, other psychiatric disorders  HEMATOLOGIC: denies blood clots, anemia, blood clotting disorders, blood transfusion  ENDOCRINE: denies autoimmune disea

## 2019-12-14 ENCOUNTER — HOSPITAL ENCOUNTER (OUTPATIENT)
Dept: MRI IMAGING | Facility: HOSPITAL | Age: 74
Discharge: HOME | End: 2019-12-14
Attending: ORTHOPAEDIC SURGERY
Payer: COMMERCIAL

## 2019-12-14 DIAGNOSIS — M19.011 GLENOHUMERAL ARTHRITIS, RIGHT: ICD-10-CM

## 2019-12-14 PROCEDURE — 73221 MRI JOINT UPR EXTREM W/O DYE: CPT | Performed by: ORTHOPAEDIC SURGERY

## 2019-12-19 ENCOUNTER — OFFICE VISIT (OUTPATIENT)
Dept: ORTHOPEDICS CLINIC | Facility: CLINIC | Age: 74
End: 2019-12-19

## 2019-12-19 DIAGNOSIS — S46.011D TRAUMATIC COMPLETE TEAR OF RIGHT ROTATOR CUFF, SUBSEQUENT ENCOUNTER: Primary | ICD-10-CM

## 2019-12-19 PROCEDURE — 99213 OFFICE O/P EST LOW 20 MIN: CPT | Performed by: ORTHOPAEDIC SURGERY

## 2019-12-19 NOTE — PROGRESS NOTES
NURSING INTAKE COMMENTS: Patient presents with:  Test Results: Here to discuss MRI right shoulder. Completed on 12/14      HPI: This 75year old male presents today with complaints of right shoulder pain follow-up.   He had an MRI and is here for the resul 09/17/2012    GREGORIO   • CATARACT EXTRACTION W/  INTRAOCULAR LENS IMPLANT Left 01/11/2010    GREGORIO   • COLONOSCOPY  04/2015    repeat in 10 years.    • CYSTOSCOPY N/A 9/10/2019    Performed by Michael Plata MD at [facility] OR   • CYSTOSCOPY N/A 4/30/2019 Oral Cap Take  by mouth daily. • Naproxen Sodium (ALEVE) 220 MG Oral Tab Take 1-2 tablets by mouth as needed. • Multiple Vitamins-Minerals (CENTRUM SILVER) Oral Tab Take 1 tablet by mouth daily.      • Cinnamon (SM CINNAMON) 500 MG Oral Cap Take 500 Examination:    There were no vitals taken for this visit. Constitutional: appears well hydrated, alert and responsive, no acute distress noted  Extremities: Unchanged. Active forward flexion to 140 degrees with pain.   Neurological: Right hand neurologic head and glenoid. A.C. JOINT:  There are osteophytes with subchondral cystic change compatible with degenerative changes. BONE MARROW:  Irregularity and cystic change at the greater tuberosity from chronic rotator cuff tendinopathy.  Hypertrophic changes 76 09/24/2019    GFRAA 88 09/24/2019        Assessment and Plan:  Diagnoses and all orders for this visit:    Traumatic complete tear of right rotator cuff, subsequent encounter        Assessment: Right shoulder full-thickness rotator cuff tear, traumatic

## 2019-12-27 RX ORDER — OMEPRAZOLE 40 MG/1
CAPSULE, DELAYED RELEASE ORAL
Qty: 90 CAPSULE | Refills: 1 | Status: SHIPPED | OUTPATIENT
Start: 2019-12-27 | End: 2020-07-02

## 2019-12-27 NOTE — TELEPHONE ENCOUNTER
Refill passed per [facility] protocol.   Refill Protocol Appointment Criteria  · Appointment scheduled in the past 12 months or in the next 3 months  Recent Outpatient Visits            1 week ago Traumatic complete tear of right rotator cuff, subseque

## 2020-01-02 ENCOUNTER — TELEPHONE (OUTPATIENT)
Dept: INTERNAL MEDICINE CLINIC | Facility: CLINIC | Age: 75
End: 2020-01-02

## 2020-01-02 DIAGNOSIS — M54.42 CHRONIC LEFT-SIDED LOW BACK PAIN WITH LEFT-SIDED SCIATICA: Primary | ICD-10-CM

## 2020-01-02 DIAGNOSIS — G89.29 CHRONIC LEFT-SIDED LOW BACK PAIN WITH LEFT-SIDED SCIATICA: Primary | ICD-10-CM

## 2020-01-02 NOTE — TELEPHONE ENCOUNTER
Patient called requesting a referral to see Dr. Evgeny Conway. Pended referral. Please review diagnosis and sign off if you agree.     Thank you,  [facility] [phone]

## 2020-01-03 ENCOUNTER — OFFICE VISIT (OUTPATIENT)
Dept: ORTHOPEDICS CLINIC | Facility: CLINIC | Age: 75
End: 2020-01-03

## 2020-01-03 VITALS — SYSTOLIC BLOOD PRESSURE: 140 MMHG | DIASTOLIC BLOOD PRESSURE: 75 MMHG | HEART RATE: 71 BPM

## 2020-01-03 DIAGNOSIS — M94.9 OSTEOCHONDRAL LESION: Primary | ICD-10-CM

## 2020-01-03 DIAGNOSIS — M89.9 OSTEOCHONDRAL LESION: Primary | ICD-10-CM

## 2020-01-03 PROCEDURE — 20610 DRAIN/INJ JOINT/BURSA W/O US: CPT | Performed by: ORTHOPAEDIC SURGERY

## 2020-01-03 PROCEDURE — 99214 OFFICE O/P EST MOD 30 MIN: CPT | Performed by: ORTHOPAEDIC SURGERY

## 2020-01-03 RX ORDER — TRIAMCINOLONE ACETONIDE 40 MG/ML
40 INJECTION, SUSPENSION INTRA-ARTICULAR; INTRAMUSCULAR ONCE
Status: COMPLETED | OUTPATIENT
Start: 2020-01-03 | End: 2020-01-03

## 2020-01-03 NOTE — PROGRESS NOTES
Verbal order given by Dr. Tavera Loss to draw up 3 cc 0.5% marcaine, 2 cc 1% lidocaine, and 1 cc kenalog 40 for a right knee injection.

## 2020-01-03 NOTE — PROGRESS NOTES
NURSING INTAKE COMMENTS: Patient presents with:  Knee Pain: C/o right knee pain. Stts he had a right knee scope on 2017. MRI right knee completed on 7/19/19. Stts he's used a knee brace with slight relief. Pain occurs when going up/down stairs.       HP impairment     readers     Past Surgical History:   Procedure Laterality Date   • ARTHROSCOPY OF JOINT UNLISTED  1990    knee   • ARTHROSCOPY OF JOINT UNLISTED Left 2011    rotator cuff repair   • ARTHROSCOPY OF JOINT UNLISTED Left 1992    hip resurfacing (thirty) days.  3 tablet 3   • NIFEDIPINE ER 60 MG Oral Tablet 24 Hr TAKE ONE TABLET BY MOUTH ONE TIME DAILY  90 tablet 1   • LOVASTATIN 40 MG Oral Tab TAKE 1 TABLET BY MOUTH EVERY DAY AT BEDTIME 90 tablet 1   • aspirin 325 MG Oral Tab Take 325 mg by mouth complaints other than in HPI  NEURO: denies numbness, tingling, weakness, balance issues, dizziness, memory loss  PSYCHIATRIC: denies Hx of depression, anxiety, other psychiatric disorders  HEMATOLOGIC: denies blood clots, anemia, blood clotting disorders, INFRASPINATUS:  There is increased signal within the tendon with a partial-thickness articular surface tear. TERES MINOR:  Intact SUBSCAPULARIS:  There is increased signal within the tendon with a partial-thickness articular surface tear.   MUSCLES:  There biceps tendon. Mild acromioclavicular and glenohumeral osteoarthritis. 4.4 cm intramuscular ganglion cyst within the short head of the biceps muscle. Subcoracoid bursitis.   Impingement upon the distal musculotendinous junction of the supraspinatus secon

## 2020-02-05 ENCOUNTER — MED REC SCAN ONLY (OUTPATIENT)
Dept: INTERNAL MEDICINE CLINIC | Facility: CLINIC | Age: 75
End: 2020-02-05

## 2020-02-07 NOTE — TELEPHONE ENCOUNTER
LOV 5/11/18, no follow up scheduled. Med is not on med list. [facility] message sent to pt to ask if he wants to fu with pulmonary.

## 2020-02-10 RX ORDER — NIFEDIPINE 60 MG/1
TABLET, FILM COATED, EXTENDED RELEASE ORAL
Qty: 90 TABLET | Refills: 0 | Status: SHIPPED | OUTPATIENT
Start: 2020-02-10 | End: 2020-05-05

## 2020-02-10 NOTE — TELEPHONE ENCOUNTER
Not on Med List:    Flonase Nasal Suspension 50 MCG/ACT    Current Outpatient Medications   Medication Sig Dispense Refill   • NIFEDIPINE ER 60 MG Oral Tablet 24 Hr TAKE ONE TABLET BY MOUTH ONE TIME DAILY 90 tablet 0   • OMEPRAZOLE 40 MG Oral Capsule Del

## 2020-02-10 NOTE — TELEPHONE ENCOUNTER
To reception staff, pls call pt for appt. Also [employer] message sent to pt       Please review; protocol failed.      Requested Prescriptions     Pending Prescriptions Disp Refills   • NIFEDIPINE ER 60 MG Oral Tablet 24 Hr [Pharmacy Med Name: Nifedipine Er

## 2020-02-10 NOTE — TELEPHONE ENCOUNTER
LOV 5/11/18, no f/u scheduled.    [facility] message sent to pt to schedule follow up

## 2020-02-11 RX ORDER — FLUTICASONE PROPIONATE 50 MCG
2 SPRAY, SUSPENSION (ML) NASAL DAILY
Qty: 1 INHALER | Refills: 4 | Status: SHIPPED | OUTPATIENT
Start: 2020-02-11 | End: 2020-03-12

## 2020-02-11 RX ORDER — CETIRIZINE HYDROCHLORIDE 10 MG/1
TABLET ORAL
Qty: 30 TABLET | Refills: 1 | Status: SHIPPED | OUTPATIENT
Start: 2020-02-11 | End: 2021-01-21

## 2020-02-11 NOTE — TELEPHONE ENCOUNTER
LOV 5/11/18  RTC 1 month  F/U scheduled 4/14/20  Last fill 5/11/18 by Dr. Zaynab Tinoco  Refill pending

## 2020-02-13 ENCOUNTER — TELEPHONE (OUTPATIENT)
Dept: PULMONOLOGY | Facility: CLINIC | Age: 75
End: 2020-02-13

## 2020-02-13 NOTE — TELEPHONE ENCOUNTER
You may let the patient know that I have prescribed [facility] for him.

## 2020-02-13 NOTE — TELEPHONE ENCOUNTER
Ancram/pharmacy calling for mutual patient to request script refill for rx:Dulera inhaler  Please call at:[phone],thanks.   *not on med list

## 2020-02-17 ENCOUNTER — PATIENT MESSAGE (OUTPATIENT)
Dept: ORTHOPEDICS CLINIC | Facility: CLINIC | Age: 75
End: 2020-02-17

## 2020-02-17 DIAGNOSIS — M17.11 PRIMARY OSTEOARTHRITIS OF RIGHT KNEE: Primary | ICD-10-CM

## 2020-02-18 NOTE — TELEPHONE ENCOUNTER
Ok to authorize the Orthovisc injections for the patient for his right knee. Visco supplementation was discussed as a treatment option in Dr. Kathleen Jacobo last note. Dx: Primary osteoarthritis right knee    Thanks!     Koby Pope

## 2020-02-21 ENCOUNTER — OFFICE VISIT (OUTPATIENT)
Dept: INTERNAL MEDICINE CLINIC | Facility: CLINIC | Age: 75
End: 2020-02-21

## 2020-02-21 VITALS
HEART RATE: 80 BPM | HEIGHT: 72 IN | WEIGHT: 208 LBS | SYSTOLIC BLOOD PRESSURE: 144 MMHG | BODY MASS INDEX: 28.17 KG/M2 | DIASTOLIC BLOOD PRESSURE: 73 MMHG | RESPIRATION RATE: 16 BRPM

## 2020-02-21 DIAGNOSIS — E11.9 TYPE 2 DIABETES MELLITUS WITHOUT COMPLICATION, WITHOUT LONG-TERM CURRENT USE OF INSULIN (HCC): ICD-10-CM

## 2020-02-21 DIAGNOSIS — L98.9 SKIN LESION: ICD-10-CM

## 2020-02-21 DIAGNOSIS — M54.32 SCIATICA OF LEFT SIDE: ICD-10-CM

## 2020-02-21 DIAGNOSIS — N21.0 BLADDER STONES: ICD-10-CM

## 2020-02-21 DIAGNOSIS — Z00.00 PHYSICAL EXAM, ANNUAL: Primary | ICD-10-CM

## 2020-02-21 DIAGNOSIS — I10 ESSENTIAL HYPERTENSION WITH GOAL BLOOD PRESSURE LESS THAN 130/85: ICD-10-CM

## 2020-02-21 PROCEDURE — 99397 PER PM REEVAL EST PAT 65+ YR: CPT | Performed by: INTERNAL MEDICINE

## 2020-02-21 NOTE — PROGRESS NOTES
Richmond Garcia is a 75year old male who presents for a complete physical exam.   HPI:   Pt complains of nothing.       Immunization History  Administered            Date(s) Administered    FLU VACC 4 DEMI Split Virus 3 YR+ (51954)                          11/ Outpatient Medications   Medication Sig Dispense Refill   • Mometasone Furo-Formoterol Fum (DULERA) 100-5 MCG/ACT Inhalation Aerosol Inhale 2 puffs into the lungs 2 (two) times daily.  1 Inhaler 5   • CETIRIZINE 10 MG Oral Tab TAKE ONE TABLET BY MOUTH ONE T • Osteoarthritis    • Posterior subcapsular cataract    • Posterior subcapsular cataract 2010, 1998 2010 Phaco w/ PC IOL OS   • Retinal hole 2006    laser photocoagulation OD   • Rotator cuff tear, left 2011    repair   • Stiffness of joint, hand 8/20 • REPAIR OF BICEPS TENDON AT ELBOW Left 1992   • RETINAL LASER PROCEDURE     • TOTAL HIP REPLACEMENT Left 2008   • YAG CAPSULOTOMY - OD - RIGHT EYE Right 4/30/14    GREGORIO      Family History   Problem Relation Age of Onset   • Colon Cancer Mother    • Othe masses, HSM or tenderness  : deferred  RECTAL: deferred  MUSCULOSKELETAL: back is not tender,FROM of the back  EXTREMITIES: no cyanosis, clubbing or edema  NEURO: Oriented times three,cranial nerves are intact,motor and sensory are grossly intact    ASSE the first 24 hours after pain started. Try to do some kiran's flexion exercises 3-4 times daily to improve stretching and decrease muscle spasm and pain.  If narcotics have been ordered use them exactly as prescribed and drink excess fluids or  neil

## 2020-02-27 ENCOUNTER — OFFICE VISIT (OUTPATIENT)
Dept: NEUROLOGY | Facility: CLINIC | Age: 75
End: 2020-02-27

## 2020-02-27 VITALS — DIASTOLIC BLOOD PRESSURE: 70 MMHG | SYSTOLIC BLOOD PRESSURE: 146 MMHG | RESPIRATION RATE: 17 BRPM | HEART RATE: 88 BPM

## 2020-02-27 DIAGNOSIS — M54.16 LUMBAR RADICULOPATHY: Primary | ICD-10-CM

## 2020-02-27 DIAGNOSIS — M48.061 SPINAL STENOSIS OF LUMBAR REGION WITHOUT NEUROGENIC CLAUDICATION: ICD-10-CM

## 2020-02-27 DIAGNOSIS — G89.29 CHRONIC RIGHT SHOULDER PAIN: ICD-10-CM

## 2020-02-27 DIAGNOSIS — M51.9 LUMBAR DISC DISEASE: ICD-10-CM

## 2020-02-27 DIAGNOSIS — M67.911 DYSFUNCTION OF RIGHT ROTATOR CUFF: ICD-10-CM

## 2020-02-27 DIAGNOSIS — M19.042 PRIMARY OSTEOARTHRITIS OF LEFT HAND: ICD-10-CM

## 2020-02-27 DIAGNOSIS — M48.061 LUMBAR FORAMINAL STENOSIS: ICD-10-CM

## 2020-02-27 DIAGNOSIS — M75.111 NONTRAUMATIC INCOMPLETE TEAR OF RIGHT ROTATOR CUFF: ICD-10-CM

## 2020-02-27 DIAGNOSIS — M43.16 SPONDYLOLISTHESIS OF LUMBAR REGION: ICD-10-CM

## 2020-02-27 DIAGNOSIS — M43.10 RETROLISTHESIS: ICD-10-CM

## 2020-02-27 DIAGNOSIS — M25.511 CHRONIC RIGHT SHOULDER PAIN: ICD-10-CM

## 2020-02-27 PROCEDURE — 99214 OFFICE O/P EST MOD 30 MIN: CPT | Performed by: PHYSICAL MEDICINE & REHABILITATION

## 2020-02-27 NOTE — PROGRESS NOTES
Patient has been scheduled for a left L4 and L5 TFESIs  on 3/13/20 at the [facility]. Medications and allergies reviewed. Patient informed to hold aspirins, nsaids, blood thinners, multivitamins, vitamin E and fish oils 3-7 days prior to procedure.  Patient infor

## 2020-02-27 NOTE — PROGRESS NOTES
Low Back Pain H & P    Chief Complaint: Patient presents with:  Leg Pain: pt here for follow up with c/o severe outer aspect of left leg occasionally radiating in the left buttocks that worsens with walking. had Left L4 and L5 transforaminal epidural stero Posterior subcapsular cataract 2010, 1998 2010 Phaco w/ PC IOL OS   • Retinal hole 2006    laser photocoagulation OD   • Rotator cuff tear, left 2011    repair   • Stiffness of joint, hand 8/2010    bilateral, hand stiffness & weakness post trigger rele • REPAIR OF BICEPS TENDON AT ELBOW Left 1992   • RETINAL LASER PROCEDURE     • TOTAL HIP REPLACEMENT Left 2008   • YAG CAPSULOTOMY - OD - RIGHT EYE Right 4/30/14    RJMAVIS       Family History   Family History   Problem Relation Age of Onset   • Colon Cance Asked        Caffeine Concern: Yes          soda, 8 oz daily        Occupational Exposure: Not Asked        Hobby Hazards: Not Asked        Sleep Concern: Not Asked        Stress Concern: Not Asked        Weight Concern: Not Asked        Special Diet: Not Neurological Lower Extremity:    Light Touch Sensation: Intact in bilateral Lower Extremities   LE Muscle Strength:  All LE strength measurements 5/5 except:  Hamstring RIGHT:   3+/5  Hamstring LEFT:   3+/5   RIGHT plantar reflexes: downward response

## 2020-02-27 NOTE — PATIENT INSTRUCTIONS
As of October 6th 2014, the Drug Enforcement Agency [facility]) is reclassifying all hydrocodone combination medications from Schedule III to Schedule II. This includes medications such as Norco, Vicodin, Lortab, Zohydro, and Vicoprofen.     What this means for y will perform left L4 and L5 TFESI(s). He will do PT for the right shoulder. He will continue with his activities for the lumbar spine. The patient does not need any pain medications at this time.     The patient will follow up in 2-3 months, but th

## 2020-03-04 ENCOUNTER — TELEPHONE (OUTPATIENT)
Dept: NEUROLOGY | Facility: CLINIC | Age: 75
End: 2020-03-04

## 2020-03-04 ENCOUNTER — OFFICE VISIT (OUTPATIENT)
Dept: SURGERY | Facility: CLINIC | Age: 75
End: 2020-03-04

## 2020-03-04 VITALS
BODY MASS INDEX: 28 KG/M2 | WEIGHT: 205 LBS | TEMPERATURE: 98 F | DIASTOLIC BLOOD PRESSURE: 71 MMHG | SYSTOLIC BLOOD PRESSURE: 128 MMHG | HEART RATE: 80 BPM

## 2020-03-04 DIAGNOSIS — R39.198 URINE STREAM SPRAYING: ICD-10-CM

## 2020-03-04 DIAGNOSIS — N52.01 ERECTILE DYSFUNCTION DUE TO ARTERIAL INSUFFICIENCY: ICD-10-CM

## 2020-03-04 DIAGNOSIS — R35.1 NOCTURIA: ICD-10-CM

## 2020-03-04 DIAGNOSIS — Z87.448 HISTORY OF URETHRAL STRICTURE: ICD-10-CM

## 2020-03-04 DIAGNOSIS — N21.0 BLADDER STONE: Primary | ICD-10-CM

## 2020-03-04 DIAGNOSIS — Z87.448 HISTORY OF GROSS HEMATURIA: ICD-10-CM

## 2020-03-04 DIAGNOSIS — N40.1 BENIGN NON-NODULAR PROSTATIC HYPERPLASIA WITH LOWER URINARY TRACT SYMPTOMS: ICD-10-CM

## 2020-03-04 DIAGNOSIS — Z12.5 PROSTATE CANCER SCREENING: ICD-10-CM

## 2020-03-04 PROCEDURE — 99214 OFFICE O/P EST MOD 30 MIN: CPT | Performed by: UROLOGY

## 2020-03-04 RX ORDER — SILDENAFIL 100 MG/1
100 TABLET, FILM COATED ORAL
Qty: 10 TABLET | Refills: 11 | Status: SHIPPED | OUTPATIENT
Start: 2020-03-04 | End: 2021-03-31 | Stop reason: ALTCHOICE

## 2020-03-04 NOTE — PROGRESS NOTES
HPI:    Patient ID: Tanesha Jefferson is a 75year old male. HPI     Bladder Stone  Problem started 7/25/2019 when CT urogram (W+WO) showed a 0.9 x 0.6 cm dependent bladder calculus.  Confirmed on 7/30/2019 when XR abdomen showed a large bladder calculus jayleen to last time. Patient states he feels \"mostly satisfied\" about his urinating problem.     History of Urethral stricture  Status post GreenLight laser ablation of prostate 4/30/2019.  On 3/24/2016 office visit; complaining of spraying of stream after dilat 195 August 2004. PSA 7.6 July 2004. Prostate ultrasound and biopsy at [facility] September 15, 2004 - no CA. Known hypotestosteronism and have not recommended testosterone replacement in the past because of elevated PSA.  Known moderate intravesical median lobe September-October 2016, but had sinusitis side effects  02/05/2018 Prostate 45-50g no palpable nodules or indurations; Start alfuzosin 10 mg daily--he stopped it on his own after he had side effect of sinusitis  08/06/2018 office visit with me; 1/6 brittney tablet, take 4--5 tablets 1.5--2 hours before planned sexual activity   08/02/2019 Office visit;  On LAURA, prostate 2+ enlarged, 35 g, soft, no palpable nodules or indurations  09/10/2019 Cystoscopy with holmium laser lithotripsy of bladder stone less than 2 1   • Fluticasone Propionate 50 MCG/ACT Nasal Suspension 2 sprays by Nasal route daily.  (Patient taking differently: 2 sprays by Nasal route as needed.  ) 1 Inhaler 4   • NIFEDIPINE ER 60 MG Oral Tablet 24 Hr TAKE ONE TABLET BY MOUTH ONE TIME DAILY 90 tabl laser photocoagulation OD   • Rotator cuff tear, left 2011    repair   • Stiffness of joint, hand 8/2010    bilateral, hand stiffness & weakness post trigger release   • Trigger finger 9/29/2011    Irf-trigger finger, recurrent - LRF trigger finger release CAPSULOTOMY - OD - RIGHT EYE Right 4/30/14    RJMAVIS      Family History   Problem Relation Age of Onset   • Colon Cancer Mother    • Other (pulmonary Embolism) Father    • Glaucoma Neg    • Diabetes Neg    • Macular degeneration Neg       Social History: Soc politely declines LAURA today. On 08/02/2019 LAURA, prostate 2+ enlarged, 35 g, soft, no palpable nodules or indurations. Musculoskeletal: Normal range of motion. Neurological: He is alert and oriented to person, place, and time. Skin: Skin is dry.    P Atherosclerotic vascular calcifications are present in the coronary vessels. There is dependent subsegmental atelectasis bilaterally. Additional scattered   reticular opacities are demonstrated and may represent atelectasis or pleuroparenchymal scarring. herniorrhaphy. OTHER:  No free air or fluid is seen in the abdomen or pelvis. CONCLUSION: 1. No evidence of nephroureterolithiasis or obstructive uropathy. 2. Assessment of the bladder is compromised by artifactual degradation.  There is a probable depende measuring 13 x 6 mm corresponds to recent CT. Very likely that it is the cause of the recurrent gross hematuria.  On 09/10/2019 Cystoscopy with holmium laser lithotripsy of bladder stone less than 2.5; bladder stone; there is a stone seen protruding in the complained of spraying stream, referred to [facility] for possible urethroplasty or reconstruction of urethra. Patient states he went to [facility] and had a cystoscopy, but felt that his complaint was not taken seriously and has not had any further treatment.  On side effect of sinusitis and he stopped the medication. Patient feels this is stable and chooses to continue observation for now.  Patient will follow up in 1 year with UA and urine cytology before visit.    (Z12.5) Prostate cancer screening  I explained to fluids -- future      Meds This Visit:  Requested Prescriptions     Signed Prescriptions Disp Refills   • Sildenafil Citrate 100 MG Oral Tab 10 tablet 11     Sig: Take 1 tablet (100 mg total) by mouth daily as needed for Erectile Dysfunction.        Imaging

## 2020-03-04 NOTE — PATIENT INSTRUCTIONS
Janel Acosta M.D.    1.    Sildenafil (generic Viagra) 100 mg tablet ---   please start out with  1/2 tablet 1--2 hours before planned sexual activity depending on whether or not there is food in your stomach when you t

## 2020-03-04 NOTE — TELEPHONE ENCOUNTER
Received in basket from [email or web address] advising of approval for occupational therapy. Will call Pt. To inform. L/m advising 8 OT sessions were approved. Can proceed with scheduling appts.

## 2020-03-10 ENCOUNTER — OFFICE VISIT (OUTPATIENT)
Dept: PHYSICAL THERAPY | Facility: HOSPITAL | Age: 75
End: 2020-03-10
Attending: PHYSICAL MEDICINE & REHABILITATION
Payer: COMMERCIAL

## 2020-03-10 DIAGNOSIS — M75.111 NONTRAUMATIC INCOMPLETE TEAR OF RIGHT ROTATOR CUFF: ICD-10-CM

## 2020-03-10 DIAGNOSIS — M67.911 DYSFUNCTION OF RIGHT ROTATOR CUFF: ICD-10-CM

## 2020-03-10 DIAGNOSIS — M25.511 CHRONIC RIGHT SHOULDER PAIN: ICD-10-CM

## 2020-03-10 DIAGNOSIS — G89.29 CHRONIC RIGHT SHOULDER PAIN: ICD-10-CM

## 2020-03-10 PROCEDURE — 97161 PT EVAL LOW COMPLEX 20 MIN: CPT | Performed by: PHYSICAL THERAPIST

## 2020-03-10 PROCEDURE — 97110 THERAPEUTIC EXERCISES: CPT | Performed by: PHYSICAL THERAPIST

## 2020-03-10 PROCEDURE — 97140 MANUAL THERAPY 1/> REGIONS: CPT | Performed by: PHYSICAL THERAPIST

## 2020-03-10 NOTE — PROGRESS NOTES
UPPER EXTREMITY EVALUATION:   Referring Physician: Dr. Mario Walton  Diagnosis:     Chronic right shoulder pain (M25.511,G89.29)  Dysfunction of right rotator cuff (M67.911)  Nontraumatic incomplete tear of right rotator cuff (M75.111)  Date of Onset: 2015 Hansel photocoagulation OD   • Rotator cuff tear, left 2011     repair   • Stiffness of joint, hand 8/2010     bilateral, hand stiffness & weakness post trigger release   • Trigger finger 9/29/2011     Irf-trigger finger, recurrent - LRF trigger finger release 1992   • RETINAL LASER PROCEDURE       • TOTAL HIP REPLACEMENT Left 2008   • YAG CAPSULOTOMY - OD - RIGHT EYE Right 4/30/14     [facility]          ASSESSMENT:   Mr. Loi Walsh presents to therapy with c/o R shoulder pain that is worse with OH activity and reaching beh for 1-2 x/week or a total of 8 visits over a 90 day period. Treatment will include: Manual Therapy; Therapeutic Exercises; Neuromuscular Re-education; Therapeutic Activity; Electrical Stim; Ultrasound;  Patient education; Home exercise program instruction;

## 2020-03-13 ENCOUNTER — OFFICE VISIT (OUTPATIENT)
Dept: SURGERY | Facility: CLINIC | Age: 75
End: 2020-03-13

## 2020-03-13 DIAGNOSIS — M48.061 SPINAL STENOSIS OF LUMBAR REGION WITHOUT NEUROGENIC CLAUDICATION: ICD-10-CM

## 2020-03-13 DIAGNOSIS — M54.16 LUMBAR RADICULOPATHY: Primary | ICD-10-CM

## 2020-03-13 DIAGNOSIS — M51.9 LUMBAR DISC DISEASE: ICD-10-CM

## 2020-03-13 PROCEDURE — 64483 NJX AA&/STRD TFRM EPI L/S 1: CPT | Performed by: PHYSICAL MEDICINE & REHABILITATION

## 2020-03-13 PROCEDURE — 64484 NJX AA&/STRD TFRM EPI L/S EA: CPT | Performed by: PHYSICAL MEDICINE & REHABILITATION

## 2020-03-13 NOTE — PROCEDURES
Maynor BAXTER 7.    2-LEVEL LUMBAR TRANSFORAMINAL   NAME:  Richmond Garcia    MR #:    [id number] :  3/17/1945     PHYSICIAN:  Gideon Campos        Operative Report    DATE OF PROCEDURE: 3/13/2020   PREOPERATIVE DIAGNOSES: 1. left > righ aspiration was performed. No blood, fluid, or air was aspirated. Then, the patient was injected with a 3 cc solution of 1.5 cc of 40 mg/cc of Kenalog and 1.5 cc of 1% PF lidocaine without epinephrine at each site. After this, the needles were removed.

## 2020-03-19 ENCOUNTER — APPOINTMENT (OUTPATIENT)
Dept: PHYSICAL THERAPY | Facility: HOSPITAL | Age: 75
End: 2020-03-19
Attending: PHYSICAL MEDICINE & REHABILITATION
Payer: COMMERCIAL

## 2020-03-24 ENCOUNTER — APPOINTMENT (OUTPATIENT)
Dept: PHYSICAL THERAPY | Facility: HOSPITAL | Age: 75
End: 2020-03-24
Attending: PHYSICAL MEDICINE & REHABILITATION
Payer: COMMERCIAL

## 2020-03-26 ENCOUNTER — TELEPHONE (OUTPATIENT)
Dept: PHYSICAL THERAPY | Facility: HOSPITAL | Age: 75
End: 2020-03-26

## 2020-03-26 NOTE — TELEPHONE ENCOUNTER
Contacted pt to discuss department's initiative to protect all non-essential and high risk patients from COVID-19 exposure. Discussed recommendations relative to pt's home program. Explained that the clinic is cancelling visits for the next two weeks.  Erika

## 2020-04-06 ENCOUNTER — TELEPHONE (OUTPATIENT)
Dept: PHYSICAL THERAPY | Facility: HOSPITAL | Age: 75
End: 2020-04-06

## 2020-04-06 NOTE — TELEPHONE ENCOUNTER
Contacted pt to explain that due to continued COVID-19 outbreak, non-essential outpatient rehab patient care has been delayed until 5/4/20. Discussed patient's current symptoms, home program, and concerns at this point.   The pt requested to be discharged a

## 2020-04-07 ENCOUNTER — APPOINTMENT (OUTPATIENT)
Dept: PHYSICAL THERAPY | Facility: HOSPITAL | Age: 75
End: 2020-04-07
Attending: PHYSICAL MEDICINE & REHABILITATION
Payer: COMMERCIAL

## 2020-04-09 ENCOUNTER — APPOINTMENT (OUTPATIENT)
Dept: PHYSICAL THERAPY | Facility: HOSPITAL | Age: 75
End: 2020-04-09
Attending: PHYSICAL MEDICINE & REHABILITATION
Payer: COMMERCIAL

## 2020-04-16 ENCOUNTER — APPOINTMENT (OUTPATIENT)
Dept: PHYSICAL THERAPY | Facility: HOSPITAL | Age: 75
End: 2020-04-16
Attending: PHYSICAL MEDICINE & REHABILITATION
Payer: COMMERCIAL

## 2020-04-20 ENCOUNTER — APPOINTMENT (OUTPATIENT)
Dept: PHYSICAL THERAPY | Facility: HOSPITAL | Age: 75
End: 2020-04-20
Attending: PHYSICAL MEDICINE & REHABILITATION
Payer: COMMERCIAL

## 2020-04-27 ENCOUNTER — APPOINTMENT (OUTPATIENT)
Dept: PHYSICAL THERAPY | Facility: HOSPITAL | Age: 75
End: 2020-04-27
Attending: PHYSICAL MEDICINE & REHABILITATION
Payer: COMMERCIAL

## 2020-05-05 ENCOUNTER — PATIENT MESSAGE (OUTPATIENT)
Dept: OPHTHALMOLOGY | Facility: CLINIC | Age: 75
End: 2020-05-05

## 2020-05-05 RX ORDER — NIFEDIPINE 60 MG/1
TABLET, FILM COATED, EXTENDED RELEASE ORAL
Qty: 90 TABLET | Refills: 0 | Status: SHIPPED | OUTPATIENT
Start: 2020-05-05 | End: 2020-08-10

## 2020-05-18 RX ORDER — GLIPIZIDE 2.5 MG/1
TABLET, EXTENDED RELEASE ORAL
Qty: 90 TABLET | Refills: 0 | Status: SHIPPED | OUTPATIENT
Start: 2020-05-18 | End: 2020-08-10

## 2020-05-27 ENCOUNTER — TELEPHONE (OUTPATIENT)
Dept: NEUROLOGY | Facility: CLINIC | Age: 75
End: 2020-05-27

## 2020-05-29 ENCOUNTER — TELEPHONE (OUTPATIENT)
Dept: ORTHOPEDICS CLINIC | Facility: CLINIC | Age: 75
End: 2020-05-29

## 2020-05-29 NOTE — TELEPHONE ENCOUNTER
Patent calling to discuss four injections for his knee and when it should be scheduled. Please call at:[phone],thanks.

## 2020-06-04 ENCOUNTER — TELEMEDICINE (OUTPATIENT)
Dept: NEUROLOGY | Facility: CLINIC | Age: 75
End: 2020-06-04

## 2020-06-04 ENCOUNTER — TELEPHONE (OUTPATIENT)
Dept: NEUROLOGY | Facility: CLINIC | Age: 75
End: 2020-06-04

## 2020-06-04 DIAGNOSIS — M43.16 SPONDYLOLISTHESIS OF LUMBAR REGION: ICD-10-CM

## 2020-06-04 DIAGNOSIS — G89.29 CHRONIC LEFT SHOULDER PAIN: ICD-10-CM

## 2020-06-04 DIAGNOSIS — M54.12 CERVICAL RADICULOPATHY: ICD-10-CM

## 2020-06-04 DIAGNOSIS — M75.111 NONTRAUMATIC INCOMPLETE TEAR OF RIGHT ROTATOR CUFF: ICD-10-CM

## 2020-06-04 DIAGNOSIS — M48.02 CERVICAL STENOSIS OF SPINE: ICD-10-CM

## 2020-06-04 DIAGNOSIS — E11.9 TYPE 2 DIABETES MELLITUS WITHOUT COMPLICATION, WITHOUT LONG-TERM CURRENT USE OF INSULIN (HCC): ICD-10-CM

## 2020-06-04 DIAGNOSIS — M50.90 CERVICAL DISC DISEASE: ICD-10-CM

## 2020-06-04 DIAGNOSIS — M25.512 CHRONIC LEFT SHOULDER PAIN: ICD-10-CM

## 2020-06-04 DIAGNOSIS — M54.16 LUMBAR RADICULOPATHY: Primary | ICD-10-CM

## 2020-06-04 DIAGNOSIS — M48.061 SPINAL STENOSIS OF LUMBAR REGION WITHOUT NEUROGENIC CLAUDICATION: ICD-10-CM

## 2020-06-04 DIAGNOSIS — G89.29 CHRONIC RIGHT SHOULDER PAIN: ICD-10-CM

## 2020-06-04 DIAGNOSIS — M51.9 LUMBAR DISC DISEASE: ICD-10-CM

## 2020-06-04 DIAGNOSIS — M43.10 RETROLISTHESIS: ICD-10-CM

## 2020-06-04 DIAGNOSIS — M25.511 CHRONIC RIGHT SHOULDER PAIN: ICD-10-CM

## 2020-06-04 DIAGNOSIS — I10 ESSENTIAL HYPERTENSION: ICD-10-CM

## 2020-06-04 DIAGNOSIS — Z96.651 S/P TOTAL KNEE REPLACEMENT, RIGHT: ICD-10-CM

## 2020-06-04 DIAGNOSIS — M50.20 CERVICAL HERNIATED DISC: ICD-10-CM

## 2020-06-04 DIAGNOSIS — M67.911 DYSFUNCTION OF RIGHT ROTATOR CUFF: ICD-10-CM

## 2020-06-04 DIAGNOSIS — M48.061 LUMBAR FORAMINAL STENOSIS: ICD-10-CM

## 2020-06-04 PROCEDURE — 99214 OFFICE O/P EST MOD 30 MIN: CPT | Performed by: PHYSICAL MEDICINE & REHABILITATION

## 2020-06-04 NOTE — PATIENT INSTRUCTIONS
PLAN:   He will get a new MRI of lumbar spine. He will see Dr. Amadeo Colin for a surgical opinion. The patient does not need any pain medications at this time.     The patient will continue with his home exercise program.    He might need a short course of P

## 2020-06-04 NOTE — PROGRESS NOTES
[facility]  [address]    Telemedicine Visit - Evaluation    Rosana Hardin verbally consents to a Telemedicine Visit on 06/04/20. This visit is conducted using Telemedicine with live, interactive audio and video.     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 • Multiple Vitamins-Minerals (CENTRUM SILVER) Oral Tab Take 1 tablet by mouth daily. • Cinnamon (SM CINNAMON) 500 MG Oral Cap Take 500 mg by mouth daily.            ALLERGIES:     Penicillins             HIVES      FAMILY Results   Component Value Date    WBC 5.9 09/24/2019    RBC 4.47 09/24/2019    HGB 14.5 09/24/2019    HCT 44.0 09/24/2019    MCV 98.4 09/24/2019    MCH 32.4 09/24/2019    MCHC 33.0 09/24/2019    RDW 12.8 09/24/2019    .0 09/24/2019    MPV 9.9 09/26/ left mod-large, C4-5 left mild, C3-4 right mild-mod foraminal bulging discs    15. C3-4 right mild-mod, C5-6 right mod, C6-7 left mod-severe foraminal stenosis    16. C6-7 mild-mod HNP    17.  right C7 radiculopathy        PLAN:   He will get a new MRI of l The patient was made aware of where to find [facility] notice of privacy practices, telehealth consent form and other related consent forms and documents. which are located on the [facility] website.  The patient verbally agreed to telehealth consent form, related c

## 2020-06-05 ENCOUNTER — MED REC SCAN ONLY (OUTPATIENT)
Dept: INTERNAL MEDICINE CLINIC | Facility: CLINIC | Age: 75
End: 2020-06-05

## 2020-06-11 ENCOUNTER — OFFICE VISIT (OUTPATIENT)
Dept: ORTHOPEDICS CLINIC | Facility: CLINIC | Age: 75
End: 2020-06-11

## 2020-06-11 ENCOUNTER — TELEPHONE (OUTPATIENT)
Dept: INTERNAL MEDICINE CLINIC | Facility: CLINIC | Age: 75
End: 2020-06-11

## 2020-06-11 VITALS
BODY MASS INDEX: 27.63 KG/M2 | WEIGHT: 204 LBS | HEART RATE: 69 BPM | HEIGHT: 72 IN | DIASTOLIC BLOOD PRESSURE: 76 MMHG | SYSTOLIC BLOOD PRESSURE: 162 MMHG

## 2020-06-11 DIAGNOSIS — M17.11 PRIMARY OSTEOARTHRITIS OF RIGHT KNEE: Primary | ICD-10-CM

## 2020-06-11 DIAGNOSIS — M54.16 LUMBAR RADICULOPATHY: Primary | ICD-10-CM

## 2020-06-11 PROCEDURE — 20610 DRAIN/INJ JOINT/BURSA W/O US: CPT | Performed by: ORTHOPAEDIC SURGERY

## 2020-06-11 RX ORDER — TRIAMCINOLONE ACETONIDE 40 MG/ML
40 INJECTION, SUSPENSION INTRA-ARTICULAR; INTRAMUSCULAR ONCE
Status: DISCONTINUED | OUTPATIENT
Start: 2020-06-11 | End: 2020-06-11

## 2020-06-11 NOTE — PROGRESS NOTES
NURSING INTAKE COMMENTS: Patient presents with:   Follow - Up: regarding right knee pain, pt presents for 1st orthovisc injection of right knee, 4/10 right knee pain, pt is taking aleve for pain, pt had a cortisone injection right knee 1/3/2020      HPI: Th Right 09/17/2012    GREGORIO   • CATARACT EXTRACTION W/  INTRAOCULAR LENS IMPLANT Left 01/11/2010    GREGORIO   • COLONOSCOPY  04/2015    repeat in 10 years.    • CYSTOSCOPY N/A 9/10/2019    Performed by Mahi Kelley MD at [address] [place] [address] OR   • CYSTOSCOPY N/A 4/30/20 tablet (100 mg total) by mouth daily as needed for Erectile Dysfunction. 10 tablet 11   • Mometasone Furo-Formoterol Fum (DULERA) 100-5 MCG/ACT Inhalation Aerosol Inhale 2 puffs into the lungs 2 (two) times daily.  (Patient taking differently: Inhale 2 puff nausea, vomiting, diarrhea, constipation, hematochezia, worsening heartburn or stomach ulcers  : denies dysuria, hematuria, incontinence, increased frequency, urgency, difficulty urinating  MUSCULOSKELETAL: denies musculoskeletal complaints other than in about 1 week (around 6/18/2020).     Rosaura Schlatter, MD

## 2020-06-11 NOTE — TELEPHONE ENCOUNTER
----- Message from Beto Aguilar. Demario Araujo sent at 6/11/2020  9:19 AM CDT -----  Regarding: Referral Request  Contact: [phone]  Hi Dr. Dino Mckoy,  I have an appointment with Dr. Phyllis Quijano on 6/19/20 for my back and leg pain. I will need a referral for this visit.   Callie Santiago

## 2020-06-11 NOTE — TELEPHONE ENCOUNTER
Dr. Antonio Abbott, patient is requesting a referral to Dr. Nisha Houston. Referral has been pended, please advise.

## 2020-06-15 ENCOUNTER — HOSPITAL ENCOUNTER (OUTPATIENT)
Dept: MRI IMAGING | Age: 75
Discharge: HOME | End: 2020-06-15
Attending: PHYSICAL MEDICINE & REHABILITATION
Payer: COMMERCIAL

## 2020-06-15 DIAGNOSIS — M43.10 RETROLISTHESIS: ICD-10-CM

## 2020-06-15 DIAGNOSIS — M51.9 LUMBAR DISC DISEASE: ICD-10-CM

## 2020-06-15 DIAGNOSIS — M54.16 LUMBAR RADICULOPATHY: ICD-10-CM

## 2020-06-15 DIAGNOSIS — M48.061 SPINAL STENOSIS OF LUMBAR REGION WITHOUT NEUROGENIC CLAUDICATION: ICD-10-CM

## 2020-06-15 DIAGNOSIS — M48.061 LUMBAR FORAMINAL STENOSIS: ICD-10-CM

## 2020-06-15 DIAGNOSIS — M43.16 SPONDYLOLISTHESIS OF LUMBAR REGION: ICD-10-CM

## 2020-06-15 PROCEDURE — 72148 MRI LUMBAR SPINE W/O DYE: CPT | Performed by: PHYSICAL MEDICINE & REHABILITATION

## 2020-06-18 ENCOUNTER — OFFICE VISIT (OUTPATIENT)
Dept: ORTHOPEDICS CLINIC | Facility: CLINIC | Age: 75
End: 2020-06-18

## 2020-06-18 VITALS — RESPIRATION RATE: 18 BRPM | HEART RATE: 77 BPM | SYSTOLIC BLOOD PRESSURE: 130 MMHG | DIASTOLIC BLOOD PRESSURE: 62 MMHG

## 2020-06-18 DIAGNOSIS — M17.11 PRIMARY OSTEOARTHRITIS OF RIGHT KNEE: Primary | ICD-10-CM

## 2020-06-18 PROCEDURE — 20610 DRAIN/INJ JOINT/BURSA W/O US: CPT | Performed by: ORTHOPAEDIC SURGERY

## 2020-06-18 NOTE — PROGRESS NOTES
NURSING INTAKE COMMENTS: Patient presents with:  Knee Pain: Right f/u - here for Ortho Visc inj # 2 - states he still has pain rated as 6-7/10 on and off       HPI: This 67year old male presents today with complaints of arthritis follow-up.   He is here fo 09/17/2012    GREGORIO   • CATARACT EXTRACTION W/  INTRAOCULAR LENS IMPLANT Left 01/11/2010    GREGORIO   • COLONOSCOPY  04/2015    repeat in 10 years.    • CYSTOSCOPY N/A 9/10/2019    Performed by Eulogio Daigle MD at [address] [place] [address]   • CYSTOSCOPY N/A 4/30/2019 ) 1 Inhaler 5   • CETIRIZINE 10 MG Oral Tab TAKE ONE TABLET BY MOUTH ONE TIME DAILY  (Patient not taking: Reported on 6/11/2020) 30 tablet 1   • OMEPRAZOLE 40 MG Oral Capsule Delayed Release TAKE ONE CAPSULE BY MOUTH ONE TIME DAILY  90 capsule 1   • RISEDR vomiting, diarrhea, constipation, hematochezia, worsening heartburn or stomach ulcers  : denies dysuria, hematuria, incontinence, increased frequency, urgency, difficulty urinating  MUSCULOSKELETAL: denies musculoskeletal complaints other than in HPI  NE narrowing the central canal and neural foramina. L2-L3: Broad-based disc bulge/osteophyte complex. Facet arthrosis, ligamentum flavum hypertrophy and prominence of the epidural fat posterior to the thecal sac.   Changes result in mild central canal narrowi Plan:  Diagnoses and all orders for this visit:    Primary osteoarthritis of right knee        Assessment: Right knee osteoarthritis primary    Plan: Using sterile technique and a superior lateral parapatellar approach the right knee was aspirated.   I was

## 2020-06-23 ENCOUNTER — PATIENT MESSAGE (OUTPATIENT)
Dept: INTERNAL MEDICINE CLINIC | Facility: CLINIC | Age: 75
End: 2020-06-23

## 2020-06-23 DIAGNOSIS — N28.1 KIDNEY CYSTS: Primary | ICD-10-CM

## 2020-06-23 NOTE — TELEPHONE ENCOUNTER
From: David Field  To: Gypsy Ragsdale MD  Sent: 6/23/2020 8:47 AM CDT  Subject: Test Results Kusum Sainz,  My resent MRI showed a kidney cysts. Stan Gutiérrez said to contact you about this.  Saw Dr. Destiny Carrasquillo about spine problems and he sa

## 2020-06-23 NOTE — TELEPHONE ENCOUNTER
Please see patient e-mail and advise. (Patient already has referral visits left for neurosurgery.   Referral for Urology pended.)

## 2020-06-24 ENCOUNTER — TELEPHONE (OUTPATIENT)
Dept: NEUROLOGY | Facility: CLINIC | Age: 75
End: 2020-06-24

## 2020-06-25 ENCOUNTER — OFFICE VISIT (OUTPATIENT)
Dept: ORTHOPEDICS CLINIC | Facility: CLINIC | Age: 75
End: 2020-06-25

## 2020-06-25 VITALS — DIASTOLIC BLOOD PRESSURE: 57 MMHG | SYSTOLIC BLOOD PRESSURE: 139 MMHG | HEART RATE: 75 BPM

## 2020-06-25 DIAGNOSIS — M17.11 PRIMARY OSTEOARTHRITIS OF RIGHT KNEE: Primary | ICD-10-CM

## 2020-06-25 PROCEDURE — 20610 DRAIN/INJ JOINT/BURSA W/O US: CPT | Performed by: ORTHOPAEDIC SURGERY

## 2020-06-25 NOTE — PROGRESS NOTES
NURSING INTAKE COMMENTS: Patient presents with: Follow - Up: Here for his 3rd orthovisc injection right knee. HPI: This 67year old male presents today for third Orthovisc injection right knee. He has had decreased pain. Doing well.   Ambulates with CYSTOSCOPY N/A 9/10/2019    Performed by Jerri Seth MD at [facility] OR   • CYSTOSCOPY N/A 4/30/2019    Performed by Jerri Seth MD at [facility]   • EYE SURGERY Right 2013    repair detached retina   • EYE SURGERY Right 02/20/2006    S/ P PP TAKE ONE CAPSULE BY MOUTH ONE TIME DAILY  90 capsule 1   • RISEDRONATE SODIUM 150 MG Oral Tab Take 1 tablet by mouth every 30 (thirty) days.  3 tablet 3   • LOVASTATIN 40 MG Oral Tab TAKE 1 TABLET BY MOUTH EVERY DAY AT BEDTIME 90 tablet 1   • aspirin 325 MG urinating  MUSCULOSKELETAL: denies musculoskeletal complaints other than in HPI  NEURO: denies numbness, tingling, weakness, balance issues, dizziness, memory loss  PSYCHIATRIC: denies Hx of depression, anxiety, other psychiatric disorders  HEMATOLOGIC: de central canal narrowing and mild bilateral neural foraminal narrowing. L3-L4: Broad-based disc bulge/osteophyte complex slightly asymmetric to the left.   Facet arthrosis, ligamentum flavum hypertrophy, and prominence of the epidural fat posterior to the th osteoarthritis    Plan: Today under sterile conditions, I injected the patient's right knee with his third Orthovisc injection. He tolerated the procedure well. Showed no sign of infection.   He will follow-up in 1 week for his fourth and final Orthovisc

## 2020-06-25 NOTE — PROGRESS NOTES
Pt arrived for his third orthovisc injection right knee. Pt left office before I could obtain post injection vitals.

## 2020-07-02 ENCOUNTER — OFFICE VISIT (OUTPATIENT)
Dept: ORTHOPEDICS CLINIC | Facility: CLINIC | Age: 75
End: 2020-07-02

## 2020-07-02 VITALS — DIASTOLIC BLOOD PRESSURE: 68 MMHG | HEART RATE: 69 BPM | SYSTOLIC BLOOD PRESSURE: 149 MMHG | RESPIRATION RATE: 20 BRPM

## 2020-07-02 DIAGNOSIS — M17.11 PRIMARY OSTEOARTHRITIS OF RIGHT KNEE: Primary | ICD-10-CM

## 2020-07-02 PROCEDURE — 20610 DRAIN/INJ JOINT/BURSA W/O US: CPT | Performed by: ORTHOPAEDIC SURGERY

## 2020-07-02 RX ORDER — OMEPRAZOLE 40 MG/1
CAPSULE, DELAYED RELEASE ORAL
Qty: 90 CAPSULE | Refills: 0 | Status: SHIPPED | OUTPATIENT
Start: 2020-07-02 | End: 2020-10-09

## 2020-07-02 NOTE — PROGRESS NOTES
NURSING INTAKE COMMENTS: Patient presents with:  Knee Pain: Right f/u - here for Ortho Visc inj #4 - states he is better - has pain with stairs rated as 4-5/10       HPI: This 67year old male presents today with complaints of right knee fourth Orthovisc i CYSTOSCOPY N/A 9/10/2019    Performed by Marychuy Miranda MD at [facility]   • CYSTOSCOPY N/A 4/30/2019    Performed by Marychuy Miranda MD at [address] [place] [address]   • EYE SURGERY Right 2013    repair detached retina   • EYE SURGERY Right 02/20/2006    S/ P PP Tab TAKE ONE TABLET BY MOUTH ONE TIME DAILY  30 tablet 1   • RISEDRONATE SODIUM 150 MG Oral Tab Take 1 tablet by mouth every 30 (thirty) days.  3 tablet 3   • LOVASTATIN 40 MG Oral Tab TAKE 1 TABLET BY MOUTH EVERY DAY AT BEDTIME 90 tablet 1   • aspirin 325 urinating  MUSCULOSKELETAL: denies musculoskeletal complaints other than in HPI  NEURO: denies numbness, tingling, weakness, balance issues, dizziness, memory loss  PSYCHIATRIC: denies Hx of depression, anxiety, other psychiatric disorders  HEMATOLOGIC: de foraminal narrowing. L3-L4: Broad-based disc bulge/osteophyte complex slightly asymmetric to the left. Facet arthrosis, ligamentum flavum hypertrophy, and prominence of the epidural fat posterior to the thecal sac. There is mild central canal narrowing. were asked and answered he will follow-up on an as-needed basis. Follow Up: No follow-ups on file.     Luann Medrano PA-C

## 2020-07-06 ENCOUNTER — OFFICE VISIT (OUTPATIENT)
Dept: INTERNAL MEDICINE CLINIC | Facility: CLINIC | Age: 75
End: 2020-07-06

## 2020-07-06 VITALS
WEIGHT: 203 LBS | DIASTOLIC BLOOD PRESSURE: 80 MMHG | SYSTOLIC BLOOD PRESSURE: 140 MMHG | BODY MASS INDEX: 27.5 KG/M2 | HEIGHT: 72 IN | HEART RATE: 77 BPM | RESPIRATION RATE: 16 BRPM

## 2020-07-06 DIAGNOSIS — N28.1 KIDNEY CYSTS: ICD-10-CM

## 2020-07-06 DIAGNOSIS — M54.32 SCIATICA OF LEFT SIDE: Primary | ICD-10-CM

## 2020-07-06 DIAGNOSIS — I10 ESSENTIAL HYPERTENSION WITH GOAL BLOOD PRESSURE LESS THAN 130/85: ICD-10-CM

## 2020-07-06 DIAGNOSIS — R05.9 COUGH: ICD-10-CM

## 2020-07-06 DIAGNOSIS — E11.9 TYPE 2 DIABETES MELLITUS WITHOUT COMPLICATION, WITHOUT LONG-TERM CURRENT USE OF INSULIN (HCC): ICD-10-CM

## 2020-07-06 PROCEDURE — 99214 OFFICE O/P EST MOD 30 MIN: CPT | Performed by: INTERNAL MEDICINE

## 2020-07-06 NOTE — PROGRESS NOTES
Marylee Nettles is a 66year old male. HPI:   1. Sciatica of left side    Has had continued left sided low back pain going down the left leg. Has been using pain meds and back exercises without full relief. Pain keeps him up at nite and is moderately severe. every morning before breakfast. Disp: 90 capsule Rfl: 3   Diclofenac Sodium 1 % Transdermal Gel Apply 2 g topically 4 (four) times daily. Disp: 1 Tube Rfl: 3   Multiple Vitamins-Minerals (EYE VITAMINS) Oral Cap Take  by mouth daily.  Disp:  Rfl:    Cholecal IOL OS   • Capsular opacification 2014     YAG laser OD      Social History:    Smoking Status: Never Smoker                      Smokeless Status: Never Used                        Alcohol Use: Yes           0.0 oz/week       0 Standard drinks or equivale  some miralax powder at drug store and use as directed to avoid constipation. Use muscle relaxants if they have been prescribed for relief as well. Call our office if the back pain persists or gets worse.   Saw Dr Ginny Mello and may be requiring back surge

## 2020-07-17 ENCOUNTER — HOSPITAL ENCOUNTER (OUTPATIENT)
Dept: ULTRASOUND IMAGING | Facility: HOSPITAL | Age: 75
Discharge: HOME | End: 2020-07-17
Attending: INTERNAL MEDICINE
Payer: COMMERCIAL

## 2020-07-17 DIAGNOSIS — N28.1 KIDNEY CYSTS: ICD-10-CM

## 2020-07-17 PROCEDURE — 76770 US EXAM ABDO BACK WALL COMP: CPT | Performed by: INTERNAL MEDICINE

## 2020-07-28 ENCOUNTER — TELEPHONE (OUTPATIENT)
Dept: CASE MANAGEMENT | Age: 75
End: 2020-07-28

## 2020-07-28 RX ORDER — FLUTICASONE PROPIONATE AND SALMETEROL 50; 100 UG/1; UG/1
1 POWDER RESPIRATORY (INHALATION) 2 TIMES DAILY
Qty: 3 EACH | Refills: 0 | Status: SHIPPED | OUTPATIENT
Start: 2020-07-28 | End: 2021-12-08

## 2020-07-28 NOTE — TELEPHONE ENCOUNTER
Insurance prefers Advair Diskus inhaler over IVA.REGINA. [employer]. Spoke with patient stating ok to switch to Advair inhaler. Provided education on administration as well as rinsing mouth after each use.  Pended prescription for Advair Diskus 100-50 mcg/dose to prefe

## 2020-08-04 ENCOUNTER — OFFICE VISIT (OUTPATIENT)
Dept: PULMONOLOGY | Facility: CLINIC | Age: 75
End: 2020-08-04

## 2020-08-04 VITALS
BODY MASS INDEX: 27.5 KG/M2 | OXYGEN SATURATION: 97 % | SYSTOLIC BLOOD PRESSURE: 142 MMHG | HEART RATE: 67 BPM | DIASTOLIC BLOOD PRESSURE: 70 MMHG | WEIGHT: 203 LBS | HEIGHT: 72 IN | RESPIRATION RATE: 20 BRPM

## 2020-08-04 DIAGNOSIS — R05.9 COUGH: Primary | ICD-10-CM

## 2020-08-04 PROCEDURE — 99213 OFFICE O/P EST LOW 20 MIN: CPT | Performed by: INTERNAL MEDICINE

## 2020-08-04 PROCEDURE — 3078F DIAST BP <80 MM HG: CPT | Performed by: INTERNAL MEDICINE

## 2020-08-04 PROCEDURE — 3008F BODY MASS INDEX DOCD: CPT | Performed by: INTERNAL MEDICINE

## 2020-08-04 PROCEDURE — 3077F SYST BP >= 140 MM HG: CPT | Performed by: INTERNAL MEDICINE

## 2020-08-04 RX ORDER — CETIRIZINE HYDROCHLORIDE 10 MG/1
10 TABLET ORAL DAILY
Qty: 30 TABLET | Refills: 5 | Status: SHIPPED | OUTPATIENT
Start: 2020-08-04 | End: 2021-01-21

## 2020-08-04 RX ORDER — FLUTICASONE PROPIONATE 50 MCG
2 SPRAY, SUSPENSION (ML) NASAL DAILY
Qty: 1 INHALER | Refills: 4 | Status: SHIPPED | OUTPATIENT
Start: 2020-08-04 | End: 2020-09-03

## 2020-08-04 NOTE — PROGRESS NOTES
Referring Physician  Lucia Meeks MD    History of Present Illness  Patient presents for follow-up visit to pulmonary clinic. States cough is significantly improved after starting maintenance inhaler therapy with Dulera.   Currently his inhaler the MG Oral Tab, TAKE ONE TABLET BY MOUTH ONE TIME DAILY  (Patient not taking: Reported on 8/4/2020), Disp: 30 tablet, Rfl: 1    No current facility-administered medications on file prior to visit.        Allergies    Penicillins             HIVES    Physical E

## 2020-08-10 RX ORDER — GLIPIZIDE 2.5 MG/1
TABLET, EXTENDED RELEASE ORAL
Qty: 90 TABLET | Refills: 0 | Status: SHIPPED | OUTPATIENT
Start: 2020-08-10 | End: 2020-11-23

## 2020-08-10 RX ORDER — NIFEDIPINE 60 MG/1
60 TABLET, FILM COATED, EXTENDED RELEASE ORAL DAILY
Qty: 90 TABLET | Refills: 0 | Status: SHIPPED | OUTPATIENT
Start: 2020-08-10 | End: 2020-11-04

## 2020-08-10 NOTE — TELEPHONE ENCOUNTER
Current Outpatient Medications:   •  NIFEDIPINE ER 60 MG Oral Tablet 24 Hr, TAKE ONE TABLET BY MOUTH ONE TIME DAILY, Disp: 90 tablet, Rfl: 0

## 2020-08-11 ENCOUNTER — OFFICE VISIT (OUTPATIENT)
Dept: OPHTHALMOLOGY | Facility: CLINIC | Age: 75
End: 2020-08-11

## 2020-08-11 DIAGNOSIS — Z86.69 HISTORY OF RETINAL TEAR: ICD-10-CM

## 2020-08-11 DIAGNOSIS — E11.9 DIABETES MELLITUS TYPE 2 WITHOUT RETINOPATHY (HCC): Primary | ICD-10-CM

## 2020-08-11 DIAGNOSIS — H40.003 GLAUCOMA SUSPECT OF BOTH EYES: ICD-10-CM

## 2020-08-11 DIAGNOSIS — Z96.1 PSEUDOPHAKIA OF BOTH EYES: ICD-10-CM

## 2020-08-11 PROCEDURE — 92015 DETERMINE REFRACTIVE STATE: CPT | Performed by: OPHTHALMOLOGY

## 2020-08-11 PROCEDURE — 92014 COMPRE OPH EXAM EST PT 1/>: CPT | Performed by: OPHTHALMOLOGY

## 2020-08-11 NOTE — PATIENT INSTRUCTIONS
Diabetes mellitus type 2 without retinopathy ([facility] [address]  Diabetes type II: no background of retinopathy, no signs of neovascularization noted. Discussed ocular and systemic benefits of blood sugar control.   Diagnosis and treatment discussed in detail with diana

## 2020-08-11 NOTE — ASSESSMENT & PLAN NOTE
Discussed with patient that he is a glaucoma suspect based on increased cupping of the optic nerves in both eyes. Glaucoma diagnostic testing ordered. Will not start medication, but will continue to observe. Patient verbalized understanding.

## 2020-08-11 NOTE — PROGRESS NOTES
Micheal Lemon is a 67year old male.     HPI:     HPI     Diabetic Eye Exam      Additional comments: Pt has been a diabetic for 6-7 years       Pt's diabetes is currently controlled by pills   Pt checks BS a few times a week   Pt's last blood sugar was  111 includes hernia surgery (hernia repair, herniorraphy); other surgical history (esophageal dilatation); hand/finger surgery unlisted (Left, 9/29/2011) (LRF trigger finger release); other surgical history (6/15/2010) (release triggers: RMF, RRF, LMF/ RRF Dup tablet (60 mg total) by mouth daily. 90 tablet 0   • cetirizine 10 MG Oral Tab Take 1 tablet (10 mg total) by mouth daily. 30 tablet 5   • Fluticasone Propionate 50 MCG/ACT Nasal Suspension 2 sprays by Nasal route daily.  1 Inhaler 4   • ADVAIR DISKUS 100-5 (11/20/07)       Right Left    Thickness 560/ -1 559/ -1          Pupils       Pupils    Right PERRL    Left PERRL          Visual Fields       Left Right     Full Full          Extraocular Movement       Right Left     Full, Ortho Full, Ortho          Dil right  Stable; no treatment. Floater, vitreous, left  No treatment. History of retinal tear  Continue to follow up with Dr. Ran Johnson yearly.      Glaucoma suspect of both eyes  Discussed with patient that he is a glaucoma suspect based on increased

## 2020-08-21 ENCOUNTER — PATIENT MESSAGE (OUTPATIENT)
Dept: NEUROLOGY | Facility: CLINIC | Age: 75
End: 2020-08-21

## 2020-08-24 NOTE — TELEPHONE ENCOUNTER
From: Warren Alexander  To: Antoinette Shaw MD  Sent: 8/21/2020 1:30 PM CDT  Subject: Visit Follow-up Question    Have not decided to have back surgery yet, but would like to have the injections to releave pain in left leg.  May not have the surgery as I'm not

## 2020-09-08 ENCOUNTER — TELEPHONE (OUTPATIENT)
Dept: NEUROLOGY | Facility: CLINIC | Age: 75
End: 2020-09-08

## 2020-09-08 NOTE — TELEPHONE ENCOUNTER
Received in basket from [email or web address]  advising of approval for . Left L4 & Left L5 TFESI for one unit/DOS. Will inform Nursing.

## 2020-09-10 ENCOUNTER — NURSE ONLY (OUTPATIENT)
Dept: OPHTHALMOLOGY | Facility: CLINIC | Age: 75
End: 2020-09-10

## 2020-09-10 DIAGNOSIS — H40.003 GLAUCOMA SUSPECT OF BOTH EYES: ICD-10-CM

## 2020-09-10 PROCEDURE — 92083 EXTENDED VISUAL FIELD XM: CPT | Performed by: OPHTHALMOLOGY

## 2020-09-10 PROCEDURE — 92133 CPTRZD OPH DX IMG PST SGM ON: CPT | Performed by: OPHTHALMOLOGY

## 2020-09-10 NOTE — PROGRESS NOTES
Cydney Piedra is a 67year old male.     HPI:     HPI     Patient is here for a VF and OCT with no MD.      Last edited by Bunny Marino OT on 9/10/2020  8:08 AM. (History)        Patient History:  Past Medical History:   Diagnosis Date   • Acute medial me retina);  Eye surgery (Right, 02/20/2006) (S/ P PPV RD repair (S/P PPVx, cryo, GFX, SF6 OD 4/25/13) Dr. Juan C Roca ); arthroscopy of joint unlisted (1990) (knee); arthroscopy of joint unlisted (Left, 2011) (rotator cuff repair); arthroscopy of joint unlisted Oral Tab TAKE ONE TABLET BY MOUTH ONE TIME DAILY  30 tablet 1   • RISEDRONATE SODIUM 150 MG Oral Tab Take 1 tablet by mouth every 30 (thirty) days.  3 tablet 3   • LOVASTATIN 40 MG Oral Tab TAKE 1 TABLET BY MOUTH EVERY DAY AT BEDTIME 90 tablet 1   • aspirin

## 2020-10-02 ENCOUNTER — PATIENT MESSAGE (OUTPATIENT)
Dept: NEUROLOGY | Facility: CLINIC | Age: 75
End: 2020-10-02

## 2020-10-05 NOTE — TELEPHONE ENCOUNTER
From: Rosana Hardin  To: Mary Dorsey MD  Sent: 10/2/2020 5:58 PM CDT  Subject: Visit Follow-up Question    Have received referral for back injections. Can we set this up or is it too risky with covid 19 ?

## 2020-10-06 NOTE — TELEPHONE ENCOUNTER
Patient has been scheduled for a left L4 and left L5 TFESIs  on 10/16/20 at the [facility]. Medications and allergies reviewed. Patient informed to hold aspirins, nsaids, blood thinners, multivitamins, vitamin E and fish oils 3-7 days prior to procedure.  Patient

## 2020-10-08 RX ORDER — RISEDRONATE SODIUM 150 MG/1
TABLET, FILM COATED ORAL
Qty: 3 TABLET | Refills: 0 | Status: SHIPPED | OUTPATIENT
Start: 2020-10-08 | End: 2021-01-21

## 2020-10-09 RX ORDER — OMEPRAZOLE 40 MG/1
40 CAPSULE, DELAYED RELEASE ORAL DAILY
Qty: 90 CAPSULE | Refills: 0 | Status: SHIPPED | OUTPATIENT
Start: 2020-10-09 | End: 2021-01-11

## 2020-10-09 NOTE — TELEPHONE ENCOUNTER
Prescription request -     Current Outpatient Medications   Medication Sig Dispense Refill   • OMEPRAZOLE 40 MG Oral Capsule Delayed Release TAKE ONE CAPSULE BY MOUTH ONE TIME DAILY  90 capsule 0

## 2020-10-12 ENCOUNTER — IMMUNIZATION (OUTPATIENT)
Dept: INTERNAL MEDICINE CLINIC | Facility: CLINIC | Age: 75
End: 2020-10-12

## 2020-10-12 ENCOUNTER — MED REC SCAN ONLY (OUTPATIENT)
Dept: INTERNAL MEDICINE CLINIC | Facility: CLINIC | Age: 75
End: 2020-10-12

## 2020-10-12 DIAGNOSIS — Z23 NEED FOR VACCINATION: ICD-10-CM

## 2020-10-12 PROCEDURE — 90471 IMMUNIZATION ADMIN: CPT | Performed by: INTERNAL MEDICINE

## 2020-10-12 PROCEDURE — 90662 IIV NO PRSV INCREASED AG IM: CPT | Performed by: INTERNAL MEDICINE

## 2020-10-16 ENCOUNTER — OFFICE VISIT (OUTPATIENT)
Dept: SURGERY | Facility: CLINIC | Age: 75
End: 2020-10-16

## 2020-10-16 DIAGNOSIS — M51.9 LUMBAR DISC DISEASE: ICD-10-CM

## 2020-10-16 DIAGNOSIS — M54.16 LUMBAR RADICULOPATHY: Primary | ICD-10-CM

## 2020-10-16 DIAGNOSIS — M48.061 SPINAL STENOSIS OF LUMBAR REGION WITHOUT NEUROGENIC CLAUDICATION: ICD-10-CM

## 2020-10-16 PROCEDURE — 64483 NJX AA&/STRD TFRM EPI L/S 1: CPT | Performed by: PHYSICAL MEDICINE & REHABILITATION

## 2020-10-16 PROCEDURE — 64484 NJX AA&/STRD TFRM EPI L/S EA: CPT | Performed by: PHYSICAL MEDICINE & REHABILITATION

## 2020-10-16 NOTE — PROCEDURES
Maynor BAXTER 7.    2-LEVEL LUMBAR TRANSFORAMINAL   NAME:  Tanesha Jefferson    MR #:    [id number] :  3/17/1945     PHYSICIAN:  Maico Campos        Operative Report    DATE OF PROCEDURE: 10/16/2020   PREOPERATIVE DIAGNOSES: 1. left > rig correct needle placement at each level. Then, aspiration was performed. No blood, fluid, or air was aspirated.   Then, the patient was injected with a 3 cc solution of 1.5 cc of 40 mg/cc of Kenalog and 1.5 cc of 1% PF lidocaine without epinephrine at each

## 2020-10-27 ENCOUNTER — TELEPHONE (OUTPATIENT)
Dept: NEUROLOGY | Facility: CLINIC | Age: 75
End: 2020-10-27

## 2020-10-27 NOTE — TELEPHONE ENCOUNTER
Pt called to explain he experienced 80% relief from Left L4 and L5 TFESI on 10/16/20. Routing to front office to schedule pt for 3 mo f/u.

## 2020-11-04 RX ORDER — NIFEDIPINE 60 MG/1
TABLET, FILM COATED, EXTENDED RELEASE ORAL
Qty: 90 TABLET | Refills: 0 | Status: SHIPPED | OUTPATIENT
Start: 2020-11-04 | End: 2021-01-26

## 2020-11-23 RX ORDER — LOVASTATIN 40 MG/1
TABLET ORAL
Qty: 90 TABLET | Refills: 0 | Status: SHIPPED | OUTPATIENT
Start: 2020-11-23 | End: 2021-10-01

## 2020-11-23 RX ORDER — GLIPIZIDE 2.5 MG/1
TABLET, EXTENDED RELEASE ORAL
Qty: 90 TABLET | Refills: 0 | Status: SHIPPED | OUTPATIENT
Start: 2020-11-23 | End: 2021-02-25

## 2020-12-11 ENCOUNTER — OFFICE VISIT (OUTPATIENT)
Dept: ORTHOPEDICS CLINIC | Facility: CLINIC | Age: 75
End: 2020-12-11

## 2020-12-11 ENCOUNTER — HOSPITAL ENCOUNTER (OUTPATIENT)
Dept: GENERAL RADIOLOGY | Facility: HOSPITAL | Age: 75
Discharge: HOME | End: 2020-12-11
Attending: ORTHOPAEDIC SURGERY
Payer: COMMERCIAL

## 2020-12-11 VITALS — RESPIRATION RATE: 20 BRPM | DIASTOLIC BLOOD PRESSURE: 65 MMHG | HEART RATE: 76 BPM | SYSTOLIC BLOOD PRESSURE: 149 MMHG

## 2020-12-11 DIAGNOSIS — R52 PAIN: Primary | ICD-10-CM

## 2020-12-11 DIAGNOSIS — R52 PAIN: ICD-10-CM

## 2020-12-11 DIAGNOSIS — M17.11 OSTEOARTHRITIS OF RIGHT KNEE, UNSPECIFIED OSTEOARTHRITIS TYPE: ICD-10-CM

## 2020-12-11 DIAGNOSIS — M25.561 RIGHT KNEE PAIN, UNSPECIFIED CHRONICITY: ICD-10-CM

## 2020-12-11 PROCEDURE — 20610 DRAIN/INJ JOINT/BURSA W/O US: CPT | Performed by: ORTHOPAEDIC SURGERY

## 2020-12-11 PROCEDURE — 3078F DIAST BP <80 MM HG: CPT | Performed by: ORTHOPAEDIC SURGERY

## 2020-12-11 PROCEDURE — 3077F SYST BP >= 140 MM HG: CPT | Performed by: ORTHOPAEDIC SURGERY

## 2020-12-11 PROCEDURE — 73564 X-RAY EXAM KNEE 4 OR MORE: CPT | Performed by: ORTHOPAEDIC SURGERY

## 2020-12-11 PROCEDURE — 99213 OFFICE O/P EST LOW 20 MIN: CPT | Performed by: ORTHOPAEDIC SURGERY

## 2020-12-11 RX ORDER — TRIAMCINOLONE ACETONIDE 40 MG/ML
40 INJECTION, SUSPENSION INTRA-ARTICULAR; INTRAMUSCULAR ONCE
Status: COMPLETED | OUTPATIENT
Start: 2020-12-11 | End: 2020-12-11

## 2020-12-11 RX ADMIN — TRIAMCINOLONE ACETONIDE 40 MG: 40 INJECTION, SUSPENSION INTRA-ARTICULAR; INTRAMUSCULAR at 16:03:00

## 2020-12-11 NOTE — PROGRESS NOTES
NURSING INTAKE COMMENTS: Patient presents with:   Injury: Right knee - onset 12/8/2020 when he got his foot caught on the stoop and he felt a sharp pain and a popping in the medial aspect of the knee - no swelling, no discoloration - has pain rated as 4-7/1 complication, not stated as uncontrolled    • Visual impairment     readers     Past Surgical History:   Procedure Laterality Date   • ARTHROSCOPY OF JOINT UNLISTED  1990    knee   • ARTHROSCOPY OF JOINT UNLISTED Left 2011    rotator cuff repair   • ARTHRO NIFEDIPINE ER 60 MG Oral Tablet 24 Hr TAKE ONE TABLET BY MOUTH ONE TIME DAILY  90 tablet 0   • Omeprazole 40 MG Oral Capsule Delayed Release Take 1 capsule (40 mg total) by mouth daily.  90 capsule 0   • RISEDRONATE SODIUM 150 MG Oral Tab Take 1 tablet by m rash  HEENT:denies recent vision change, new nasal congestion,hearing loss, tinnitus, sore throat, headaches  RESPIRATORY: denies new shortness of breath, cough, asthma, wheezing  CARDIOVASCULAR: denies chest pain, leg cramps with exertion, palpitations, l RIGHT (CPT=73721), 7/25/2019, 7:34 AM.  [facility], [facility]. [facility] 2nd Floor, [address] [facility], 5/31/2019, 8:09 AM.  INDICATIONS: Medial right knee pain post twisting injury 12-8-20. Hx: Bilateral knee surgeries.   TECHNIQUE: 3 views were

## 2020-12-11 NOTE — PROGRESS NOTES
Per verbal order from ROCIO Phillips, draw up 3ml of 0.5% Marcaine & 2ml 1% lidocaine and 1ml of Kenalog 40 for cortisone injection to right knee. Leticia Robles    Patient provided education handout for cortisone injection.    Patient left office

## 2020-12-15 ENCOUNTER — PATIENT MESSAGE (OUTPATIENT)
Dept: INTERNAL MEDICINE CLINIC | Facility: CLINIC | Age: 75
End: 2020-12-15

## 2020-12-15 DIAGNOSIS — H33.319 RETINAL TEAR, UNSPECIFIED LATERALITY: Primary | ICD-10-CM

## 2020-12-16 NOTE — TELEPHONE ENCOUNTER
From: Jeanie Meredith  To: Debo Culver MD  Sent: 12/15/2020 9:23 PM CST  Subject: Referral Request    Hi Dr. Antonio Abbott. I have an appointment with Dr. Polly Holbrook on Jan. 22nd.  I don't believe I am covered with a referral. Can I please have one for

## 2021-01-07 ENCOUNTER — PATIENT MESSAGE (OUTPATIENT)
Dept: INTERNAL MEDICINE CLINIC | Facility: CLINIC | Age: 76
End: 2021-01-07

## 2021-01-07 NOTE — TELEPHONE ENCOUNTER
From: Abdoulaye Griffin  To: Nura Roblero MD  Sent: 1/7/2021 11:00 AM CST  Subject: Referral Request    Hello, I need referrals for Dr. Melissa Dos Santos for 1/21/21 and Dr. Patrick Vargas for 1/22/21.    Thank you,  Kevin Hester  [birth year]  [phone]

## 2021-01-09 NOTE — TELEPHONE ENCOUNTER
Message sent to clarify reason for ortho referral, also if he used 2 visits on the 12/9/2020 referral. Mina Lozano referral created.

## 2021-01-10 NOTE — TELEPHONE ENCOUNTER
[employer] message sent to pt.        Future Appointments   Date Time Provider Dominic Mayda   1/21/2021  8:40 AM Yamileth Dickerson MD [facility]   2/4/2021  8:30 AM Bradley Campos MD [facility] OF THE [facility]   3/5/2021  9:40 AM Kee Guillory MD

## 2021-01-11 RX ORDER — OMEPRAZOLE 40 MG/1
CAPSULE, DELAYED RELEASE ORAL
Qty: 90 CAPSULE | Refills: 0 | Status: SHIPPED | OUTPATIENT
Start: 2021-01-11 | End: 2021-04-14

## 2021-01-11 NOTE — TELEPHONE ENCOUNTER
[employer] message viewed by patient.      From   Néstor Rodriguez RN To   Ashwini Melchor and Delivered   1/9/2021 10:35 PM   Last Read in Vserv   1/10/2021  7:53 AM by Caryl Larios

## 2021-01-21 ENCOUNTER — OFFICE VISIT (OUTPATIENT)
Dept: ORTHOPEDICS CLINIC | Facility: CLINIC | Age: 76
End: 2021-01-21

## 2021-01-21 VITALS — HEIGHT: 72 IN | WEIGHT: 200 LBS | BODY MASS INDEX: 27.09 KG/M2

## 2021-01-21 DIAGNOSIS — M17.11 PRIMARY OSTEOARTHRITIS OF RIGHT KNEE: Primary | ICD-10-CM

## 2021-01-21 DIAGNOSIS — M17.11 OSTEOARTHRITIS OF RIGHT KNEE, UNSPECIFIED OSTEOARTHRITIS TYPE: ICD-10-CM

## 2021-01-21 PROCEDURE — 3008F BODY MASS INDEX DOCD: CPT | Performed by: ORTHOPAEDIC SURGERY

## 2021-01-21 PROCEDURE — 99213 OFFICE O/P EST LOW 20 MIN: CPT | Performed by: ORTHOPAEDIC SURGERY

## 2021-01-21 NOTE — PROGRESS NOTES
NURSING INTAKE COMMENTS: Patient presents with:  Knee Pain: right -- States knee pain flared up again 2 weeks ago but started feeling better. Rates pain 2-3/10 today. HPI: This 67year old male presents today with complaints of right knee pain.   He w ARTHROSCOPY OF JOINT UNLISTED Left 2011    rotator cuff repair   • ARTHROSCOPY OF JOINT UNLISTED Left 1992    hip resurfacing   • ARTHROSCOPY OF JOINT UNLISTED Left 12/2008    hip resurfacing   • CATARACT     • CATARACT EXTRACTION W/  INTRAOCULAR LENS IMPL Hr TAKE ONE TABLET BY MOUTH ONE TIME DAILY  90 tablet 0   • ADVAIR DISKUS 100-50 MCG/DOSE Inhalation Aerosol Powder, Breath Activated Inhale 1 puff into the lungs 2 (two) times daily.  3 each 0   • Sildenafil Citrate 100 MG Oral Tab Take 1 tablet (100 mg to hematuria, incontinence, increased frequency, urgency, difficulty urinating  MUSCULOSKELETAL: denies musculoskeletal complaints other than in HPI  NEURO: denies numbness, tingling, weakness, balance issues, dizziness, memory loss  PSYCHIATRIC: denies Hx of his questions were asked and answered. Follow Up: No follow-ups on file.     Verenice Dick PA-C

## 2021-01-25 ENCOUNTER — MED REC SCAN ONLY (OUTPATIENT)
Dept: INTERNAL MEDICINE CLINIC | Facility: CLINIC | Age: 76
End: 2021-01-25

## 2021-01-26 RX ORDER — NIFEDIPINE 60 MG/1
TABLET, FILM COATED, EXTENDED RELEASE ORAL
Qty: 90 TABLET | Refills: 0 | Status: SHIPPED | OUTPATIENT
Start: 2021-01-26 | End: 2021-05-10

## 2021-01-26 RX ORDER — RISEDRONATE SODIUM 150 MG/1
TABLET, FILM COATED ORAL
Qty: 3 TABLET | Refills: 0 | Status: SHIPPED | OUTPATIENT
Start: 2021-01-26 | End: 2021-04-13

## 2021-01-26 NOTE — TELEPHONE ENCOUNTER
Name from pharmacy: Risedronate Sodium 150 Mg Tab Auro          Will file in chart as: RISEDRONATE SODIUM 150 MG Oral Tab    The original prescription was discontinued on 1/21/2021 by Nina Adam RN for the following reason: Patient discontinued.  Lexie Sender

## 2021-02-04 ENCOUNTER — OFFICE VISIT (OUTPATIENT)
Dept: NEUROLOGY | Facility: CLINIC | Age: 76
End: 2021-02-04

## 2021-02-04 ENCOUNTER — TELEPHONE (OUTPATIENT)
Dept: NEUROLOGY | Facility: CLINIC | Age: 76
End: 2021-02-04

## 2021-02-04 VITALS — WEIGHT: 200 LBS | HEIGHT: 72 IN | BODY MASS INDEX: 27.09 KG/M2

## 2021-02-04 DIAGNOSIS — M54.16 LUMBAR RADICULOPATHY: Primary | ICD-10-CM

## 2021-02-04 DIAGNOSIS — M43.16 SPONDYLOLISTHESIS OF LUMBAR REGION: ICD-10-CM

## 2021-02-04 DIAGNOSIS — M43.10 RETROLISTHESIS: ICD-10-CM

## 2021-02-04 DIAGNOSIS — M51.9 LUMBAR DISC DISEASE: ICD-10-CM

## 2021-02-04 DIAGNOSIS — M48.061 SPINAL STENOSIS OF LUMBAR REGION WITHOUT NEUROGENIC CLAUDICATION: ICD-10-CM

## 2021-02-04 DIAGNOSIS — M48.061 LUMBAR FORAMINAL STENOSIS: ICD-10-CM

## 2021-02-04 PROCEDURE — 99214 OFFICE O/P EST MOD 30 MIN: CPT | Performed by: PHYSICAL MEDICINE & REHABILITATION

## 2021-02-04 PROCEDURE — 3008F BODY MASS INDEX DOCD: CPT | Performed by: PHYSICAL MEDICINE & REHABILITATION

## 2021-02-04 NOTE — TELEPHONE ENCOUNTER
Submitted request to [facility] for left L4 and left L5 TFESIs CPT [id number] to be done at [facility]    Status: pending [facility] clinical review

## 2021-02-04 NOTE — PROGRESS NOTES
Low Back Pain H & P    Chief Complaint: Patient presents with:  Low Back Pain: Patient present for f/u on low back pain, state 80% better after injection 10/15/20, lasted 2 month. Pain returned 1 week before tierra.  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    PPVx, Cryo, GFX, SF6   • Myopia with astigmatism and presbyopia 1957   • Osteoarthritis    • Posterior subcapsular cataract Kendra Antoine MD at [facility] MAIN OR   • LASER SURGERY OF EYE     • OTHER      bladder surgery x2   • OTHER SURGICAL HISTORY      esophageal dilatation   • OTHER SURGICAL HISTORY  6/15/2010    release triggers: RMF, RRF, LMF/ RRF Dupuytren's nodule excis current or ex partner: Not on file        Emotionally abused: Not on file        Physically abused: Not on file        Forced sexual activity: Not on file    Other Topics      Concerns:         Service: Not Asked        Blood Transfusions: Not Aske dextroscoliosis that is mild-moderate     Lumbar Spine Palpation:    Spinous Processes: Non-tender for all Spinous Processes   Z-joints: Tender at  left L4-5 and left L5-S1   SIJ: Non-tender for bilateral SIJ   Piriformis Muscle: Non-tender bilateral Pirif

## 2021-02-04 NOTE — PATIENT INSTRUCTIONS
Plan  I will perform left L4 and L5 TFESI(s) under MAC. The patient will continue with his home exercise program.    He will think about having the L4-5 laminectomy and fusion in the future. I told him that this is his decision.     The patient will f

## 2021-02-09 NOTE — TELEPHONE ENCOUNTER
Patient has been scheduled for a left L4 and left L5 TFESIs   on 2/26/21 at the [address] [place] [address]. Medications and allergies reviewed. Patient informed to hold aspirins, nsaids, blood thinners, multivitamins, vitamin E and fish oils 7 days prior to procedure.  Patient i

## 2021-02-23 ENCOUNTER — TELEPHONE (OUTPATIENT)
Dept: NEUROLOGY | Facility: CLINIC | Age: 76
End: 2021-02-23

## 2021-02-23 NOTE — TELEPHONE ENCOUNTER
Patient has been REscheduled for a  left L4 and left L5 TFESIs under MAC  on 03/12/2021 at the [facility]. Medications and allergies reviewed. Patient informed he will need a .  Patient informed not to eat or drink anything after midnight the night prior to

## 2021-02-25 RX ORDER — GLIPIZIDE 2.5 MG/1
2.5 TABLET, EXTENDED RELEASE ORAL
Qty: 90 TABLET | Refills: 0 | Status: SHIPPED | OUTPATIENT
Start: 2021-02-25 | End: 2021-05-24

## 2021-03-01 ENCOUNTER — OFFICE VISIT (OUTPATIENT)
Dept: INTERNAL MEDICINE CLINIC | Facility: CLINIC | Age: 76
End: 2021-03-01

## 2021-03-01 VITALS
SYSTOLIC BLOOD PRESSURE: 146 MMHG | RESPIRATION RATE: 16 BRPM | HEART RATE: 73 BPM | HEIGHT: 72 IN | DIASTOLIC BLOOD PRESSURE: 71 MMHG | BODY MASS INDEX: 27.09 KG/M2 | WEIGHT: 200 LBS

## 2021-03-01 DIAGNOSIS — E11.9 TYPE 2 DIABETES MELLITUS WITHOUT COMPLICATION, WITHOUT LONG-TERM CURRENT USE OF INSULIN (HCC): ICD-10-CM

## 2021-03-01 DIAGNOSIS — M65.311 TRIGGER FINGER OF RIGHT THUMB: Primary | ICD-10-CM

## 2021-03-01 DIAGNOSIS — M19.049 HAND ARTHRITIS: ICD-10-CM

## 2021-03-01 DIAGNOSIS — I10 ESSENTIAL HYPERTENSION WITH GOAL BLOOD PRESSURE LESS THAN 130/85: ICD-10-CM

## 2021-03-01 DIAGNOSIS — M54.32 SCIATICA OF LEFT SIDE: ICD-10-CM

## 2021-03-01 PROCEDURE — 99214 OFFICE O/P EST MOD 30 MIN: CPT | Performed by: INTERNAL MEDICINE

## 2021-03-01 PROCEDURE — 3008F BODY MASS INDEX DOCD: CPT | Performed by: INTERNAL MEDICINE

## 2021-03-01 PROCEDURE — 3077F SYST BP >= 140 MM HG: CPT | Performed by: INTERNAL MEDICINE

## 2021-03-01 PROCEDURE — 3078F DIAST BP <80 MM HG: CPT | Performed by: INTERNAL MEDICINE

## 2021-03-01 NOTE — PROGRESS NOTES
Alfredo Tubbs is a 66year old male. HPI:   1. Trigger finger right thumb    Has developed a trigger finger of the right thumb lately. Click but does not stay flexed.      2. Arthritis of hand    Hs arthritis of both hands especially left 3rd MCP that is ramirez Take 1 tablet (60 mg total) by mouth once daily.  Disp: 90 tablet Rfl: 3   GlipiZIDE ER (GLIPIZIDE XL) 2.5 MG Oral Tablet 24 Hr Take 1 tablet (2.5 mg total) by mouth daily with breakfast. 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 Essential hypertension with goal blood pressure less than 130/85  ***  - CBC WITH DIFFERENTIAL WITH PLATELET; Future  - COMP METABOLIC PANEL (14); Future  - LIPID PANEL;  Future  - URIN home and bring readings to your next office visit to review.    - HEMOGLOBIN A1C; Future  - MICROALB/CREAT RATIO, RANDOM URINE; Future    Had both covid vaccine tests    The patient indicates understanding of these issues and agrees to the plan.     The pat

## 2021-03-02 ENCOUNTER — LAB ENCOUNTER (OUTPATIENT)
Dept: LAB | Facility: HOSPITAL | Age: 76
End: 2021-03-02
Attending: INTERNAL MEDICINE
Payer: COMMERCIAL

## 2021-03-02 DIAGNOSIS — Z87.448 HISTORY OF GROSS HEMATURIA: ICD-10-CM

## 2021-03-02 DIAGNOSIS — Z12.5 PROSTATE CANCER SCREENING: ICD-10-CM

## 2021-03-02 DIAGNOSIS — I10 ESSENTIAL HYPERTENSION WITH GOAL BLOOD PRESSURE LESS THAN 130/85: ICD-10-CM

## 2021-03-02 DIAGNOSIS — E11.9 TYPE 2 DIABETES MELLITUS WITHOUT COMPLICATION, WITHOUT LONG-TERM CURRENT USE OF INSULIN (HCC): ICD-10-CM

## 2021-03-02 DIAGNOSIS — R35.1 NOCTURIA: ICD-10-CM

## 2021-03-02 LAB
ALBUMIN SERPL-MCNC: 3.6 G/DL (ref 3.4–5)
ALBUMIN/GLOB SERPL: 1.2 {RATIO} (ref 1–2)
ALP LIVER SERPL-CCNC: 71 U/L
ALT SERPL-CCNC: 21 U/L
ANION GAP SERPL CALC-SCNC: 3 MMOL/L (ref 0–18)
AST SERPL-CCNC: 8 U/L (ref 15–37)
BASOPHILS # BLD AUTO: 0.05 X10(3) UL (ref 0–0.2)
BASOPHILS NFR BLD AUTO: 0.7 %
BILIRUB SERPL-MCNC: 0.5 MG/DL (ref 0.1–2)
BILIRUB UR QL: NEGATIVE
BUN BLD-MCNC: 9 MG/DL (ref 7–18)
BUN/CREAT SERPL: 9.9 (ref 10–20)
CALCIUM BLD-MCNC: 8.9 MG/DL (ref 8.5–10.1)
CHLORIDE SERPL-SCNC: 109 MMOL/L (ref 98–112)
CHOLEST SMN-MCNC: 129 MG/DL (ref ?–200)
CLARITY UR: CLEAR
CO2 SERPL-SCNC: 32 MMOL/L (ref 21–32)
COLOR UR: YELLOW
COMPLEXED PSA SERPL-MCNC: 4.95 NG/ML (ref ?–4)
CREAT BLD-MCNC: 0.91 MG/DL
CREAT UR-SCNC: 80.1 MG/DL
DEPRECATED RDW RBC AUTO: 46.5 FL (ref 35.1–46.3)
EOSINOPHIL # BLD AUTO: 0.59 X10(3) UL (ref 0–0.7)
EOSINOPHIL NFR BLD AUTO: 7.9 %
ERYTHROCYTE [DISTWIDTH] IN BLOOD BY AUTOMATED COUNT: 12.6 % (ref 11–15)
EST. AVERAGE GLUCOSE BLD GHB EST-MCNC: 123 MG/DL (ref 68–126)
GLOBULIN PLAS-MCNC: 3.1 G/DL (ref 2.8–4.4)
GLUCOSE BLD-MCNC: 111 MG/DL (ref 70–99)
GLUCOSE UR-MCNC: NEGATIVE MG/DL
HBA1C MFR BLD HPLC: 5.9 % (ref ?–5.7)
HCT VFR BLD AUTO: 43.6 %
HDLC SERPL-MCNC: 43 MG/DL (ref 40–59)
HGB BLD-MCNC: 14.4 G/DL
HGB UR QL STRIP.AUTO: NEGATIVE
IMM GRANULOCYTES # BLD AUTO: 0.02 X10(3) UL (ref 0–1)
IMM GRANULOCYTES NFR BLD: 0.3 %
KETONES UR-MCNC: NEGATIVE MG/DL
LDLC SERPL CALC-MCNC: 70 MG/DL (ref ?–100)
LEUKOCYTE ESTERASE UR QL STRIP.AUTO: NEGATIVE
LYMPHOCYTES # BLD AUTO: 1.81 X10(3) UL (ref 1–4)
LYMPHOCYTES NFR BLD AUTO: 24.3 %
M PROTEIN MFR SERPL ELPH: 6.7 G/DL (ref 6.4–8.2)
MCH RBC QN AUTO: 33 PG (ref 26–34)
MCHC RBC AUTO-ENTMCNC: 33 G/DL (ref 31–37)
MCV RBC AUTO: 100 FL
MICROALBUMIN UR-MCNC: 0.86 MG/DL
MICROALBUMIN/CREAT 24H UR-RTO: 10.7 UG/MG (ref ?–30)
MONOCYTES # BLD AUTO: 0.78 X10(3) UL (ref 0.1–1)
MONOCYTES NFR BLD AUTO: 10.5 %
NEUTROPHILS # BLD AUTO: 4.2 X10 (3) UL (ref 1.5–7.7)
NEUTROPHILS # BLD AUTO: 4.2 X10(3) UL (ref 1.5–7.7)
NEUTROPHILS NFR BLD AUTO: 56.3 %
NITRITE UR QL STRIP.AUTO: NEGATIVE
NON GYNE INTERPRETATION: NEGATIVE
NONHDLC SERPL-MCNC: 86 MG/DL (ref ?–130)
OSMOLALITY SERPL CALC.SUM OF ELEC: 297 MOSM/KG (ref 275–295)
PATIENT FASTING Y/N/NP: YES
PATIENT FASTING Y/N/NP: YES
PH UR: 7 [PH] (ref 5–8)
PLATELET # BLD AUTO: 257 10(3)UL (ref 150–450)
POTASSIUM SERPL-SCNC: 4.8 MMOL/L (ref 3.5–5.1)
PROT UR-MCNC: NEGATIVE MG/DL
RBC # BLD AUTO: 4.36 X10(6)UL
SODIUM SERPL-SCNC: 144 MMOL/L (ref 136–145)
SP GR UR STRIP: 1.01 (ref 1–1.03)
TRIGL SERPL-MCNC: 79 MG/DL (ref 30–149)
TSI SER-ACNC: 0.88 MIU/ML (ref 0.36–3.74)
UROBILINOGEN UR STRIP-ACNC: <2
VLDLC SERPL CALC-MCNC: 16 MG/DL (ref 0–30)
WBC # BLD AUTO: 7.5 X10(3) UL (ref 4–11)

## 2021-03-02 PROCEDURE — 80053 COMPREHEN METABOLIC PANEL: CPT

## 2021-03-02 PROCEDURE — 80061 LIPID PANEL: CPT

## 2021-03-02 PROCEDURE — 83036 HEMOGLOBIN GLYCOSYLATED A1C: CPT

## 2021-03-02 PROCEDURE — 85025 COMPLETE CBC W/AUTO DIFF WBC: CPT

## 2021-03-02 PROCEDURE — 81003 URINALYSIS AUTO W/O SCOPE: CPT

## 2021-03-02 PROCEDURE — 84443 ASSAY THYROID STIM HORMONE: CPT

## 2021-03-02 PROCEDURE — 88108 CYTOPATH CONCENTRATE TECH: CPT

## 2021-03-02 PROCEDURE — 82043 UR ALBUMIN QUANTITATIVE: CPT

## 2021-03-02 PROCEDURE — 82570 ASSAY OF URINE CREATININE: CPT

## 2021-03-02 PROCEDURE — 36415 COLL VENOUS BLD VENIPUNCTURE: CPT

## 2021-03-02 NOTE — PROGRESS NOTES
Caryl Larios is a 66year old male. HPI:   1. Trigger finger right thumb    Has developed a trigger finger of the right thumb lately. Click but does not stay flexed.      2. Arthritis of hand    Hs arthritis of both hands especially left 3rd MCP that is ramirez Take 1 tablet (60 mg total) by mouth once daily.  Disp: 90 tablet Rfl: 3   GlipiZIDE ER (GLIPIZIDE XL) 2.5 MG Oral Tablet 24 Hr Take 1 tablet (2.5 mg total) by mouth daily with breakfast. 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 Hand arthritis    The patient complains of bilateral hand arthritis that has been more worrisome and bothersome lately it is limiting his ability to hold items and to open cans and other daily activities.   We will send to rheumatology for ev PANEL (14); Future  - LIPID PANEL; Future  - URINALYSIS, ROUTINE; Future  - TSH W REFLEX TO FREE T4; Future    Had both covid vaccine tests    The patient indicates understanding of these issues and agrees to the plan.     The patient is asked to return in

## 2021-03-03 DIAGNOSIS — Z23 NEED FOR VACCINATION: ICD-10-CM

## 2021-03-08 ENCOUNTER — OFFICE VISIT (OUTPATIENT)
Dept: RHEUMATOLOGY | Facility: CLINIC | Age: 76
End: 2021-03-08

## 2021-03-08 VITALS
SYSTOLIC BLOOD PRESSURE: 160 MMHG | DIASTOLIC BLOOD PRESSURE: 84 MMHG | WEIGHT: 200 LBS | HEART RATE: 86 BPM | BODY MASS INDEX: 27.09 KG/M2 | HEIGHT: 72 IN

## 2021-03-08 DIAGNOSIS — M79.641 BILATERAL HAND PAIN: ICD-10-CM

## 2021-03-08 DIAGNOSIS — M65.311 TRIGGER FINGER OF RIGHT THUMB: Primary | ICD-10-CM

## 2021-03-08 DIAGNOSIS — M79.642 BILATERAL HAND PAIN: ICD-10-CM

## 2021-03-08 PROCEDURE — 20550 NJX 1 TENDON SHEATH/LIGAMENT: CPT | Performed by: INTERNAL MEDICINE

## 2021-03-08 PROCEDURE — 99244 OFF/OP CNSLTJ NEW/EST MOD 40: CPT | Performed by: INTERNAL MEDICINE

## 2021-03-08 PROCEDURE — 3079F DIAST BP 80-89 MM HG: CPT | Performed by: INTERNAL MEDICINE

## 2021-03-08 PROCEDURE — 3008F BODY MASS INDEX DOCD: CPT | Performed by: INTERNAL MEDICINE

## 2021-03-08 PROCEDURE — 3077F SYST BP >= 140 MM HG: CPT | Performed by: INTERNAL MEDICINE

## 2021-03-08 RX ORDER — METHYLPREDNISOLONE ACETATE 80 MG/ML
20 INJECTION, SUSPENSION INTRA-ARTICULAR; INTRALESIONAL; INTRAMUSCULAR; SOFT TISSUE ONCE
Status: COMPLETED | OUTPATIENT
Start: 2021-03-08 | End: 2021-03-08

## 2021-03-08 RX ADMIN — METHYLPREDNISOLONE ACETATE 20 MG: 80 INJECTION, SUSPENSION INTRA-ARTICULAR; INTRALESIONAL; INTRAMUSCULAR; SOFT TISSUE at 09:25:00

## 2021-03-08 NOTE — PATIENT INSTRUCTIONS
You were seen today for right thumb trigger finger and hand pain  It seems like your symptoms are from osteoarthritis but lets rule out rheumatoid arthritis  We injected your right thumb with cortisone, hopefully that will help  We will be getting x-rays o

## 2021-03-08 NOTE — PROGRESS NOTES
Alvino Snellen is a 67year old male who presents for Patient presents with:  Consult  Hand Pain: Right thumb, left middle finger; stiffness  .    HPI:   CC: hand pain  Consult: referred by PCP Dr. Digna Baez    This is a 68 yo M with hx of HTN, HLD, DM-2, L T BEDTIME 90 tablet 0   • ADVAIR DISKUS 100-50 MCG/DOSE Inhalation Aerosol Powder, Breath Activated Inhale 1 puff into the lungs 2 (two) times daily.  3 each 0   • Sildenafil Citrate 100 MG Oral Tab Take 1 tablet (100 mg total) by mouth daily as needed for Er Procedure Laterality Date   • ARTHROSCOPY OF JOINT UNLISTED  1990    knee   • ARTHROSCOPY OF JOINT UNLISTED Left 2011    rotator cuff repair   • ARTHROSCOPY OF JOINT UNLISTED Left 1992    hip resurfacing   • ARTHROSCOPY OF JOINT UNLISTED Left 12/2008 Alcohol/week: 2.0 - 4.0 standard drinks      Types: 2 - 4 Cans of beer per week      Comment: 2-3 times per week    Drug use: No          REVIEW OF SYSTEMS:   Review Of Systems:  Constitutional: No fever, no change in weight or appetitie  Derm: No rashes, pain or warmth on palpation with FROM  Spine: no lumbar or sacral pain on palpation. NEURO: Cranial nerves II-XII intact grossly. 5/5 strength throughout in both upper and lower extremities, sensation intact.   PSYCH: normal mood    LABS:     Component joints    Chronic lower back pain  - Following with Dr. Sylvester Villegas  - We will be having epidural injection on Friday. This will be his fourth round    R knee OA  - Following with orthopedic, has had gel injections last year which helped.   We will also be jack

## 2021-03-08 NOTE — PROCEDURES
With  consent, I injected the patient's R thumb trigger finger with 0.25ml lidocaine  1% and 0.25ml depo 80. It was under sterile technique using iodine and alcohol swabs and ethyl chloride was used as an anaesthetic spray. Pt.  tolerated it well.

## 2021-03-09 ENCOUNTER — LAB ENCOUNTER (OUTPATIENT)
Dept: LAB | Facility: HOSPITAL | Age: 76
End: 2021-03-09
Attending: INTERNAL MEDICINE
Payer: COMMERCIAL

## 2021-03-09 ENCOUNTER — HOSPITAL ENCOUNTER (OUTPATIENT)
Dept: GENERAL RADIOLOGY | Facility: HOSPITAL | Age: 76
Discharge: HOME | End: 2021-03-09
Attending: INTERNAL MEDICINE
Payer: COMMERCIAL

## 2021-03-09 ENCOUNTER — LAB REQUISITION (OUTPATIENT)
Dept: LAB | Facility: HOSPITAL | Age: 76
End: 2021-03-09
Payer: COMMERCIAL

## 2021-03-09 DIAGNOSIS — M79.641 BILATERAL HAND PAIN: ICD-10-CM

## 2021-03-09 DIAGNOSIS — M65.311 TRIGGER FINGER OF RIGHT THUMB: ICD-10-CM

## 2021-03-09 DIAGNOSIS — Z01.818 ENCOUNTER FOR OTHER PREPROCEDURAL EXAMINATION: ICD-10-CM

## 2021-03-09 DIAGNOSIS — M79.642 BILATERAL HAND PAIN: ICD-10-CM

## 2021-03-09 LAB
CRP SERPL-MCNC: <0.29 MG/DL (ref ?–0.3)
ERYTHROCYTE [SEDIMENTATION RATE] IN BLOOD: 9 MM/HR
RHEUMATOID FACT SERPL-ACNC: <10 IU/ML (ref ?–15)

## 2021-03-09 PROCEDURE — 86140 C-REACTIVE PROTEIN: CPT | Performed by: INTERNAL MEDICINE

## 2021-03-09 PROCEDURE — 73130 X-RAY EXAM OF HAND: CPT | Performed by: INTERNAL MEDICINE

## 2021-03-09 PROCEDURE — 86200 CCP ANTIBODY: CPT | Performed by: INTERNAL MEDICINE

## 2021-03-09 PROCEDURE — 85652 RBC SED RATE AUTOMATED: CPT | Performed by: INTERNAL MEDICINE

## 2021-03-09 PROCEDURE — 36415 COLL VENOUS BLD VENIPUNCTURE: CPT | Performed by: INTERNAL MEDICINE

## 2021-03-09 PROCEDURE — 86431 RHEUMATOID FACTOR QUANT: CPT | Performed by: INTERNAL MEDICINE

## 2021-03-10 LAB — SARS-COV-2 RNA RESP QL NAA+PROBE: NOT DETECTED

## 2021-03-12 ENCOUNTER — OFFICE VISIT (OUTPATIENT)
Dept: SURGERY | Facility: CLINIC | Age: 76
End: 2021-03-12

## 2021-03-12 DIAGNOSIS — M48.061 SPINAL STENOSIS OF LUMBAR REGION WITHOUT NEUROGENIC CLAUDICATION: ICD-10-CM

## 2021-03-12 DIAGNOSIS — M54.16 LUMBAR RADICULOPATHY: Primary | ICD-10-CM

## 2021-03-12 DIAGNOSIS — M51.9 LUMBAR DISC DISEASE: ICD-10-CM

## 2021-03-12 DIAGNOSIS — M48.061 LUMBAR FORAMINAL STENOSIS: ICD-10-CM

## 2021-03-12 PROCEDURE — 64483 NJX AA&/STRD TFRM EPI L/S 1: CPT | Performed by: PHYSICAL MEDICINE & REHABILITATION

## 2021-03-12 PROCEDURE — 64484 NJX AA&/STRD TFRM EPI L/S EA: CPT | Performed by: PHYSICAL MEDICINE & REHABILITATION

## 2021-03-12 NOTE — PROCEDURES
Maynor BAXTER 7.    2-LEVEL LUMBAR TRANSFORAMINAL   NAME:  Krista Welch    MR #:    [id number] :  3/17/1945     PHYSICIAN:  Gala Campos        Operative Report    DATE OF PROCEDURE: 3/12/2021   PREOPERATIVE DIAGNOSES: 1. left > righ advance the needles to the 6 o'clock position on the left L4 and left L5 pedicles. At this point in time, Omnipaque-240 contrast was used to obtain a good epidurogram indicating correct needle placement at each level. Then, aspiration was performed.   No

## 2021-03-13 LAB — CCP IGG SERPL-ACNC: <0.4 U/ML (ref 0–6.9)

## 2021-03-16 RX ORDER — HYDROXYCHLOROQUINE SULFATE 200 MG/1
400 TABLET, FILM COATED ORAL DAILY
Qty: 180 TABLET | Refills: 0 | Status: SHIPPED | OUTPATIENT
Start: 2021-03-16 | End: 2021-06-03

## 2021-03-31 ENCOUNTER — OFFICE VISIT (OUTPATIENT)
Dept: SURGERY | Facility: CLINIC | Age: 76
End: 2021-03-31

## 2021-03-31 VITALS
SYSTOLIC BLOOD PRESSURE: 144 MMHG | BODY MASS INDEX: 27 KG/M2 | DIASTOLIC BLOOD PRESSURE: 68 MMHG | WEIGHT: 200 LBS | HEART RATE: 82 BPM

## 2021-03-31 DIAGNOSIS — R39.198 URINE STREAM SPRAYING: ICD-10-CM

## 2021-03-31 DIAGNOSIS — Q54.1 PENILE HYPOSPADIAS: ICD-10-CM

## 2021-03-31 DIAGNOSIS — R35.1 NOCTURIA: ICD-10-CM

## 2021-03-31 DIAGNOSIS — R97.20 ELEVATED PSA: Primary | ICD-10-CM

## 2021-03-31 DIAGNOSIS — Z87.448 HISTORY OF URETHRAL STRICTURE: ICD-10-CM

## 2021-03-31 DIAGNOSIS — N40.1 BENIGN PROSTATIC HYPERPLASIA WITH URINARY FREQUENCY: ICD-10-CM

## 2021-03-31 DIAGNOSIS — Z87.448 HISTORY OF GROSS HEMATURIA: ICD-10-CM

## 2021-03-31 DIAGNOSIS — R35.0 BENIGN PROSTATIC HYPERPLASIA WITH URINARY FREQUENCY: ICD-10-CM

## 2021-03-31 DIAGNOSIS — N52.01 ERECTILE DYSFUNCTION DUE TO ARTERIAL INSUFFICIENCY: ICD-10-CM

## 2021-03-31 DIAGNOSIS — Z87.448 HISTORY OF BLADDER STONE: ICD-10-CM

## 2021-03-31 PROCEDURE — 99214 OFFICE O/P EST MOD 30 MIN: CPT | Performed by: UROLOGY

## 2021-03-31 PROCEDURE — 3077F SYST BP >= 140 MM HG: CPT | Performed by: UROLOGY

## 2021-03-31 PROCEDURE — 3078F DIAST BP <80 MM HG: CPT | Performed by: UROLOGY

## 2021-03-31 RX ORDER — TADALAFIL 20 MG/1
TABLET ORAL
Qty: 5 TABLET | Refills: 11 | Status: SHIPPED | OUTPATIENT
Start: 2021-03-31

## 2021-03-31 NOTE — PROGRESS NOTES
HPI:    Patient ID: Keenan Bran is a 67year old male. HPI     Elevated PSA   Problem started 03/02/2021 when PSA = 4.95.  Patient presently denies any associated symptoms to suggest prostate cancer such as unintentional weight loss, loss of appetite, o that he is still experiencing spraying of his urine. Voiding Dysfunction  Chronic. S/p 04/30/2019 GreenLight laser ablation of prostate.  Patient has current IPSS score of 15, moderate voiding dysfunction category, slightly worse compared to previous sc above.           Review of previous records:     PSA was 2.2 on April 9, 2008, corrected for finasteride PSA was 4.4. The patient had the same PSA as that of September 20, 2007.    The patient started finasteride July 10, 2006, because of voiding dysfunctio complaint of spraying stream, referred to [facility] for possible urethroplasty/reconstruction of urethra.  9/28/2016 [place] cystoscopy under general anesthesia by Dr. Cardoso Cons  for possible urethral stricture, no stricture found; prostatic urethra showed minima Patient denies discharge, drainage. Patient states he is able to pull back his foreskin, then able to pull foreskin back over tip of penis w/o difficulty.   Just complaining of severe itching, states he tried OTC clotrimazole without any relief--started on abdominal pain and constipation. Genitourinary: Negative for dysuria, flank pain and hematuria. Skin: Negative for color change. Neurological: Negative for speech difficulty. Psychiatric/Behavioral: The patient is not nervous/anxious.           Pao Whatley arthritis 7/18/2011    R [facility] arthritis - R [facility] aristospan / lidocaine injection   • Esophageal reflux    • Eye problem 1998    increased C/D ratio   • High blood pressure    • High cholesterol    • Hx of eye surgery 2013    repair detached retina   • OR SACRAL W IMG GUIDANCE      x2 2019,x2 2017   • HERNIA SURGERY      hernia repair, herniorraphy   • KNEE ARTHROSCOPY Right 7/10/2017    Performed by Ricki Brothers MD at [address] [place] [address] OR   • LASER SURGERY OF EYE     • OTHER      bladder surgery x2   • O cytology = negative for high-grade urothelial carcinoma; Creatinine = 0.91; GFR = 82; UA blood = negative; protein = negative; Microscopic not indicated   09/24/2019 Creatinine = 0.97; GFR = 76   09/10/2019 urinary calculi composition = primarily calcium p pleuroparenchymal scarring. KIDNEYS:   No renal or ureteral calculi are identified. There is no hydronephrosis or hydroureter. No perinephric or periureteric fat stranding is appreciated.     Multiple well circumscribed left-sided renal cortical and peripe a probable dependent 0.9 cm calculus in the bladder lumen. This could reflect a stasis calculus related to chronic outlet obstruction or a recently passed renal calculus. 3. Marked prostatomegaly with chronic outlet obstruction.  There appears to be a post to suggest prostatitis or prostate cancer. On LAURA today, prostate 2-3+ enlarged, 35-40 g, no palpable nodules or indurations.  In light of the available information, I fully explained to patient the benefits, risks, complications, side effects, reasons for, hours before sexual activity.      (Q54.1) Penile hypospadias  Please see Urine stream spraying and History of urethral stricture below.     (R39.198) Urine stream spraying  Chronic. History of obstructive urethral stricture.  03/24/2016 office visit; compl likely cause of his gross hematuria. 09/10/2019 Cystoscopy with holmium laser lithotripsy of bladder stone less than 2.5; bladder stone; stone seen protruding in the prostatic urethra; 1.5 cm in size; resolved.  09/10/2019 calculi composition = primarily of medical record entries made by the scribe were at my direction and in my presence. I have reviewed the chart and discharge instructions (if applicable) and agree that the record reflects my personal performance and is accurate and complete.   Adriana Taylor

## 2021-03-31 NOTE — PATIENT INSTRUCTIONS
Bobbi Benavidez M.D.    1. Stop sildenafil    2. Start tadalafil (generic Cialis) 20 mg dose 1 to 2 hours before planned sexual activity for erectile dysfunction. 3. Visit in 6 months.  Please get blood draw for PSA-

## 2021-04-08 ENCOUNTER — OFFICE VISIT (OUTPATIENT)
Dept: ORTHOPEDICS CLINIC | Facility: CLINIC | Age: 76
End: 2021-04-08

## 2021-04-08 VITALS — DIASTOLIC BLOOD PRESSURE: 73 MMHG | SYSTOLIC BLOOD PRESSURE: 159 MMHG | HEART RATE: 79 BPM

## 2021-04-08 DIAGNOSIS — M17.11 OSTEOARTHRITIS OF RIGHT KNEE, UNSPECIFIED OSTEOARTHRITIS TYPE: Primary | ICD-10-CM

## 2021-04-08 PROCEDURE — 3077F SYST BP >= 140 MM HG: CPT | Performed by: ORTHOPAEDIC SURGERY

## 2021-04-08 PROCEDURE — 20610 DRAIN/INJ JOINT/BURSA W/O US: CPT | Performed by: ORTHOPAEDIC SURGERY

## 2021-04-08 PROCEDURE — 3078F DIAST BP <80 MM HG: CPT | Performed by: ORTHOPAEDIC SURGERY

## 2021-04-08 NOTE — PROGRESS NOTES
NURSING INTAKE COMMENTS: Patient presents with:  Knee Pain: Right knee pain, 7-8/10 for pain. Patient is here to get the first Orthovisc injection. HPI: This 67year old male presents today for first Orthovisc injection in their right knee.      Past M Performed by Anitha Romo MD at [facility] OR   • CYSTOSCOPY N/A 4/30/2019    Performed by Anitha Romo MD at [facility] OR   • EYE SURGERY Right 2013    repair detached retina   • EYE SURGERY Right 02/20/2006    S/ P PPV RD repair (S/P PPVx, cryo, puff into the lungs 2 (two) times daily. 3 each 0   • aspirin 325 MG Oral Tab Take 325 mg by mouth as needed. • Calcium Carbonate-Vit D-Min (CALCIUM 1200 OR) Take 1 tablet by mouth daily.      • Cholecalciferol (VITAMIN D) 1000 UNITS Oral Cap Take  by loss  PSYCHIATRIC: denies Hx of depression, anxiety, other psychiatric disorders  HEMATOLOGIC: denies blood clots, anemia, blood clotting disorders, blood transfusion  ENDOCRINE: denies autoimmune disease, thyroid issues, or diabetes  ALLERGY: denies asthm

## 2021-04-13 RX ORDER — RISEDRONATE SODIUM 150 MG/1
TABLET, FILM COATED ORAL
Qty: 3 TABLET | Refills: 0 | Status: SHIPPED | OUTPATIENT
Start: 2021-04-13 | End: 2021-07-15

## 2021-04-14 RX ORDER — OMEPRAZOLE 40 MG/1
40 CAPSULE, DELAYED RELEASE ORAL DAILY
Qty: 90 CAPSULE | Refills: 0 | Status: SHIPPED | OUTPATIENT
Start: 2021-04-14 | End: 2021-07-13

## 2021-04-14 NOTE — TELEPHONE ENCOUNTER
Current Outpatient Medications:   ••  OMEPRAZOLE 40 MG Oral Capsule Delayed Release, TAKE ONE CAPSULE BY MOUTH ONE TIME DAILY , Disp: 90 capsule, Rfl: 0

## 2021-04-15 ENCOUNTER — OFFICE VISIT (OUTPATIENT)
Dept: ORTHOPEDICS CLINIC | Facility: CLINIC | Age: 76
End: 2021-04-15

## 2021-04-15 VITALS — SYSTOLIC BLOOD PRESSURE: 140 MMHG | RESPIRATION RATE: 18 BRPM | DIASTOLIC BLOOD PRESSURE: 64 MMHG | HEART RATE: 75 BPM

## 2021-04-15 DIAGNOSIS — M17.11 OSTEOARTHRITIS OF RIGHT KNEE, UNSPECIFIED OSTEOARTHRITIS TYPE: Primary | ICD-10-CM

## 2021-04-15 PROCEDURE — 3078F DIAST BP <80 MM HG: CPT | Performed by: ORTHOPAEDIC SURGERY

## 2021-04-15 PROCEDURE — 3077F SYST BP >= 140 MM HG: CPT | Performed by: ORTHOPAEDIC SURGERY

## 2021-04-15 PROCEDURE — 20610 DRAIN/INJ JOINT/BURSA W/O US: CPT | Performed by: ORTHOPAEDIC SURGERY

## 2021-04-15 NOTE — PROGRESS NOTES
NURSING INTAKE COMMENTS: Patient presents with: Other: 2nd injection of Orthovisc in his right knee. HPI: This 67year old male presents today for second Orthovisc injection in their right knee.      Past Medical History:   Diagnosis Date   • Acute me MAIN OR   • CYSTOSCOPY N/A 4/30/2019    Performed by Marleny Mcgraw MD at [facility] MAIN OR   • EYE SURGERY Right 2013    repair detached retina   • EYE SURGERY Right 02/20/2006    S/ P PPV RD repair (S/P PPVx, cryo, GFX, SF6 OD 4/25/13) Dr. Kalpana Maxwell    • [address] [place] [address] Oral Cap Take  by mouth daily. • Cinnamon (SM CINNAMON) 500 MG Oral Cap Take 500 mg by mouth daily. • ADVAIR DISKUS 100-50 MCG/DOSE Inhalation Aerosol Powder, Breath Activated Inhale 1 puff into the lungs 2 (two) times daily.  (Patient not taking: R denies musculoskeletal complaints other than in HPI  NEURO: denies numbness, tingling, weakness, balance issues, dizziness, memory loss  PSYCHIATRIC: denies Hx of depression, anxiety, other psychiatric disorders  HEMATOLOGIC: denies blood clots, anemia, bl

## 2021-04-22 ENCOUNTER — OFFICE VISIT (OUTPATIENT)
Dept: ORTHOPEDICS CLINIC | Facility: CLINIC | Age: 76
End: 2021-04-22

## 2021-04-22 VITALS — DIASTOLIC BLOOD PRESSURE: 67 MMHG | HEART RATE: 79 BPM | SYSTOLIC BLOOD PRESSURE: 145 MMHG

## 2021-04-22 DIAGNOSIS — M17.11 OSTEOARTHRITIS OF RIGHT KNEE, UNSPECIFIED OSTEOARTHRITIS TYPE: Primary | ICD-10-CM

## 2021-04-22 PROCEDURE — 3077F SYST BP >= 140 MM HG: CPT | Performed by: ORTHOPAEDIC SURGERY

## 2021-04-22 PROCEDURE — 3078F DIAST BP <80 MM HG: CPT | Performed by: ORTHOPAEDIC SURGERY

## 2021-04-22 PROCEDURE — 20610 DRAIN/INJ JOINT/BURSA W/O US: CPT | Performed by: PHYSICIAN ASSISTANT

## 2021-04-22 RX ORDER — MELATONIN
1 DAILY
COMMUNITY
Start: 2021-03-17

## 2021-04-22 NOTE — PROGRESS NOTES
NURSING INTAKE COMMENTS: Patient presents with: Follow - Up: Patient is here for the 3rd OrthoVisc injection to the right knee. HPI: This 67year old male presents today for third Orthovisc injection in their right knee.      Past Medical History: retina   • EYE SURGERY Right 02/20/2006    S/ P PPV RD repair (S/P PPVx, cryo, GFX, SF6 OD 4/25/13) Dr. Naif Rodríguez    • FOREARM/WRIST SURGERY UNLISTED Left     left wrist surgery   • HAND/FINGER SURGERY UNLISTED Left 9/29/2011    LRF trigger finger release SILVER) Oral Tab Take 1 tablet by mouth daily. • Cinnamon (SM CINNAMON) 500 MG Oral Cap Take 500 mg by mouth daily. • ADVAIR DISKUS 100-50 MCG/DOSE Inhalation Aerosol Powder, Breath Activated Inhale 1 puff into the lungs 2 (two) times daily.  Bonifacio Hedrick clots, anemia, blood clotting disorders, blood transfusion  ENDOCRINE: denies autoimmune disease, thyroid issues, or diabetes  ALLERGY: denies asthma, seasonal allergies    Physical Examination:    /67 (BP Location: Right arm, Patient Position: Sitti

## 2021-04-29 ENCOUNTER — OFFICE VISIT (OUTPATIENT)
Dept: ORTHOPEDICS CLINIC | Facility: CLINIC | Age: 76
End: 2021-04-29

## 2021-04-29 VITALS — HEART RATE: 73 BPM | SYSTOLIC BLOOD PRESSURE: 132 MMHG | RESPIRATION RATE: 18 BRPM | DIASTOLIC BLOOD PRESSURE: 61 MMHG

## 2021-04-29 DIAGNOSIS — M17.11 OSTEOARTHRITIS OF RIGHT KNEE, UNSPECIFIED OSTEOARTHRITIS TYPE: Primary | ICD-10-CM

## 2021-04-29 PROCEDURE — 3075F SYST BP GE 130 - 139MM HG: CPT | Performed by: PHYSICIAN ASSISTANT

## 2021-04-29 PROCEDURE — 20610 DRAIN/INJ JOINT/BURSA W/O US: CPT | Performed by: PHYSICIAN ASSISTANT

## 2021-04-29 PROCEDURE — 3078F DIAST BP <80 MM HG: CPT | Performed by: PHYSICIAN ASSISTANT

## 2021-04-29 NOTE — PROGRESS NOTES
NURSING INTAKE COMMENTS: Patient presents with:  Knee Pain: Right f/u - here for Orthovisc inj #4 - states he still has pain rated as 3-4/10 on and off       HPI: This 67year old male presents today for fourth Orthovisc injection in their right knee. 2013    repair detached retina   • EYE SURGERY Right 02/20/2006    S/ P PPV RD repair (S/P PPVx, cryo, GFX, SF6 OD 4/25/13) Dr. Sanchez Signs    • FOREARM/WRIST SURGERY UNLISTED Left     left wrist surgery   • HAND/FINGER SURGERY UNLISTED Left 9/29/2011    LRF taking: Reported on 4/22/2021 )     • Calcium Carbonate-Vit D-Min (CALCIUM 1200 OR) Take 1 tablet by mouth daily. • Cholecalciferol (VITAMIN D) 1000 UNITS Oral Cap Take  by mouth daily.      • Naproxen Sodium (ALEVE) 220 MG Oral Tab Take 1-2 tablets by disorders  HEMATOLOGIC: denies blood clots, anemia, blood clotting disorders, blood transfusion  ENDOCRINE: denies autoimmune disease, thyroid issues, or diabetes  ALLERGY: denies asthma, seasonal allergies    Physical Examination:    /61 (BP Locatio

## 2021-05-10 RX ORDER — NIFEDIPINE 60 MG/1
TABLET, FILM COATED, EXTENDED RELEASE ORAL
Qty: 90 TABLET | Refills: 0 | Status: SHIPPED | OUTPATIENT
Start: 2021-05-10 | End: 2021-07-19

## 2021-05-24 RX ORDER — GLIPIZIDE 2.5 MG/1
TABLET, EXTENDED RELEASE ORAL
Qty: 90 TABLET | Refills: 0 | Status: SHIPPED | OUTPATIENT
Start: 2021-05-24 | End: 2021-08-23

## 2021-06-03 ENCOUNTER — MED REC SCAN ONLY (OUTPATIENT)
Dept: INTERNAL MEDICINE CLINIC | Facility: CLINIC | Age: 76
End: 2021-06-03

## 2021-06-03 ENCOUNTER — OFFICE VISIT (OUTPATIENT)
Dept: INTERNAL MEDICINE CLINIC | Facility: CLINIC | Age: 76
End: 2021-06-03

## 2021-06-03 VITALS
DIASTOLIC BLOOD PRESSURE: 67 MMHG | SYSTOLIC BLOOD PRESSURE: 140 MMHG | WEIGHT: 194 LBS | HEIGHT: 72 IN | RESPIRATION RATE: 16 BRPM | BODY MASS INDEX: 26.28 KG/M2 | HEART RATE: 76 BPM

## 2021-06-03 DIAGNOSIS — M54.32 SCIATICA OF LEFT SIDE: ICD-10-CM

## 2021-06-03 DIAGNOSIS — M19.049 HAND ARTHRITIS: ICD-10-CM

## 2021-06-03 DIAGNOSIS — I10 ESSENTIAL HYPERTENSION WITH GOAL BLOOD PRESSURE LESS THAN 130/85: ICD-10-CM

## 2021-06-03 DIAGNOSIS — E11.9 TYPE 2 DIABETES MELLITUS WITHOUT COMPLICATION, WITHOUT LONG-TERM CURRENT USE OF INSULIN (HCC): Primary | ICD-10-CM

## 2021-06-03 PROCEDURE — 3008F BODY MASS INDEX DOCD: CPT | Performed by: INTERNAL MEDICINE

## 2021-06-03 PROCEDURE — 99214 OFFICE O/P EST MOD 30 MIN: CPT | Performed by: INTERNAL MEDICINE

## 2021-06-03 PROCEDURE — 3078F DIAST BP <80 MM HG: CPT | Performed by: INTERNAL MEDICINE

## 2021-06-03 PROCEDURE — 3077F SYST BP >= 140 MM HG: CPT | Performed by: INTERNAL MEDICINE

## 2021-06-03 RX ORDER — HYDROXYCHLOROQUINE SULFATE 200 MG/1
200 TABLET, FILM COATED ORAL DAILY
Qty: 90 TABLET | Refills: 1 | COMMUNITY
Start: 2021-06-03 | End: 2021-09-20

## 2021-06-03 NOTE — PROGRESS NOTES
David Field is a 66year old male. HPI:     1.  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 Disp: 90 tablet Rfl: 3   lansoprazole 30 MG Oral Capsule Delayed Release Take 1 capsule (30 mg total) by mouth every morning before breakfast. Disp: 90 caps [facility] arthritis - R TH [facility] aristospan / lidocaine injection   • Cataract 2012, 1998 2012 Phaco w/ PC IOL OD   • Posterior subcapsular cataract    • Posterior subcapsular cataract 2010, 1998 2010 Phaco w/ PC IOL OS   • Capsular opacification 2014 CMP, lose wgt with carbohydrate controlled diet and exercise, refer to Ophthalmology, check feet daily.     2. Hand arthritis    The patient complains of bilateral hand arthritis that has been more worrisome and bothersome lately it is limiting his ability

## 2021-06-17 ENCOUNTER — TELEPHONE (OUTPATIENT)
Dept: NEUROLOGY | Facility: CLINIC | Age: 76
End: 2021-06-17

## 2021-06-17 ENCOUNTER — OFFICE VISIT (OUTPATIENT)
Dept: NEUROLOGY | Facility: CLINIC | Age: 76
End: 2021-06-17

## 2021-06-17 VITALS — WEIGHT: 194 LBS | BODY MASS INDEX: 26.28 KG/M2 | HEIGHT: 72 IN

## 2021-06-17 DIAGNOSIS — M54.16 LUMBAR RADICULOPATHY: Primary | ICD-10-CM

## 2021-06-17 DIAGNOSIS — M51.9 LUMBAR DISC DISEASE: ICD-10-CM

## 2021-06-17 DIAGNOSIS — M43.16 SPONDYLOLISTHESIS OF LUMBAR REGION: ICD-10-CM

## 2021-06-17 DIAGNOSIS — M48.061 LUMBAR FORAMINAL STENOSIS: ICD-10-CM

## 2021-06-17 DIAGNOSIS — M43.10 RETROLISTHESIS: ICD-10-CM

## 2021-06-17 DIAGNOSIS — M48.061 SPINAL STENOSIS OF LUMBAR REGION WITHOUT NEUROGENIC CLAUDICATION: ICD-10-CM

## 2021-06-17 PROCEDURE — 99214 OFFICE O/P EST MOD 30 MIN: CPT | Performed by: PHYSICAL MEDICINE & REHABILITATION

## 2021-06-17 PROCEDURE — 3008F BODY MASS INDEX DOCD: CPT | Performed by: PHYSICAL MEDICINE & REHABILITATION

## 2021-06-17 NOTE — PATIENT INSTRUCTIONS
Plan  I will perform left L4 and L5 TFESI(s) under MAC. The patient will continue with his home exercise program.    The patient does not need any pain medications at this time.     The patient will follow up in 3 months, but the patient will call me 2

## 2021-06-17 NOTE — PROGRESS NOTES
Low Back Pain H & P    Chief Complaint: Patient presents with:  Back Pain: 03/12/2021 Left L4 and L5 transforaminal epidural . Patient states for about 2 months he had less pain and states now he is back to having pain.  Patient rates pain 8/10 when standin 2010, 1998 2010 Phaco w/ PC IOL OS   • Retinal hole 2006    laser photocoagulation OD   • Rotator cuff tear, left 2011    repair   • Stiffness of joint, hand 8/2010    bilateral, hand stiffness & weakness post trigger release   • Trigger finger 9/29/201 Embolism) Father    • Glaucoma Neg    • Diabetes Neg    • Macular degeneration Neg        Social History   Social History    Socioeconomic History      Marital status:       Spouse name: Not on file      Number of children: Not on file      Years of     Days of Exercise per Week:       Minutes of Exercise per Session:   Stress:       Feeling of Stress :   Social Connections:       Frequency of Communication with Friends and Family:       Frequency of Social Gatherings with Friends and Family:       for bilateral Greater Trochanteric Bursa     Vascular lower extremity:   Dorsalis pedis pulse-RIGHT 2+   Dorsalis pedis pulse-LEFT 2+   Tibialis posterior pulse-RIGHT 2+   Tibialis posterior pulse-LEFT 2+     Neurological Lower Extremity:    Light Touch Se

## 2021-06-17 NOTE — TELEPHONE ENCOUNTER
Left L4 (L4-5),Left L5 (L5-S1) TFESI, [id number], [id number], [id number] approval  Sent  [facility] clinical notes for authorization of above.

## 2021-06-22 NOTE — TELEPHONE ENCOUNTER
Left L4 (L4-5),Left L5 (L5-S1) TFESI, [id number], [id number], [id number]  Received in-basket from [facility] with approval for above. Notified URIEL Approved Referral for scheduling.

## 2021-06-22 NOTE — TELEPHONE ENCOUNTER
Patient has been scheduled for a Left L4 (L4-5),Left L5 (L5-S1) transforaminal epidural steroid injections  on 7/13/21 at the [facility]. Medications and allergies reviewed.  Patient informed we will need verbal or written authorization from patients Primary Care

## 2021-07-13 ENCOUNTER — OFFICE VISIT (OUTPATIENT)
Dept: SURGERY | Facility: CLINIC | Age: 76
End: 2021-07-13

## 2021-07-13 DIAGNOSIS — M54.16 LUMBAR RADICULOPATHY: Primary | ICD-10-CM

## 2021-07-13 DIAGNOSIS — M51.9 LUMBAR DISC DISEASE: ICD-10-CM

## 2021-07-13 DIAGNOSIS — M48.061 SPINAL STENOSIS OF LUMBAR REGION WITHOUT NEUROGENIC CLAUDICATION: ICD-10-CM

## 2021-07-13 PROCEDURE — 64484 NJX AA&/STRD TFRM EPI L/S EA: CPT | Performed by: PHYSICAL MEDICINE & REHABILITATION

## 2021-07-13 PROCEDURE — 64483 NJX AA&/STRD TFRM EPI L/S 1: CPT | Performed by: PHYSICAL MEDICINE & REHABILITATION

## 2021-07-13 NOTE — TELEPHONE ENCOUNTER
•  Omeprazole 40 MG Oral Capsule Delayed Release, Take 1 capsule (40 mg total) by mouth daily. , Disp: 90 capsule, Rfl: 0

## 2021-07-14 RX ORDER — RISEDRONATE SODIUM 150 MG/1
150 TABLET, FILM COATED ORAL
Qty: 3 TABLET | Refills: 3 | OUTPATIENT
Start: 2021-07-14

## 2021-07-14 RX ORDER — OMEPRAZOLE 40 MG/1
40 CAPSULE, DELAYED RELEASE ORAL DAILY
Qty: 90 CAPSULE | Refills: 1 | Status: SHIPPED | OUTPATIENT
Start: 2021-07-14 | End: 2022-01-13

## 2021-07-14 NOTE — TELEPHONE ENCOUNTER
Current Outpatient Medications:     RISEDRONATE SODIUM 150 MG Oral Tab, Take 1 tablet by mouth every 30 days. , Disp: 3 tablet, Rfl: 0

## 2021-07-14 NOTE — TELEPHONE ENCOUNTER
Previous patient of [facility]. Patient is planning to follow-up with us. Patient is requesting for a refill. Please check with the patient and see whether he is still taking this medicine as the chart indicates that Dr. Wally Jara has discontinued this medicine.   If

## 2021-07-15 RX ORDER — RISEDRONATE SODIUM 150 MG/1
150 TABLET, FILM COATED ORAL
Qty: 3 TABLET | Refills: 1 | Status: SHIPPED | OUTPATIENT
Start: 2021-07-15 | End: 2021-12-13

## 2021-07-15 NOTE — TELEPHONE ENCOUNTER
Spoke with patient who stated he has been taking this med and never was told to [address] by Reena Santiago.

## 2021-07-19 ENCOUNTER — OFFICE VISIT (OUTPATIENT)
Dept: INTERNAL MEDICINE CLINIC | Facility: CLINIC | Age: 76
End: 2021-07-19

## 2021-07-19 VITALS
DIASTOLIC BLOOD PRESSURE: 70 MMHG | HEART RATE: 76 BPM | WEIGHT: 196 LBS | OXYGEN SATURATION: 97 % | RESPIRATION RATE: 16 BRPM | BODY MASS INDEX: 26.55 KG/M2 | HEIGHT: 72 IN | SYSTOLIC BLOOD PRESSURE: 153 MMHG

## 2021-07-19 DIAGNOSIS — E11.9 TYPE 2 DIABETES MELLITUS WITHOUT COMPLICATION, WITHOUT LONG-TERM CURRENT USE OF INSULIN (HCC): ICD-10-CM

## 2021-07-19 DIAGNOSIS — I10 ESSENTIAL HYPERTENSION: Primary | ICD-10-CM

## 2021-07-19 DIAGNOSIS — N35.916 STRICTURE OF OVERLAPPING SITES OF URETHRA IN MALE, UNSPECIFIED STRICTURE TYPE: ICD-10-CM

## 2021-07-19 DIAGNOSIS — E78.2 MIXED HYPERLIPIDEMIA: ICD-10-CM

## 2021-07-19 DIAGNOSIS — M48.061 SPINAL STENOSIS OF LUMBAR REGION WITHOUT NEUROGENIC CLAUDICATION: ICD-10-CM

## 2021-07-19 PROBLEM — R39.9 LOWER URINARY TRACT SYMPTOMS (LUTS): Status: RESOLVED | Noted: 2019-01-24 | Resolved: 2021-07-19

## 2021-07-19 PROBLEM — R05.9 COUGH: Status: RESOLVED | Noted: 2018-02-05 | Resolved: 2021-07-19

## 2021-07-19 PROCEDURE — 3008F BODY MASS INDEX DOCD: CPT | Performed by: INTERNAL MEDICINE

## 2021-07-19 PROCEDURE — 3077F SYST BP >= 140 MM HG: CPT | Performed by: INTERNAL MEDICINE

## 2021-07-19 PROCEDURE — 99214 OFFICE O/P EST MOD 30 MIN: CPT | Performed by: INTERNAL MEDICINE

## 2021-07-19 PROCEDURE — 3078F DIAST BP <80 MM HG: CPT | Performed by: INTERNAL MEDICINE

## 2021-07-19 RX ORDER — NIFEDIPINE 90 MG/1
90 TABLET, FILM COATED, EXTENDED RELEASE ORAL DAILY
Qty: 90 TABLET | Refills: 3 | Status: SHIPPED | OUTPATIENT
Start: 2021-07-19 | End: 2021-10-17

## 2021-07-19 RX ORDER — FLUTICASONE PROPIONATE 50 MCG
1 SPRAY, SUSPENSION (ML) NASAL AS NEEDED
COMMUNITY

## 2021-07-19 RX ORDER — ACETAMINOPHEN 325 MG/1
325 TABLET ORAL 2 TIMES DAILY
COMMUNITY
End: 2021-09-09

## 2021-07-19 NOTE — PROGRESS NOTES
Dickson Serrano is a 67year old male. Patient presents with:  Establish Care: NP here to establish Care     HPI:   67-year-old gentleman with a past medical history of diabetes, hypertension, dyslipidemia, cervical spine disease/lumbar spine disease here to e Vitamins-Minerals (CENTRUM SILVER) Oral Tab Take 1 tablet by mouth daily. • Cinnamon (SM CINNAMON) 500 MG Oral Cap Take 500 mg by mouth daily.         Past Medical History:   Diagnosis Date   • Acute medial meniscus tear of right knee    • BPH (benign repair detached retina   • EYE SURGERY Right 02/20/2006    S/ P PPV RD repair (S/P PPVx, cryo, GFX, SF6 OD 4/25/13) Dr. Almeida Begun    • FOREARM/WRIST SURGERY UNLISTED Left     left wrist surgery   • HAND/FINGER SURGERY UNLISTED Left 9/29/2011    LRF trigger kg)  03/31/21 : 200 lb (90.7 kg)  03/08/21 : 200 lb (90.7 kg)    Body mass index is 26.58 kg/m².       EXAM:   /70 (BP Location: Right arm, Patient Position: Sitting, Cuff Size: adult)   Pulse 76   Resp 16   Ht 6' (1.829 m)   Wt 196 lb (88.9 kg)   SpO urethra in male, unspecified stricture type  He follows up with urology. Provided with a referral.  - UROLOGY - INTERNAL    5. 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 Follows with physiatry.  I have given him the referral.  - PHYSIAT

## 2021-08-23 RX ORDER — GLIPIZIDE 2.5 MG/1
2.5 TABLET, EXTENDED RELEASE ORAL
Qty: 90 TABLET | Refills: 0 | Status: SHIPPED | OUTPATIENT
Start: 2021-08-23 | End: 2021-11-19

## 2021-08-23 NOTE — TELEPHONE ENCOUNTER
Current Outpatient Medications:   ••  GLIPIZIDE ER 2.5 MG Oral Tablet 24 Hr, Take 1 tablet by mouth daily with breakfast., Disp: 90 tablet, Rfl: 0

## 2021-09-09 ENCOUNTER — OFFICE VISIT (OUTPATIENT)
Dept: INTERNAL MEDICINE CLINIC | Facility: CLINIC | Age: 76
End: 2021-09-09

## 2021-09-09 ENCOUNTER — TELEPHONE (OUTPATIENT)
Dept: INTERNAL MEDICINE CLINIC | Facility: CLINIC | Age: 76
End: 2021-09-09

## 2021-09-09 VITALS
SYSTOLIC BLOOD PRESSURE: 110 MMHG | RESPIRATION RATE: 14 BRPM | HEIGHT: 72 IN | BODY MASS INDEX: 26.14 KG/M2 | DIASTOLIC BLOOD PRESSURE: 70 MMHG | OXYGEN SATURATION: 97 % | HEART RATE: 80 BPM | WEIGHT: 193 LBS

## 2021-09-09 DIAGNOSIS — G89.29 CHRONIC LEFT-SIDED LOW BACK PAIN WITH LEFT-SIDED SCIATICA: ICD-10-CM

## 2021-09-09 DIAGNOSIS — M17.11 PRIMARY OSTEOARTHRITIS OF RIGHT KNEE: ICD-10-CM

## 2021-09-09 DIAGNOSIS — L60.0 INGROWN NAIL: Primary | ICD-10-CM

## 2021-09-09 DIAGNOSIS — I10 ESSENTIAL HYPERTENSION: ICD-10-CM

## 2021-09-09 DIAGNOSIS — M54.42 CHRONIC LEFT-SIDED LOW BACK PAIN WITH LEFT-SIDED SCIATICA: ICD-10-CM

## 2021-09-09 PROCEDURE — 3078F DIAST BP <80 MM HG: CPT | Performed by: INTERNAL MEDICINE

## 2021-09-09 PROCEDURE — 99214 OFFICE O/P EST MOD 30 MIN: CPT | Performed by: INTERNAL MEDICINE

## 2021-09-09 PROCEDURE — 3008F BODY MASS INDEX DOCD: CPT | Performed by: INTERNAL MEDICINE

## 2021-09-09 PROCEDURE — 3074F SYST BP LT 130 MM HG: CPT | Performed by: INTERNAL MEDICINE

## 2021-09-09 NOTE — PROGRESS NOTES
Dickson Serrano is a 67year old male. Patient presents with:   Follow - Up: 3 mos f/u   Musculoskeletal Problem: Middle finger pain and Barney Toe issue     HPI:   69-year gentleman with a past medical history of diabetes, hypertension, dyslipidemia, DJD here for 500 mg by mouth daily.         Past Medical History:   Diagnosis Date   • Acute medial meniscus tear of right knee    • BPH (benign prostatic hyperplasia)    • Calculus of kidney    • Capsular opacification 2014    YAG laser OD   • Cataract    • CMC arthrit • FOREARM/WRIST SURGERY UNLISTED Left     left wrist surgery   • HAND/FINGER SURGERY UNLISTED Left 9/29/2011    LRF trigger finger release   • HC INJ EPIDURAL STEROID LUMBAR OR SACRAL W IMG GUIDANCE      x2 2019,x2 2017   • HERNIA SURGERY      hernia rep kg)    Body mass index is 26.18 kg/m².       EXAM:   /70 (BP Location: Left arm, Patient Position: Sitting, Cuff Size: adult)   Pulse 80   Resp 14   Ht 6' (1.829 m)   Wt 193 lb (87.5 kg)   SpO2 97%   BMI 26.18 kg/m²      Physical Exam  Constitutional: regimen. Plan: As above. I will see him back in 3 to 4 months for his physical.      The patient indicates understanding of these issues and agrees to the plan. No follow-ups on file.

## 2021-09-09 NOTE — TELEPHONE ENCOUNTER
Previous patient of Dr. Wally Jara. He is following up with me now. He has [employer].   Can you please update the PCP field thank you

## 2021-09-16 ENCOUNTER — PATIENT MESSAGE (OUTPATIENT)
Dept: RHEUMATOLOGY | Facility: CLINIC | Age: 76
End: 2021-09-16

## 2021-09-17 NOTE — TELEPHONE ENCOUNTER
From: Alfredo Tubbs  To: Chelsi Rossi MD  Sent: 9/16/2021 4:04 PM CDT  Subject: finger pain    Hi Dr. Katherine Johnson,  Giselle Cevallos here and I'm updating you of my finger pain. I have finished the Hydroxychloroquin 200 mg.    The pain in my right index finger is much le

## 2021-09-20 RX ORDER — HYDROXYCHLOROQUINE SULFATE 200 MG/1
200 TABLET, FILM COATED ORAL DAILY
Qty: 90 TABLET | Refills: 1 | Status: SHIPPED | OUTPATIENT
Start: 2021-09-20

## 2021-09-20 NOTE — TELEPHONE ENCOUNTER
Last filled: 6/3/21 #90 tab with 1 refill   LOV: 3/8/21  Future Appointments   Date Time Provider Dominic Ohara   9/30/2021  9:40 AM Zunilda Pandey DPM [facility]   10/7/2021  9:30 AM Ron Campos MD [facility]   10/20/2021  8:

## 2021-09-22 ENCOUNTER — PATIENT MESSAGE (OUTPATIENT)
Dept: INTERNAL MEDICINE CLINIC | Facility: CLINIC | Age: 76
End: 2021-09-22

## 2021-09-22 NOTE — TELEPHONE ENCOUNTER
From: Cheryle Cadet  To: Daniel Lehman MD  Sent: 9/22/2021 10:53 AM CDT  Subject: Flue shot    Hi Dr. Attila Rees, just wondering if the flue shot is now ready?   Ty

## 2021-09-30 NOTE — INTERVAL H&P NOTE
Pre-op Diagnosis: Right knee medial meniscal tear    The above referenced H&P was reviewed by Edna Pringle MD on 7/10/2017, the patient was examined and no significant changes have occurred in the patient's condition since the H&P was performed.   I
20

## 2021-10-01 ENCOUNTER — PATIENT MESSAGE (OUTPATIENT)
Dept: INTERNAL MEDICINE CLINIC | Facility: CLINIC | Age: 76
End: 2021-10-01

## 2021-10-01 RX ORDER — LOVASTATIN 40 MG/1
40 TABLET ORAL NIGHTLY
Qty: 90 TABLET | Refills: 0 | Status: CANCELLED | OUTPATIENT
Start: 2021-10-01

## 2021-10-01 NOTE — TELEPHONE ENCOUNTER
From: Ramone Hills  To: Leila Taylor MD  Sent: 10/1/2021 12:27 PM CDT  Subject: Refill    need approval from you for LOVASTATIN 40 MG Oral Tab at [facility]. almost out.   Dixie Worley

## 2021-10-03 RX ORDER — LOVASTATIN 40 MG/1
40 TABLET ORAL NIGHTLY
Qty: 90 TABLET | Refills: 0 | Status: SHIPPED | OUTPATIENT
Start: 2021-10-03 | End: 2022-01-13

## 2021-10-07 ENCOUNTER — OFFICE VISIT (OUTPATIENT)
Dept: PHYSICAL MEDICINE AND REHAB | Facility: CLINIC | Age: 76
End: 2021-10-07

## 2021-10-07 ENCOUNTER — TELEPHONE (OUTPATIENT)
Dept: PHYSICAL THERAPY | Facility: HOSPITAL | Age: 76
End: 2021-10-07

## 2021-10-07 ENCOUNTER — TELEPHONE (OUTPATIENT)
Dept: PHYSICAL MEDICINE AND REHAB | Facility: CLINIC | Age: 76
End: 2021-10-07

## 2021-10-07 ENCOUNTER — ORDER TRANSCRIPTION (OUTPATIENT)
Dept: PHYSICAL THERAPY | Facility: HOSPITAL | Age: 76
End: 2021-10-07

## 2021-10-07 VITALS
BODY MASS INDEX: 26.14 KG/M2 | WEIGHT: 193 LBS | DIASTOLIC BLOOD PRESSURE: 68 MMHG | SYSTOLIC BLOOD PRESSURE: 122 MMHG | HEIGHT: 72 IN

## 2021-10-07 DIAGNOSIS — M54.16 LUMBAR RADICULOPATHY: Primary | ICD-10-CM

## 2021-10-07 DIAGNOSIS — E11.9 TYPE 2 DIABETES MELLITUS WITHOUT COMPLICATION, WITHOUT LONG-TERM CURRENT USE OF INSULIN (HCC): ICD-10-CM

## 2021-10-07 DIAGNOSIS — M43.16 SPONDYLOLISTHESIS OF LUMBAR REGION: ICD-10-CM

## 2021-10-07 DIAGNOSIS — G89.29 CHRONIC PAIN OF RIGHT KNEE: Primary | ICD-10-CM

## 2021-10-07 DIAGNOSIS — G89.29 CHRONIC PAIN OF RIGHT KNEE: ICD-10-CM

## 2021-10-07 DIAGNOSIS — M25.561 CHRONIC PAIN OF RIGHT KNEE: ICD-10-CM

## 2021-10-07 DIAGNOSIS — M17.0 PRIMARY OSTEOARTHRITIS OF BOTH KNEES: ICD-10-CM

## 2021-10-07 DIAGNOSIS — M48.061 LUMBAR FORAMINAL STENOSIS: ICD-10-CM

## 2021-10-07 DIAGNOSIS — M43.10 RETROLISTHESIS: ICD-10-CM

## 2021-10-07 DIAGNOSIS — M25.561 CHRONIC PAIN OF RIGHT KNEE: Primary | ICD-10-CM

## 2021-10-07 DIAGNOSIS — M51.9 LUMBAR DISC DISEASE: ICD-10-CM

## 2021-10-07 DIAGNOSIS — M48.061 SPINAL STENOSIS OF LUMBAR REGION WITHOUT NEUROGENIC CLAUDICATION: ICD-10-CM

## 2021-10-07 PROCEDURE — 99214 OFFICE O/P EST MOD 30 MIN: CPT | Performed by: PHYSICAL MEDICINE & REHABILITATION

## 2021-10-07 PROCEDURE — 3078F DIAST BP <80 MM HG: CPT | Performed by: PHYSICAL MEDICINE & REHABILITATION

## 2021-10-07 PROCEDURE — 3074F SYST BP LT 130 MM HG: CPT | Performed by: PHYSICAL MEDICINE & REHABILITATION

## 2021-10-07 PROCEDURE — 3008F BODY MASS INDEX DOCD: CPT | Performed by: PHYSICAL MEDICINE & REHABILITATION

## 2021-10-07 NOTE — PROGRESS NOTES
Low Back Pain H & P    Chief Complaint: Patient presents with:  Back Pain: LOV 7/13/2021. Patient here today for back pain imjection had done 7/13 feels like it did help then wore off and still having pain.                      Nursing note reviewed and kvng retinal detachment 2013    PPVx, Cryo, GFX, SF6   • Myopia with astigmatism and presbyopia 1957   • Nocturia 9/7/2014   • Osteoarthritis    • Posterior subcapsular cataract    • Posterior subcapsular cataract 2010, 1998 2010 Phaco w/ PC IOL OS   • Retin • TOTAL HIP REPLACEMENT Left 2008   • YAG CAPSULOTOMY - OD - RIGHT EYE Right 4/30/14    RJMAVIS       Family History   Family History   Problem Relation Age of Onset   • Colon Cancer Mother    • Other (pulmonary Embolism) Father    • Glaucoma Neg    • Diabet Worried About [facility] in the Last Year: Not on file      Ran Out of Food in the Last Year: Not on file  Transportation Needs:       Lack of Transportation (Medical): Not on file      Lack of Transportation (Non-Medical):  Not on file  Physical A Spinous Processes   Z-joints: Non-tender for all Z-joints   SIJ: Non-tender for bilateral SIJ   Piriformis Muscle: Non-tender bilateral Piriformis muscles   Greater Trochanteric Bursa: Non-tender for bilateral Greater Trochanteric Bursa     Vascular lower future. The patient and his wifewill speak with Dr. Diallo Diaz again about surgical options for his condition. He will follow up in 3 months or sooner if needed. The patient does not need any pain medications at this time.     The patient understands and

## 2021-10-07 NOTE — TELEPHONE ENCOUNTER
Submitted request to [facility] for Physical Therapy CPT [id number] and Right Knee Brace CPT     Status: pending [facility] clinical review

## 2021-10-07 NOTE — PATIENT INSTRUCTIONS
Plan  He will get into PT for the right knee and will look into a better brace for the brace. I spoke to him about PRP and genicular nerve radiofrequency neurotomies as possible options in the future.     The patient and his wifewill speak with Dr. Rogeloi Lau

## 2021-10-20 ENCOUNTER — OFFICE VISIT (OUTPATIENT)
Dept: OPHTHALMOLOGY | Facility: CLINIC | Age: 76
End: 2021-10-20
Payer: COMMERCIAL

## 2021-10-20 DIAGNOSIS — Z86.69 HISTORY OF RETINAL TEAR: ICD-10-CM

## 2021-10-20 DIAGNOSIS — H40.003 GLAUCOMA SUSPECT OF BOTH EYES: Primary | ICD-10-CM

## 2021-10-20 DIAGNOSIS — Z79.899 LONG-TERM USE OF PLAQUENIL: ICD-10-CM

## 2021-10-20 DIAGNOSIS — H35.371 EPIRETINAL MEMBRANE, RIGHT: ICD-10-CM

## 2021-10-20 DIAGNOSIS — Z96.1 PSEUDOPHAKIA OF BOTH EYES: ICD-10-CM

## 2021-10-20 DIAGNOSIS — E11.9 DIABETES MELLITUS TYPE 2 WITHOUT RETINOPATHY (HCC): ICD-10-CM

## 2021-10-20 PROCEDURE — 92014 COMPRE OPH EXAM EST PT 1/>: CPT | Performed by: OPHTHALMOLOGY

## 2021-10-20 PROCEDURE — 92250 FUNDUS PHOTOGRAPHY W/I&R: CPT | Performed by: OPHTHALMOLOGY

## 2021-10-20 PROCEDURE — 92015 DETERMINE REFRACTIVE STATE: CPT | Performed by: OPHTHALMOLOGY

## 2021-10-20 NOTE — PATIENT INSTRUCTIONS
Diabetes mellitus type 2 without retinopathy ([facility] [address]  Diabetes type II: no background of retinopathy, no signs of neovascularization noted. Discussed ocular and systemic benefits of blood sugar control.   Diagnosis and treatment discussed in detail with diana

## 2021-10-20 NOTE — PROGRESS NOTES
Syeda Darling is a 67year old male.     HPI:     HPI     Consult      Additional comments: Consult per Dr. Paige Ch              Diabetic Eye Exam      Additional comments: Pt has been a diabetic for 8 years       Pt's diabetes is currently controlled by pil triggers: RMF, RRF, LMF/ RRF Dupuytren's nodule excision); forearm/wrist surgery unlisted (Left) (left wrist surgery); laser surgery of eye; Yag Capsulotomy - OD - Right Eye (Right, 4/30/14) (GREGORIO);  Retinal laser procedure; total hip replacement (Left, 2008 MCG/ACT Nasal Suspension 1 spray by Each Nare route as needed for Rhinitis. • Risedronate Sodium 150 MG Oral Tab Take 1 tablet (150 mg total) by mouth every 30 (thirty) days.  3 tablet 1   • Omeprazole 40 MG Oral Capsule Delayed Release Take 1 capsule ( Right Left    Ishihara 5/5 5/5            Slit Lamp and Fundus Exam     Slit Lamp Exam       Right Left    Lids/Lashes Dermatochalasis, Meibomian gland dysfunction Dermatochalasis, Meibomian gland dysfunction    Conjunctiva/Sclera Normal Normal    Cornea Diagnosis and treatment discussed in detail with patient. Glaucoma suspect of both eyes  Patient is a glaucoma suspect due to increased cupping of the optic nerves in both eyes. Patient had normal glaucoma diagnostics last year. IOP is normal today.

## 2021-10-20 NOTE — ASSESSMENT & PLAN NOTE
No evidence of plaquenil toxicity in either eye. Will follow in 1 year for a plaquenil eye exam based on current guidelines.    Patient is approved to continue on plaquenil as directed by their PCP or rheumatologist.  Patient is on Plaquenil from Dr. Carrington Manjarrez

## 2021-10-21 NOTE — TELEPHONE ENCOUNTER
Received notice of Approval from [address] at [facility] for DME valid until 4/21/22 to be received at [facility]      Patient notified via [employer]

## 2021-10-22 ENCOUNTER — LAB ENCOUNTER (OUTPATIENT)
Dept: LAB | Facility: HOSPITAL | Age: 76
End: 2021-10-22
Attending: UROLOGY
Payer: COMMERCIAL

## 2021-10-22 DIAGNOSIS — R97.20 ELEVATED PSA: ICD-10-CM

## 2021-10-22 PROCEDURE — 84153 ASSAY OF PSA TOTAL: CPT

## 2021-10-22 PROCEDURE — 84154 ASSAY OF PSA FREE: CPT

## 2021-10-22 PROCEDURE — 36415 COLL VENOUS BLD VENIPUNCTURE: CPT

## 2021-10-22 NOTE — TELEPHONE ENCOUNTER
Reviewed chart and PT was Approved by JEVON Snyder at [facility]    Patient is already scheduled

## 2021-10-28 ENCOUNTER — APPOINTMENT (OUTPATIENT)
Dept: PHYSICAL THERAPY | Facility: HOSPITAL | Age: 76
End: 2021-10-28
Attending: PHYSICAL MEDICINE & REHABILITATION
Payer: COMMERCIAL

## 2021-10-29 ENCOUNTER — OFFICE VISIT (OUTPATIENT)
Dept: SURGERY | Facility: CLINIC | Age: 76
End: 2021-10-29

## 2021-10-29 ENCOUNTER — TELEPHONE (OUTPATIENT)
Dept: PHYSICAL THERAPY | Facility: HOSPITAL | Age: 76
End: 2021-10-29

## 2021-10-29 VITALS
SYSTOLIC BLOOD PRESSURE: 143 MMHG | BODY MASS INDEX: 26.14 KG/M2 | DIASTOLIC BLOOD PRESSURE: 72 MMHG | WEIGHT: 193 LBS | HEART RATE: 73 BPM | HEIGHT: 72 IN

## 2021-10-29 DIAGNOSIS — Z87.448 HISTORY OF BLADDER STONE: ICD-10-CM

## 2021-10-29 DIAGNOSIS — Q54.1 PENILE HYPOSPADIAS: ICD-10-CM

## 2021-10-29 DIAGNOSIS — Z87.448 HISTORY OF URETHRAL STRICTURE: ICD-10-CM

## 2021-10-29 DIAGNOSIS — N40.1 BENIGN PROSTATIC HYPERPLASIA WITH URINARY FREQUENCY: ICD-10-CM

## 2021-10-29 DIAGNOSIS — R39.198 URINE STREAM SPRAYING: ICD-10-CM

## 2021-10-29 DIAGNOSIS — Z87.448 HISTORY OF GROSS HEMATURIA: ICD-10-CM

## 2021-10-29 DIAGNOSIS — R97.20 ELEVATED PSA: Primary | ICD-10-CM

## 2021-10-29 DIAGNOSIS — N52.01 ERECTILE DYSFUNCTION DUE TO ARTERIAL INSUFFICIENCY: ICD-10-CM

## 2021-10-29 DIAGNOSIS — R35.0 BENIGN PROSTATIC HYPERPLASIA WITH URINARY FREQUENCY: ICD-10-CM

## 2021-10-29 DIAGNOSIS — R35.1 NOCTURIA: ICD-10-CM

## 2021-10-29 PROCEDURE — 99214 OFFICE O/P EST MOD 30 MIN: CPT | Performed by: UROLOGY

## 2021-10-29 PROCEDURE — 3078F DIAST BP <80 MM HG: CPT | Performed by: UROLOGY

## 2021-10-29 PROCEDURE — 3008F BODY MASS INDEX DOCD: CPT | Performed by: UROLOGY

## 2021-10-29 PROCEDURE — 3077F SYST BP >= 140 MM HG: CPT | Performed by: UROLOGY

## 2021-10-29 NOTE — PATIENT INSTRUCTIONS
Leslie Bains M.D.      1.  MRI 3T of the prostate to investigate chronically elevated PSA and also 10/22/2021 PSA 5.32. To Schedule MRI, call [phone].       If the MRI does not show any suspicious lesion, I wo

## 2021-10-29 NOTE — PROGRESS NOTES
HPI:    Patient ID: Mara Scheuermann is a 67year old male. HPI    Elevated PSA   Problem started 03/02/2021 when PSA = 4.95. Most recent 10/22/2021 PSA = 5.32, 24 % free ( 28.3% probability of prostate cancer).  Patient presently denies any associated 09/10/2019 cystoscopy, no urethral stricture seen. Patient presently states that he is still experiencing spraying of his urine.      Voiding Dysfunction  Chronic. S/p 04/30/2019 GreenLight laser ablation of prostate.  Patient has current AUA score of 17, m asymptomatic. Please see History of Bladder Stone above.           Review of previous records:     PSA was 2.2 on April 9, 2008, corrected for finasteride PSA was 4.4. The patient had the same PSA as that of September 20, 2007.    The patient started finast sounds. [address] office visit 4/18/2016 patient complaint of spraying stream, referred to [facility] [place] for possible urethroplasty/reconstruction of urethra.  9/28/2016 [facility] cystoscopy under general anesthesia by Dr. Alem Gonzalez  for possible urethral stricture, no st of bladder stone less than 2.5; preoperative diagnosis, gross hematuria, bladder stone; prostatic fossa is wide open status post GreenLight laser ablation several months ago; there is a stone seen protruding in the prostatic urethra; 1.5 cm in size; formed (200 mg total) by mouth daily.  90 tablet 1   • glipiZIDE ER 2.5 MG Oral Tablet 24 Hr Take 1 tablet (2.5 mg total) by mouth daily with breakfast. 90 tablet 0   • Fluticasone Propionate 50 MCG/ACT Nasal Suspension 1 spray by Each Nare route as needed for Rhi mellitus without mention of complication, not stated as uncontrolled    • Visual impairment     readers      Past Surgical History:   Procedure Laterality Date   • ARTHROSCOPY OF JOINT UNLISTED  1990    knee   • ARTHROSCOPY OF JOINT UNLISTED Left 2011    r Over the past month, how often have you had a sensation of not emptying your bladder completely after you finish urinating?: Less than half the time  Over the past month, how often have you had to urinate again less than 2 hours after you finished urin cytology = negative for high-grade urothelial carcinoma; Creatinine = 0.91; GFR = 82; UA blood = negative; protein = negative; Microscopic not indicated   09/24/2019 Creatinine = 0.97; GFR = 76   09/10/2019 urinary calculi composition = primarily calcium p pleuroparenchymal scarring. KIDNEYS:   No renal or ureteral calculi are identified. There is no hydronephrosis or hydroureter. No perinephric or periureteric fat stranding is appreciated.     Multiple well circumscribed left-sided renal cortical and peripe a probable dependent 0.9 cm calculus in the bladder lumen. This could reflect a stasis calculus related to chronic outlet obstruction or a recently passed renal calculus. 3. Marked prostatomegaly with chronic outlet obstruction.  There appears to be a post prostate cancer). I explain to patient that his probability of prostate cancer is 28%. Patient presently denies any associated symptoms to suggest prostatitis or prostate cancer. On LAURA today, prostate 3+ enlarged, wide and short, no palpable nodules or in due to arterial insufficiency  Chronic. Problem started 2014 after starting finasteride. He is not currently taking any medications as he ran out of medication and he could not get a refill.  Patient states improvement while on Sildenafil (generic Viagra) 1 dysfunction category; nocturia 4x. He is not currently taking any medication for this. He previously took tamsulosin and alfuzosin; stopped both medications due to side effect of sinusitis.  The patient feels this is stable and chooses to continue observati biopsy. If the MRI does show a suspicious lesion, I would then recommend a prostate biopsy ultrasound department in the hospital using MRI–ultrasound fusion technique and once again I would be the one performing the biopsy. 2.   Visit in 6 months.   P

## 2021-11-01 ENCOUNTER — OFFICE VISIT (OUTPATIENT)
Dept: PHYSICAL THERAPY | Facility: HOSPITAL | Age: 76
End: 2021-11-01
Attending: PHYSICAL MEDICINE & REHABILITATION
Payer: COMMERCIAL

## 2021-11-01 DIAGNOSIS — M54.16 LUMBAR RADICULOPATHY: ICD-10-CM

## 2021-11-01 DIAGNOSIS — M51.9 LUMBAR DISC DISEASE: ICD-10-CM

## 2021-11-01 DIAGNOSIS — M43.16 SPONDYLOLISTHESIS OF LUMBAR REGION: ICD-10-CM

## 2021-11-01 DIAGNOSIS — M43.10 RETROLISTHESIS: ICD-10-CM

## 2021-11-01 DIAGNOSIS — G89.29 CHRONIC PAIN OF RIGHT KNEE: ICD-10-CM

## 2021-11-01 DIAGNOSIS — E11.9 TYPE 2 DIABETES MELLITUS WITHOUT COMPLICATION, WITHOUT LONG-TERM CURRENT USE OF INSULIN (HCC): ICD-10-CM

## 2021-11-01 DIAGNOSIS — M48.061 SPINAL STENOSIS OF LUMBAR REGION WITHOUT NEUROGENIC CLAUDICATION: ICD-10-CM

## 2021-11-01 DIAGNOSIS — M25.561 CHRONIC PAIN OF RIGHT KNEE: ICD-10-CM

## 2021-11-01 DIAGNOSIS — M17.0 PRIMARY OSTEOARTHRITIS OF BOTH KNEES: ICD-10-CM

## 2021-11-01 DIAGNOSIS — M48.061 LUMBAR FORAMINAL STENOSIS: ICD-10-CM

## 2021-11-01 PROCEDURE — 97161 PT EVAL LOW COMPLEX 20 MIN: CPT | Performed by: PHYSICAL THERAPIST

## 2021-11-01 PROCEDURE — 97110 THERAPEUTIC EXERCISES: CPT | Performed by: PHYSICAL THERAPIST

## 2021-11-01 NOTE — PROGRESS NOTES
LUMBAR SPINE EVALUATION:   Referring Physician: Dr. Milton Zapien  Diagnosis:     Chronic pain of right knee (M25.561,G89.29)  Primary osteoarthritis of both knees (M17.0)   Date of Service: 11/1/2021   Date of Onset: several years ago    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     PPVx, Cryo, GFX, SF6   • Myopia with astigmatism and presbyopia 1957   • Nocturia 9/7/2014   • Osteoarthritis     • Posterior subcaps 6/15/2010     release triggers: RMF, RRF, LMF/ RRF Dupuytren's nodule excision   • REPAIR OF BICEPS TENDON AT ELBOW Left 1992   • RETINAL LASER PROCEDURE       • TOTAL HIP REPLACEMENT Left 2008   • YAG CAPSULOTOMY - OD - RIGHT EYE Right 4/30/14     GREGORIO self OP in sitting    SLR in sitting (small ROM)   Therapeutic Activity    Neuromuscular Education    TNE Education    HEP Quad set in sitting    Knee extension with self OP in sitting    SLR in sitting (small ROM)       The pt was educated on the plan of Date____________________    Certification From: 78/5/4858  To:1/30/2022

## 2021-11-04 ENCOUNTER — IMMUNIZATION (OUTPATIENT)
Dept: INTERNAL MEDICINE CLINIC | Facility: CLINIC | Age: 76
End: 2021-11-04
Payer: COMMERCIAL

## 2021-11-04 DIAGNOSIS — Z23 NEED FOR VACCINATION: Primary | ICD-10-CM

## 2021-11-04 PROCEDURE — 90471 IMMUNIZATION ADMIN: CPT | Performed by: INTERNAL MEDICINE

## 2021-11-04 PROCEDURE — 90662 IIV NO PRSV INCREASED AG IM: CPT | Performed by: INTERNAL MEDICINE

## 2021-11-09 ENCOUNTER — TELEPHONE (OUTPATIENT)
Dept: INTERNAL MEDICINE CLINIC | Facility: CLINIC | Age: 76
End: 2021-11-09

## 2021-11-17 ENCOUNTER — TELEPHONE (OUTPATIENT)
Dept: PHYSICAL THERAPY | Facility: HOSPITAL | Age: 76
End: 2021-11-17

## 2021-11-19 ENCOUNTER — PATIENT MESSAGE (OUTPATIENT)
Dept: INTERNAL MEDICINE CLINIC | Facility: CLINIC | Age: 76
End: 2021-11-19

## 2021-11-19 ENCOUNTER — HOSPITAL ENCOUNTER (OUTPATIENT)
Dept: MRI IMAGING | Age: 76
Discharge: HOME | End: 2021-11-19
Attending: NEUROLOGICAL SURGERY
Payer: COMMERCIAL

## 2021-11-19 DIAGNOSIS — M48.00 SPINAL STENOSIS: ICD-10-CM

## 2021-11-19 PROCEDURE — 72148 MRI LUMBAR SPINE W/O DYE: CPT | Performed by: NEUROLOGICAL SURGERY

## 2021-11-19 RX ORDER — GLIPIZIDE 2.5 MG/1
2.5 TABLET, EXTENDED RELEASE ORAL
Qty: 90 TABLET | Refills: 0 | Status: SHIPPED | OUTPATIENT
Start: 2021-11-19

## 2021-11-19 NOTE — TELEPHONE ENCOUNTER
Please review; protocol failed.     Requested Prescriptions   Pending Prescriptions Disp Refills    GLIPIZIDE ER 2.5 MG Oral Tablet 24 Hr [Pharmacy Med Name: Glipizide Er 24hr 2.5 Mg Tab Nort] 90 tablet 0     Sig: Take 1 tablet (2.5 mg total) by mouth daily [facility] 3 Month  Month F/U    In 11 months Bryan Haas MD [facility] Ophthalmology Return in about 1 year (around 10/20/2022) for Diabetic eye exam, Plaquenil eye exam.            Recent Outpatient

## 2021-11-19 NOTE — TELEPHONE ENCOUNTER
From: Chelsea Fontenot  To: Hong Montanez MD  Sent: 11/19/2021 7:50 AM CST  Subject: Booster shot. Hi Dr. Phil Jordan,  Just letting you know that I got my booster shot this past week. What about the shingles shots?   Thank you, Delphine Joya

## 2021-11-29 ENCOUNTER — APPOINTMENT (OUTPATIENT)
Dept: PHYSICAL THERAPY | Facility: HOSPITAL | Age: 76
End: 2021-11-29
Attending: PHYSICAL MEDICINE & REHABILITATION
Payer: COMMERCIAL

## 2021-12-02 ENCOUNTER — APPOINTMENT (OUTPATIENT)
Dept: PHYSICAL THERAPY | Facility: HOSPITAL | Age: 76
End: 2021-12-02
Attending: PHYSICAL MEDICINE & REHABILITATION
Payer: COMMERCIAL

## 2021-12-06 ENCOUNTER — APPOINTMENT (OUTPATIENT)
Dept: PHYSICAL THERAPY | Facility: HOSPITAL | Age: 76
End: 2021-12-06
Attending: PHYSICAL MEDICINE & REHABILITATION
Payer: COMMERCIAL

## 2021-12-07 ENCOUNTER — PATIENT MESSAGE (OUTPATIENT)
Dept: PHYSICAL MEDICINE AND REHAB | Facility: CLINIC | Age: 76
End: 2021-12-07

## 2021-12-07 NOTE — TELEPHONE ENCOUNTER
From: Christiano Ochoa  To: Evie Payan MD  Sent: 12/7/2021 11:27 AM CST  Subject: Surgery    Have back surgery on the 19th of Jan. with Dr. John Tsang

## 2021-12-08 ENCOUNTER — OFFICE VISIT (OUTPATIENT)
Dept: INTERNAL MEDICINE CLINIC | Facility: CLINIC | Age: 76
End: 2021-12-08
Payer: COMMERCIAL

## 2021-12-08 VITALS
SYSTOLIC BLOOD PRESSURE: 120 MMHG | HEART RATE: 96 BPM | OXYGEN SATURATION: 82 % | WEIGHT: 201 LBS | DIASTOLIC BLOOD PRESSURE: 66 MMHG | BODY MASS INDEX: 27.22 KG/M2 | RESPIRATION RATE: 14 BRPM | HEIGHT: 72 IN

## 2021-12-08 DIAGNOSIS — E11.9 DIABETES MELLITUS TYPE 2 WITHOUT RETINOPATHY (HCC): ICD-10-CM

## 2021-12-08 DIAGNOSIS — E78.2 MIXED HYPERLIPIDEMIA: ICD-10-CM

## 2021-12-08 DIAGNOSIS — Z00.00 ADULT GENERAL MEDICAL EXAM: Primary | ICD-10-CM

## 2021-12-08 DIAGNOSIS — M85.80 OSTEOPENIA, UNSPECIFIED LOCATION: ICD-10-CM

## 2021-12-08 DIAGNOSIS — M48.061 SPINAL STENOSIS OF LUMBAR REGION WITHOUT NEUROGENIC CLAUDICATION: ICD-10-CM

## 2021-12-08 DIAGNOSIS — I10 ESSENTIAL HYPERTENSION: ICD-10-CM

## 2021-12-08 PROBLEM — K13.0 ANGULAR CHEILITIS: Status: RESOLVED | Noted: 2017-01-16 | Resolved: 2021-12-08

## 2021-12-08 PROBLEM — J30.2 SEASONAL ALLERGIC RHINITIS: Status: RESOLVED | Noted: 2017-04-24 | Resolved: 2021-12-08

## 2021-12-08 PROCEDURE — 99397 PER PM REEVAL EST PAT 65+ YR: CPT | Performed by: INTERNAL MEDICINE

## 2021-12-08 PROCEDURE — 3078F DIAST BP <80 MM HG: CPT | Performed by: INTERNAL MEDICINE

## 2021-12-08 PROCEDURE — 3074F SYST BP LT 130 MM HG: CPT | Performed by: INTERNAL MEDICINE

## 2021-12-08 PROCEDURE — 3008F BODY MASS INDEX DOCD: CPT | Performed by: INTERNAL MEDICINE

## 2021-12-08 RX ORDER — CETIRIZINE HYDROCHLORIDE 10 MG/1
10 TABLET ORAL DAILY
Qty: 90 TABLET | Refills: 1 | Status: SHIPPED | OUTPATIENT
Start: 2021-12-08

## 2021-12-08 RX ORDER — CETIRIZINE HYDROCHLORIDE 10 MG/1
10 TABLET ORAL DAILY
COMMUNITY
Start: 2021-10-20 | End: 2021-12-08

## 2021-12-08 RX ORDER — FLUTICASONE PROPIONATE AND SALMETEROL 50; 100 UG/1; UG/1
1 POWDER RESPIRATORY (INHALATION) 2 TIMES DAILY
Qty: 3 EACH | Refills: 0 | Status: SHIPPED | OUTPATIENT
Start: 2021-12-08

## 2021-12-08 RX ORDER — NIFEDIPINE 90 MG/1
90 TABLET, FILM COATED, EXTENDED RELEASE ORAL DAILY
COMMUNITY

## 2021-12-08 NOTE — PROGRESS NOTES
Norris Clifford is a 67year old male. Patient presents with:  Physical    HPI:   28-year-old gentleman with medical history significant for diabetes, hypertension, dyslipidemia, osteopenia on.   Risedronate, urethral stricture/BPH here for physical.  He tablet by mouth daily. • Tadalafil 20 MG Oral Tab Take 1 tablet by mouth 1 to 2 hours before planned sexual activity 5 tablet 11   • Cholecalciferol (VITAMIN D) 1000 UNITS Oral Cap Take  by mouth daily.      • Multiple Vitamins-Minerals (CENTRUM SILVER) retina   • EYE SURGERY Right 02/20/2006    S/ P PPV RD repair (S/P PPVx, cryo, GFX, SF6 OD 4/25/13) Dr. Lazarus Bloom    • FOREARM/WRIST SURGERY UNLISTED Left     left wrist surgery   • HAND/FINGER SURGERY UNLISTED Left 9/29/2011    LRF trigger finger release kg)  09/09/21 : 193 lb (87.5 kg)  07/19/21 : 196 lb (88.9 kg)    Body mass index is 27.26 kg/m².       EXAM:   /66 (BP Location: Right arm, Patient Position: Sitting, Cuff Size: adult)   Pulse 96   Resp 14   Ht 6' (1.829 m)   Wt 201 lb (91.2 kg)   SpO HEMOGLOBIN A1C; Future    2. Essential hypertension  Well-controlled continue current regimen    3. Mixed hyperlipidemia  Continue statins check lipid profile LFT    4.  Diabetes mellitus type 2 without retinopathy (HCC)  Excellent hemoglobin A1c previously

## 2021-12-13 ENCOUNTER — APPOINTMENT (OUTPATIENT)
Dept: PHYSICAL THERAPY | Facility: HOSPITAL | Age: 76
End: 2021-12-13
Attending: PHYSICAL MEDICINE & REHABILITATION
Payer: COMMERCIAL

## 2021-12-13 RX ORDER — RISEDRONATE SODIUM 150 MG/1
TABLET, FILM COATED ORAL
Qty: 3 TABLET | Refills: 0 | Status: SHIPPED | OUTPATIENT
Start: 2021-12-13 | End: 2021-12-20

## 2021-12-16 ENCOUNTER — APPOINTMENT (OUTPATIENT)
Dept: PHYSICAL THERAPY | Facility: HOSPITAL | Age: 76
End: 2021-12-16
Attending: PHYSICAL MEDICINE & REHABILITATION
Payer: COMMERCIAL

## 2021-12-20 RX ORDER — RISEDRONATE SODIUM 150 MG/1
150 TABLET, FILM COATED ORAL
Qty: 3 TABLET | Refills: 1 | Status: SHIPPED | OUTPATIENT
Start: 2021-12-20

## 2021-12-21 ENCOUNTER — TELEPHONE (OUTPATIENT)
Dept: INTERNAL MEDICINE CLINIC | Facility: CLINIC | Age: 76
End: 2021-12-21

## 2021-12-21 NOTE — TELEPHONE ENCOUNTER
Patient dropped off the [facility] IM  a copy of a parking placard renewal.  Please call when completed

## 2021-12-21 NOTE — TELEPHONE ENCOUNTER
Refill passed per [facility] protocol.   Requested Prescriptions   Pending Prescriptions Disp Refills    RISEDRONATE SODIUM 150 MG Oral Tab [Pharmacy Med Name: Risedronate Sodium 150 Mg Tab Auro] 3 tablet 0     Sig: Take 1 tablet  by mouth every 30 day

## 2021-12-22 NOTE — TELEPHONE ENCOUNTER
Placard Form signed. Patient will be picking up form today at [facility] Office.

## 2021-12-23 ENCOUNTER — APPOINTMENT (OUTPATIENT)
Dept: PHYSICAL THERAPY | Facility: HOSPITAL | Age: 76
End: 2021-12-23
Attending: PHYSICAL MEDICINE & REHABILITATION
Payer: COMMERCIAL

## 2022-01-03 ENCOUNTER — HOSPITAL ENCOUNTER (OUTPATIENT)
Dept: GENERAL RADIOLOGY | Facility: HOSPITAL | Age: 77
Discharge: HOME | End: 2022-01-03
Attending: NEUROLOGICAL SURGERY
Payer: COMMERCIAL

## 2022-01-03 ENCOUNTER — LAB ENCOUNTER (OUTPATIENT)
Dept: LAB | Facility: HOSPITAL | Age: 77
End: 2022-01-03
Attending: INTERNAL MEDICINE
Payer: COMMERCIAL

## 2022-01-03 ENCOUNTER — APPOINTMENT (OUTPATIENT)
Dept: PHYSICAL THERAPY | Facility: HOSPITAL | Age: 77
End: 2022-01-03
Attending: PHYSICAL MEDICINE & REHABILITATION
Payer: COMMERCIAL

## 2022-01-03 DIAGNOSIS — Z00.00 ADULT GENERAL MEDICAL EXAM: ICD-10-CM

## 2022-01-03 DIAGNOSIS — Z01.810 PRE-OPERATIVE CARDIOVASCULAR EXAMINATION: Primary | ICD-10-CM

## 2022-01-03 DIAGNOSIS — Z01.812 PRE-OPERATIVE LABORATORY EXAMINATION: Primary | ICD-10-CM

## 2022-01-03 DIAGNOSIS — Z01.811 PRE-OP CHEST EXAM: ICD-10-CM

## 2022-01-03 LAB
ALBUMIN SERPL-MCNC: 4 G/DL (ref 3.4–5)
ALP LIVER SERPL-CCNC: 71 U/L
ALT SERPL-CCNC: 20 U/L
ANION GAP SERPL CALC-SCNC: 5 MMOL/L (ref 0–18)
AST SERPL-CCNC: 11 U/L (ref 15–37)
BASOPHILS # BLD AUTO: 0.06 X10(3) UL (ref 0–0.2)
BASOPHILS NFR BLD AUTO: 0.8 %
BILIRUB DIRECT SERPL-MCNC: 0.2 MG/DL (ref 0–0.2)
BILIRUB SERPL-MCNC: 0.8 MG/DL (ref 0.1–2)
BILIRUB UR QL: NEGATIVE
BUN BLD-MCNC: 11 MG/DL (ref 7–18)
BUN/CREAT SERPL: 12 (ref 10–20)
CALCIUM BLD-MCNC: 9.4 MG/DL (ref 8.5–10.1)
CHLORIDE SERPL-SCNC: 110 MMOL/L (ref 98–112)
CHOLEST SERPL-MCNC: 132 MG/DL (ref ?–200)
CLARITY UR: CLEAR
CO2 SERPL-SCNC: 29 MMOL/L (ref 21–32)
COLOR UR: YELLOW
CREAT BLD-MCNC: 0.92 MG/DL
CREAT UR-SCNC: 89.1 MG/DL
DEPRECATED RDW RBC AUTO: 45.2 FL (ref 35.1–46.3)
EOSINOPHIL # BLD AUTO: 0.5 X10(3) UL (ref 0–0.7)
EOSINOPHIL NFR BLD AUTO: 6.6 %
ERYTHROCYTE [DISTWIDTH] IN BLOOD BY AUTOMATED COUNT: 12.7 % (ref 11–15)
EST. AVERAGE GLUCOSE BLD GHB EST-MCNC: 123 MG/DL (ref 68–126)
FASTING PATIENT LIPID ANSWER: YES
FASTING STATUS PATIENT QL REPORTED: YES
GLUCOSE BLD-MCNC: 105 MG/DL (ref 70–99)
GLUCOSE UR-MCNC: NEGATIVE MG/DL
HBA1C MFR BLD: 5.9 % (ref ?–5.7)
HCT VFR BLD AUTO: 43.4 %
HDLC SERPL-MCNC: 49 MG/DL (ref 40–59)
HGB BLD-MCNC: 14.7 G/DL
HGB UR QL STRIP.AUTO: NEGATIVE
IMM GRANULOCYTES # BLD AUTO: 0.01 X10(3) UL (ref 0–1)
IMM GRANULOCYTES NFR BLD: 0.1 %
INR BLD: 0.94 (ref 0.8–1.2)
KETONES UR-MCNC: NEGATIVE MG/DL
LDLC SERPL CALC-MCNC: 68 MG/DL (ref ?–100)
LEUKOCYTE ESTERASE UR QL STRIP.AUTO: NEGATIVE
LYMPHOCYTES # BLD AUTO: 1.68 X10(3) UL (ref 1–4)
LYMPHOCYTES NFR BLD AUTO: 22.3 %
MCH RBC QN AUTO: 33 PG (ref 26–34)
MCHC RBC AUTO-ENTMCNC: 33.9 G/DL (ref 31–37)
MCV RBC AUTO: 97.3 FL
MICROALBUMIN UR-MCNC: 2.58 MG/DL
MICROALBUMIN/CREAT 24H UR-RTO: 29 UG/MG (ref ?–30)
MONOCYTES # BLD AUTO: 0.75 X10(3) UL (ref 0.1–1)
MONOCYTES NFR BLD AUTO: 9.9 %
MRSA DNA SPEC QL NAA+PROBE: NEGATIVE
NEUTROPHILS # BLD AUTO: 4.54 X10 (3) UL (ref 1.5–7.7)
NEUTROPHILS # BLD AUTO: 4.54 X10(3) UL (ref 1.5–7.7)
NEUTROPHILS NFR BLD AUTO: 60.3 %
NITRITE UR QL STRIP.AUTO: NEGATIVE
NONHDLC SERPL-MCNC: 83 MG/DL (ref ?–130)
OSMOLALITY SERPL CALC.SUM OF ELEC: 298 MOSM/KG (ref 275–295)
PH UR: 6 [PH] (ref 5–8)
PLATELET # BLD AUTO: 237 10(3)UL (ref 150–450)
POTASSIUM SERPL-SCNC: 4.7 MMOL/L (ref 3.5–5.1)
PROT SERPL-MCNC: 6.7 G/DL (ref 6.4–8.2)
PROT UR-MCNC: NEGATIVE MG/DL
PROTHROMBIN TIME: 12.6 SECONDS (ref 11.6–14.8)
RBC # BLD AUTO: 4.46 X10(6)UL
SODIUM SERPL-SCNC: 144 MMOL/L (ref 136–145)
SP GR UR STRIP: 1.01 (ref 1–1.03)
TRIGL SERPL-MCNC: 74 MG/DL (ref 30–149)
TSI SER-ACNC: 1.06 MIU/ML (ref 0.36–3.74)
UROBILINOGEN UR STRIP-ACNC: <2
VLDLC SERPL CALC-MCNC: 11 MG/DL (ref 0–30)
WBC # BLD AUTO: 7.5 X10(3) UL (ref 4–11)

## 2022-01-03 PROCEDURE — 84443 ASSAY THYROID STIM HORMONE: CPT

## 2022-01-03 PROCEDURE — 82043 UR ALBUMIN QUANTITATIVE: CPT

## 2022-01-03 PROCEDURE — 71046 X-RAY EXAM CHEST 2 VIEWS: CPT | Performed by: NEUROLOGICAL SURGERY

## 2022-01-03 PROCEDURE — 83036 HEMOGLOBIN GLYCOSYLATED A1C: CPT

## 2022-01-03 PROCEDURE — 80048 BASIC METABOLIC PNL TOTAL CA: CPT

## 2022-01-03 PROCEDURE — 81003 URINALYSIS AUTO W/O SCOPE: CPT

## 2022-01-03 PROCEDURE — 87641 MR-STAPH DNA AMP PROBE: CPT

## 2022-01-03 PROCEDURE — 82570 ASSAY OF URINE CREATININE: CPT

## 2022-01-03 PROCEDURE — 85610 PROTHROMBIN TIME: CPT

## 2022-01-03 PROCEDURE — 93010 ELECTROCARDIOGRAM REPORT: CPT | Performed by: NEUROLOGICAL SURGERY

## 2022-01-03 PROCEDURE — 93005 ELECTROCARDIOGRAM TRACING: CPT

## 2022-01-03 PROCEDURE — 85025 COMPLETE CBC W/AUTO DIFF WBC: CPT

## 2022-01-03 PROCEDURE — 80061 LIPID PANEL: CPT

## 2022-01-03 PROCEDURE — 36415 COLL VENOUS BLD VENIPUNCTURE: CPT

## 2022-01-03 PROCEDURE — 80076 HEPATIC FUNCTION PANEL: CPT

## 2022-01-06 ENCOUNTER — APPOINTMENT (OUTPATIENT)
Dept: PHYSICAL THERAPY | Facility: HOSPITAL | Age: 77
End: 2022-01-06
Attending: PHYSICAL MEDICINE & REHABILITATION
Payer: COMMERCIAL

## 2022-01-13 RX ORDER — LOVASTATIN 40 MG/1
40 TABLET ORAL NIGHTLY
Qty: 90 TABLET | Refills: 1 | Status: SHIPPED | OUTPATIENT
Start: 2022-01-13 | End: 2023-07-13

## 2022-01-13 RX ORDER — OMEPRAZOLE 40 MG/1
40 CAPSULE, DELAYED RELEASE ORAL DAILY
Qty: 90 CAPSULE | Refills: 1 | Status: SHIPPED | OUTPATIENT
Start: 2022-01-13 | End: 2022-07-19

## 2022-01-13 NOTE — TELEPHONE ENCOUNTER
Refill passed per [employer] [facility] protocol. Requested Prescriptions   Pending Prescriptions Disp Refills    LOVASTATIN 40 MG Oral Tab [Pharmacy Med Name: Lovastatin 40 Mg Tab Lupi] 90 tablet 0     Sig: Take 1 tablet by mouth nightly AT BEDTIME        Cholesterol Medication Protocol Passed - 1/13/2022  7:39 AM        Passed - ALT in past 12 months        Passed - LDL in past 12 months        Passed - Last ALT < 80       Lab Results   Component Value Date    ALT 20 01/03/2022             Passed - Last LDL < 130     Lab Results   Component Value Date    LDL 68 01/03/2022               Passed - Appointment in past 12 or next 3 months                Recent Outpatient Visits              1 month ago Adult general medical exam    [address], Jennet Landau, MD    Office Visit    2 months ago Lumbar radiculopathy    [address] Cameron Garcia    Office Visit    2 months ago Elevated PSA    [facility], [address], Jaden Blackwell MD    Office Visit    2 months ago Glaucoma suspect of both eyes    [facility] Ophthalmology Oksana Lee MD    Office Visit    3 months ago left > right S1 radiculopathies    [address] [facility] Adriana Garcia MD    Office Visit             Future Appointments         Provider Department Appt Notes    In 1 month Babar Pastor MD [place], [facility], [facility] follow up on testing done in Jan.    In 1 month Shaquille Campos  [facility] follow up on back pain.     In 2 months Babar Pastor MD [place] [facility], [address], Adis Whalens 4 Month Follow Up    In 3 months Silvestre Ching, [address] [facility], [facility]    In 9 months Oksana Lee MD [facility] Ophthal[facility] Return in about 1 year (around 10/20/2022) for Diabetic eye exam, Plaquenil eye exam.

## 2022-01-13 NOTE — TELEPHONE ENCOUNTER
Refill passed per [place] [employer] [facility] protocol. Requested Prescriptions   Pending Prescriptions Disp Refills    OMEPRAZOLE 40 MG Oral Capsule Delayed Release [Pharmacy Med Name: Omeprazole Dr 40 Mg Cap Nort] 90 capsule 0     Sig: Take 1 capsule (40 mg total) by mouth daily. Gastrointestional Medication Protocol Passed - 1/13/2022  7:33 AM        Passed - Appointment in past 12 or next 3 months               Recent Outpatient Visits              1 month ago Adult general medical exam    Nelly Cooper MD    Office Visit    2 months ago Lumbar radiculopathy    [facility] Shanta Laadriana, [place]    Office Visit    2 months ago Elevated PSA    [facility], [address], Rod MD Sixto    Office Visit    2 months ago Glaucoma suspect of both eyes    [facility] Ophthalmology Glo Alcantar MD    Office Visit    3 months ago left > right S1 radiculopathies    [address] [facility] Devendra Vegas MD    Office Visit            Future Appointments         Provider Department Appt Notes    In 1 month Padma Preston MD [facility], [facility] follow up on testing done in Jan.    In 1 month Donna Campos  [facility] follow up on back pain.     In 2 months Padma Preston MD [facility], [address], [place] 4 Month Follow Up    In 3 months Stanley Vargas [address] for [facility], [facility]    In 9 months Glo Alcantar MD [facility] Ophthalmology Return in about 1 year (around 10/20/2022) for Diabetic eye exam, Plaquenil eye exam.

## 2022-01-16 ENCOUNTER — LAB ENCOUNTER (OUTPATIENT)
Dept: LAB | Facility: HOSPITAL | Age: 77
End: 2022-01-16
Attending: NEUROLOGICAL SURGERY
Payer: COMMERCIAL

## 2022-01-16 DIAGNOSIS — Z01.818 PREOP TESTING: ICD-10-CM

## 2022-01-16 DIAGNOSIS — Z01.810 PRE-OPERATIVE CARDIOVASCULAR EXAMINATION: ICD-10-CM

## 2022-01-16 LAB
ANION GAP SERPL CALC-SCNC: 7 MMOL/L (ref 0–18)
ANTIBODY SCREEN: NEGATIVE
BASOPHILS # BLD AUTO: 0.06 X10(3) UL (ref 0–0.2)
BASOPHILS NFR BLD AUTO: 0.8 %
BILIRUB UR QL: NEGATIVE
BUN BLD-MCNC: 17 MG/DL (ref 7–18)
BUN/CREAT SERPL: 17.7 (ref 10–20)
CALCIUM BLD-MCNC: 9.4 MG/DL (ref 8.5–10.1)
CHLORIDE SERPL-SCNC: 110 MMOL/L (ref 98–112)
CLARITY UR: CLEAR
CO2 SERPL-SCNC: 27 MMOL/L (ref 21–32)
COLOR UR: YELLOW
CREAT BLD-MCNC: 0.96 MG/DL
DEPRECATED RDW RBC AUTO: 45.6 FL (ref 35.1–46.3)
EOSINOPHIL # BLD AUTO: 0.45 X10(3) UL (ref 0–0.7)
EOSINOPHIL NFR BLD AUTO: 5.7 %
ERYTHROCYTE [DISTWIDTH] IN BLOOD BY AUTOMATED COUNT: 12.7 % (ref 11–15)
FASTING STATUS PATIENT QL REPORTED: YES
GLUCOSE BLD-MCNC: 150 MG/DL (ref 70–99)
GLUCOSE UR-MCNC: NEGATIVE MG/DL
HCT VFR BLD AUTO: 44.5 %
HGB BLD-MCNC: 14.8 G/DL
HGB UR QL STRIP.AUTO: NEGATIVE
IMM GRANULOCYTES # BLD AUTO: 0.02 X10(3) UL (ref 0–1)
IMM GRANULOCYTES NFR BLD: 0.3 %
INR BLD: 0.97 (ref 0.8–1.2)
KETONES UR-MCNC: NEGATIVE MG/DL
LEUKOCYTE ESTERASE UR QL STRIP.AUTO: NEGATIVE
LYMPHOCYTES # BLD AUTO: 1.86 X10(3) UL (ref 1–4)
LYMPHOCYTES NFR BLD AUTO: 23.7 %
MCH RBC QN AUTO: 32.5 PG (ref 26–34)
MCHC RBC AUTO-ENTMCNC: 33.3 G/DL (ref 31–37)
MCV RBC AUTO: 97.8 FL
MONOCYTES # BLD AUTO: 0.66 X10(3) UL (ref 0.1–1)
MONOCYTES NFR BLD AUTO: 8.4 %
NEUTROPHILS # BLD AUTO: 4.79 X10 (3) UL (ref 1.5–7.7)
NEUTROPHILS # BLD AUTO: 4.79 X10(3) UL (ref 1.5–7.7)
NEUTROPHILS NFR BLD AUTO: 61.1 %
NITRITE UR QL STRIP.AUTO: NEGATIVE
OSMOLALITY SERPL CALC.SUM OF ELEC: 302 MOSM/KG (ref 275–295)
PH UR: 5 [PH] (ref 5–8)
PLATELET # BLD AUTO: 231 10(3)UL (ref 150–450)
POTASSIUM SERPL-SCNC: 4.4 MMOL/L (ref 3.5–5.1)
PROT UR-MCNC: NEGATIVE MG/DL
PROTHROMBIN TIME: 13 SECONDS (ref 11.6–14.8)
RBC # BLD AUTO: 4.55 X10(6)UL
RH BLOOD TYPE: POSITIVE
SODIUM SERPL-SCNC: 144 MMOL/L (ref 136–145)
SP GR UR STRIP: 1.02 (ref 1–1.03)
UROBILINOGEN UR STRIP-ACNC: <2
WBC # BLD AUTO: 7.8 X10(3) UL (ref 4–11)

## 2022-01-16 PROCEDURE — 80048 BASIC METABOLIC PNL TOTAL CA: CPT

## 2022-01-16 PROCEDURE — 36415 COLL VENOUS BLD VENIPUNCTURE: CPT

## 2022-01-16 PROCEDURE — 85025 COMPLETE CBC W/AUTO DIFF WBC: CPT

## 2022-01-16 PROCEDURE — 81001 URINALYSIS AUTO W/SCOPE: CPT

## 2022-01-16 PROCEDURE — 86900 BLOOD TYPING SEROLOGIC ABO: CPT

## 2022-01-16 PROCEDURE — 85610 PROTHROMBIN TIME: CPT

## 2022-01-16 PROCEDURE — 86850 RBC ANTIBODY SCREEN: CPT

## 2022-01-16 PROCEDURE — 86901 BLOOD TYPING SEROLOGIC RH(D): CPT

## 2022-01-17 ENCOUNTER — TELEPHONE (OUTPATIENT)
Dept: INTERNAL MEDICINE CLINIC | Facility: CLINIC | Age: 77
End: 2022-01-17

## 2022-01-17 LAB — SARS-COV-2 RNA RESP QL NAA+PROBE: NOT DETECTED

## 2022-01-17 NOTE — TELEPHONE ENCOUNTER
That is a typo. I have put an addendum to my progress note dated December 8, 2021.   Please inform him thank you

## 2022-01-17 NOTE — TELEPHONE ENCOUNTER
April calling from Dr. Nanette Solis office states they received clearance letter for patient, however they only see a mention that patient will be having surgery July of this year.  April will be needing a new clearance letter to confirm patient is cleared for arizmendi

## 2022-01-28 ENCOUNTER — MED REC SCAN ONLY (OUTPATIENT)
Dept: INTERNAL MEDICINE CLINIC | Facility: CLINIC | Age: 77
End: 2022-01-28

## 2022-02-09 ENCOUNTER — TELEPHONE (OUTPATIENT)
Dept: INTERNAL MEDICINE CLINIC | Facility: CLINIC | Age: 77
End: 2022-02-09

## 2022-02-09 NOTE — TELEPHONE ENCOUNTER
April from 's office would like a new letter to clear patient for surgery Feb 23rd. Please fax to [phone].  Please advise

## 2022-02-09 NOTE — TELEPHONE ENCOUNTER
It looks like patient has an appointment to see me on February 17 for preoperative clearance. We will fax it to Dr. Devin Mosley office after visit.   Thank you

## 2022-02-18 ENCOUNTER — TELEPHONE (OUTPATIENT)
Dept: INTERNAL MEDICINE CLINIC | Facility: CLINIC | Age: 77
End: 2022-02-18

## 2022-02-18 ENCOUNTER — OFFICE VISIT (OUTPATIENT)
Dept: INTERNAL MEDICINE CLINIC | Facility: CLINIC | Age: 77
End: 2022-02-18
Payer: COMMERCIAL

## 2022-02-18 VITALS
WEIGHT: 201 LBS | SYSTOLIC BLOOD PRESSURE: 130 MMHG | RESPIRATION RATE: 14 BRPM | DIASTOLIC BLOOD PRESSURE: 70 MMHG | BODY MASS INDEX: 27.22 KG/M2 | HEART RATE: 82 BPM | HEIGHT: 72 IN | OXYGEN SATURATION: 95 %

## 2022-02-18 DIAGNOSIS — E78.2 MIXED HYPERLIPIDEMIA: ICD-10-CM

## 2022-02-18 DIAGNOSIS — I10 ESSENTIAL HYPERTENSION: ICD-10-CM

## 2022-02-18 DIAGNOSIS — Z01.818 PRE-OPERATIVE CLEARANCE: Primary | ICD-10-CM

## 2022-02-18 DIAGNOSIS — E11.9 DIABETES MELLITUS TYPE 2 WITHOUT RETINOPATHY (HCC): ICD-10-CM

## 2022-02-18 DIAGNOSIS — M54.16 LUMBAR RADICULOPATHY: ICD-10-CM

## 2022-02-18 PROCEDURE — 3078F DIAST BP <80 MM HG: CPT | Performed by: INTERNAL MEDICINE

## 2022-02-18 PROCEDURE — 99215 OFFICE O/P EST HI 40 MIN: CPT | Performed by: INTERNAL MEDICINE

## 2022-02-18 PROCEDURE — 3008F BODY MASS INDEX DOCD: CPT | Performed by: INTERNAL MEDICINE

## 2022-02-18 PROCEDURE — 3075F SYST BP GE 130 - 139MM HG: CPT | Performed by: INTERNAL MEDICINE

## 2022-02-18 NOTE — PATIENT INSTRUCTIONS
ASSESSMENT AND PLAN:   1. Pre-operative clearance  Patient has no major clinical predictors going for laminectomy surgery. Patient has reasonable functional capacity as described above. I have reviewed blood works, EKG, chest x-ray as ordered by surgeon. As they are acceptable, I will consider patient as low to moderate risk for perioperative cardiac events with routine perioperative care. DVT prophylaxis as protocol  The above risk assessment was discussed with the patient and would like to proceed with the surgery. Patient will discuss the risk and benefit of the surgery with the surgeon. The above note will be faxed to surgeon's office.     Surgeons name Dr. Jason Andersen  Location of surgery [address] [place] [address]  Date of surgery 2/23/2022  Fax number to surgeon's office-surgeon uses epic

## 2022-02-18 NOTE — TELEPHONE ENCOUNTER
April from Dr. Michael Foster office calling, confirmed patient's name and . April explained that all the labs that were done on  need to be repeated for  surgery.

## 2022-02-18 NOTE — TELEPHONE ENCOUNTER
What about EKG and CXR   I have placed the orders for labs. Please inform patient. If you need to repeat EKG and chest x-ray, please let me know. I will place that orders as well.   Thank you

## 2022-02-18 NOTE — TELEPHONE ENCOUNTER
April calling from Dr Fadia Prince office regarding pts surgery that is scheduled for 2/23. She is calling to make sure pt gets updated labs for his surgery at his appt today.

## 2022-02-18 NOTE — TELEPHONE ENCOUNTER
CBC, BMP, PT/INR, urinalysis ordered. Please check with April and see whether they want me to order anything else. Please inform patient to complete blood test as per surgeon's recommendation.   Thank you

## 2022-02-19 ENCOUNTER — LAB ENCOUNTER (OUTPATIENT)
Dept: LAB | Facility: HOSPITAL | Age: 77
End: 2022-02-19
Attending: INTERNAL MEDICINE
Payer: COMMERCIAL

## 2022-02-19 DIAGNOSIS — Z01.818 PREOPERATIVE CLEARANCE: ICD-10-CM

## 2022-02-19 LAB
ANION GAP SERPL CALC-SCNC: 4 MMOL/L (ref 0–18)
BUN BLD-MCNC: 15 MG/DL (ref 7–18)
BUN/CREAT SERPL: 16.5 (ref 10–20)
CALCIUM BLD-MCNC: 9.4 MG/DL (ref 8.5–10.1)
CHLORIDE SERPL-SCNC: 110 MMOL/L (ref 98–112)
CO2 SERPL-SCNC: 27 MMOL/L (ref 21–32)
CREAT BLD-MCNC: 0.91 MG/DL
DEPRECATED RDW RBC AUTO: 46 FL (ref 35.1–46.3)
ERYTHROCYTE [DISTWIDTH] IN BLOOD BY AUTOMATED COUNT: 12.8 % (ref 11–15)
FASTING STATUS PATIENT QL REPORTED: YES
GLUCOSE BLD-MCNC: 112 MG/DL (ref 70–99)
HCT VFR BLD AUTO: 43.2 %
HGB BLD-MCNC: 14.7 G/DL
INR BLD: 0.94 (ref 0.8–1.2)
MCH RBC QN AUTO: 33.3 PG (ref 26–34)
MCHC RBC AUTO-ENTMCNC: 34 G/DL (ref 31–37)
MCV RBC AUTO: 97.7 FL
OSMOLALITY SERPL CALC.SUM OF ELEC: 294 MOSM/KG (ref 275–295)
PLATELET # BLD AUTO: 244 10(3)UL (ref 150–450)
POTASSIUM SERPL-SCNC: 4.3 MMOL/L (ref 3.5–5.1)
PROTHROMBIN TIME: 12.7 SECONDS (ref 11.6–14.8)
RBC # BLD AUTO: 4.42 X10(6)UL
SODIUM SERPL-SCNC: 141 MMOL/L (ref 136–145)
WBC # BLD AUTO: 7.6 X10(3) UL (ref 4–11)

## 2022-02-19 PROCEDURE — 81015 MICROSCOPIC EXAM OF URINE: CPT

## 2022-02-19 PROCEDURE — 85610 PROTHROMBIN TIME: CPT

## 2022-02-19 PROCEDURE — 85027 COMPLETE CBC AUTOMATED: CPT

## 2022-02-19 PROCEDURE — 36415 COLL VENOUS BLD VENIPUNCTURE: CPT

## 2022-02-19 PROCEDURE — 80048 BASIC METABOLIC PNL TOTAL CA: CPT

## 2022-02-21 ENCOUNTER — LAB ENCOUNTER (OUTPATIENT)
Dept: LAB | Facility: HOSPITAL | Age: 77
End: 2022-02-21
Attending: NEUROLOGICAL SURGERY
Payer: COMMERCIAL

## 2022-02-21 DIAGNOSIS — Z01.818 PRE-OP TESTING: ICD-10-CM

## 2022-02-21 LAB
ANTIBODY SCREEN: NEGATIVE
MRSA DNA SPEC QL NAA+PROBE: NEGATIVE
RH BLOOD TYPE: POSITIVE
SARS-COV-2 RNA RESP QL NAA+PROBE: NOT DETECTED

## 2022-02-21 PROCEDURE — 86901 BLOOD TYPING SEROLOGIC RH(D): CPT

## 2022-02-21 PROCEDURE — 36415 COLL VENOUS BLD VENIPUNCTURE: CPT

## 2022-02-21 PROCEDURE — 86850 RBC ANTIBODY SCREEN: CPT

## 2022-02-21 PROCEDURE — 87641 MR-STAPH DNA AMP PROBE: CPT

## 2022-02-21 PROCEDURE — 86900 BLOOD TYPING SEROLOGIC ABO: CPT

## 2022-02-21 RX ORDER — GLIPIZIDE 2.5 MG/1
2.5 TABLET, EXTENDED RELEASE ORAL
Qty: 90 TABLET | Refills: 1 | Status: SHIPPED | OUTPATIENT
Start: 2022-02-21

## 2022-02-21 NOTE — TELEPHONE ENCOUNTER
Refill passed per [facility] protocol    Requested Prescriptions   Pending Prescriptions Disp Refills    GLIPIZIDE ER 2.5 MG Oral Tablet 24 Hr [Pharmacy Med Name: Glipizide Er 24hr 2.5 Mg Tab Nort] 90 tablet 0     Sig: Take 1 tablet by mouth daily with breakfast.        Diabetes Medication Protocol Passed - 2/21/2022  6:21 AM        Passed - Last A1C < 7.5 and within past 6 months     Lab Results   Component Value Date    A1C 5.9 (H) 01/03/2022               Passed - Appointment in past 6 or next 3 months        Passed - GFR Non- > 50     Lab Results   Component Value Date    GFRNAA 81 02/19/2022                 Passed - GFR in the past 12 months              Future Appointments         Provider Department Appt Notes    In 2 weeks Jaime Sharif  [facility]-Physiatr follow up on back pain.     In 3 weeks Cristina Tran MD [facility], [address] follow up on testing done in Jan.    In 1 month Cristina Tran MD [place] [facility], [address], [facility] 4 Month Follow Up    In 2 months Brii Perez Check, [facility], [address], [address]    In 8 months Senia Marino MD [facility] for [facility] Ophthalmology Return in about 1 year (around 10/20/2022) for Diabetic eye exam, Plaquenil eye exam.            Recent Outpatient Visits              3 days ago Pre-operative clearance    [facility] Wh[facility], Malaika Dela Cruz MD    Office Visit    2 months ago Adult general medical exam    [facility], [address], Malaika Dela Cruz MD    Office Visit    3 months ago Lumbar radiculopathy    [facility] Toshia Carlos, [address]    Office Visit    3 months ago Elevated PSA    [facility] [address], Mamie Gomez MD    Office Visit    4 months ago Glaucoma suspect of both eyes    [facility] for [facility] Ophthalmology Ramón Anderson MD    Office Visit

## 2022-02-23 ENCOUNTER — ANESTHESIA (OUTPATIENT)
Dept: SURGERY | Facility: HOSPITAL | Age: 77
DRG: 455 | End: 2022-02-23
Payer: COMMERCIAL

## 2022-02-23 ENCOUNTER — APPOINTMENT (OUTPATIENT)
Dept: GENERAL RADIOLOGY | Facility: HOSPITAL | Age: 77
DRG: 455 | End: 2022-02-23
Attending: NEUROLOGICAL SURGERY
Payer: COMMERCIAL

## 2022-02-23 ENCOUNTER — ANESTHESIA EVENT (OUTPATIENT)
Dept: SURGERY | Facility: HOSPITAL | Age: 77
DRG: 455 | End: 2022-02-23
Payer: COMMERCIAL

## 2022-02-23 ENCOUNTER — HOSPITAL ENCOUNTER (INPATIENT)
Facility: HOSPITAL | Age: 77
LOS: 1 days | Discharge: HOME | DRG: 455 | End: 2022-02-24
Attending: NEUROLOGICAL SURGERY | Admitting: NEUROLOGICAL SURGERY
Payer: COMMERCIAL

## 2022-02-23 DIAGNOSIS — Z01.818 PRE-OP TESTING: Primary | ICD-10-CM

## 2022-02-23 LAB
GLUCOSE BLDC GLUCOMTR-MCNC: 125 MG/DL (ref 70–99)
GLUCOSE BLDC GLUCOMTR-MCNC: 149 MG/DL (ref 70–99)
GLUCOSE BLDC GLUCOMTR-MCNC: 157 MG/DL (ref 70–99)
GLUCOSE BLDC GLUCOMTR-MCNC: 165 MG/DL (ref 70–99)
GLUCOSE BLDC GLUCOMTR-MCNC: 166 MG/DL (ref 70–99)
GLUCOSE BLDC GLUCOMTR-MCNC: 185 MG/DL (ref 70–99)

## 2022-02-23 PROCEDURE — 0SG00AJ FUSION OF LUMBAR VERTEBRAL JOINT WITH INTERBODY FUSION DEVICE, POSTERIOR APPROACH, ANTERIOR COLUMN, OPEN APPROACH: ICD-10-PCS | Performed by: NEUROLOGICAL SURGERY

## 2022-02-23 PROCEDURE — 01NB0ZZ RELEASE LUMBAR NERVE, OPEN APPROACH: ICD-10-PCS | Performed by: NEUROLOGICAL SURGERY

## 2022-02-23 PROCEDURE — 0SG0071 FUSION OF LUMBAR VERTEBRAL JOINT WITH AUTOLOGOUS TISSUE SUBSTITUTE, POSTERIOR APPROACH, POSTERIOR COLUMN, OPEN APPROACH: ICD-10-PCS | Performed by: NEUROLOGICAL SURGERY

## 2022-02-23 PROCEDURE — 99232 SBSQ HOSP IP/OBS MODERATE 35: CPT | Performed by: HOSPITALIST

## 2022-02-23 PROCEDURE — 76000 FLUOROSCOPY <1 HR PHYS/QHP: CPT | Performed by: NEUROLOGICAL SURGERY

## 2022-02-23 PROCEDURE — 4A11X4G MONITORING OF PERIPHERAL NERVOUS ELECTRICAL ACTIVITY, INTRAOPERATIVE, EXTERNAL APPROACH: ICD-10-PCS | Performed by: NEUROLOGICAL SURGERY

## 2022-02-23 PROCEDURE — 0ST20ZZ RESECTION OF LUMBAR VERTEBRAL DISC, OPEN APPROACH: ICD-10-PCS | Performed by: NEUROLOGICAL SURGERY

## 2022-02-23 DEVICE — TLX20, 9X11X31MM 20°
Type: IMPLANTABLE DEVICE | Site: BACK | Status: FUNCTIONAL
Brand: TLX

## 2022-02-23 DEVICE — BONE GRAFT KIT 7510100 INFUSE X SMALL
Type: IMPLANTABLE DEVICE | Site: BACK | Status: FUNCTIONAL
Brand: INFUSE® BONE GRAFT

## 2022-02-23 DEVICE — RELINE MAS RED SCRW 6.5X50 2C: Type: IMPLANTABLE DEVICE | Site: BACK | Status: FUNCTIONAL

## 2022-02-23 DEVICE — OSTEOCEL PRO LARGE BULK BUY: Type: IMPLANTABLE DEVICE | Site: BACK | Status: FUNCTIONAL

## 2022-02-23 DEVICE — RELINE LCK SCRW 5.5 OPEN TULIP: Type: IMPLANTABLE DEVICE | Site: BACK | Status: FUNCTIONAL

## 2022-02-23 DEVICE — RELINE MAS TI ROD 5.5X30 LRDTC: Type: IMPLANTABLE DEVICE | Site: BACK | Status: FUNCTIONAL

## 2022-02-23 RX ORDER — HYDROCODONE BITARTRATE AND ACETAMINOPHEN 5; 325 MG/1; MG/1
2 TABLET ORAL AS NEEDED
Status: DISCONTINUED | OUTPATIENT
Start: 2022-02-23 | End: 2022-02-23 | Stop reason: HOSPADM

## 2022-02-23 RX ORDER — GLYCOPYRROLATE 0.2 MG/ML
INJECTION, SOLUTION INTRAMUSCULAR; INTRAVENOUS AS NEEDED
Status: DISCONTINUED | OUTPATIENT
Start: 2022-02-23 | End: 2022-02-23 | Stop reason: SURG

## 2022-02-23 RX ORDER — NICOTINE POLACRILEX 4 MG
15 LOZENGE BUCCAL
Status: DISCONTINUED | OUTPATIENT
Start: 2022-02-23 | End: 2022-02-23 | Stop reason: HOSPADM

## 2022-02-23 RX ORDER — HYDROMORPHONE HYDROCHLORIDE 1 MG/ML
0.2 INJECTION, SOLUTION INTRAMUSCULAR; INTRAVENOUS; SUBCUTANEOUS EVERY 5 MIN PRN
Status: DISCONTINUED | OUTPATIENT
Start: 2022-02-23 | End: 2022-02-23 | Stop reason: HOSPADM

## 2022-02-23 RX ORDER — MORPHINE SULFATE 10 MG/ML
6 INJECTION, SOLUTION INTRAMUSCULAR; INTRAVENOUS EVERY 10 MIN PRN
Status: DISCONTINUED | OUTPATIENT
Start: 2022-02-23 | End: 2022-02-23 | Stop reason: HOSPADM

## 2022-02-23 RX ORDER — HYDROCODONE BITARTRATE AND ACETAMINOPHEN 5; 325 MG/1; MG/1
1 TABLET ORAL AS NEEDED
Status: DISCONTINUED | OUTPATIENT
Start: 2022-02-23 | End: 2022-02-23 | Stop reason: HOSPADM

## 2022-02-23 RX ORDER — DEXTROSE MONOHYDRATE 25 G/50ML
50 INJECTION, SOLUTION INTRAVENOUS
Status: DISCONTINUED | OUTPATIENT
Start: 2022-02-23 | End: 2022-02-23 | Stop reason: HOSPADM

## 2022-02-23 RX ORDER — MORPHINE SULFATE 4 MG/ML
2 INJECTION, SOLUTION INTRAMUSCULAR; INTRAVENOUS EVERY 2 HOUR PRN
Status: DISCONTINUED | OUTPATIENT
Start: 2022-02-23 | End: 2022-02-24

## 2022-02-23 RX ORDER — NIFEDIPINE 90 MG/1
90 TABLET, EXTENDED RELEASE ORAL DAILY
Status: DISCONTINUED | OUTPATIENT
Start: 2022-02-24 | End: 2022-02-24

## 2022-02-23 RX ORDER — BUPIVACAINE HYDROCHLORIDE AND EPINEPHRINE 2.5; 5 MG/ML; UG/ML
INJECTION, SOLUTION INFILTRATION; PERINEURAL AS NEEDED
Status: DISCONTINUED | OUTPATIENT
Start: 2022-02-23 | End: 2022-02-23 | Stop reason: HOSPADM

## 2022-02-23 RX ORDER — MORPHINE SULFATE 4 MG/ML
6 INJECTION, SOLUTION INTRAMUSCULAR; INTRAVENOUS EVERY 2 HOUR PRN
Status: DISCONTINUED | OUTPATIENT
Start: 2022-02-23 | End: 2022-02-24

## 2022-02-23 RX ORDER — MORPHINE SULFATE 4 MG/ML
4 INJECTION, SOLUTION INTRAMUSCULAR; INTRAVENOUS EVERY 2 HOUR PRN
Status: DISCONTINUED | OUTPATIENT
Start: 2022-02-23 | End: 2022-02-24

## 2022-02-23 RX ORDER — METHOCARBAMOL 500 MG/1
1000 TABLET, FILM COATED ORAL 2 TIMES DAILY
Status: DISCONTINUED | OUTPATIENT
Start: 2022-02-23 | End: 2022-02-24

## 2022-02-23 RX ORDER — MIDAZOLAM HYDROCHLORIDE 1 MG/ML
INJECTION INTRAMUSCULAR; INTRAVENOUS AS NEEDED
Status: DISCONTINUED | OUTPATIENT
Start: 2022-02-23 | End: 2022-02-23 | Stop reason: SURG

## 2022-02-23 RX ORDER — GLIPIZIDE 2.5 MG/1
2.5 TABLET, EXTENDED RELEASE ORAL
Status: DISCONTINUED | OUTPATIENT
Start: 2022-02-24 | End: 2022-02-23

## 2022-02-23 RX ORDER — SODIUM CHLORIDE, SODIUM LACTATE, POTASSIUM CHLORIDE, CALCIUM CHLORIDE 600; 310; 30; 20 MG/100ML; MG/100ML; MG/100ML; MG/100ML
INJECTION, SOLUTION INTRAVENOUS CONTINUOUS
Status: DISCONTINUED | OUTPATIENT
Start: 2022-02-23 | End: 2022-02-23 | Stop reason: HOSPADM

## 2022-02-23 RX ORDER — NICOTINE POLACRILEX 4 MG
30 LOZENGE BUCCAL
Status: DISCONTINUED | OUTPATIENT
Start: 2022-02-23 | End: 2022-02-24

## 2022-02-23 RX ORDER — DEXAMETHASONE SODIUM PHOSPHATE 4 MG/ML
VIAL (ML) INJECTION AS NEEDED
Status: DISCONTINUED | OUTPATIENT
Start: 2022-02-23 | End: 2022-02-23 | Stop reason: SURG

## 2022-02-23 RX ORDER — PANTOPRAZOLE SODIUM 40 MG/1
40 TABLET, DELAYED RELEASE ORAL
Refills: 1 | Status: DISCONTINUED | OUTPATIENT
Start: 2022-02-24 | End: 2022-02-24

## 2022-02-23 RX ORDER — ONDANSETRON 2 MG/ML
INJECTION INTRAMUSCULAR; INTRAVENOUS AS NEEDED
Status: DISCONTINUED | OUTPATIENT
Start: 2022-02-23 | End: 2022-02-23 | Stop reason: SURG

## 2022-02-23 RX ORDER — HYDROMORPHONE HYDROCHLORIDE 1 MG/ML
0.4 INJECTION, SOLUTION INTRAMUSCULAR; INTRAVENOUS; SUBCUTANEOUS EVERY 5 MIN PRN
Status: DISCONTINUED | OUTPATIENT
Start: 2022-02-23 | End: 2022-02-23 | Stop reason: HOSPADM

## 2022-02-23 RX ORDER — NICOTINE POLACRILEX 4 MG
15 LOZENGE BUCCAL
Status: DISCONTINUED | OUTPATIENT
Start: 2022-02-23 | End: 2022-02-24

## 2022-02-23 RX ORDER — VANCOMYCIN HYDROCHLORIDE
15 EVERY 12 HOURS
Status: DISCONTINUED | OUTPATIENT
Start: 2022-02-23 | End: 2022-02-24

## 2022-02-23 RX ORDER — HYDROCODONE BITARTRATE AND ACETAMINOPHEN 10; 325 MG/1; MG/1
1 TABLET ORAL EVERY 4 HOURS PRN
Status: DISCONTINUED | OUTPATIENT
Start: 2022-02-23 | End: 2022-02-24

## 2022-02-23 RX ORDER — LIDOCAINE HYDROCHLORIDE 20 MG/ML
INJECTION, SOLUTION EPIDURAL; INFILTRATION; INTRACAUDAL; PERINEURAL AS NEEDED
Status: DISCONTINUED | OUTPATIENT
Start: 2022-02-23 | End: 2022-02-23 | Stop reason: SURG

## 2022-02-23 RX ORDER — NICOTINE POLACRILEX 4 MG
30 LOZENGE BUCCAL
Status: DISCONTINUED | OUTPATIENT
Start: 2022-02-23 | End: 2022-02-23 | Stop reason: HOSPADM

## 2022-02-23 RX ORDER — DEXTROSE MONOHYDRATE 25 G/50ML
50 INJECTION, SOLUTION INTRAVENOUS
Status: DISCONTINUED | OUTPATIENT
Start: 2022-02-23 | End: 2022-02-24

## 2022-02-23 RX ORDER — VANCOMYCIN HYDROCHLORIDE
15 ONCE
Status: COMPLETED | OUTPATIENT
Start: 2022-02-23 | End: 2022-02-24

## 2022-02-23 RX ORDER — ONDANSETRON 2 MG/ML
4 INJECTION INTRAMUSCULAR; INTRAVENOUS ONCE AS NEEDED
Status: DISCONTINUED | OUTPATIENT
Start: 2022-02-23 | End: 2022-02-23 | Stop reason: HOSPADM

## 2022-02-23 RX ORDER — EPHEDRINE SULFATE 50 MG/ML
INJECTION, SOLUTION INTRAVENOUS AS NEEDED
Status: DISCONTINUED | OUTPATIENT
Start: 2022-02-23 | End: 2022-02-23 | Stop reason: SURG

## 2022-02-23 RX ORDER — PROCHLORPERAZINE EDISYLATE 5 MG/ML
5 INJECTION INTRAMUSCULAR; INTRAVENOUS ONCE AS NEEDED
Status: DISCONTINUED | OUTPATIENT
Start: 2022-02-23 | End: 2022-02-23 | Stop reason: HOSPADM

## 2022-02-23 RX ORDER — ACETAMINOPHEN 500 MG
1000 TABLET ORAL ONCE
Status: COMPLETED | OUTPATIENT
Start: 2022-02-23 | End: 2022-02-23

## 2022-02-23 RX ORDER — HALOPERIDOL 5 MG/ML
0.25 INJECTION INTRAMUSCULAR ONCE AS NEEDED
Status: DISCONTINUED | OUTPATIENT
Start: 2022-02-23 | End: 2022-02-23 | Stop reason: HOSPADM

## 2022-02-23 RX ORDER — SODIUM CHLORIDE, SODIUM LACTATE, POTASSIUM CHLORIDE, CALCIUM CHLORIDE 600; 310; 30; 20 MG/100ML; MG/100ML; MG/100ML; MG/100ML
INJECTION, SOLUTION INTRAVENOUS CONTINUOUS
Status: DISCONTINUED | OUTPATIENT
Start: 2022-02-23 | End: 2022-02-24

## 2022-02-23 RX ORDER — HYDROMORPHONE HYDROCHLORIDE 1 MG/ML
0.6 INJECTION, SOLUTION INTRAMUSCULAR; INTRAVENOUS; SUBCUTANEOUS EVERY 5 MIN PRN
Status: DISCONTINUED | OUTPATIENT
Start: 2022-02-23 | End: 2022-02-23 | Stop reason: HOSPADM

## 2022-02-23 RX ORDER — MORPHINE SULFATE 4 MG/ML
4 INJECTION, SOLUTION INTRAMUSCULAR; INTRAVENOUS EVERY 10 MIN PRN
Status: DISCONTINUED | OUTPATIENT
Start: 2022-02-23 | End: 2022-02-23 | Stop reason: HOSPADM

## 2022-02-23 RX ORDER — MORPHINE SULFATE 4 MG/ML
2 INJECTION, SOLUTION INTRAMUSCULAR; INTRAVENOUS EVERY 10 MIN PRN
Status: DISCONTINUED | OUTPATIENT
Start: 2022-02-23 | End: 2022-02-23 | Stop reason: HOSPADM

## 2022-02-23 RX ORDER — NALOXONE HYDROCHLORIDE 0.4 MG/ML
80 INJECTION, SOLUTION INTRAMUSCULAR; INTRAVENOUS; SUBCUTANEOUS AS NEEDED
Status: DISCONTINUED | OUTPATIENT
Start: 2022-02-23 | End: 2022-02-23 | Stop reason: HOSPADM

## 2022-02-23 RX ADMIN — LIDOCAINE HYDROCHLORIDE 80 MG: 20 INJECTION, SOLUTION EPIDURAL; INFILTRATION; INTRACAUDAL; PERINEURAL at 07:21:00

## 2022-02-23 RX ADMIN — MIDAZOLAM HYDROCHLORIDE 2 MG: 1 INJECTION INTRAMUSCULAR; INTRAVENOUS at 07:15:00

## 2022-02-23 RX ADMIN — ONDANSETRON 4 MG: 2 INJECTION INTRAMUSCULAR; INTRAVENOUS at 07:21:00

## 2022-02-23 RX ADMIN — SODIUM CHLORIDE, SODIUM LACTATE, POTASSIUM CHLORIDE, CALCIUM CHLORIDE: 600; 310; 30; 20 INJECTION, SOLUTION INTRAVENOUS at 09:32:00

## 2022-02-23 RX ADMIN — SODIUM CHLORIDE, SODIUM LACTATE, POTASSIUM CHLORIDE, CALCIUM CHLORIDE: 600; 310; 30; 20 INJECTION, SOLUTION INTRAVENOUS at 07:13:00

## 2022-02-23 RX ADMIN — EPHEDRINE SULFATE 5 MG: 50 INJECTION, SOLUTION INTRAVENOUS at 07:21:00

## 2022-02-23 RX ADMIN — EPHEDRINE SULFATE 5 MG: 50 INJECTION, SOLUTION INTRAVENOUS at 07:36:00

## 2022-02-23 RX ADMIN — DEXAMETHASONE SODIUM PHOSPHATE 4 MG: 4 MG/ML VIAL (ML) INJECTION at 07:21:00

## 2022-02-23 RX ADMIN — GLYCOPYRROLATE 0.2 MG: 0.2 INJECTION, SOLUTION INTRAMUSCULAR; INTRAVENOUS at 07:21:00

## 2022-02-23 NOTE — ADDENDUM NOTE
Addendum  created 02/23/22 1136 by Linh Montiel CRNA    Flowsheet accepted, Flowsheet data copied forward, Intraprocedure Flowsheets edited

## 2022-02-23 NOTE — OPERATIVE REPORT
OPERATIVE REPORT    PATIENT NAME: Yvonne Anne    DATE OF OPERATION:  2/23/2022    PREOPERATIVE DIAGNOSIS:  1. Lumbar stenosis with neurogenic claudication             2. Lumbar spondylolisthesis    POSTOPERATIVE DIAGNOSIS: 1. same               2. same    OPERATIVE PROCEDURE:  1.  Lumbar 4 through 5 transforaminal intervertebral arthrodesis and fusion, utilizing 1 interbodies/cages and allograft and locally-harvested morselized autograft  2. Lumbar 3 through 5 laminectomy and L4-5 left complete facetectomy and right L4-5 posterolateral arthrodesis, utilizing locally-harvested morselized autograft  3. Lumbar 4 through 5 pedicle screw and sylvia instrumentation for augmentation of fusion purposes. 4.  Real time intraoperative fluoroscopy and C-arm with the image guidance. 5.  Real time intraoperative motor-evoked potentials, SSEP and nerve monitoring. CPT CODES: 69546, [id number], [id number], 18554, 54533L3, [address] [place] [address], [address], 34286-07    SURGEON:  Wilhelmenia Duane, M.D.    ASSISTANT: Billie Nails    ANESTHESIA: General endotracheal anesthesia with TIVA and local anesthetic. ESTIMATED BLOOD LOSS: 25cc    INTRAVENOUS FLUIDS AND URINE OUTPUT: Per anesthesia sheet    TRANSFUSION: None    IMPLANTS: Nuva, 6.5x50x4, 30mmx2, 9x31 cage    DRAIN: HV    SPECIMEN: None    COMPLICATIONS: none    PROCEDURE:    After informed consent was obtained, the patient was brought to the operating room. After the uneventful induction of general endotracheal anesthesia and placement of the Romero catheter, electrodes and monitoring devices were placed. The patient was then positioned on the operating room table, an open-frame Chino Table, in the prone position. The arms were supported and the neck physiologically extended. All pressure points were adequately padded. The patient's eyes were protected from exposure to prolonged pressure. Baseline electrophysiological potentials were obtained.  Subsequently, we utilized lateral and AP fluoroscopic guidance to identify our incision site relative to the lumbar bony anatomy corresponding to the correct level(s). It was marked out on the skin. The patient was prepped and draped sterilely. The C-arm was also draped sterilely into the field. Time-Out was done in the proper fashion. Perioperative antibiosis was given within one hour of incision. After the \"Time-Out\", we infiltrated the incision with our usual local anesthetic. We incised utilizing a 10-blade scalpel. The subcutaneous tissues were opened with Bovie cautery down to the fascia. The fascia was opened sharply using both sharp and blunt dissection. The paraspinal muscles were dissected off the lateral aspect of the laminae in a subperiosteal fashion, and then a self-retaining retractor was placed. Localization was confirmed with fluoroscopy. We exposed the bilateral partes interarticulares of the correct lumbar levels, Lumbar 3 through 5. We removed the spinous processes of L3  and, partially, of L5, using Leksell rongeurs. A high-speed dayanna was then utilized to complete the generous laminectomy and the Left L4 pars was then transected perpendicularly. This detached the L4 articulating facet en-bloc. We then drilled down the corresponding L5 articulating surface to expose the L4-5 intervertebral space and disc. The rest of the bony decompression was accomplished utilizing Kerrison rongeurs. After the soft tissues, including the ligamentum flavum, had been resected, we visualized the ipsilaterally exiting L4 and the en-passage L5 nerve roots verifying adequate decompression. The disc space was then re-identified and some epidural veins around the disc space were coagulated and cut sharply with the microscissors. The disc space was incised and a radical discectomy was performed using a series of Victoria and Jorge, extending across the midline with angled curettes.   The cartilaginous endplates were then removed using a series of curettes, rongeurs, rasps, and yasmin, removing the cartilaginous endplates and roughing up the bony endplates to get good bleeding bone along both surfaces for fusion purposes. This was inspected with direct visualization and fluoroscopy repeatedly. The wound was copiously irrigated. The nerves were maintained safe and protected with continued direct visualization. No additional disc or cartilaginous endplate was encountered and very good bleeding bony endplates were seen. We trialed for the properly-sized intervertebral cage and then filled it with allograft. About 9 cc of Osteocel Plus with some of the drilled autologous bone were impacted along the ventral aspect of the disc space and also to the right side of the disc space utilizing a funnel applicator and graft delivery system. While gently and carefully retracting the en-passage nerve root medially, the cage was impacted into position and 50 % expanded to proper alignment. Approximately, 10 degrees of segmental lordosis were gained. Fluoroscopy confirmed good positioning of the cage. We irrigated the surgical site copiously and hemostasis was obtained with bipolar electrocautery and Flowseal.     Next, percutanously, a monitored Jamshidi needle was used to cannulate the pedicles in the traditional fashion. The trajectories were fluoroscopically confirmed. Subsequently, the screws with extendoers were placed over guidewires utilizing fluoroscopic guidance. Finally, we tested and stimulated all screws electrophysiologically at appropriate thresholds. Utilizing calipers within the screw extenders, we measured for the properly-sized lordotic rods and placed them appropriately. These were placed through the guide channels of the extenders and easily placed into the screw heads. Locking nuts were provisionally placed.   Then, using the torque  and the counter-torque device, each end of the sylvia was secured into the tulip heads of the screws, compressing or reducing the construct lightly before tightening the rods. The locking nuts were again revisited with the torque  to confirm tightness. The screw extenders were then removed and final fluoroscopic images documented satisfactory position of the cage, screws, and rods. The wound was copiously irrigated and small bleeding points were controlled with a bipolar electrocautery. We then decorticated the contralateral facet and posterolateral elements generously extending from L4 to L5 with the high-speed dayanna. The rest of our harvested autograft which had been denuded off all soft tissue and morselized was then packed into the posterolateral space and gutter extending from L4 through L5 for posterolateral arthrodesis purposes. A medium hemovac drain was placed in the subfascial space and brought out through a separate stab incision in the skin. The construct was again inspected and found to be quite satisfactory under direct visualization. Still, more irrigation was used and the lumbodorsal fascia was closed using a series of interrupted 0 Vicryl sutures. The subcutaneous tissues were copiously irrigated and closed with an interrupted inverted 3-0 Vicryl suture. The skin was closed with 4-0 Vicryl and Dermabond. The drain was secured with a Bio-patch and covered with a Tegaderm. There were no complications. All counts were reported correct. The nerve stimulation of the screws achieved satisfactory thresholds including final placement of the instrumentation. The patient was returned to the supine position, extubated in the operating room, and taken to the recovery room in satisfactory condition, having tolerated the procedure well and moving all fours strongly to command. Please fax a copy of this report to my office at [phone] and the Primary Care Provider of this patient.

## 2022-02-23 NOTE — PLAN OF CARE
Patient is post op day # 0. Monitoring vital signs, stable at this time. Patient on 1L of oxygen via nasal canula. Dressing in place to lower back. Hemovac in place, output recorded. Spine precautions in place. Patient up with standby assist with walker. Norco given as needed for pain. Gel ice in place. Romero in place. Tolerating diet, denies nausea or vomiting. Monitoring blood sugar per order. SCD's in place for DVT prophylaxis. Fall precautions maintained- bed alarm on, chair alarm on, bed locked in lowest position, call light and personal belongings within reach, non-skid socks in place to bilateral feet. Patient compliant with call light system. Frequent rounding by nursing staff. Plan is home pending medical clearance.      Problem: Patient Centered Care  Goal: Patient preferences are identified and integrated in the patient's plan of care  Description: Interventions:  - What would you like us to know as we care for you?   - Provide timely, complete, and accurate information to patient/family  - Incorporate patient and family knowledge, values, beliefs, and cultural backgrounds into the planning and delivery of care  - Encourage patient/family to participate in care and decision-making at the level they choose  - Honor patient and family perspectives and choices  2/23/2022 1516 by Niru Gasca, RN  Outcome: Progressing  2/23/2022 1458 by Niru Gasca, RN  Outcome: Progressing     Problem: Diabetes/Glucose Control  Goal: Glucose maintained within prescribed range  Description: INTERVENTIONS:  - Monitor Blood Glucose as ordered  - Assess for signs and symptoms of hyperglycemia and hypoglycemia  - Administer ordered medications to maintain glucose within target range  - Assess barriers to adequate nutritional intake and initiate nutrition consult as needed  - Instruct patient on self management of diabetes  2/23/2022 1516 by Niru Gasca, RN  Outcome: Progressing  2/23/2022 1458 by Niru Gasca, RN  Outcome: Progressing     Problem: Patient/Family Goals  Goal: Patient/Family Long Term Goal  Description: Patient's Long Term Goal: To go home    Interventions:  - follow MD orders   -discharge planning  -update patient and family on plan of care  -ambulate as tolerated  -PT/OT  - See additional Care Plan goals for specific interventions  2/23/2022 1516 by Anders Agee RN  Outcome: Progressing  2/23/2022 1458 by Anders Agee RN  Outcome: Progressing  Goal: Patient/Family Short Term Goal  Description: Patient's Short Term Goal: Control pain    Interventions:   - pain medication as needed  -non-pharmacologic pain management  -follow MD orders  -PT/OT  -ambulate as tolerated  - See additional Care Plan goals for specific interventions  2/23/2022 1516 by Anders Agee RN  Outcome: Progressing  2/23/2022 1458 by Anders Agee RN  Outcome: Progressing     Problem: PAIN - ADULT  Goal: Verbalizes/displays adequate comfort level or patient's stated pain goal  Description: INTERVENTIONS:  - Encourage pt to monitor pain and request assistance  - Assess pain using appropriate pain scale  - Administer analgesics based on type and severity of pain and evaluate response  - Implement non-pharmacological measures as appropriate and evaluate response  - Consider cultural and social influences on pain and pain management  - Manage/alleviate anxiety  - Utilize distraction and/or relaxation techniques  - Monitor for opioid side effects  - Notify MD/LIP if interventions unsuccessful or patient reports new pain  - Anticipate increased pain with activity and pre-medicate as appropriate  2/23/2022 1516 by Anders Agee RN  Outcome: Progressing  2/23/2022 1458 by Anders Agee RN  Outcome: Progressing     Problem: RISK FOR INFECTION - ADULT  Goal: Absence of fever/infection during anticipated neutropenic period  Description: INTERVENTIONS  - Monitor WBC  - Administer growth factors as ordered  - Implement neutropenic guidelines  2/23/2022 [phone] by Angel James RN  Outcome: Progressing  2/23/2022 1458 by Angel James RN  Outcome: Progressing     Problem: SAFETY ADULT - FALL  Goal: Free from fall injury  Description: INTERVENTIONS:  - Assess pt frequently for physical needs  - Identify cognitive and physical deficits and behaviors that affect risk of falls.   - [place] fall precautions as indicated by assessment.  - Educate pt/family on patient safety including physical limitations  - Instruct pt to call for assistance with activity based on assessment  - Modify environment to reduce risk of injury  - Provide assistive devices as appropriate  - Consider OT/PT consult to assist with strengthening/mobility  - Encourage toileting schedule  2/23/2022 1516 by Angel James RN  Outcome: Progressing  2/23/2022 1458 by Angel James RN  Outcome: Progressing     Problem: DISCHARGE PLANNING  Goal: Discharge to home or other facility with appropriate resources  Description: INTERVENTIONS:  - Identify barriers to discharge w/pt and caregiver  - Include patient/family/discharge partner in discharge planning  - Arrange for needed discharge resources and transportation as appropriate  - Identify discharge learning needs (meds, wound care, etc)  - Arrange for interpreters to assist at discharge as needed  - Consider post-discharge preferences of patient/family/discharge partner  - Complete POLST form as appropriate  - Assess patient's ability to be responsible for managing their own health  - Refer to Case Management Department for coordinating discharge planning if the patient needs post-hospital services based on physician/LIP order or complex needs related to functional status, cognitive ability or social support system  2/23/2022 1516 by Angel James RN  Outcome: Progressing  2/23/2022 1458 by Angel James RN  Outcome: Progressing     Problem: METABOLIC/FLUID AND ELECTROLYTES - ADULT  Goal: Glucose maintained within prescribed range  Description: INTERVENTIONS:  - Monitor Blood Glucose as ordered  - Assess for signs and symptoms of hyperglycemia and hypoglycemia  - Administer ordered medications to maintain glucose within target range  - Assess barriers to adequate nutritional intake and initiate nutrition consult as needed  - Instruct patient on self management of diabetes  2/23/2022 1516 by Domi Zepeda RN  Outcome: Progressing  2/23/2022 1458 by Domi Zepeda RN  Outcome: Progressing     Problem: SKIN/TISSUE INTEGRITY - ADULT  Goal: Incision(s), wounds(s) or drain site(s) healing without S/S of infection  Description: INTERVENTIONS:  - Assess and document risk factors for pressure ulcer development  - Assess and document skin integrity  - Assess and document dressing/incision, wound bed, drain sites and surrounding tissue  - Implement wound care per orders  - Initiate isolation precautions as appropriate  - Initiate Pressure Ulcer prevention bundle as indicated  2/23/2022 1516 by Domi Zepeda RN  Outcome: Progressing  2/23/2022 1458 by Domi Zepeda RN  Outcome: Progressing     Problem: MUSCULOSKELETAL - ADULT  Goal: Return mobility to safest level of function  Description: INTERVENTIONS:  - Assess patient stability and activity tolerance for standing, transferring and ambulating w/ or w/o assistive devices  - Assist with transfers and ambulation using safe patient handling equipment as needed  - Ensure adequate protection for wounds/incisions during mobilization  - Obtain PT/OT consults as needed  - Advance activity as appropriate  - Communicate ordered activity level and limitations with patient/family  2/23/2022 1516 by Domi Zepeda RN  Outcome: Progressing  2/23/2022 1458 by Domi Zepeda RN  Outcome: Progressing

## 2022-02-23 NOTE — INTERVAL H&P NOTE
Pre-op Diagnosis: Other spondylosis with radiculopathy; lumbar region, spinal stenosis; lumbar region with neurogenic claudication, other intervertebral disc degeneration; lumbar region, low back pain, unspecified    The above referenced H&P was reviewed by Susan Abarca MD on 2/23/2022, the patient was examined and no significant changes have occurred in the patient's condition since the H&P was performed. I discussed with the patient and/or legal representative the potential benefits, risks and side effects of this procedure; the likelihood of the patient achieving goals; and potential problems that might occur during recuperation. I discussed reasonable alternatives to the procedure, including risks, benefits and side effects related to the alternatives and risks related to not receiving this procedure. We will proceed with procedure as planned. Lola Cadena M.D., F.A.A.N.S.  of [address] [place] [address]  Board Certified Neurosurgeon                                     Hannah Thomas  Neurosurgery        [place] [address], [address], [facility]    PHONE  [phone]    [email or web address]           2/23/2022  6:52 AM    PRE-OPERATIVE CONSULTATION     Patti Mc was again informed of the nature of the problem, planned treatment, indications and alternatives.   We again reviewed the expected benefits of surgery, as well as all reasonably foreseeable risks, including but not limited to infection, bleeding requiring transfusion or reoperation, no relief or worsening of the pain, numbness, weakness, need for assistive devices to get around, injury to the nerves, paralysis, loss of control of the legs/bladder/bowel/sexual function, spinal fluid leak, scar formation, failure of the fusion to heal (made more likely with smoking,) bad position of the screws or cages, migration of the screws or cages, break of the screws or rods, problems above or below the level of fusion due to increased stress on those levels from the fusion and leading to more pain and possibly the need for more surgery, heart attack, blood clot formation, pulmonary embolism, stroke, coma and death. his questions were all answered and he understands what we discussed. he also understands that no guarantees can be made regarding outcome or results. Elsa Oneal agrees the benefits of surgery outweigh the risks, and wishes to proceed with surgery.       Halie Alfonso M.D., F.A.A.N.S.

## 2022-02-23 NOTE — ANESTHESIA PROCEDURE NOTES
Peripheral IV  Date/Time: 2/23/2022 7:25 AM  Inserted by: Vannesa Charles CRNA    Placement  Needle size: 18 G  Laterality: left  Location: forearm  Local anesthetic: UNDER ANESTHESIA.   Site prep: alcohol  Technique: anatomical landmarks  Attempts: 1

## 2022-02-23 NOTE — ANESTHESIA PROCEDURE NOTES
Airway  Date/Time: 2/23/2022 7:24 AM  Urgency: Elective    Airway not difficult    General Information and Staff    Patient location during procedure: OR  Anesthesiologist: Mary Madsen MD  Resident/CRNA: Irish Ponce CRNA  Performed: CRNA     Indications and Patient Condition  Indications for airway management: anesthesia  Sedation level: deep  Preoxygenated: yes  Patient position: sniffing  Mask difficulty assessment: 0 - not attempted    Final Airway Details  Final airway type: endotracheal airway      Successful airway: ETT  Cuffed: yes   Facilitating devices/methods: anterior pressure/BURP and intubating stylet  Endotracheal tube insertion site: oral  ETT size (mm): 8.0    Placement verified by: chest auscultation and capnometry   Measured from: lips  ETT to lips (cm): 22  Number of attempts at approach: 1    Additional Comments  ATRAUMATIC

## 2022-02-24 VITALS
HEART RATE: 88 BPM | HEIGHT: 72 IN | SYSTOLIC BLOOD PRESSURE: 156 MMHG | OXYGEN SATURATION: 91 % | DIASTOLIC BLOOD PRESSURE: 85 MMHG | TEMPERATURE: 98 F | WEIGHT: 200 LBS | RESPIRATION RATE: 18 BRPM | BODY MASS INDEX: 27.09 KG/M2

## 2022-02-24 LAB
GLUCOSE BLDC GLUCOMTR-MCNC: 137 MG/DL (ref 70–99)
GLUCOSE BLDC GLUCOMTR-MCNC: 139 MG/DL (ref 70–99)

## 2022-02-24 PROCEDURE — 99232 SBSQ HOSP IP/OBS MODERATE 35: CPT | Performed by: HOSPITALIST

## 2022-02-24 RX ORDER — METHOCARBAMOL 500 MG/1
1000 TABLET, FILM COATED ORAL 2 TIMES DAILY
Refills: 0 | Status: SHIPPED | COMMUNITY
Start: 2022-02-24

## 2022-02-24 RX ORDER — SENNA AND DOCUSATE SODIUM 50; 8.6 MG/1; MG/1
1 TABLET, FILM COATED ORAL DAILY
Qty: 30 TABLET | Refills: 0 | Status: SHIPPED | OUTPATIENT
Start: 2022-02-24

## 2022-02-24 RX ORDER — HYDROCODONE BITARTRATE AND ACETAMINOPHEN 10; 325 MG/1; MG/1
1 TABLET ORAL EVERY 4 HOURS PRN
Refills: 0 | Status: SHIPPED | COMMUNITY
Start: 2022-02-24

## 2022-02-24 NOTE — PLAN OF CARE
Patient POD #1. Alert and oriented. Monitoring vital signs, stable at this time. Room air. Dressing in place to lower back. Hemovac removed per MD order. Serafin Coke Spine precautions in place. Patient up with stand by assist with walker. Norco given as needed for pain. Gel ice in place. Monitoring blood sugar per order. Tolerating diet, denies nausea or vomiting. SCD's in place for DVT prophylaxis. Cleared for discharge by ortho/surgery, internal medicine, physical therapy, occupational therapy. Patient understands to monitor urine output and drink lots of fluids. Sent with personal belongings, discharge paperwork, scripts. Addressed additional questions. IV removed.        Problem: Patient Centered Care  Goal: Patient preferences are identified and integrated in the patient's plan of care  Description: Interventions:  - What would you like us to know as we care for you?   - Provide timely, complete, and accurate information to patient/family  - Incorporate patient and family knowledge, values, beliefs, and cultural backgrounds into the planning and delivery of care  - Encourage patient/family to participate in care and decision-making at the level they choose  - Honor patient and family perspectives and choices  Outcome: Adequate for Discharge     Problem: Diabetes/Glucose Control  Goal: Glucose maintained within prescribed range  Description: INTERVENTIONS:  - Monitor Blood Glucose as ordered  - Assess for signs and symptoms of hyperglycemia and hypoglycemia  - Administer ordered medications to maintain glucose within target range  - Assess barriers to adequate nutritional intake and initiate nutrition consult as needed  - Instruct patient on self management of diabetes  Outcome: Adequate for Discharge     Problem: Patient/Family Goals  Goal: Patient/Family Long Term Goal  Description: Patient's Long Term Goal: To go home    Interventions:  - follow MD orders   -discharge planning  -update patient and family on plan of care  -ambulate as tolerated  -PT/OT  - See additional Care Plan goals for specific interventions  Outcome: Adequate for Discharge  Goal: Patient/Family Short Term Goal  Description: Patient's Short Term Goal: Control pain    Interventions:   - pain medication as needed  -non-pharmacologic pain management  -follow MD orders  -PT/OT  -ambulate as tolerated  - See additional Care Plan goals for specific interventions  Outcome: Adequate for Discharge     Problem: PAIN - ADULT  Goal: Verbalizes/displays adequate comfort level or patient's stated pain goal  Description: INTERVENTIONS:  - Encourage pt to monitor pain and request assistance  - Assess pain using appropriate pain scale  - Administer analgesics based on type and severity of pain and evaluate response  - Implement non-pharmacological measures as appropriate and evaluate response  - Consider cultural and social influences on pain and pain management  - Manage/alleviate anxiety  - Utilize distraction and/or relaxation techniques  - Monitor for opioid side effects  - Notify MD/LIP if interventions unsuccessful or patient reports new pain  - Anticipate increased pain with activity and pre-medicate as appropriate  Outcome: Adequate for Discharge     Problem: RISK FOR INFECTION - ADULT  Goal: Absence of fever/infection during anticipated neutropenic period  Description: INTERVENTIONS  - Monitor WBC  - Administer growth factors as ordered  - Implement neutropenic guidelines  Outcome: Adequate for Discharge     Problem: SAFETY ADULT - FALL  Goal: Free from fall injury  Description: INTERVENTIONS:  - Assess pt frequently for physical needs  - Identify cognitive and physical deficits and behaviors that affect risk of falls.   - [place] fall precautions as indicated by assessment.  - Educate pt/family on patient safety including physical limitations  - Instruct pt to call for assistance with activity based on assessment  - Modify environment to reduce risk of injury  - Provide assistive devices as appropriate  - Consider OT/PT consult to assist with strengthening/mobility  - Encourage toileting schedule  Outcome: Adequate for Discharge     Problem: DISCHARGE PLANNING  Goal: Discharge to home or other facility with appropriate resources  Description: INTERVENTIONS:  - Identify barriers to discharge w/pt and caregiver  - Include patient/family/discharge partner in discharge planning  - Arrange for needed discharge resources and transportation as appropriate  - Identify discharge learning needs (meds, wound care, etc)  - Arrange for interpreters to assist at discharge as needed  - Consider post-discharge preferences of patient/family/discharge partner  - Complete POLST form as appropriate  - Assess patient's ability to be responsible for managing their own health  - Refer to Case Management Department for coordinating discharge planning if the patient needs post-hospital services based on physician/LIP order or complex needs related to functional status, cognitive ability or social support system  Outcome: Adequate for Discharge     Problem: METABOLIC/FLUID AND ELECTROLYTES - ADULT  Goal: Glucose maintained within prescribed range  Description: INTERVENTIONS:  - Monitor Blood Glucose as ordered  - Assess for signs and symptoms of hyperglycemia and hypoglycemia  - Administer ordered medications to maintain glucose within target range  - Assess barriers to adequate nutritional intake and initiate nutrition consult as needed  - Instruct patient on self management of diabetes  Outcome: Adequate for Discharge     Problem: SKIN/TISSUE INTEGRITY - ADULT  Goal: Incision(s), wounds(s) or drain site(s) healing without S/S of infection  Description: INTERVENTIONS:  - Assess and document risk factors for pressure ulcer development  - Assess and document skin integrity  - Assess and document dressing/incision, wound bed, drain sites and surrounding tissue  - Implement wound care per orders  - Initiate isolation precautions as appropriate  - Initiate Pressure Ulcer prevention bundle as indicated  Outcome: Adequate for Discharge     Problem: MUSCULOSKELETAL - ADULT  Goal: Return mobility to safest level of function  Description: INTERVENTIONS:  - Assess patient stability and activity tolerance for standing, transferring and ambulating w/ or w/o assistive devices  - Assist with transfers and ambulation using safe patient handling equipment as needed  - Ensure adequate protection for wounds/incisions during mobilization  - Obtain PT/OT consults as needed  - Advance activity as appropriate  - Communicate ordered activity level and limitations with patient/family  Outcome: Adequate for Discharge

## 2022-02-24 NOTE — PROGRESS NOTES
Doing well  Resolution of LE pain  Post-op LBP  Drain >50cc    If drain less or equal 50 in the next 8 hours, dc drain and may go home  F/u in 2 weeks  D/w nursing staff and patient

## 2022-02-24 NOTE — PLAN OF CARE
Patient is alert and oriented. RA. SCDs on for DVT prophylaxis. Romero in place, to be removed in AM, then patient will be check void. Gel ice packs. Incisions to lower back , DINO, CDI. ACHS accucheck. Hemovac in place. Up x1 with walker. PRN norco and morphine given for pain management. Call light within reach, bed alarm active.   Problem: Patient Centered Care  Goal: Patient preferences are identified and integrated in the patient's plan of care  Description: Interventions:  - Provide timely, complete, and accurate information to patient/family  - Incorporate patient and family knowledge, values, beliefs, and cultural backgrounds into the planning and delivery of care  - Encourage patient/family to participate in care and decision-making at the level they choose  - Honor patient and family perspectives and choices  Outcome: Progressing     Problem: Diabetes/Glucose Control  Goal: Glucose maintained within prescribed range  Description: INTERVENTIONS:  - Monitor Blood Glucose as ordered  - Assess for signs and symptoms of hyperglycemia and hypoglycemia  - Administer ordered medications to maintain glucose within target range  - Assess barriers to adequate nutritional intake and initiate nutrition consult as needed  - Instruct patient on self management of diabetes  Outcome: Progressing     Problem: PAIN - ADULT  Goal: Verbalizes/displays adequate comfort level or patient's stated pain goal  Description: INTERVENTIONS:  - Encourage pt to monitor pain and request assistance  - Assess pain using appropriate pain scale  - Administer analgesics based on type and severity of pain and evaluate response  - Implement non-pharmacological measures as appropriate and evaluate response  - Consider cultural and social influences on pain and pain management  - Manage/alleviate anxiety  - Utilize distraction and/or relaxation techniques  - Monitor for opioid side effects  - Notify MD/LIP if interventions unsuccessful or patient reports new pain  - Anticipate increased pain with activity and pre-medicate as appropriate  Outcome: Progressing     Problem: RISK FOR INFECTION - ADULT  Goal: Absence of fever/infection during anticipated neutropenic period  Description: INTERVENTIONS  - Monitor WBC  - Administer growth factors as ordered  - Implement neutropenic guidelines  Outcome: Progressing     Problem: SAFETY ADULT - FALL  Goal: Free from fall injury  Description: INTERVENTIONS:  - Assess pt frequently for physical needs  - Identify cognitive and physical deficits and behaviors that affect risk of falls.   - [place] fall precautions as indicated by assessment.  - Educate pt/family on patient safety including physical limitations  - Instruct pt to call for assistance with activity based on assessment  - Modify environment to reduce risk of injury  - Provide assistive devices as appropriate  - Consider OT/PT consult to assist with strengthening/mobility  - Encourage toileting schedule  Outcome: Progressing     Problem: DISCHARGE PLANNING  Goal: Discharge to home or other facility with appropriate resources  Description: INTERVENTIONS:  - Identify barriers to discharge w/pt and caregiver  - Include patient/family/discharge partner in discharge planning  - Arrange for needed discharge resources and transportation as appropriate  - Identify discharge learning needs (meds, wound care, etc)  - Arrange for interpreters to assist at discharge as needed  - Consider post-discharge preferences of patient/family/discharge partner  - Complete POLST form as appropriate  - Assess patient's ability to be responsible for managing their own health  - Refer to Case Management Department for coordinating discharge planning if the patient needs post-hospital services based on physician/LIP order or complex needs related to functional status, cognitive ability or social support system  Outcome: Progressing     Problem: METABOLIC/FLUID AND ELECTROLYTES - ADULT  Goal: Glucose maintained within prescribed range  Description: INTERVENTIONS:  - Monitor Blood Glucose as ordered  - Assess for signs and symptoms of hyperglycemia and hypoglycemia  - Administer ordered medications to maintain glucose within target range  - Assess barriers to adequate nutritional intake and initiate nutrition consult as needed  - Instruct patient on self management of diabetes  Outcome: Progressing     Problem: SKIN/TISSUE INTEGRITY - ADULT  Goal: Incision(s), wounds(s) or drain site(s) healing without S/S of infection  Description: INTERVENTIONS:  - Assess and document risk factors for pressure ulcer development  - Assess and document skin integrity  - Assess and document dressing/incision, wound bed, drain sites and surrounding tissue  - Implement wound care per orders  - Initiate isolation precautions as appropriate  - Initiate Pressure Ulcer prevention bundle as indicated  Outcome: Progressing     Problem: MUSCULOSKELETAL - ADULT  Goal: Return mobility to safest level of function  Description: INTERVENTIONS:  - Assess patient stability and activity tolerance for standing, transferring and ambulating w/ or w/o assistive devices  - Assist with transfers and ambulation using safe patient handling equipment as needed  - Ensure adequate protection for wounds/incisions during mobilization  - Obtain PT/OT consults as needed  - Advance activity as appropriate  - Communicate ordered activity level and limitations with patient/family  Outcome: Progressing

## 2022-02-24 NOTE — PHYSICAL THERAPY NOTE
PHYSICAL THERAPY TREATMENT NOTE - INPATIENT     Room Number: 427/427-A       Presenting Problem: s/p Lumbar 4 through 5 transforaminal intervertebral arthrodesis and fusion, Lumbar 3 through 5 laminectomy and L4-5 left complete facetectomy and right L4-5 posterolateral arthrodesis, Lumbar 4 through 5 pedicle screw and sylvia instrumentation      Problem List  Active Problems:    * No active hospital problems. *      PHYSICAL THERAPY ASSESSMENT   Chart reviewed. RN Radha approved participation in physical therapy. PPE worn by therapist: mask and gloves. Patient was wearing a mask during session. Patient presented in bedside chair with 6/10 pain. Patient with good  progress towards goals during this session. Education provided on Spine precautions, Physical therapy plan of care and physiological benefits of out of bed mobility. Patient with good carryover. Pt is being seen today BID for therapy. Pt is received in the chair during both therapy sessions. Pt is CGA am /SBA pm sessions with sit<>stand transfers with the RW. Pt was able to AMB about 150'am and 50' pm sessions with decreased step length with small MELANY and forward flexed posture. Pt is cued for correct posture. Pt was brought to the therapy gym for stair training. Pt was educated and able to negotiate 4 stairs with 1 HR CGA. Pt is cued for correct technique. Returned pt back to the room and to sitting in the chair with all needs. Pt was educated and able to maintain all his spinal precautions throughout both therapy sessions. Pt is on track to dc to home once medically cleared, Reported to the RN on the status of the pt. Bed mobility: NT  Transfers: Contact guard assist/SBA pm  Gait Assistance: Contact guard assist/SBA pm  Distance (ft): 150'am/50' pm  Assistive Device: Rolling walker  Pattern: R Flexed knee;L Flexed knee (narrow MELANY)          Patient was left in bedside chair at end of session with all needs in reach.  The patient's Approx Degree of Impairment: 46.58% has been calculated based on documentation in the [facility] '6 clicks' Inpatient Basic Mobility Short Form. Research supports that patients with this level of impairment may benefit from Home with no services and to out patient PT when cleared by MD. RN aware of patient status post session. DISCHARGE RECOMMENDATIONS  PT Discharge Recommendations: Home with home health PT;24 hour care/supervision     PLAN  PT Treatment Plan: Bed mobility; Body mechanics; Coordination; Endurance; Patient education;Gait training;Strengthening;Stoop training;Stair training;Transfer training;Balance training    SUBJECTIVE  Pt was agreeable to therapy session. OBJECTIVE  Precautions: Spine;Drain(s)    WEIGHT BEARING RESTRICTION  Weight Bearing Restriction: None                PAIN ASSESSMENT   Ratin  Location: back  Management Techniques: Activity promotion; Body mechanics; Relaxation;Repositioning    BALANCE                                                                                                                       Static Sitting: Fair  Dynamic Sitting: Fair           Static Standing: Fair -  Dynamic Standing: Fair -    ACTIVITY TOLERANCE                         O2 WALK       AM-PAC '6-Clicks' INPATIENT SHORT FORM - BASIC MOBILITY  How much difficulty does the patient currently have. .. Patient Difficulty: Turning over in bed (including adjusting bedclothes, sheets and blankets)?: A Little   Patient Difficulty: Sitting down on and standing up from a chair with arms (e.g., wheelchair, bedside commode, etc.): A Little   Patient Difficulty: Moving from lying on back to sitting on the side of the bed?: A Little   How much help from another person does the patient currently need. ..    Help from Another: Moving to and from a bed to a chair (including a wheelchair)?: A Little   Help from Another: Need to walk in hospital room?: A Little   Help from Another: Climbing 3-5 steps with a railing?: A Little     AM-PAC Score:  Raw Score: 18   Approx Degree of Impairment: 46.58%   Standardized Score (AM-PAC Scale): 43.63   CMS Modifier (G-Code): CK        Patient End of Session: Up in chair;Needs met;Call light within reach;RN aware of session/findings; All patient questions and concerns addressed    CURRENT GOALS     Goals to be met by: 3/2/22  Patient Goal Patient's self-stated goal is: go home   Goal #1 Patient is able to demonstrate supine - sit EOB @ level: supervision     Goal #1   Current Status NT received in the chair am/pm   Goal #2 Patient is able to demonstrate transfers Sit to/from Stand at assistance level: supervision with walker - rolling     Goal #2  Current Status CGA with the RW am  SBA pm   Goal #3 Patient is able to ambulate 200 feet with assist device: walker - rolling at assistance level: supervision   Goal #3   Current Status 150' with the RW CGA am  48' with the RW SBA PM   Goal #4 Patient will negotiate 7 stairs/one curb w/ assistive device and supervision   Goal #4   Current Status 4 stairs with 1 HR CGA pm   Goal #5 Patient to demonstrate independence with home activity/exercise instructions provided to patient in preparation for discharge.    Goal #5   Current Status IN PROGRESS   Goal #6    Goal #6  Current Status

## 2022-03-09 ENCOUNTER — ORDER TRANSCRIPTION (OUTPATIENT)
Dept: PHYSICAL THERAPY | Facility: HOSPITAL | Age: 77
End: 2022-03-09

## 2022-03-10 ENCOUNTER — TELEPHONE (OUTPATIENT)
Dept: PHYSICAL THERAPY | Facility: HOSPITAL | Age: 77
End: 2022-03-10

## 2022-03-14 ENCOUNTER — OFFICE VISIT (OUTPATIENT)
Dept: PHYSICAL THERAPY | Facility: HOSPITAL | Age: 77
End: 2022-03-14
Attending: PHYSICIAN ASSISTANT
Payer: COMMERCIAL

## 2022-03-14 DIAGNOSIS — M54.50 LUMBAGO: ICD-10-CM

## 2022-03-14 PROCEDURE — 97110 THERAPEUTIC EXERCISES: CPT | Performed by: PHYSICAL THERAPIST

## 2022-03-14 PROCEDURE — 97162 PT EVAL MOD COMPLEX 30 MIN: CPT | Performed by: PHYSICAL THERAPIST

## 2022-03-15 ENCOUNTER — OFFICE VISIT (OUTPATIENT)
Dept: INTERNAL MEDICINE CLINIC | Facility: CLINIC | Age: 77
End: 2022-03-15
Payer: COMMERCIAL

## 2022-03-15 VITALS
BODY MASS INDEX: 26.28 KG/M2 | RESPIRATION RATE: 14 BRPM | WEIGHT: 194 LBS | HEIGHT: 72 IN | HEART RATE: 76 BPM | DIASTOLIC BLOOD PRESSURE: 60 MMHG | SYSTOLIC BLOOD PRESSURE: 128 MMHG | OXYGEN SATURATION: 96 %

## 2022-03-15 DIAGNOSIS — N13.8 BPH WITH OBSTRUCTION/LOWER URINARY TRACT SYMPTOMS: ICD-10-CM

## 2022-03-15 DIAGNOSIS — I10 ESSENTIAL HYPERTENSION: Primary | ICD-10-CM

## 2022-03-15 DIAGNOSIS — M48.062 LUMBAR STENOSIS WITH NEUROGENIC CLAUDICATION: ICD-10-CM

## 2022-03-15 DIAGNOSIS — E11.9 DIABETES MELLITUS TYPE 2 WITHOUT RETINOPATHY (HCC): ICD-10-CM

## 2022-03-15 DIAGNOSIS — N40.1 BPH WITH OBSTRUCTION/LOWER URINARY TRACT SYMPTOMS: ICD-10-CM

## 2022-03-15 PROCEDURE — 3078F DIAST BP <80 MM HG: CPT | Performed by: INTERNAL MEDICINE

## 2022-03-15 PROCEDURE — 1111F DSCHRG MED/CURRENT MED MERGE: CPT | Performed by: INTERNAL MEDICINE

## 2022-03-15 PROCEDURE — 99214 OFFICE O/P EST MOD 30 MIN: CPT | Performed by: INTERNAL MEDICINE

## 2022-03-15 PROCEDURE — 3008F BODY MASS INDEX DOCD: CPT | Performed by: INTERNAL MEDICINE

## 2022-03-15 PROCEDURE — 3074F SYST BP LT 130 MM HG: CPT | Performed by: INTERNAL MEDICINE

## 2022-03-17 ENCOUNTER — APPOINTMENT (OUTPATIENT)
Dept: PHYSICAL THERAPY | Facility: HOSPITAL | Age: 77
End: 2022-03-17
Attending: PHYSICIAN ASSISTANT
Payer: COMMERCIAL

## 2022-03-18 ENCOUNTER — OFFICE VISIT (OUTPATIENT)
Dept: PHYSICAL THERAPY | Facility: HOSPITAL | Age: 77
End: 2022-03-18
Attending: PHYSICIAN ASSISTANT
Payer: COMMERCIAL

## 2022-03-18 PROCEDURE — 97110 THERAPEUTIC EXERCISES: CPT | Performed by: PHYSICAL THERAPIST

## 2022-03-21 ENCOUNTER — OFFICE VISIT (OUTPATIENT)
Dept: PHYSICAL THERAPY | Facility: HOSPITAL | Age: 77
End: 2022-03-21
Attending: PHYSICIAN ASSISTANT
Payer: COMMERCIAL

## 2022-03-21 DIAGNOSIS — M54.50 LUMBAGO: ICD-10-CM

## 2022-03-21 PROCEDURE — 97110 THERAPEUTIC EXERCISES: CPT | Performed by: PHYSICAL THERAPIST

## 2022-04-04 ENCOUNTER — APPOINTMENT (OUTPATIENT)
Dept: PHYSICAL THERAPY | Facility: HOSPITAL | Age: 77
End: 2022-04-04
Attending: PHYSICIAN ASSISTANT
Payer: COMMERCIAL

## 2022-04-11 ENCOUNTER — APPOINTMENT (OUTPATIENT)
Dept: PHYSICAL THERAPY | Facility: HOSPITAL | Age: 77
End: 2022-04-11
Attending: PHYSICIAN ASSISTANT
Payer: COMMERCIAL

## 2022-04-13 ENCOUNTER — OFFICE VISIT (OUTPATIENT)
Dept: INTERNAL MEDICINE CLINIC | Facility: CLINIC | Age: 77
End: 2022-04-13
Payer: COMMERCIAL

## 2022-04-13 VITALS
BODY MASS INDEX: 26.01 KG/M2 | RESPIRATION RATE: 14 BRPM | HEIGHT: 72 IN | DIASTOLIC BLOOD PRESSURE: 70 MMHG | SYSTOLIC BLOOD PRESSURE: 124 MMHG | WEIGHT: 192 LBS | HEART RATE: 82 BPM | OXYGEN SATURATION: 96 %

## 2022-04-13 DIAGNOSIS — K52.9 GASTROENTERITIS: Primary | ICD-10-CM

## 2022-04-13 PROCEDURE — 3074F SYST BP LT 130 MM HG: CPT | Performed by: INTERNAL MEDICINE

## 2022-04-13 PROCEDURE — 3008F BODY MASS INDEX DOCD: CPT | Performed by: INTERNAL MEDICINE

## 2022-04-13 PROCEDURE — 99213 OFFICE O/P EST LOW 20 MIN: CPT | Performed by: INTERNAL MEDICINE

## 2022-04-13 PROCEDURE — 3078F DIAST BP <80 MM HG: CPT | Performed by: INTERNAL MEDICINE

## 2022-04-14 ENCOUNTER — APPOINTMENT (OUTPATIENT)
Dept: PHYSICAL THERAPY | Facility: HOSPITAL | Age: 77
End: 2022-04-14
Attending: PHYSICIAN ASSISTANT
Payer: COMMERCIAL

## 2022-04-15 ENCOUNTER — LAB ENCOUNTER (OUTPATIENT)
Dept: LAB | Facility: HOSPITAL | Age: 77
End: 2022-04-15
Attending: UROLOGY
Payer: COMMERCIAL

## 2022-04-15 DIAGNOSIS — R97.20 ELEVATED PSA: ICD-10-CM

## 2022-04-15 DIAGNOSIS — R35.1 NOCTURIA: ICD-10-CM

## 2022-04-15 LAB
BILIRUB UR QL: NEGATIVE
CLARITY UR: CLEAR
COLOR UR: YELLOW
GLUCOSE UR-MCNC: NEGATIVE MG/DL
HGB UR QL STRIP.AUTO: NEGATIVE
KETONES UR-MCNC: NEGATIVE MG/DL
LEUKOCYTE ESTERASE UR QL STRIP.AUTO: NEGATIVE
NITRITE UR QL STRIP.AUTO: NEGATIVE
PH UR: 6 [PH] (ref 5–8)
PROT UR-MCNC: NEGATIVE MG/DL
PSA FREE MFR SERPL: 13 %
PSA FREE SERPL-MCNC: 0.86 NG/ML
PSA SERPL-MCNC: 6.57 NG/ML (ref ?–4)
SP GR UR STRIP: 1.01 (ref 1–1.03)
UROBILINOGEN UR STRIP-ACNC: <2
VIT C UR-MCNC: NEGATIVE MG/DL

## 2022-04-15 PROCEDURE — 84154 ASSAY OF PSA FREE: CPT

## 2022-04-15 PROCEDURE — 36415 COLL VENOUS BLD VENIPUNCTURE: CPT

## 2022-04-15 PROCEDURE — 81003 URINALYSIS AUTO W/O SCOPE: CPT

## 2022-04-15 PROCEDURE — 84153 ASSAY OF PSA TOTAL: CPT

## 2022-04-18 ENCOUNTER — OFFICE VISIT (OUTPATIENT)
Dept: PHYSICAL THERAPY | Facility: HOSPITAL | Age: 77
End: 2022-04-18
Attending: PHYSICIAN ASSISTANT
Payer: COMMERCIAL

## 2022-04-18 DIAGNOSIS — M54.50 LUMBAGO: ICD-10-CM

## 2022-04-18 PROCEDURE — 97110 THERAPEUTIC EXERCISES: CPT | Performed by: PHYSICAL THERAPIST

## 2022-04-22 ENCOUNTER — OFFICE VISIT (OUTPATIENT)
Dept: SURGERY | Facility: CLINIC | Age: 77
End: 2022-04-22
Payer: COMMERCIAL

## 2022-04-22 DIAGNOSIS — Z87.448 HISTORY OF GROSS HEMATURIA: ICD-10-CM

## 2022-04-22 DIAGNOSIS — R35.0 BENIGN PROSTATIC HYPERPLASIA WITH URINARY FREQUENCY: ICD-10-CM

## 2022-04-22 DIAGNOSIS — Z87.448 HISTORY OF BLADDER STONE: ICD-10-CM

## 2022-04-22 DIAGNOSIS — Z87.448 HISTORY OF URETHRAL STRICTURE: ICD-10-CM

## 2022-04-22 DIAGNOSIS — Q54.1 PENILE HYPOSPADIAS: ICD-10-CM

## 2022-04-22 DIAGNOSIS — R97.20 ELEVATED PSA: Primary | ICD-10-CM

## 2022-04-22 DIAGNOSIS — N52.01 ERECTILE DYSFUNCTION DUE TO ARTERIAL INSUFFICIENCY: ICD-10-CM

## 2022-04-22 DIAGNOSIS — R35.1 NOCTURIA: ICD-10-CM

## 2022-04-22 DIAGNOSIS — R39.198 URINE STREAM SPRAYING: ICD-10-CM

## 2022-04-22 DIAGNOSIS — N50.89 SCROTAL ERYTHEMA: ICD-10-CM

## 2022-04-22 DIAGNOSIS — N40.1 BENIGN PROSTATIC HYPERPLASIA WITH URINARY FREQUENCY: ICD-10-CM

## 2022-04-22 PROCEDURE — 99215 OFFICE O/P EST HI 40 MIN: CPT | Performed by: UROLOGY

## 2022-04-22 RX ORDER — FLUCONAZOLE 200 MG/1
200 TABLET ORAL ONCE
Qty: 1 TABLET | Refills: 0 | Status: SHIPPED | OUTPATIENT
Start: 2022-04-22 | End: 2022-04-22

## 2022-04-26 ENCOUNTER — OFFICE VISIT (OUTPATIENT)
Dept: PHYSICAL THERAPY | Facility: HOSPITAL | Age: 77
End: 2022-04-26
Attending: PHYSICIAN ASSISTANT
Payer: COMMERCIAL

## 2022-04-26 DIAGNOSIS — M54.50 LUMBAGO: ICD-10-CM

## 2022-04-26 PROCEDURE — 97110 THERAPEUTIC EXERCISES: CPT | Performed by: PHYSICAL THERAPIST

## 2022-04-29 ENCOUNTER — OFFICE VISIT (OUTPATIENT)
Dept: PHYSICAL THERAPY | Facility: HOSPITAL | Age: 77
End: 2022-04-29
Attending: PHYSICIAN ASSISTANT
Payer: COMMERCIAL

## 2022-04-29 DIAGNOSIS — M54.50 LUMBAGO: ICD-10-CM

## 2022-04-29 PROCEDURE — 97110 THERAPEUTIC EXERCISES: CPT | Performed by: PHYSICAL THERAPIST

## 2022-04-29 PROCEDURE — 97140 MANUAL THERAPY 1/> REGIONS: CPT | Performed by: PHYSICAL THERAPIST

## 2022-05-02 ENCOUNTER — OFFICE VISIT (OUTPATIENT)
Dept: PHYSICAL THERAPY | Facility: HOSPITAL | Age: 77
End: 2022-05-02
Attending: PHYSICIAN ASSISTANT
Payer: COMMERCIAL

## 2022-05-02 ENCOUNTER — HOSPITAL ENCOUNTER (OUTPATIENT)
Dept: GENERAL RADIOLOGY | Facility: HOSPITAL | Age: 77
Discharge: HOME | End: 2022-05-02
Attending: PHYSICIAN ASSISTANT
Payer: COMMERCIAL

## 2022-05-02 DIAGNOSIS — M54.50 LUMBAGO: ICD-10-CM

## 2022-05-02 DIAGNOSIS — M54.50 LOW BACK PAIN: ICD-10-CM

## 2022-05-02 PROCEDURE — 72100 X-RAY EXAM L-S SPINE 2/3 VWS: CPT | Performed by: PHYSICIAN ASSISTANT

## 2022-05-02 PROCEDURE — 97140 MANUAL THERAPY 1/> REGIONS: CPT | Performed by: PHYSICAL THERAPIST

## 2022-05-05 ENCOUNTER — HOSPITAL ENCOUNTER (OUTPATIENT)
Dept: MRI IMAGING | Facility: HOSPITAL | Age: 77
Discharge: HOME | End: 2022-05-05
Attending: PHYSICIAN ASSISTANT

## 2022-05-05 ENCOUNTER — APPOINTMENT (OUTPATIENT)
Dept: PHYSICAL THERAPY | Facility: HOSPITAL | Age: 77
End: 2022-05-05
Attending: PHYSICIAN ASSISTANT
Payer: COMMERCIAL

## 2022-05-05 DIAGNOSIS — M47.22 OTHER SPONDYLOSIS WITH RADICULOPATHY, CERVICAL REGION: ICD-10-CM

## 2022-05-05 NOTE — TELEPHONE ENCOUNTER
Spoke with pt and determined that for the past 7 days he noticed he would have an incident of grossly bloody urine and also passed a large clot one time and then each subsequent urination would be less blody till it cleared.   Denies any other UTI symptoms [Straight Catheterization] : insertion of a straight catheter [Urinary Retention] : urinary retention [None] : there were no complications with the catheter insertion [No Complications] : no complications [Tolerated Well] : the patient tolerated the procedure well [Post procedure instructions and information given] : Post procedure instructions and information were given and reviewed with patient.

## 2022-05-06 ENCOUNTER — HOSPITAL ENCOUNTER (OUTPATIENT)
Dept: MRI IMAGING | Facility: HOSPITAL | Age: 77
Discharge: HOME | End: 2022-05-06
Attending: PHYSICIAN ASSISTANT
Payer: COMMERCIAL

## 2022-05-06 PROCEDURE — 72141 MRI NECK SPINE W/O DYE: CPT | Performed by: PHYSICIAN ASSISTANT

## 2022-05-09 ENCOUNTER — OFFICE VISIT (OUTPATIENT)
Dept: PHYSICAL THERAPY | Facility: HOSPITAL | Age: 77
End: 2022-05-09
Attending: PHYSICIAN ASSISTANT
Payer: COMMERCIAL

## 2022-05-09 DIAGNOSIS — M54.50 LUMBAGO: ICD-10-CM

## 2022-05-09 PROCEDURE — 97140 MANUAL THERAPY 1/> REGIONS: CPT | Performed by: PHYSICAL THERAPIST

## 2022-05-11 ENCOUNTER — OFFICE VISIT (OUTPATIENT)
Dept: INTERNAL MEDICINE CLINIC | Facility: CLINIC | Age: 77
End: 2022-05-11
Payer: COMMERCIAL

## 2022-05-11 VITALS
HEART RATE: 78 BPM | BODY MASS INDEX: 26.41 KG/M2 | OXYGEN SATURATION: 95 % | RESPIRATION RATE: 14 BRPM | DIASTOLIC BLOOD PRESSURE: 60 MMHG | WEIGHT: 195 LBS | SYSTOLIC BLOOD PRESSURE: 110 MMHG | HEIGHT: 72 IN

## 2022-05-11 DIAGNOSIS — M48.9 CERVICAL SPINE DISEASE: ICD-10-CM

## 2022-05-11 DIAGNOSIS — I10 ESSENTIAL HYPERTENSION: Primary | ICD-10-CM

## 2022-05-11 DIAGNOSIS — E78.2 MIXED HYPERLIPIDEMIA: ICD-10-CM

## 2022-05-11 DIAGNOSIS — E11.9 DIABETES MELLITUS TYPE 2 WITHOUT RETINOPATHY (HCC): ICD-10-CM

## 2022-05-11 DIAGNOSIS — R01.1 HEART MURMUR: ICD-10-CM

## 2022-05-11 PROCEDURE — 3008F BODY MASS INDEX DOCD: CPT | Performed by: INTERNAL MEDICINE

## 2022-05-11 PROCEDURE — 99214 OFFICE O/P EST MOD 30 MIN: CPT | Performed by: INTERNAL MEDICINE

## 2022-05-11 PROCEDURE — 3078F DIAST BP <80 MM HG: CPT | Performed by: INTERNAL MEDICINE

## 2022-05-11 PROCEDURE — 3074F SYST BP LT 130 MM HG: CPT | Performed by: INTERNAL MEDICINE

## 2022-05-17 ENCOUNTER — HOSPITAL ENCOUNTER (OUTPATIENT)
Dept: CV DIAGNOSTICS | Facility: HOSPITAL | Age: 77
Discharge: HOME | End: 2022-05-17
Attending: INTERNAL MEDICINE
Payer: COMMERCIAL

## 2022-05-17 DIAGNOSIS — R01.1 HEART MURMUR: ICD-10-CM

## 2022-05-17 PROCEDURE — 93306 TTE W/DOPPLER COMPLETE: CPT | Performed by: INTERNAL MEDICINE

## 2022-05-19 ENCOUNTER — OFFICE VISIT (OUTPATIENT)
Dept: PHYSICAL THERAPY | Facility: HOSPITAL | Age: 77
End: 2022-05-19
Attending: PHYSICIAN ASSISTANT
Payer: COMMERCIAL

## 2022-05-19 PROCEDURE — 97140 MANUAL THERAPY 1/> REGIONS: CPT | Performed by: PHYSICAL THERAPIST

## 2022-05-23 ENCOUNTER — OFFICE VISIT (OUTPATIENT)
Dept: PHYSICAL MEDICINE AND REHAB | Facility: CLINIC | Age: 77
End: 2022-05-23
Payer: COMMERCIAL

## 2022-05-23 VITALS
WEIGHT: 195 LBS | DIASTOLIC BLOOD PRESSURE: 70 MMHG | HEIGHT: 72 IN | SYSTOLIC BLOOD PRESSURE: 132 MMHG | HEART RATE: 74 BPM | OXYGEN SATURATION: 98 % | BODY MASS INDEX: 26.41 KG/M2

## 2022-05-23 DIAGNOSIS — M96.1 LUMBAR POST-LAMINECTOMY SYNDROME: ICD-10-CM

## 2022-05-23 DIAGNOSIS — M50.90 CERVICAL DISC DISEASE: ICD-10-CM

## 2022-05-23 DIAGNOSIS — M48.061 LUMBAR FORAMINAL STENOSIS: ICD-10-CM

## 2022-05-23 DIAGNOSIS — M51.9 LUMBAR DISC DISEASE: ICD-10-CM

## 2022-05-23 DIAGNOSIS — M43.10 RETROLISTHESIS: ICD-10-CM

## 2022-05-23 DIAGNOSIS — M50.20 CERVICAL HERNIATED DISC: ICD-10-CM

## 2022-05-23 DIAGNOSIS — M48.02 CERVICAL STENOSIS OF SPINE: ICD-10-CM

## 2022-05-23 DIAGNOSIS — M54.16 LUMBAR RADICULOPATHY: ICD-10-CM

## 2022-05-23 DIAGNOSIS — G56.21 ULNAR NEUROPATHY AT ELBOW OF RIGHT UPPER EXTREMITY: Primary | ICD-10-CM

## 2022-05-23 DIAGNOSIS — Q76.1 CERVICAL FUSION SYNDROME: ICD-10-CM

## 2022-05-23 PROBLEM — M43.16 SPONDYLOLISTHESIS OF LUMBAR REGION: Status: RESOLVED | Noted: 2018-05-09 | Resolved: 2022-05-23

## 2022-05-23 PROBLEM — M48.062 LUMBAR STENOSIS WITH NEUROGENIC CLAUDICATION: Status: RESOLVED | Noted: 2022-03-15 | Resolved: 2022-05-23

## 2022-05-23 NOTE — PATIENT INSTRUCTIONS
Plan  I will do an EMG/NCS of the right arm and hand. He will continue with the PT for the lumbar spine. He kya use the gabapentin as needed for the right hand pain. He will follow follow up after having the above testing.

## 2022-05-24 ENCOUNTER — PATIENT MESSAGE (OUTPATIENT)
Dept: ORTHOPEDICS CLINIC | Facility: CLINIC | Age: 77
End: 2022-05-24

## 2022-05-24 ENCOUNTER — OFFICE VISIT (OUTPATIENT)
Dept: PHYSICAL THERAPY | Facility: HOSPITAL | Age: 77
End: 2022-05-24
Attending: PHYSICIAN ASSISTANT
Payer: COMMERCIAL

## 2022-05-24 DIAGNOSIS — M17.11 OSTEOARTHRITIS OF RIGHT KNEE, UNSPECIFIED OSTEOARTHRITIS TYPE: Primary | ICD-10-CM

## 2022-05-24 PROCEDURE — 97112 NEUROMUSCULAR REEDUCATION: CPT | Performed by: PHYSICAL THERAPIST

## 2022-05-24 PROCEDURE — 97110 THERAPEUTIC EXERCISES: CPT | Performed by: PHYSICAL THERAPIST

## 2022-05-24 NOTE — TELEPHONE ENCOUNTER
From: Amanda Lozano  To: Brandy Nunes MD  Sent: 5/24/2022 2:54 PM CDT  Subject: Right knee     Hello Dr Radha Leavitt, I'm having a lot of pain in my right now and should be eligible for the 4 shot pain releave treatment. Can I get this schedule?   Thank you,  Juan Shelton  [birth year]  [phone]

## 2022-05-26 ENCOUNTER — OFFICE VISIT (OUTPATIENT)
Dept: PHYSICAL THERAPY | Facility: HOSPITAL | Age: 77
End: 2022-05-26
Attending: PHYSICIAN ASSISTANT
Payer: COMMERCIAL

## 2022-05-26 ENCOUNTER — TELEPHONE (OUTPATIENT)
Dept: PHYSICAL THERAPY | Facility: HOSPITAL | Age: 77
End: 2022-05-26

## 2022-05-26 ENCOUNTER — APPOINTMENT (OUTPATIENT)
Dept: URBAN - METROPOLITAN AREA CLINIC 244 | Age: 77
Setting detail: DERMATOLOGY
End: 2022-05-26

## 2022-05-26 DIAGNOSIS — L82.0 INFLAMED SEBORRHEIC KERATOSIS: ICD-10-CM

## 2022-05-26 DIAGNOSIS — L57.0 ACTINIC KERATOSIS: ICD-10-CM

## 2022-05-26 DIAGNOSIS — L82.1 OTHER SEBORRHEIC KERATOSIS: ICD-10-CM

## 2022-05-26 DIAGNOSIS — K13.0 DISEASES OF LIPS: ICD-10-CM

## 2022-05-26 DIAGNOSIS — D22 MELANOCYTIC NEVI: ICD-10-CM

## 2022-05-26 DIAGNOSIS — L81.4 OTHER MELANIN HYPERPIGMENTATION: ICD-10-CM

## 2022-05-26 PROBLEM — D22.5 MELANOCYTIC NEVI OF TRUNK: Status: ACTIVE | Noted: 2022-05-26

## 2022-05-26 PROCEDURE — OTHER PRESCRIPTION MEDICATION MANAGEMENT: OTHER

## 2022-05-26 PROCEDURE — 17000 DESTRUCT PREMALG LESION: CPT | Mod: 59

## 2022-05-26 PROCEDURE — 99213 OFFICE O/P EST LOW 20 MIN: CPT | Mod: 25

## 2022-05-26 PROCEDURE — OTHER PRESCRIPTION: OTHER

## 2022-05-26 PROCEDURE — 97112 NEUROMUSCULAR REEDUCATION: CPT | Performed by: PHYSICAL THERAPIST

## 2022-05-26 PROCEDURE — OTHER LIQUID NITROGEN: OTHER

## 2022-05-26 PROCEDURE — 17110 DESTRUCT B9 LESION 1-14: CPT

## 2022-05-26 PROCEDURE — OTHER COUNSELING: OTHER

## 2022-05-26 RX ORDER — KETOCONAZOLE 20 MG/G
CREAM TOPICAL BID
Qty: 15 | Refills: 3 | Status: ERX | COMMUNITY
Start: 2022-05-26

## 2022-05-26 ASSESSMENT — LOCATION ZONE DERM
LOCATION ZONE: ARM
LOCATION ZONE: TRUNK
LOCATION ZONE: LIP
LOCATION ZONE: LEG
LOCATION ZONE: HAND

## 2022-05-26 ASSESSMENT — LOCATION DETAILED DESCRIPTION DERM
LOCATION DETAILED: LEFT DISTAL PRETIBIAL REGION
LOCATION DETAILED: LEFT ORAL COMMISSURE
LOCATION DETAILED: RIGHT SUPERIOR MEDIAL UPPER BACK
LOCATION DETAILED: LEFT RADIAL DORSAL HAND
LOCATION DETAILED: LEFT DISTAL DORSAL FOREARM
LOCATION DETAILED: RIGHT ORAL COMMISSURE
LOCATION DETAILED: LEFT MEDIAL UPPER BACK
LOCATION DETAILED: UPPER STERNUM

## 2022-05-26 ASSESSMENT — LOCATION SIMPLE DESCRIPTION DERM
LOCATION SIMPLE: LEFT FOREARM
LOCATION SIMPLE: RIGHT LIP
LOCATION SIMPLE: LEFT HAND
LOCATION SIMPLE: LEFT PRETIBIAL REGION
LOCATION SIMPLE: LEFT UPPER BACK
LOCATION SIMPLE: RIGHT UPPER BACK
LOCATION SIMPLE: LEFT LIP
LOCATION SIMPLE: CHEST

## 2022-05-26 NOTE — PROCEDURE: COUNSELING
Detail Level: Generalized
Detail Level: Simple
Patient Specific Counseling (Will Not Stick From Patient To Patient): L Ant Leg photo taken 4cm x 4cm

## 2022-05-26 NOTE — PROCEDURE: LIQUID NITROGEN
Show Applicator Variable?: Yes
Render Post-Care Instructions In Note?: no
Application Tool (Optional): Liquid Nitrogen Sprayer
Spray Paint Text: The liquid nitrogen was applied to the skin utilizing a spray paint frosting technique.
Post-Care Instructions: I reviewed with the patient in detail post-care instructions. Patient is to wear sunprotection, and avoid picking at any of the treated lesions. Pt may apply Vaseline to crusted or scabbing areas.
Number Of Freeze-Thaw Cycles: 2 freeze-thaw cycles
Medical Necessity Clause: This procedure was medically necessary because the lesions that were treated were:
Medical Necessity Information: It is in your best interest to select a reason for this procedure from the list below. All of these items fulfill various CMS LCD requirements except the new and changing color options.
Number Of Freeze-Thaw Cycles: 1 freeze-thaw cycle
Duration Of Freeze Thaw-Cycle (Seconds): 5-10
Consent: The patient's consent was obtained including but not limited to risks of crusting, scabbing, blistering, scarring, darker or lighter pigmentary change, recurrence, incomplete removal and infection.
Detail Level: Simple
Duration Of Freeze Thaw-Cycle (Seconds): 2

## 2022-06-03 ENCOUNTER — OFFICE VISIT (OUTPATIENT)
Dept: PHYSICAL THERAPY | Facility: HOSPITAL | Age: 77
End: 2022-06-03
Attending: PHYSICIAN ASSISTANT
Payer: COMMERCIAL

## 2022-06-03 PROCEDURE — 97140 MANUAL THERAPY 1/> REGIONS: CPT | Performed by: PHYSICAL THERAPIST

## 2022-06-03 PROCEDURE — 97110 THERAPEUTIC EXERCISES: CPT | Performed by: PHYSICAL THERAPIST

## 2022-06-06 ENCOUNTER — APPOINTMENT (OUTPATIENT)
Dept: PHYSICAL THERAPY | Facility: HOSPITAL | Age: 77
End: 2022-06-06
Attending: PHYSICIAN ASSISTANT
Payer: COMMERCIAL

## 2022-06-07 ENCOUNTER — OFFICE VISIT (OUTPATIENT)
Dept: PHYSICAL THERAPY | Facility: HOSPITAL | Age: 77
End: 2022-06-07
Attending: PHYSICIAN ASSISTANT
Payer: COMMERCIAL

## 2022-06-07 PROCEDURE — 97140 MANUAL THERAPY 1/> REGIONS: CPT | Performed by: PHYSICAL THERAPIST

## 2022-06-14 ENCOUNTER — OFFICE VISIT (OUTPATIENT)
Dept: PHYSICAL THERAPY | Facility: HOSPITAL | Age: 77
End: 2022-06-14
Attending: PHYSICIAN ASSISTANT
Payer: COMMERCIAL

## 2022-06-14 PROCEDURE — 97112 NEUROMUSCULAR REEDUCATION: CPT | Performed by: PHYSICAL THERAPIST

## 2022-06-14 PROCEDURE — 97140 MANUAL THERAPY 1/> REGIONS: CPT | Performed by: PHYSICAL THERAPIST

## 2022-06-23 ENCOUNTER — OFFICE VISIT (OUTPATIENT)
Dept: PHYSICAL THERAPY | Facility: HOSPITAL | Age: 77
End: 2022-06-23
Attending: PHYSICIAN ASSISTANT
Payer: COMMERCIAL

## 2022-06-23 PROCEDURE — 97140 MANUAL THERAPY 1/> REGIONS: CPT | Performed by: PHYSICAL THERAPIST

## 2022-06-23 PROCEDURE — 97112 NEUROMUSCULAR REEDUCATION: CPT | Performed by: PHYSICAL THERAPIST

## 2022-07-01 ENCOUNTER — APPOINTMENT (OUTPATIENT)
Dept: PHYSICAL THERAPY | Facility: HOSPITAL | Age: 77
End: 2022-07-01
Attending: PHYSICIAN ASSISTANT
Payer: COMMERCIAL

## 2022-07-05 ENCOUNTER — APPOINTMENT (OUTPATIENT)
Dept: PHYSICAL THERAPY | Facility: HOSPITAL | Age: 77
End: 2022-07-05
Attending: PHYSICIAN ASSISTANT
Payer: COMMERCIAL

## 2022-07-05 ENCOUNTER — PROCEDURE VISIT (OUTPATIENT)
Dept: PHYSICAL MEDICINE AND REHAB | Facility: CLINIC | Age: 77
End: 2022-07-05
Payer: COMMERCIAL

## 2022-07-05 DIAGNOSIS — G56.01 RIGHT CARPAL TUNNEL SYNDROME: ICD-10-CM

## 2022-07-05 DIAGNOSIS — G56.21 ULNAR NEUROPATHY AT ELBOW OF RIGHT UPPER EXTREMITY: Primary | ICD-10-CM

## 2022-07-05 DIAGNOSIS — M54.12 CERVICAL RADICULOPATHY: ICD-10-CM

## 2022-07-05 PROCEDURE — 95886 MUSC TEST DONE W/N TEST COMP: CPT | Performed by: PHYSICAL MEDICINE & REHABILITATION

## 2022-07-05 PROCEDURE — 95910 NRV CNDJ TEST 7-8 STUDIES: CPT | Performed by: PHYSICAL MEDICINE & REHABILITATION

## 2022-07-07 ENCOUNTER — TELEPHONE (OUTPATIENT)
Dept: PHYSICAL THERAPY | Facility: HOSPITAL | Age: 77
End: 2022-07-07

## 2022-07-13 ENCOUNTER — TELEPHONE (OUTPATIENT)
Dept: PHYSICAL MEDICINE AND REHAB | Facility: CLINIC | Age: 77
End: 2022-07-13

## 2022-07-13 NOTE — TELEPHONE ENCOUNTER
Submitted request to [facility] for Occupational Therapy CPT [id number]     Status: pending [facility] clinical review

## 2022-07-14 NOTE — TELEPHONE ENCOUNTER
Sent message to Bernadette Molina at [facility] to check status of OT  Awaiting follow up

## 2022-07-18 ENCOUNTER — OFFICE VISIT (OUTPATIENT)
Dept: OCCUPATIONAL MEDICINE | Facility: HOSPITAL | Age: 77
End: 2022-07-18
Attending: PHYSICAL MEDICINE & REHABILITATION
Payer: COMMERCIAL

## 2022-07-18 DIAGNOSIS — M54.12 CERVICAL RADICULOPATHY: ICD-10-CM

## 2022-07-18 DIAGNOSIS — G56.01 RIGHT CARPAL TUNNEL SYNDROME: ICD-10-CM

## 2022-07-18 DIAGNOSIS — G56.21 ULNAR NEUROPATHY AT ELBOW OF RIGHT UPPER EXTREMITY: ICD-10-CM

## 2022-07-18 PROCEDURE — 97166 OT EVAL MOD COMPLEX 45 MIN: CPT | Performed by: OCCUPATIONAL THERAPIST

## 2022-07-18 NOTE — TELEPHONE ENCOUNTER
OT Approved by [facility] valid 7/13/22-10/16/22  Patient is already scheduled

## 2022-07-19 ENCOUNTER — APPOINTMENT (OUTPATIENT)
Dept: PHYSICAL THERAPY | Facility: HOSPITAL | Age: 77
End: 2022-07-19
Attending: PHYSICIAN ASSISTANT
Payer: COMMERCIAL

## 2022-07-19 RX ORDER — OMEPRAZOLE 40 MG/1
40 CAPSULE, DELAYED RELEASE ORAL DAILY
Qty: 90 CAPSULE | Refills: 0 | Status: SHIPPED | OUTPATIENT
Start: 2022-07-19 | End: 2022-10-18

## 2022-07-25 ENCOUNTER — OFFICE VISIT (OUTPATIENT)
Dept: OCCUPATIONAL MEDICINE | Facility: HOSPITAL | Age: 77
End: 2022-07-25
Attending: PHYSICAL MEDICINE & REHABILITATION
Payer: COMMERCIAL

## 2022-07-25 DIAGNOSIS — G56.01 RIGHT CARPAL TUNNEL SYNDROME: ICD-10-CM

## 2022-07-25 DIAGNOSIS — M54.12 CERVICAL RADICULOPATHY: ICD-10-CM

## 2022-07-25 DIAGNOSIS — G56.21 ULNAR NEUROPATHY AT ELBOW OF RIGHT UPPER EXTREMITY: ICD-10-CM

## 2022-07-25 PROCEDURE — 97140 MANUAL THERAPY 1/> REGIONS: CPT | Performed by: OCCUPATIONAL THERAPIST

## 2022-07-25 PROCEDURE — 97110 THERAPEUTIC EXERCISES: CPT | Performed by: OCCUPATIONAL THERAPIST

## 2022-07-25 NOTE — PROGRESS NOTES
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 \"    Objective: Treatment began with moist heat follow by STM, MFR, manual nerve glides, intrinsic hand strengthening and review of HEP. See flow sheet below  Date 07/18/22 07/25/22               Visit # 1  2                                  Evaluation                 Manual                   STM of right cubital tunnel   8 min               kinesiotape   0 stretch along ulnar nerve cubital tunnel to ulnar wrist          Manual ulnar and median  nerve glides in supine    x5 each                MFR in supne    8 min               Ther ex                   Ulnar nerve glides                  Blue web stretch wrist flexion   20 sec x 5                Blue web      x20                digit small finger extension, abduction, AROM    x10                Resisted digit extension, abduction with rubber band    x10                                                       HEP instruction   ulnar nerve glides , hold 5 sec x 4, three times/day  intrinsic hand exercises, see below                                   Therapeutic Activity                                       Neuromuscular Re-education                                                             Modalities                                                                         Assessment: Observed restriction of ulnar nerve in cubital tunnel during manual nerve glides, no change in symptoms. Observed weakness of small finger interosseus, reviewed and issued intrinsic hand strengthening HEP.   Pt voiced understanding and performs HEP correctly without reported pain.      Goals:   Pt complaints of paresthesia in right small finger will decrease by 50%. Pt will be independent and compliant with comprehensive HEP to maintain progress achieved in OT. Patient will demonstrate increase in right  strength to at least 35 lbs for ease in carrying grocery bag. Patient will demonstrate independence with don/doff elbow extension orthosis and anticlaw orthosis. Patient will demonstrate increase in right small finger PIP extension to at least -10 degrees. Plan: Reassess benefit for kinesiotape, continue with working on reducing paresthesia and strengthening.       Charges: MT2,TE Total Timed Treatment: 42 min  Total Treatment Time: 45 min

## 2022-07-26 ENCOUNTER — APPOINTMENT (OUTPATIENT)
Dept: PHYSICAL THERAPY | Facility: HOSPITAL | Age: 77
End: 2022-07-26
Attending: PHYSICIAN ASSISTANT
Payer: COMMERCIAL

## 2022-07-29 ENCOUNTER — OFFICE VISIT (OUTPATIENT)
Dept: OCCUPATIONAL MEDICINE | Facility: HOSPITAL | Age: 77
End: 2022-07-29
Attending: PHYSICAL MEDICINE & REHABILITATION
Payer: COMMERCIAL

## 2022-07-29 PROCEDURE — 97110 THERAPEUTIC EXERCISES: CPT | Performed by: OCCUPATIONAL THERAPIST

## 2022-07-29 PROCEDURE — 97140 MANUAL THERAPY 1/> REGIONS: CPT | Performed by: OCCUPATIONAL THERAPIST

## 2022-07-29 NOTE — PROGRESS NOTES
Dx:     Ulnar neuropathy at elbow of right upper extremity (G56.21)  Right carpal tunnel syndrome (G56.01)  Cervical radiculopathy (M54.12)     Authorized # of Visits:  10        Next MD visit: none scheduled  Fall Risk: standard         Precautions: diabetes, HTN, back surgery         Medication Changes since last visit?: No  Subjective: Patient feeling mild relief in right hand stiffness since last visit. Objective: Treatment began with moist heat follow by STM, MFR, manual nerve glides, intrinsic hand strengthening and review of HEP.  See flow sheet below      Date 07/18/22 07/25/22 07/29/22             Visit # 1  2  3                                Evaluation                 Manual                   STM of right cubital tunnel   8 min  5 min             kinesiotape   0 stretch along ulnar nerve cubital tunnel to ulnar wrist Right shoulder gentle oscilating 3 min         Manual ulnar and median  nerve glides in supine    x5 each  x5 each              MFR in supne    8 min  5 min             Ther ex                   Ulnar nerve glides                  Blue web stretch wrist flexion   20 sec x 5  20 sec x5              Blue web      x20  x20 , 2 sets              digit small finger extension, abduction, AROM    x10  x20 small finger  And ring finger, 1 set              Resisted digit extension, abduction with rubber band    x10  x20, 1 set              Reverse blocking      x20              Green putty, IP       4 min             HEP instruction   ulnar nerve glides , hold 5 sec x 4, three times/day  intrinsic hand exercises, see below  rubber band intrinsic hand strengthening with rubber bands x 20, 4x/day                                 Therapeutic Activity                                       Neuromuscular Re-education                                                             Modalities                                                               Assessment: Patient didn't feel any benefit from kinesiotape, hold off on reapply. Provided information on appropriate elbow brace for overnight. Patient experiencing pain in right shoulder during manual ulnar nerve glides , required repositioning shoulder and keeping head and trunk elevated for comfort. Goals:   Pt complaints of paresthesia in right small finger will decrease by 50%. Pt will be independent and compliant with comprehensive HEP to maintain progress achieved in OT. Patient will demonstrate increase in right  strength to at least 35 lbs for ease in carrying grocery bag. Patient will demonstrate independence with don/doff elbow extension orthosis and anticlaw orthosis. Patient will demonstrate increase in right small finger PIP extension to at least -10 degrees. Plan: Continue to work towards improving sensation in right hand as well as increasing  and pinch strength for functional grasping.     Charges: MT,TE2 Total Timed Treatment: 42 min  Total Treatment Time: 45 min

## 2022-08-01 ENCOUNTER — OFFICE VISIT (OUTPATIENT)
Dept: OCCUPATIONAL MEDICINE | Facility: HOSPITAL | Age: 77
End: 2022-08-01
Attending: PHYSICAL MEDICINE & REHABILITATION
Payer: COMMERCIAL

## 2022-08-01 PROCEDURE — 97035 APP MDLTY 1+ULTRASOUND EA 15: CPT | Performed by: OCCUPATIONAL THERAPIST

## 2022-08-01 PROCEDURE — 97140 MANUAL THERAPY 1/> REGIONS: CPT | Performed by: OCCUPATIONAL THERAPIST

## 2022-08-01 PROCEDURE — 97110 THERAPEUTIC EXERCISES: CPT | Performed by: OCCUPATIONAL THERAPIST

## 2022-08-01 NOTE — PROGRESS NOTES
Dx:     Ulnar neuropathy at elbow of right upper extremity (G56.21)  Right carpal tunnel syndrome (G56.01)  Cervical radiculopathy (M54.12)     Authorized # of Visits:  10        Next MD visit: none scheduled  Fall Risk: standard         Precautions: diabetes, HTN, back surgery         Medication Changes since last visit?: No  Subjective: \"My ring finger is getting stiff, don't know why. \"    Objective: Treatment began with STM, MFR, manual nerve glides, intrinsic hand strengthening, ultrasound, kinesiotape, and review of HEP.  See flow sheet below      Date 07/18/22 07/25/22 07/29/22 08/01/22           Visit # 1  2  3  4                              Evaluation                 Manual                   STM of right cubital tunnel   8 min  5 min  8 min           kinesiotape   0 stretch along ulnar nerve cubital tunnel to ulnar wrist Right shoulder gentle oscilating 3 min Right shoulder gentle oscilating 3 min    Right RF PROM 3 min           20% stretch along ulnar nerve cubital tunnel to ulnar wrist        Manual ulnar and median  nerve glides in supine    x5 each  x5 each  x5 each            MFR in supne    8 min  5 min  10 min           Ther ex                   Ulnar nerve glides                  Blue web stretch wrist flexion   20 sec x 5  20 sec x5              Blue web      x20  x20 , 2 sets              digit small finger extension, abduction, AROM    x10  x20 small finger  And ring finger, 1 set  x20 small finger  And ring finger, 1 se            Resisted digit extension, abduction with rubber band    x10  x20, 1 set  x20, 1 set            Reverse blocking      x20  x20            Green putty, IP       4 min             HEP instruction   ulnar nerve glides , hold 5 sec x 4, three times/day  intrinsic hand exercises, see below  rubber band intrinsic hand strengthening with rubber bands x 20, 4x/day                                 Therapeutic Activity Neuromuscular Re-education                                                             Modalities                    ultrasound . 3.0 MHZ, 50%, 1/2 w/cm2        8 min just proximal to cubital tunnel                                   Assessment: Patient spent weekend without kinesiotape and noticed a significant difference, felt kinesiotape had been helpful with reducing irritation to nerve after all. Patient has yet to begin using elbow extension brace overnight. Initiated pulsed ultrasound along ulnar nerve 4 cm distal to cubital tunnel at point of soreness along nerve. .      Goals:   Pt complaints of paresthesia in right small finger will decrease by 50%. Pt will be independent and compliant with comprehensive HEP to maintain progress achieved in OT. Patient will demonstrate increase in right  strength to at least 35 lbs for ease in carrying grocery bag. Patient will demonstrate independence with don/doff elbow extension orthosis and anticlaw orthosis. Patient will demonstrate increase in right small finger PIP extension to at least -10 degrees. Plan: Continue to work towards improving sensation in right hand as well as increasing  and pinch strength for functional grasping.     Charges: MT,TE,US Total Timed Treatment: 42 min  Total Treatment Time: 45 min

## 2022-08-02 ENCOUNTER — HOSPITAL ENCOUNTER (OUTPATIENT)
Dept: GENERAL RADIOLOGY | Facility: HOSPITAL | Age: 77
Discharge: HOME | End: 2022-08-02
Attending: NEUROLOGICAL SURGERY
Payer: COMMERCIAL

## 2022-08-02 DIAGNOSIS — M43.26 FUSION OF SPINE, LUMBAR REGION: ICD-10-CM

## 2022-08-02 PROCEDURE — 72100 X-RAY EXAM L-S SPINE 2/3 VWS: CPT | Performed by: NEUROLOGICAL SURGERY

## 2022-08-05 ENCOUNTER — OFFICE VISIT (OUTPATIENT)
Dept: OCCUPATIONAL MEDICINE | Facility: HOSPITAL | Age: 77
End: 2022-08-05
Attending: PHYSICAL MEDICINE & REHABILITATION
Payer: COMMERCIAL

## 2022-08-05 PROCEDURE — 97110 THERAPEUTIC EXERCISES: CPT | Performed by: OCCUPATIONAL THERAPIST

## 2022-08-05 PROCEDURE — 97035 APP MDLTY 1+ULTRASOUND EA 15: CPT | Performed by: OCCUPATIONAL THERAPIST

## 2022-08-05 PROCEDURE — 97140 MANUAL THERAPY 1/> REGIONS: CPT | Performed by: OCCUPATIONAL THERAPIST

## 2022-08-05 NOTE — PROGRESS NOTES
Dx:     Ulnar neuropathy at elbow of right upper extremity (G56.21)  Right carpal tunnel syndrome (G56.01)  Cervical radiculopathy (M54.12)     Authorized # of Visits:  10        Next MD visit: none scheduled  Fall Risk: standard         Precautions: diabetes, HTN, back surgery         Medication Changes since last visit?: No  Subjective: \"I think I have more sensation in my ring finger lately, small finger feels the same. \"    Objective: Treatment began with STM, MFR, manual nerve glides, intrinsic hand strengthening, ultrasound, kinesiotape, and review of HEP. See flow sheet below.   [place] assessment of right volar fingertips  D1 4.31 Diminished protective sensation  D2 2.83 Normal light touch  D3 3.22 Diminished light touch  D4 3.22 Diminished light touch  D5 3.61 Diminished light touch      Date 07/18/22 07/25/22 07/29/22 08/01/22 08/05/22         Visit # 1  2  3  4  5                            Evaluation                 Manual                   STM of right cubital tunnel   8 min  5 min  8 min  8 ,om         kinesiotape   0 stretch along ulnar nerve cubital tunnel to ulnar wrist Right shoulder gentle oscilating 3 min Right shoulder gentle oscilating 3 min    Right RF PROM 3 min kinesiotape 0% stretch along ulnar nerve cubital tunnel to ulnar wrist          20% stretch along ulnar nerve cubital tunnel to ulnar wrist [place] assessment       Manual ulnar and median  nerve glides in supine    x5 each  x5 each  x5 each  x10          MFR in supne    8 min  5 min  10 min  10 min         Ther ex                   Ulnar nerve glides                  Blue web stretch wrist flexion   20 sec x 5  20 sec x5              Blue web      x20  x20 , 2 sets              digit small finger extension, abduction, AROM    x10  x20 small finger  And ring finger, 1 set  x20 small finger  And ring finger, 1 se            Resisted digit extension, abduction with rubber band    x10  x20, 1 set  x20, 1 set x20, 1 set          Reverse blocking      x20  x20  x20          Green putty, IP       4 min             HEP instruction   ulnar nerve glides , hold 5 sec x 4, three times/day  intrinsic hand exercises, see below  rubber band intrinsic hand strengthening with rubber bands x 20, 4x/day                                 Therapeutic Activity                                       Neuromuscular Re-education                                                             Modalities                    ultrasound . 3.0 MHZ, 50%, 1/2 w/cm2        8 min just proximal to cubital tunnel  8 min just proximal to cubital tunnel                                 Assessment: Patient reports he obtained elbow brace for overnight however maintains elbow in full extension which is difficult to sleep in. Brace has metal stay, advised him to attempt to bend metal stay about 30 -40 degrees flexion for more comfort in sleeping. [place] assessment indicates improved sensation in volar ring finger. Applied kinesiotape along ulnar nerve once again to relief pressure on nerve. Goals:   Pt complaints of paresthesia in right small finger will decrease by 50%. Pt will be independent and compliant with comprehensive HEP to maintain progress achieved in OT. Patient will demonstrate increase in right  strength to at least 35 lbs for ease in carrying grocery bag. Patient will demonstrate independence with don/doff elbow extension orthosis and anticlaw orthosis. Patient will demonstrate increase in right small finger PIP extension to at least -10 degrees. Plan: Continue to work towards improving sensation in right hand as well as increasing  and pinch strength for functional grasping.     Charges: MT,TE,US Total Timed Treatment: 42 min  Total Treatment Time: 45 min

## 2022-08-08 ENCOUNTER — OFFICE VISIT (OUTPATIENT)
Dept: OCCUPATIONAL MEDICINE | Facility: HOSPITAL | Age: 77
End: 2022-08-08
Attending: PHYSICAL MEDICINE & REHABILITATION
Payer: COMMERCIAL

## 2022-08-08 PROCEDURE — 97035 APP MDLTY 1+ULTRASOUND EA 15: CPT | Performed by: OCCUPATIONAL THERAPIST

## 2022-08-08 PROCEDURE — 97140 MANUAL THERAPY 1/> REGIONS: CPT | Performed by: OCCUPATIONAL THERAPIST

## 2022-08-08 PROCEDURE — 97110 THERAPEUTIC EXERCISES: CPT | Performed by: OCCUPATIONAL THERAPIST

## 2022-08-08 NOTE — PROGRESS NOTES
Dx:     Ulnar neuropathy at elbow of right upper extremity (G56.21)  Right carpal tunnel syndrome (G56.01)  Cervical radiculopathy (M54.12)     Authorized # of Visits:  10        Next MD visit: none scheduled  Fall Risk: standard         Precautions: diabetes, HTN, back surgery         Medication Changes since last visit?: No  Subjective: \"I tried wearing the elbow brace overnight, I can't tolerate it. \"    Objective: Treatment began with STM, MFR, manual nerve glides, intrinsic hand strengthening, ultrasound, kinesiotape, and review of HEP. See flow sheet below.   Measurement: AROM SF  MP 0/103, PIP -25/95, DIP 0/30 GONZALES 203    Date 07/18/22 07/25/22 07/29/22 08/01/22 08/05/22 08/08/22       Visit # 1  2  3  4  5  6                          Evaluation                 Manual                   STM of right cubital tunnel   8 min  5 min  8 min  8 ,om  8 min       kinesiotape   0 stretch along ulnar nerve cubital tunnel to ulnar wrist Right shoulder gentle oscilating 3 min Right shoulder gentle oscilating 3 min    Right RF PROM 3 min kinesiotape 0% stretch along ulnar nerve cubital tunnel to ulnar wrist          20% stretch along ulnar nerve cubital tunnel to ulnar wrist [place] Pearl assessment       Manual ulnar and median  nerve glides in supine    x5 each  x5 each  x5 each  x10  x10        MFR in supne    8 min  5 min  10 min  10 min  10 min       Ther ex                   Ulnar nerve glides                  Blue web stretch wrist flexion   20 sec x 5  20 sec x5    20 sec x 5  20 sec x 5        Blue web      x20  x20 , 2 sets      x20         digit small finger extension, abduction, AROM    x10  x20 small finger  And ring finger, 1 set  x20 small finger  And ring finger, 1 se    rubber band intrinsic hand strengthening with rubber bands x 20, 4x/day        Resisted digit extension, abduction with rubber band    x10  x20, 1 set  x20, 1 set  x20, 1 set  x20, 1 set        Reverse blocking      x20  x20 x20  x20        Green putty, IP       4 min             HEP instruction   ulnar nerve glides , hold 5 sec x 4, three times/day  intrinsic hand exercises, see below  rubber band intrinsic hand strengthening with rubber bands x 20, 4x/day                                 Therapeutic Activity                                       Neuromuscular Re-education                                                             Modalities                    ultrasound . 3.0 MHZ, 50%, 1/2 w/cm2        8 min just proximal to cubital tunnel  8 min just proximal to cubital tunnel  8 min just proximal to cubital tunnel                               Assessment: Patient continues to complain of numbness in small finger. Reports his PIP has always had extension lag. Improved SF GONZALES by 28 degrees. Goals:   Pt complaints of paresthesia in right small finger will decrease by 50%. Pt will be independent and compliant with comprehensive HEP to maintain progress achieved in OT. Patient will demonstrate increase in right  strength to at least 35 lbs for ease in carrying grocery bag. Patient will demonstrate independence with don/doff elbow extension orthosis and anticlaw orthosis. Patient will demonstrate increase in right small finger PIP extension to at least -10 degrees. ..(made progress toward)    Plan: Continue to work towards improving sensation in right hand as well as increasing  and pinch strength for functional grasping.     Charges: MT,TE,US Total Timed Treatment: 42 min  Total Treatment Time: 45 min

## 2022-08-11 ENCOUNTER — OFFICE VISIT (OUTPATIENT)
Dept: ORTHOPEDICS CLINIC | Facility: CLINIC | Age: 77
End: 2022-08-11
Payer: COMMERCIAL

## 2022-08-11 VITALS — DIASTOLIC BLOOD PRESSURE: 62 MMHG | HEART RATE: 71 BPM | SYSTOLIC BLOOD PRESSURE: 132 MMHG

## 2022-08-11 DIAGNOSIS — M17.11 OSTEOARTHRITIS OF RIGHT KNEE, UNSPECIFIED OSTEOARTHRITIS TYPE: Primary | ICD-10-CM

## 2022-08-11 PROBLEM — J44.9 CHRONIC OBSTRUCTIVE PULMONARY DISEASE, UNSPECIFIED (HCC): Status: ACTIVE | Noted: 2022-08-11

## 2022-08-11 PROCEDURE — 3075F SYST BP GE 130 - 139MM HG: CPT | Performed by: ORTHOPAEDIC SURGERY

## 2022-08-11 PROCEDURE — 3078F DIAST BP <80 MM HG: CPT | Performed by: ORTHOPAEDIC SURGERY

## 2022-08-11 PROCEDURE — 20610 DRAIN/INJ JOINT/BURSA W/O US: CPT | Performed by: ORTHOPAEDIC SURGERY

## 2022-08-11 PROCEDURE — 99213 OFFICE O/P EST LOW 20 MIN: CPT | Performed by: ORTHOPAEDIC SURGERY

## 2022-08-15 ENCOUNTER — OFFICE VISIT (OUTPATIENT)
Dept: OCCUPATIONAL MEDICINE | Facility: HOSPITAL | Age: 77
End: 2022-08-15
Attending: PHYSICAL MEDICINE & REHABILITATION
Payer: COMMERCIAL

## 2022-08-15 PROCEDURE — 97140 MANUAL THERAPY 1/> REGIONS: CPT | Performed by: OCCUPATIONAL THERAPIST

## 2022-08-15 PROCEDURE — 97110 THERAPEUTIC EXERCISES: CPT | Performed by: OCCUPATIONAL THERAPIST

## 2022-08-15 RX ORDER — GLIPIZIDE 2.5 MG/1
TABLET, EXTENDED RELEASE ORAL
Qty: 90 TABLET | Refills: 0 | Status: SHIPPED | OUTPATIENT
Start: 2022-08-15

## 2022-08-15 RX ORDER — NIFEDIPINE 90 MG/1
TABLET, FILM COATED, EXTENDED RELEASE ORAL
Qty: 90 TABLET | Refills: 0 | Status: SHIPPED | OUTPATIENT
Start: 2022-08-15

## 2022-08-17 ENCOUNTER — TELEPHONE (OUTPATIENT)
Dept: INTERNAL MEDICINE CLINIC | Facility: CLINIC | Age: 77
End: 2022-08-17

## 2022-08-17 DIAGNOSIS — M19.90 OSTEOARTHRITIS, UNSPECIFIED OSTEOARTHRITIS TYPE, UNSPECIFIED SITE: Primary | ICD-10-CM

## 2022-08-17 DIAGNOSIS — R29.898 HAND WEAKNESS: ICD-10-CM

## 2022-08-18 ENCOUNTER — OFFICE VISIT (OUTPATIENT)
Dept: ORTHOPEDICS CLINIC | Facility: CLINIC | Age: 77
End: 2022-08-18
Payer: COMMERCIAL

## 2022-08-18 VITALS — DIASTOLIC BLOOD PRESSURE: 66 MMHG | SYSTOLIC BLOOD PRESSURE: 142 MMHG | HEART RATE: 77 BPM

## 2022-08-18 DIAGNOSIS — M17.11 PRIMARY OSTEOARTHRITIS OF RIGHT KNEE: Primary | ICD-10-CM

## 2022-08-18 PROCEDURE — 20610 DRAIN/INJ JOINT/BURSA W/O US: CPT | Performed by: ORTHOPAEDIC SURGERY

## 2022-08-18 PROCEDURE — 99213 OFFICE O/P EST LOW 20 MIN: CPT | Performed by: ORTHOPAEDIC SURGERY

## 2022-08-18 PROCEDURE — 3077F SYST BP >= 140 MM HG: CPT | Performed by: ORTHOPAEDIC SURGERY

## 2022-08-18 PROCEDURE — 3078F DIAST BP <80 MM HG: CPT | Performed by: ORTHOPAEDIC SURGERY

## 2022-08-18 RX ORDER — TRIAMCINOLONE ACETONIDE 40 MG/ML
40 INJECTION, SUSPENSION INTRA-ARTICULAR; INTRAMUSCULAR ONCE
Status: DISCONTINUED | OUTPATIENT
Start: 2022-08-18 | End: 2022-08-18

## 2022-08-18 NOTE — TELEPHONE ENCOUNTER
Patient is not consistently using MyChart. Triage Support, please call patient tomorrow to inform referral is approved by Dr. Florian Randall and authorized by insurance plan for 1 visit (appt is tomorrow, 8/18/22).

## 2022-08-18 NOTE — TELEPHONE ENCOUNTER
Spoke to patient and informed him that his referral for ortho has been approved but he is asking for more visits because he will be going back for a few more injections.  He is also requesting a referral to [facility] [address]    Referral has been pended please approve if appropriate

## 2022-08-19 ENCOUNTER — OFFICE VISIT (OUTPATIENT)
Facility: CLINIC | Age: 77
End: 2022-08-19
Payer: COMMERCIAL

## 2022-08-19 ENCOUNTER — APPOINTMENT (OUTPATIENT)
Dept: OCCUPATIONAL MEDICINE | Facility: HOSPITAL | Age: 77
End: 2022-08-19
Attending: PHYSICAL MEDICINE & REHABILITATION
Payer: COMMERCIAL

## 2022-08-19 VITALS
OXYGEN SATURATION: 95 % | HEIGHT: 72 IN | BODY MASS INDEX: 26.68 KG/M2 | HEART RATE: 74 BPM | WEIGHT: 197 LBS | RESPIRATION RATE: 14 BRPM | SYSTOLIC BLOOD PRESSURE: 120 MMHG | DIASTOLIC BLOOD PRESSURE: 68 MMHG

## 2022-08-19 DIAGNOSIS — I10 ESSENTIAL HYPERTENSION: ICD-10-CM

## 2022-08-19 DIAGNOSIS — E11.9 DIABETES MELLITUS TYPE 2 WITHOUT RETINOPATHY (HCC): Primary | ICD-10-CM

## 2022-08-19 DIAGNOSIS — I87.2 VENOUS INSUFFICIENCY OF LEFT LOWER EXTREMITY: ICD-10-CM

## 2022-08-19 PROCEDURE — 3074F SYST BP LT 130 MM HG: CPT | Performed by: INTERNAL MEDICINE

## 2022-08-19 PROCEDURE — 3078F DIAST BP <80 MM HG: CPT | Performed by: INTERNAL MEDICINE

## 2022-08-19 PROCEDURE — 3008F BODY MASS INDEX DOCD: CPT | Performed by: INTERNAL MEDICINE

## 2022-08-19 PROCEDURE — 99214 OFFICE O/P EST MOD 30 MIN: CPT | Performed by: INTERNAL MEDICINE

## 2022-08-22 ENCOUNTER — OFFICE VISIT (OUTPATIENT)
Dept: PHYSICAL MEDICINE AND REHAB | Facility: CLINIC | Age: 77
End: 2022-08-22
Payer: COMMERCIAL

## 2022-08-22 VITALS
DIASTOLIC BLOOD PRESSURE: 64 MMHG | SYSTOLIC BLOOD PRESSURE: 124 MMHG | HEART RATE: 64 BPM | OXYGEN SATURATION: 97 % | WEIGHT: 195 LBS | BODY MASS INDEX: 26 KG/M2

## 2022-08-22 DIAGNOSIS — Q76.1 CERVICAL FUSION SYNDROME: ICD-10-CM

## 2022-08-22 DIAGNOSIS — R29.898 HAND WEAKNESS: ICD-10-CM

## 2022-08-22 DIAGNOSIS — M50.90 CERVICAL DISC DISEASE: ICD-10-CM

## 2022-08-22 DIAGNOSIS — G56.21 ULNAR NEUROPATHY AT ELBOW OF RIGHT UPPER EXTREMITY: Primary | ICD-10-CM

## 2022-08-22 DIAGNOSIS — E11.9 DIABETES MELLITUS TYPE 2 WITHOUT RETINOPATHY (HCC): ICD-10-CM

## 2022-08-22 DIAGNOSIS — M19.042 PRIMARY OSTEOARTHRITIS OF LEFT HAND: ICD-10-CM

## 2022-08-22 DIAGNOSIS — M19.041 PRIMARY OSTEOARTHRITIS OF RIGHT HAND: ICD-10-CM

## 2022-08-22 DIAGNOSIS — M50.20 CERVICAL HERNIATED DISC: ICD-10-CM

## 2022-08-22 DIAGNOSIS — M48.02 CERVICAL STENOSIS OF SPINE: ICD-10-CM

## 2022-08-22 DIAGNOSIS — G56.01 RIGHT CARPAL TUNNEL SYNDROME: ICD-10-CM

## 2022-08-22 DIAGNOSIS — M79.641 RIGHT HAND PAIN: ICD-10-CM

## 2022-08-25 ENCOUNTER — HOSPITAL ENCOUNTER (OUTPATIENT)
Dept: GENERAL RADIOLOGY | Facility: HOSPITAL | Age: 77
Discharge: HOME | End: 2022-08-25
Attending: ORTHOPAEDIC SURGERY
Payer: COMMERCIAL

## 2022-08-25 ENCOUNTER — OFFICE VISIT (OUTPATIENT)
Dept: ORTHOPEDICS CLINIC | Facility: CLINIC | Age: 77
End: 2022-08-25
Payer: COMMERCIAL

## 2022-08-25 VITALS — SYSTOLIC BLOOD PRESSURE: 125 MMHG | DIASTOLIC BLOOD PRESSURE: 65 MMHG | HEART RATE: 75 BPM

## 2022-08-25 DIAGNOSIS — Z47.89 ORTHOPEDIC AFTERCARE: ICD-10-CM

## 2022-08-25 DIAGNOSIS — M17.11 PRIMARY OSTEOARTHRITIS OF RIGHT KNEE: Primary | ICD-10-CM

## 2022-08-25 PROCEDURE — 99213 OFFICE O/P EST LOW 20 MIN: CPT | Performed by: ORTHOPAEDIC SURGERY

## 2022-08-25 PROCEDURE — 73564 X-RAY EXAM KNEE 4 OR MORE: CPT | Performed by: ORTHOPAEDIC SURGERY

## 2022-08-25 PROCEDURE — 20610 DRAIN/INJ JOINT/BURSA W/O US: CPT | Performed by: ORTHOPAEDIC SURGERY

## 2022-08-25 PROCEDURE — 3074F SYST BP LT 130 MM HG: CPT | Performed by: ORTHOPAEDIC SURGERY

## 2022-08-25 PROCEDURE — 3078F DIAST BP <80 MM HG: CPT | Performed by: ORTHOPAEDIC SURGERY

## 2022-09-01 ENCOUNTER — OFFICE VISIT (OUTPATIENT)
Dept: ORTHOPEDICS CLINIC | Facility: CLINIC | Age: 77
End: 2022-09-01
Payer: COMMERCIAL

## 2022-09-01 VITALS — SYSTOLIC BLOOD PRESSURE: 140 MMHG | DIASTOLIC BLOOD PRESSURE: 67 MMHG | HEART RATE: 77 BPM

## 2022-09-01 DIAGNOSIS — M17.11 PRIMARY OSTEOARTHRITIS OF RIGHT KNEE: Primary | ICD-10-CM

## 2022-09-01 PROCEDURE — 3077F SYST BP >= 140 MM HG: CPT | Performed by: ORTHOPAEDIC SURGERY

## 2022-09-01 PROCEDURE — 3078F DIAST BP <80 MM HG: CPT | Performed by: ORTHOPAEDIC SURGERY

## 2022-09-01 PROCEDURE — 20610 DRAIN/INJ JOINT/BURSA W/O US: CPT | Performed by: ORTHOPAEDIC SURGERY

## 2022-09-01 PROCEDURE — 99213 OFFICE O/P EST LOW 20 MIN: CPT | Performed by: ORTHOPAEDIC SURGERY

## 2022-09-10 NOTE — PROCEDURES
[address] [facility]          Cardiology                                                                Progress Note    Admission date:  2022    Subjective:   CC CP  HPI pt reports unremitting left sided chest pain for last 5 days. Rhythm has been sinus. Vitals:  Blood pressure (!) 143/63, pulse 61, temperature 98 °F (36.7 °C), temperature source Oral, resp. rate 16, height 5' 2\" (1.575 m), weight 205 lb 5 oz (93.1 kg), SpO2 95 %, not currently breastfeeding.   Temp  Av.5 °F (36.4 °C)  Min: 96.9 °F (36.1 °C)  Max: 98.8 °F (37.1 °C)  Pulse  Av  Min: 61  Max: 102  BP  Min: 90/42  Max: 152/51  SpO2  Av.2 %  Min: 94 %  Max: 99 %    24 hour I/O    Intake/Output Summary (Last 24 hours) at 9/10/2022 7398  Last data filed at 2022 2350  Gross per 24 hour   Intake 582 ml   Output 2200 ml   Net -1618 ml       Objective:     Telemetry monitor: SR    Physical Exam:  General Appearance:  comfortable  HEENT: pupils equal and reactive, no scleral  icterus  Skin:  Warm and dry  Heart:  Regular, normal apex, S1 and S2 normal, 2/6 FANI at LSB  Lungs:  basilar crackles, L>R  Abd: soft, non tender  Extremities:  No edema, no cyanosis  Psych: normal affect, oriented X 3      Lab Review     Renal Profile:   Lab Results   Component Value Date/Time    CREATININE 1.1 09/10/2022 04:16 AM    BUN 34 09/10/2022 04:16 AM     09/10/2022 04:16 AM    K 3.9 09/10/2022 04:16 AM    K 4.0 2022 03:54 AM     09/10/2022 04:16 AM    CO2 30 09/10/2022 04:16 AM     CBC:    Lab Results   Component Value Date/Time    WBC 6.5 09/10/2022 04:16 AM    RBC 3.08 09/10/2022 04:16 AM    HGB 8.3 09/10/2022 04:16 AM    HCT 26.1 09/10/2022 04:16 AM    MCV 85.0 09/10/2022 04:16 AM    RDW 16.6 09/10/2022 04:16 AM     09/10/2022 04:16 AM     BNP:  No results found for: BNP  Fasting Lipid Panel:    Lab Results   Component Value Date/Time    CHOL 172 2021 02:30 AM    HDL 72 2021 02:30 AM    HDL Maynor Prado UEmerald 7.    2-LEVEL LUMBAR TRANSFORAMINAL   NAME:  Kathryn Levy    MR #:    [id number] :  3/17/1945     PHYSICIAN:  Collette Footman A. Couri        Operative Report    DATE OF PROCEDURE: 2021   PREOPERATIVE DIAGNOSES: 1. left > righ 81 10/06/2010 02:16 PM    TRIG 93 03/21/2021 02:30 AM     Cardiac Enzymes:  CK/MbTroponin  Lab Results   Component Value Date/Time    CKTOTAL 67 08/21/2022 12:40 PM    TROPONINI 0.01 09/09/2022 12:40 AM     PT/ INR   Lab Results   Component Value Date/Time    INR 0.99 06/27/2022 07:13 PM    INR 1.01 07/29/2020 12:16 PM    INR 1.12 11/02/2019 06:37 PM    PROTIME 12.9 06/27/2022 07:13 PM    PROTIME 11.7 07/29/2020 12:16 PM    PROTIME 12.8 11/02/2019 06:37 PM     PTT No results found for: PTT   Lab Results   Component Value Date/Time    MG 1.80 09/08/2022 06:36 AM      Lab Results   Component Value Date/Time    TSH 3.30 04/07/2017 07:12 AM       Assessment:     Atypical CP  dCHF- slight decline in CR clearance. She was transitioned from IV to PO diuretics 9/9/22. Fluid balance -7.4 L.   Anemia  Aortic stenosis- mild  Mitral stenosis- mild-moderate    Plan:      Antibiotics per IM  Continue metoprolol, lisinopril  Monitor renal function      Cande Levi MD correct needle placement at each level. Then, aspiration was performed. No blood, fluid, or air was aspirated.   Then, the patient was injected with a 3 cc solution of 1.5 cc of 40 mg/cc of Kenalog and 1.5 cc of 1% PF lidocaine without epinephrine at each

## 2022-10-11 ENCOUNTER — OFFICE VISIT (OUTPATIENT)
Facility: CLINIC | Age: 77
End: 2022-10-11
Payer: COMMERCIAL

## 2022-10-11 VITALS
BODY MASS INDEX: 26.68 KG/M2 | TEMPERATURE: 98 F | SYSTOLIC BLOOD PRESSURE: 128 MMHG | RESPIRATION RATE: 20 BRPM | HEIGHT: 72 IN | WEIGHT: 197 LBS | DIASTOLIC BLOOD PRESSURE: 58 MMHG | HEART RATE: 72 BPM

## 2022-10-11 DIAGNOSIS — E78.2 MIXED HYPERLIPIDEMIA: ICD-10-CM

## 2022-10-11 DIAGNOSIS — I10 ESSENTIAL HYPERTENSION: ICD-10-CM

## 2022-10-11 DIAGNOSIS — E11.9 TYPE 2 DIABETES MELLITUS WITHOUT COMPLICATION, WITHOUT LONG-TERM CURRENT USE OF INSULIN (HCC): Primary | ICD-10-CM

## 2022-10-11 DIAGNOSIS — J44.9 CHRONIC OBSTRUCTIVE PULMONARY DISEASE, UNSPECIFIED COPD TYPE (HCC): ICD-10-CM

## 2022-10-11 DIAGNOSIS — N40.0 BENIGN PROSTATIC HYPERPLASIA, UNSPECIFIED WHETHER LOWER URINARY TRACT SYMPTOMS PRESENT: ICD-10-CM

## 2022-10-11 LAB
CARTRIDGE LOT#: 507 NUMERIC
HEMOGLOBIN A1C: 5.6 % (ref 4.3–5.6)

## 2022-10-11 PROCEDURE — 3074F SYST BP LT 130 MM HG: CPT | Performed by: INTERNAL MEDICINE

## 2022-10-11 PROCEDURE — 3078F DIAST BP <80 MM HG: CPT | Performed by: INTERNAL MEDICINE

## 2022-10-11 PROCEDURE — 99214 OFFICE O/P EST MOD 30 MIN: CPT | Performed by: INTERNAL MEDICINE

## 2022-10-11 PROCEDURE — 83036 HEMOGLOBIN GLYCOSYLATED A1C: CPT | Performed by: INTERNAL MEDICINE

## 2022-10-11 PROCEDURE — 3008F BODY MASS INDEX DOCD: CPT | Performed by: INTERNAL MEDICINE

## 2022-10-11 RX ORDER — GABAPENTIN 300 MG/1
300 CAPSULE ORAL NIGHTLY
COMMUNITY
Start: 2022-04-06

## 2022-10-11 RX ORDER — AMOXICILLIN 250 MG
1 CAPSULE ORAL DAILY
COMMUNITY
Start: 2022-02-24

## 2022-10-11 RX ORDER — HYDROCODONE BITARTRATE AND ACETAMINOPHEN 7.5; 325 MG/1; MG/1
TABLET ORAL AS DIRECTED
COMMUNITY
Start: 2022-03-09 | End: 2022-10-11

## 2022-10-11 RX ORDER — KETOCONAZOLE 20 MG/G
1 CREAM TOPICAL 2 TIMES DAILY
COMMUNITY
Start: 2022-05-26

## 2022-10-18 RX ORDER — OMEPRAZOLE 40 MG/1
CAPSULE, DELAYED RELEASE ORAL
Qty: 90 CAPSULE | Refills: 0 | Status: SHIPPED | OUTPATIENT
Start: 2022-10-18

## 2022-10-18 RX ORDER — RISEDRONATE SODIUM 150 MG/1
TABLET, FILM COATED ORAL
Qty: 3 TABLET | Refills: 0 | Status: SHIPPED | OUTPATIENT
Start: 2022-10-18

## 2022-10-26 ENCOUNTER — OFFICE VISIT (OUTPATIENT)
Dept: OPHTHALMOLOGY | Facility: CLINIC | Age: 77
End: 2022-10-26
Payer: COMMERCIAL

## 2022-10-26 DIAGNOSIS — H40.003 GLAUCOMA SUSPECT OF BOTH EYES: ICD-10-CM

## 2022-10-26 DIAGNOSIS — E11.9 DIABETES MELLITUS TYPE 2 WITHOUT RETINOPATHY (HCC): Primary | ICD-10-CM

## 2022-10-26 DIAGNOSIS — Z96.1 PSEUDOPHAKIA OF BOTH EYES: ICD-10-CM

## 2022-10-26 DIAGNOSIS — H43.392 FLOATER, VITREOUS, LEFT: ICD-10-CM

## 2022-10-26 DIAGNOSIS — H35.371 EPIRETINAL MEMBRANE, RIGHT: ICD-10-CM

## 2022-10-26 PROCEDURE — 92014 COMPRE OPH EXAM EST PT 1/>: CPT | Performed by: OPHTHALMOLOGY

## 2022-10-26 PROCEDURE — 92015 DETERMINE REFRACTIVE STATE: CPT | Performed by: OPHTHALMOLOGY

## 2022-10-26 NOTE — PATIENT INSTRUCTIONS
Pseudophakia of both eyes  New glasses Rx given. Suggest update. Floater, vitreous, left  No treatment. There is no evidence of retinal pathology. All signs and symptoms of retinal detachment/tears explained in detail. Patient instructed to call the office if they experience increase in floaters, increase in flashes of light, loss of vision or curtain or veil effect. Diabetes mellitus type 2 without retinopathy ([address]  Diabetes type II: no background of retinopathy, no signs of neovascularization noted. Discussed ocular and systemic benefits of blood sugar control. Diagnosis and treatment discussed in detail with patient. Glaucoma suspect of both eyes  Discussed with patient that he is a glaucoma suspect based on increased cupping of the optic nerves in both eyes. Glaucoma diagnostic testing ordered. Will not start medication, but will continue to observe. Patient verbalized understanding. Epiretinal membrane, right  Stable; no treatment.

## 2022-10-27 ENCOUNTER — IMMUNIZATION (OUTPATIENT)
Dept: INTERNAL MEDICINE CLINIC | Facility: CLINIC | Age: 77
End: 2022-10-27
Payer: COMMERCIAL

## 2022-10-27 DIAGNOSIS — Z23 NEED FOR VACCINATION: Primary | ICD-10-CM

## 2022-10-27 PROCEDURE — 90662 IIV NO PRSV INCREASED AG IM: CPT | Performed by: INTERNAL MEDICINE

## 2022-10-27 PROCEDURE — 90471 IMMUNIZATION ADMIN: CPT | Performed by: INTERNAL MEDICINE

## 2022-11-03 ENCOUNTER — NURSE ONLY (OUTPATIENT)
Dept: OPHTHALMOLOGY | Facility: CLINIC | Age: 77
End: 2022-11-03
Payer: COMMERCIAL

## 2022-11-03 ENCOUNTER — TELEPHONE (OUTPATIENT)
Dept: OPHTHALMOLOGY | Facility: CLINIC | Age: 77
End: 2022-11-03

## 2022-11-03 DIAGNOSIS — H40.003 GLAUCOMA SUSPECT OF BOTH EYES: ICD-10-CM

## 2022-11-03 PROCEDURE — 92133 CPTRZD OPH DX IMG PST SGM ON: CPT | Performed by: OPHTHALMOLOGY

## 2022-11-03 PROCEDURE — 92083 EXTENDED VISUAL FIELD XM: CPT | Performed by: OPHTHALMOLOGY

## 2022-11-11 RX ORDER — NIFEDIPINE 90 MG/1
TABLET, FILM COATED, EXTENDED RELEASE ORAL
Qty: 90 TABLET | Refills: 0 | Status: SHIPPED | OUTPATIENT
Start: 2022-11-11

## 2022-11-16 RX ORDER — FLUTICASONE PROPIONATE AND SALMETEROL 50; 100 UG/1; UG/1
1 POWDER RESPIRATORY (INHALATION) 2 TIMES DAILY
Qty: 180 EACH | Refills: 1 | Status: SHIPPED | OUTPATIENT
Start: 2022-11-16

## 2022-11-16 NOTE — TELEPHONE ENCOUNTER
Refill passed per Status[facility] protocol. Requested Prescriptions   Pending Prescriptions Disp Refills    ADVAIR DISKUS 100-50 MCG/ACT Inhalation Aerosol Powder, Breath Activated [Pharmacy Med Name: Advair Diskus 100-50 Mcg/Act Aer Glax]  0     Sig: Inhale 1 puff into the lungs 2  times daily.        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)    [facility] Farzana Centeno MD    Office Visit    1 month ago Type 2 diabetes mellitus without complication, without long-term current use of insulin [facility])    Brittni Roberts MD    Office Visit    2 months ago Primary osteoarthritis of right knee    [facility], [address], Ashleigh Lagos MD    Office Visit    2 months ago Primary osteoarthritis of right knee    [facility], [address], Ashleigh Lagos MD    Office Visit          Future Appointments         Provider Department Appt Notes    In 5 days [facility], [address] [place] [address], [address] [place] [address] follow up for psa testing, would need a test first.    In 1 month Gisela Frank MD [facility] [place], [facility], [address] [place] [address] 3 month follow     In 11 months Farzana Centeno MD [facility] EP/ DM EE                     Recent Outpatient Visits              1 week ago Glaucoma suspect of both eyes    [facility]    Nurse Only    3 weeks ago Diabetes mellitus type 2 without retinopathy [facility])    [facility] Edwin guerra Serene Ochoa MD    Office Visit    1 month ago Type 2 diabetes mellitus without complication, without long-term current use of insulin [facility])    Edy Elizabeth MD    Office Visit    2 months ago Primary osteoarthritis of right knee    [facility], [address], [place] walter MD    Office Visit    2 months ago Primary osteoarthritis of right knee    [facility], [address], [place] walter MD    Office Visit            Future Appointments         Provider Department Appt Notes    In 5 days Gonzalez Mcguire, [address] [place] [address], [facility] follow up for psa testing, would need a test first.    In 1 month Luis Rodriguez MD [address] 3 month follow     In 11 months Yolanda Nguyen MD [facility] EP/ DM EE

## 2022-11-21 ENCOUNTER — OFFICE VISIT (OUTPATIENT)
Dept: SURGERY | Facility: CLINIC | Age: 77
End: 2022-11-21
Payer: COMMERCIAL

## 2022-11-21 ENCOUNTER — PATIENT MESSAGE (OUTPATIENT)
Dept: SURGERY | Facility: CLINIC | Age: 77
End: 2022-11-21

## 2022-11-21 ENCOUNTER — LAB ENCOUNTER (OUTPATIENT)
Dept: LAB | Facility: HOSPITAL | Age: 77
End: 2022-11-21
Attending: UROLOGY
Payer: COMMERCIAL

## 2022-11-21 VITALS — HEART RATE: 78 BPM | SYSTOLIC BLOOD PRESSURE: 144 MMHG | DIASTOLIC BLOOD PRESSURE: 70 MMHG

## 2022-11-21 DIAGNOSIS — N40.1 BENIGN PROSTATIC HYPERPLASIA WITH URINARY FREQUENCY: ICD-10-CM

## 2022-11-21 DIAGNOSIS — N52.01 ERECTILE DYSFUNCTION DUE TO ARTERIAL INSUFFICIENCY: ICD-10-CM

## 2022-11-21 DIAGNOSIS — R97.20 ELEVATED PSA: ICD-10-CM

## 2022-11-21 DIAGNOSIS — R35.0 BENIGN PROSTATIC HYPERPLASIA WITH URINARY FREQUENCY: ICD-10-CM

## 2022-11-21 DIAGNOSIS — N50.89 SCROTAL ERYTHEMA: ICD-10-CM

## 2022-11-21 DIAGNOSIS — R97.20 ELEVATED PSA: Primary | ICD-10-CM

## 2022-11-21 LAB — PSA SERPL-MCNC: 8.03 NG/ML (ref ?–4)

## 2022-11-21 PROCEDURE — 84153 ASSAY OF PSA TOTAL: CPT

## 2022-11-21 PROCEDURE — 36415 COLL VENOUS BLD VENIPUNCTURE: CPT

## 2022-11-21 PROCEDURE — 99243 OFF/OP CNSLTJ NEW/EST LOW 30: CPT | Performed by: UROLOGY

## 2022-11-21 PROCEDURE — 3078F DIAST BP <80 MM HG: CPT | Performed by: UROLOGY

## 2022-11-21 PROCEDURE — 3077F SYST BP >= 140 MM HG: CPT | Performed by: UROLOGY

## 2022-11-21 RX ORDER — GLIPIZIDE 2.5 MG/1
2.5 TABLET, EXTENDED RELEASE ORAL
Qty: 90 TABLET | Refills: 1 | Status: SHIPPED | OUTPATIENT
Start: 2022-11-21

## 2022-11-21 NOTE — TELEPHONE ENCOUNTER
Refill passed per [facility] protocol.       Requested Prescriptions   Pending Prescriptions Disp Refills    GLIPIZIDE ER 2.5 MG Oral Tablet 24 Hr [Pharmacy Med Name: Glipizide Er 24hr 2.5 Mg Tab Nort] 90 tablet 0     Sig: Take 1 tablet by mouth daily with breakfast.       Diabetes Medication Protocol Passed - 11/21/2022  8:33 AM        Passed - Last A1C < 7.5 and within past 6 months     Lab Results   Component Value Date    A1C 5.6 10/11/2022             Passed - In person appointment or virtual visit in the past 6 mos or appointment in next 3 mos     Recent Outpatient Visits              Today Elevated PSA    [facility], [address], Vanna Kenney MD    Office Visit    2 weeks ago Glaucoma suspect of both eyes    [facility] Ophthalmology    Nurse Only    3 weeks ago Diabetes mellitus type 2 without retinopathy [facility])    [facility] Ophthalmology Shawn Vincent MD    Office Visit    1 month ago Type 2 diabetes mellitus without complication, without long-term current use of insulin [facility])    Kirk Dave MD    Office Visit    2 months ago Primary osteoarthritis of right knee    [facility], [address], [place], Lisa Magallanes MD    Office Visit          Future Appointments         Provider Department Appt Notes    In 1 month Katy Rascon MD [facility], [address] [place] [address] 3 month follow     In 11 months Shawn Vincent MD [facility] Ophthalmology EP/ DM EE               Passed [facility] or [facility] > 50     GFR Evaluation  GFRNAA: 81 , resulted on 2/19/2022          Passed - GFR in the past 12 months              Future Appointments         Provider Department Appt Notes    In 1 month Katy Rascon MD [facility], [address] [place] [address] 3 month follow     In 11 months Shawn Vincent MD [facility] Ophthalmology EP/ DM EE            Recent Outpatient Visits              Today Elevated PSA    [facility], [address], Lani [place]    Office Visit    2 weeks ago Glaucoma suspect of both eyes    [facility] Ophthalmology    Nurse Only    3 weeks ago Diabetes mellitus type 2 without retinopathy [facility])    [facility] Ophthalmology Pattie Palumbo MD    Office Visit    1 month ago Type 2 diabetes mellitus without complication, without long-term current use of insulin [facility])    Timmy Rose MD    Office Visit    2 months ago Primary osteoarthritis of right knee    [facility] Ale [place], Ander Hooks MD    Office Visit

## 2022-11-22 ENCOUNTER — TELEPHONE (OUTPATIENT)
Dept: SURGERY | Facility: CLINIC | Age: 77
End: 2022-11-22

## 2022-11-22 DIAGNOSIS — R97.20 ELEVATED PSA: Primary | ICD-10-CM

## 2022-11-22 NOTE — TELEPHONE ENCOUNTER
I agree that an MRI of the prostate is likely the next logical step. Please call the patient and notify that I have ordered the MRI and I would like him to obtain it and follow-up for a visit in 4 to 6 weeks to review those results.

## 2022-12-08 ENCOUNTER — PATIENT MESSAGE (OUTPATIENT)
Facility: CLINIC | Age: 77
End: 2022-12-08

## 2022-12-22 ENCOUNTER — HOSPITAL ENCOUNTER (OUTPATIENT)
Dept: MRI IMAGING | Facility: HOSPITAL | Age: 77
Discharge: HOME | End: 2022-12-22
Attending: UROLOGY
Payer: COMMERCIAL

## 2022-12-22 DIAGNOSIS — R97.20 ELEVATED PSA: ICD-10-CM

## 2022-12-22 LAB
CREAT BLD-MCNC: 0.9 MG/DL
GFR SERPLBLD BASED ON 1.73 SQ M-ARVRAT: 87 ML/MIN/1.73M2 (ref 60–?)

## 2022-12-22 PROCEDURE — 72197 MRI PELVIS W/O & W/DYE: CPT | Performed by: UROLOGY

## 2022-12-22 PROCEDURE — 82565 ASSAY OF CREATININE: CPT

## 2022-12-22 PROCEDURE — A9575 INJ GADOTERATE MEGLUMI 0.1ML: HCPCS | Performed by: UROLOGY

## 2022-12-22 RX ORDER — GADOTERATE MEGLUMINE 376.9 MG/ML
20 INJECTION INTRAVENOUS
Status: COMPLETED | OUTPATIENT
Start: 2022-12-22 | End: 2022-12-22

## 2022-12-22 RX ADMIN — GADOTERATE MEGLUMINE 20 ML: 376.9 INJECTION INTRAVENOUS at 07:46:00

## 2022-12-27 ENCOUNTER — LAB ENCOUNTER (OUTPATIENT)
Dept: LAB | Facility: HOSPITAL | Age: 77
End: 2022-12-27
Attending: INTERNAL MEDICINE
Payer: COMMERCIAL

## 2022-12-27 DIAGNOSIS — I10 ESSENTIAL HYPERTENSION: ICD-10-CM

## 2022-12-27 DIAGNOSIS — E11.9 TYPE 2 DIABETES MELLITUS WITHOUT COMPLICATION, WITHOUT LONG-TERM CURRENT USE OF INSULIN (HCC): ICD-10-CM

## 2022-12-27 LAB
ALBUMIN SERPL-MCNC: 3.5 G/DL (ref 3.4–5)
ALBUMIN/GLOB SERPL: 1.1 {RATIO} (ref 1–2)
ALP LIVER SERPL-CCNC: 87 U/L
ALT SERPL-CCNC: 21 U/L
ANION GAP SERPL CALC-SCNC: 7 MMOL/L (ref 0–18)
AST SERPL-CCNC: 14 U/L (ref 15–37)
BILIRUB SERPL-MCNC: 0.7 MG/DL (ref 0.1–2)
BUN BLD-MCNC: 16 MG/DL (ref 7–18)
BUN/CREAT SERPL: 16 (ref 10–20)
CALCIUM BLD-MCNC: 9.2 MG/DL (ref 8.5–10.1)
CHLORIDE SERPL-SCNC: 110 MMOL/L (ref 98–112)
CHOLEST SERPL-MCNC: 156 MG/DL (ref ?–200)
CO2 SERPL-SCNC: 28 MMOL/L (ref 21–32)
CREAT BLD-MCNC: 1 MG/DL
CREAT UR-SCNC: 85 MG/DL
DEPRECATED RDW RBC AUTO: 46.3 FL (ref 35.1–46.3)
ERYTHROCYTE [DISTWIDTH] IN BLOOD BY AUTOMATED COUNT: 12.9 % (ref 11–15)
EST. AVERAGE GLUCOSE BLD GHB EST-MCNC: 117 MG/DL (ref 68–126)
FASTING PATIENT LIPID ANSWER: YES
FASTING STATUS PATIENT QL REPORTED: YES
GFR SERPLBLD BASED ON 1.73 SQ M-ARVRAT: 77 ML/MIN/1.73M2 (ref 60–?)
GLOBULIN PLAS-MCNC: 3.2 G/DL (ref 2.8–4.4)
GLUCOSE BLD-MCNC: 100 MG/DL (ref 70–99)
HBA1C MFR BLD: 5.7 % (ref ?–5.7)
HCT VFR BLD AUTO: 44 %
HDLC SERPL-MCNC: 48 MG/DL (ref 40–59)
HGB BLD-MCNC: 14.6 G/DL
LDLC SERPL CALC-MCNC: 93 MG/DL (ref ?–100)
MCH RBC QN AUTO: 32.4 PG (ref 26–34)
MCHC RBC AUTO-ENTMCNC: 33.2 G/DL (ref 31–37)
MCV RBC AUTO: 97.6 FL
MICROALBUMIN UR-MCNC: 1.64 MG/DL
MICROALBUMIN/CREAT 24H UR-RTO: 19.3 UG/MG (ref ?–30)
NONHDLC SERPL-MCNC: 108 MG/DL (ref ?–130)
OSMOLALITY SERPL CALC.SUM OF ELEC: 301 MOSM/KG (ref 275–295)
PLATELET # BLD AUTO: 268 10(3)UL (ref 150–450)
POTASSIUM SERPL-SCNC: 4.2 MMOL/L (ref 3.5–5.1)
PROT SERPL-MCNC: 6.7 G/DL (ref 6.4–8.2)
RBC # BLD AUTO: 4.51 X10(6)UL
SODIUM SERPL-SCNC: 145 MMOL/L (ref 136–145)
TRIGL SERPL-MCNC: 80 MG/DL (ref 30–149)
TSI SER-ACNC: 1.11 MIU/ML (ref 0.36–3.74)
VLDLC SERPL CALC-MCNC: 13 MG/DL (ref 0–30)
WBC # BLD AUTO: 8.5 X10(3) UL (ref 4–11)

## 2022-12-27 PROCEDURE — 36415 COLL VENOUS BLD VENIPUNCTURE: CPT

## 2022-12-27 PROCEDURE — 82570 ASSAY OF URINE CREATININE: CPT

## 2022-12-27 PROCEDURE — 82043 UR ALBUMIN QUANTITATIVE: CPT

## 2022-12-27 PROCEDURE — 84443 ASSAY THYROID STIM HORMONE: CPT

## 2022-12-27 PROCEDURE — 83036 HEMOGLOBIN GLYCOSYLATED A1C: CPT

## 2022-12-27 PROCEDURE — 85027 COMPLETE CBC AUTOMATED: CPT

## 2022-12-27 PROCEDURE — 80061 LIPID PANEL: CPT

## 2022-12-27 PROCEDURE — 80053 COMPREHEN METABOLIC PANEL: CPT

## 2023-01-06 ENCOUNTER — PATIENT MESSAGE (OUTPATIENT)
Facility: CLINIC | Age: 78
End: 2023-01-06

## 2023-01-06 DIAGNOSIS — E11.9 TYPE 2 DIABETES MELLITUS WITHOUT COMPLICATION, WITHOUT LONG-TERM CURRENT USE OF INSULIN (HCC): Primary | ICD-10-CM

## 2023-01-06 DIAGNOSIS — M48.062 LUMBAR STENOSIS WITH NEUROGENIC CLAUDICATION: ICD-10-CM

## 2023-01-07 NOTE — TELEPHONE ENCOUNTER
From: Rusty Michaels  To: Vasyl Jain MD  Sent: 1/6/2023 1:28 PM CST  Subject: Referrals    Sorry to cancel today, but broken drain pipe needed plumbers to be here this morning. I need a referrals for Dr. Judy Hanna for 1/31/23 and Dr. Tereza Foss for 2/23/23  Dr. Richar Oliva for 2/20/23. Thank you, I will be in on 2/9/23 or earlier if opening becomes available.   Lelia Chaparro  [birth year]

## 2023-01-07 NOTE — TELEPHONE ENCOUNTER
Dr. Gordon Hernandez - Referrals pended, please advise if okay      Ophthalmology Referral expires before Ophtha appt. Neurosurgery Referral (1 visit) already used on 8/12/22 1/6/23 Friday Cancelled (Annual Physical)  Pt explained  situation came up, had to cancel. (See his MyChart Message)  Physical rescheduled for 2/9/23    But requesting 2 referrals:   Dr. Laural Gaucher 1/31/23 appt  Dr. Rajendra Payan 2/23/23 Appt.      Patient's Last Visits with PCP: 10/11, 8/19, 5/11      Future Appointments   Date Time Provider Dominic Ohara   2/9/2023  9:30 AM Bony Raygoza MD [facility]   2/20/2023  8:20 AM Fremont Ganser, MD [facility]   10/24/2023  8:30 AM Jennifer Whiting MD [facility]

## 2023-01-12 RX ORDER — RISEDRONATE SODIUM 150 MG/1
150 TABLET, FILM COATED ORAL
Qty: 3 TABLET | Refills: 1 | Status: SHIPPED | OUTPATIENT
Start: 2023-01-12

## 2023-01-12 NOTE — TELEPHONE ENCOUNTER
Requested Prescriptions   Pending Prescriptions Disp Refills    RISEDRONATE SODIUM 150 MG Oral Tab [Pharmacy Med Name: Risedronate Sodium 150 Mg Tab Auro] 3 tablet 0     Sig: Take 1 tablet by mouth every 30 days.        Osteoporosis Medication Protocol Passed - 1/12/2023  9:00 AM        Passed - In person appointment or virtual visit in the past 12 mos or appointment in next 3 mos     Recent Outpatient Visits              1 month ago Elevated PSA    [facility], [address], Stephane Camacho MD    Office Visit    2 months ago Glaucoma suspect of both eyes    [facility] Ophthalmology    Nurse Only    2 months ago Diabetes mellitus type 2 without retinopathy [facility])    [facility] Ophthalmology Trey Carreon MD    Office Visit    3 months ago Type 2 diabetes mellitus without complication, without long-term current use of insulin [facility])    Siomara Lauren MD    Office Visit    4 months ago Primary osteoarthritis of right knee    [facility], [address], [place], Evgeny Wilson MD    Office Visit          Future Appointments         Provider Department Appt Notes    In 4 weeks Madi Daly MD [facility], [facility], [address] annual exam---last px 12/08/21    In 1 month Jose Austin, [address] [place] [address], [address] [facility] MRI Results    In 9 months Trey Carreon MD [facility] Ophthalmology EP/ DM EE                    Recent Outpatient Visits              1 month ago Elevated PSA    [facility], [address], Stephane Camacho MD    Office Visit    2 months ago Glaucoma suspect of both eyes    [facility] Ophthalmology    Nurse Only    2 months ago Diabetes mellitus type 2 without retinopathy [facility])    [facility] Ophthalmology Trey Carreon, MD    Office Visit    3 months ago Type 2 diabetes mellitus without complication, without long-term current use of insulin [facility])    Joanie Valdez MD    Office Visit    4 months ago Primary osteoarthritis of right knee    [facility], [address], [place], Wing Kat MD    Office Visit            Future Appointments         Provider Department Appt Notes    In 4 weeks Cristina Tran MD [address] [facility], [address] [facility] [address] annual exam---last px 12/08/21    In 1 month Tabathadon Husbands, [address] [facility], [address] [facility] MRI Results    In 9 months Senia Marino MD [facility] Ophthalmology EP/ DM EE

## 2023-01-31 NOTE — TELEPHONE ENCOUNTER
Pt is asking that the referrals be faxed to the specialists:  Dr. Jacob Hernandez ophthalmology (does not have fax)   Dr. Valdemar Alexandre be faxed ([phone]). Pt had an appt with Dr Jacob Hernandez today, needs faxed asap.     Pt's best c/b number with questions: [phone]

## 2023-02-01 ENCOUNTER — MED REC SCAN ONLY (OUTPATIENT)
Dept: INTERNAL MEDICINE CLINIC | Facility: CLINIC | Age: 78
End: 2023-02-01

## 2023-02-07 RX ORDER — NIFEDIPINE 90 MG/1
90 TABLET, FILM COATED, EXTENDED RELEASE ORAL DAILY
Qty: 90 TABLET | Refills: 1 | Status: SHIPPED | OUTPATIENT
Start: 2023-02-07

## 2023-02-07 NOTE — TELEPHONE ENCOUNTER
Protocol failed or has No Protocol, please review    Med pended for your review / approval    Requested Prescriptions   Pending Prescriptions Disp Refills    NIFEDIPINE ER 90 MG Oral Tablet 24 Hr [Pharmacy Med Name: Nifedipine Er 24hr 90 Mg Tab Brittni] 90 tablet 0     Sig: TAKE ONE TABLET BY MOUTH ONE TIME DAILY       Hypertensive Medications Protocol Failed - 2/6/2023  9:18 AM        Failed - Last BP reading less than 140/90     BP Readings from Last 1 Encounters:  11/21/22 : 144/70              Passed - In person appointment in the past 12 or next 3 months     Recent Outpatient Visits              2 months ago Elevated PSA    Nilo Dickinson MD    Office Visit    3 months ago Glaucoma suspect of both eyes    [facility]rett Bray, [address], Normal    Nurse Only    3 months ago Diabetes mellitus type 2 without retinopathy [facility]    [facility]rett Bray, [address], Kale Salazar MD    Office Visit    3 months ago Type 2 diabetes mellitus without complication, without long-term current use of insulin [facility])    Barrett Bray, [address] [facility], MD Nelly    Office Visit    5 months ago Primary osteoarthritis of right knee    [facility], [address], Tommie Lagos MD    Office Visit          Future Appointments         Provider Department Appt Notes    In 2 days Hettie Kanner, MD Alverta Harrison, [address] [facility] annual exam---last px 12/08/21    In 1 week MD Barrett Garcia, [address], Normal MRI Results    In 1 month MD Barrett Shafer, [address], [place] Left knee injury    In 8 months MD Barrett Gan, [address] [facility] [address] EP/ DM EE               Passed - CMP or BMP in past 6 months     Recent Results (from the past 4392 hour(s))   COMP METABOLIC PANEL (14)    Collection Time: 12/27/22  6:48 AM   Result Value Ref Range    Glucose 100 (H) 70 - 99 mg/dL    Sodium 145 136 - 145 mmol/L    Potassium 4.2 3.5 - 5.1 mmol/L    Chloride 110 98 - 112 mmol/L    CO2 28.0 21.0 - 32.0 mmol/L    Anion Gap 7 0 - 18 mmol/L    BUN 16 7 - 18 mg/dL    Creatinine 1.00 0.70 - 1.30 mg/dL    BUN/CREA Ratio 16.0 10.0 - 20.0    Calcium, Total 9.2 8.5 - 10.1 mg/dL    Calculated Osmolality 301 (H) 275 - 295 mOsm/kg    eGFR-Cr 77 >=60 mL/min/1.73m2    ALT 21 16 - 61 U/L    AST 14 (L) 15 - 37 U/L    Alkaline Phosphatase 87 45 - 117 U/L    Bilirubin, Total 0.7 0.1 - 2.0 mg/dL    Total Protein 6.7 6.4 - 8.2 g/dL    Albumin 3.5 3.4 - 5.0 g/dL    Globulin  3.2 2.8 - 4.4 g/dL    A/G Ratio 1.1 1.0 - 2.0    Patient Fasting for CMP? Yes      *Note: Due to a large number of results and/or encounters for the requested time period, some results have not been displayed. A complete set of results can be found in Results Review.                Passed - In person appointment or virtual visit in the past 6 months     Recent Outpatient Visits              2 months ago Elevated PSA    [address] [facility], Zena Francisco MD    Office Visit    3 months ago Glaucoma suspect of both eyes    [facility], [address], [place]    Nurse Only    3 months ago Diabetes mellitus type 2 without retinopathy ([facility]    [address], [address] [facility] [place]    Office Visit    3 months ago Type 2 diabetes mellitus without complication, without long-term current use of insulin [facility])    Nelly Edwards MD    Office Visit    5 months ago Primary osteoarthritis of right knee    [address], [address], [facility] Joanne Oakley MD    Office Visit          Future Appointments         Provider Department Appt Notes    In 2 days Parul Cid MD [address], [address] annual exam---last px 12/08/21    In 1 week Dianelys Ontiveros MD [address], [address] [facility] [address] MRI Results    In 1 month Rosalie Holstein, MD [address], [address], [place] Left knee injury    In 8 months Herberth Garzon MD [address], [address], [facility] EP/ DM EE               Passed - [facility] or GFRNAA > 50     GFR Evaluation  EGFRCR: 77 , resulted on 12/27/2022             Future Appointments         Provider Department Appt Notes    In 2 days Jaz Nj MD [address], [address] [facility] annual exam---last px 12/08/21    In 1 week Dianelys Ontiveros MD [address], [address], [place] MRI Results    In 1 month Rosalie Holstein, MD [address] [place] [address] Left knee injury    In 8 months Herberth Garzon MD [address], [address], [facility] EP/ DM EE          Recent Outpatient Visits              2 months ago Elevated PSA    [address] [place], [place] [address]Rosamaria MD    Office Visit    3 months ago Glaucoma suspect of both eyes    [address], [address], [place]    Nurse Only    3 months ago Diabetes mellitus type 2 without retinopathy [facility])    [address], [address], [place], Elson [place], MD    Office Visit    3 months ago Type 2 diabetes mellitus without complication, without long-term current use of insulin [facility])    Nelly See MD    Office Visit    5 months ago Primary osteoarthritis of right knee    [address] [place], [place] [address]Genevieve MD    Office Visit

## 2023-02-09 ENCOUNTER — OFFICE VISIT (OUTPATIENT)
Facility: CLINIC | Age: 78
End: 2023-02-09

## 2023-02-09 VITALS
SYSTOLIC BLOOD PRESSURE: 130 MMHG | HEIGHT: 72 IN | HEART RATE: 94 BPM | DIASTOLIC BLOOD PRESSURE: 60 MMHG | BODY MASS INDEX: 27.09 KG/M2 | WEIGHT: 200 LBS | OXYGEN SATURATION: 94 %

## 2023-02-09 DIAGNOSIS — Z23 NEED FOR SHINGLES VACCINE: ICD-10-CM

## 2023-02-09 DIAGNOSIS — M25.562 CHRONIC PAIN OF LEFT KNEE: Primary | ICD-10-CM

## 2023-02-09 DIAGNOSIS — Z00.00 ADULT GENERAL MEDICAL EXAM: ICD-10-CM

## 2023-02-09 DIAGNOSIS — G89.29 CHRONIC PAIN OF LEFT KNEE: Primary | ICD-10-CM

## 2023-02-09 PROCEDURE — 3008F BODY MASS INDEX DOCD: CPT | Performed by: INTERNAL MEDICINE

## 2023-02-09 PROCEDURE — 3078F DIAST BP <80 MM HG: CPT | Performed by: INTERNAL MEDICINE

## 2023-02-09 PROCEDURE — 90750 HZV VACC RECOMBINANT IM: CPT | Performed by: INTERNAL MEDICINE

## 2023-02-09 PROCEDURE — 90471 IMMUNIZATION ADMIN: CPT | Performed by: INTERNAL MEDICINE

## 2023-02-09 PROCEDURE — 3075F SYST BP GE 130 - 139MM HG: CPT | Performed by: INTERNAL MEDICINE

## 2023-02-09 PROCEDURE — 99397 PER PM REEVAL EST PAT 65+ YR: CPT | Performed by: INTERNAL MEDICINE

## 2023-02-09 RX ORDER — FLUTICASONE PROPIONATE 50 MCG
1 SPRAY, SUSPENSION (ML) NASAL AS NEEDED
Qty: 1 EACH | Refills: 3 | Status: SHIPPED | OUTPATIENT
Start: 2023-02-09

## 2023-02-14 ENCOUNTER — HOSPITAL ENCOUNTER (OUTPATIENT)
Dept: GENERAL RADIOLOGY | Facility: HOSPITAL | Age: 78
Discharge: HOME | End: 2023-02-14
Payer: COMMERCIAL

## 2023-02-14 DIAGNOSIS — Z98.1 S/P LUMBAR SPINAL FUSION: ICD-10-CM

## 2023-02-14 PROCEDURE — 72100 X-RAY EXAM L-S SPINE 2/3 VWS: CPT

## 2023-02-20 ENCOUNTER — OFFICE VISIT (OUTPATIENT)
Dept: SURGERY | Facility: CLINIC | Age: 78
End: 2023-02-20

## 2023-02-20 DIAGNOSIS — N40.1 BENIGN PROSTATIC HYPERPLASIA WITH URINARY FREQUENCY: ICD-10-CM

## 2023-02-20 DIAGNOSIS — R97.20 ELEVATED PSA: Primary | ICD-10-CM

## 2023-02-20 DIAGNOSIS — R35.0 BENIGN PROSTATIC HYPERPLASIA WITH URINARY FREQUENCY: ICD-10-CM

## 2023-02-20 DIAGNOSIS — N52.01 ERECTILE DYSFUNCTION DUE TO ARTERIAL INSUFFICIENCY: ICD-10-CM

## 2023-02-20 PROCEDURE — 99213 OFFICE O/P EST LOW 20 MIN: CPT | Performed by: UROLOGY

## 2023-02-20 RX ORDER — TADALAFIL 5 MG/1
5 TABLET ORAL
Qty: 90 TABLET | Refills: 3 | Status: SHIPPED | OUTPATIENT
Start: 2023-02-20

## 2023-03-21 RX ORDER — CETIRIZINE HYDROCHLORIDE 10 MG/1
10 TABLET ORAL DAILY
Qty: 90 TABLET | Refills: 3 | Status: SHIPPED | OUTPATIENT
Start: 2023-03-21

## 2023-03-21 NOTE — TELEPHONE ENCOUNTER
Refill passed per [facility] Alicia protocol.   Requested Prescriptions   Pending Prescriptions Disp Refills    CETIRIZINE 10 MG Oral Tab [Pharmacy Med Name: Cetirizine Hydrochloride 10 Mg Tab Nort] 90 tablet 0     Sig: TAKE ONE TABLET BY MOUTH ONE TIME DAILY       Allergy Medication Protocol Passed - 3/21/2023  4:38 PM        Passed - In person appointment or virtual visit in the past 12 mos or appointment in next 3 mos     Recent Outpatient Visits              4 weeks ago Elevated PSA    [address] [facility], Cassidy Osullivan MD    Office Visit    1 month ago Chronic pain of left knee    [facility], [address] [facility], MD Nelly    Office Visit    4 months ago Elevated PSA    [address] [facility], Cassidy Osullivan MD    Office Visit    4 months ago Glaucoma suspect of both eyes    [facility], [address], [place]    Nurse Only    4 months ago Diabetes mellitus type 2 without retinopathy ([facility]    [address], [address], Paddy Pandya MD    Office Visit          Future Appointments         Provider Department Appt Notes    In 2 days Phoebe Manjarrez MD [address], [address], [place] Left knee injury    In 1 week [facility] [facility] [address]     In 1 month Brooksie Ahumada, MD [address], [address] 3 month follow up    In 2 months Douglas Soria MD [address] [place] [address] Follow up MRI  2/20  sent for referral MR    In 7 months Osmel Squires MD [address], [address], [place] EP/ DM EE

## 2023-03-21 NOTE — TELEPHONE ENCOUNTER
Please review refill failed/no protocol - high dose warning     Requested Prescriptions     Pending Prescriptions Disp Refills    cetirizine 10 MG Oral Tab [Pharmacy Med Name: Cetirizine Hydrochloride 10 Mg Tab Nort] 90 tablet 3     Sig: Take 1 tablet (10 mg total) by mouth daily. Recent Visits  Date Type Provider Dept   02/09/23 Office Visit Hettie Kanner, MD [facility]   10/11/22 Office Visit Hettie Kanner, MD [facility]   08/19/22 Office Visit Hettie Kanner, MD [facility]   05/11/22 Office Visit Hettie Kanner, MD [facility]   04/13/22 Office Visit Hettie Kanner, MD [facility]   03/15/22 Office Visit Hettie Kanner, MD [facility]   02/18/22 Office Visit Hettie Kanner, MD [facility]   12/08/21 Office Visit Hettie Kanner, MD [facility]   Showing recent visits within past 540 days with a meds authorizing provider and meeting all other requirements  Future Appointments  Date Type Provider Dept   05/02/23 Appointment Hettie Kanner, MD [facility]   Showing future appointments within next 150 days with a meds authorizing provider and meeting all other requirements    Requested Prescriptions   Pending Prescriptions Disp Refills    cetirizine 10 MG Oral Tab [Pharmacy Med Name: Cetirizine Hydrochloride 10 Mg Tab Nort] 90 tablet 3     Sig: Take 1 tablet (10 mg total) by mouth daily.        Allergy Medication Protocol Passed - 3/21/2023  4:38 PM        Passed - In person appointment or virtual visit in the past 12 mos or appointment in next 3 mos     Recent Outpatient Visits              4 weeks ago Elevated PSA    Nilo Dickinson MD    Office Visit    1 month ago Chronic pain of left knee    Nelly Emanuel MD    Office Visit    4 months ago Elevated PSA    [address], [address] [facility],[address], [facility], Nelda Reis MD    Office Visit    4 months ago Glaucoma suspect of both eyes    [facility], [address], [place]    Nurse Only    4 months ago Diabetes mellitus type 2 without retinopathy ([facility]    [address], [address], [place] Rosa Mccauley MD    Office Visit          Future Appointments         Provider Department Appt Notes    In 2 days Alex Grimes MD [address], [address], [place] Left knee injury    In 1 week [facility] [facility] [address]     In 1 month Nancy [place] MD [address], [address] [facility] 3 month follow up    In 2 months MD Lesvia Hunt, [place] Follow up MRI  2/20  sent for referral MR    In 7 months Rosa Mccauley MD [address], [address], [facility] EP/ DM

## 2023-03-23 ENCOUNTER — HOSPITAL ENCOUNTER (OUTPATIENT)
Dept: GENERAL RADIOLOGY | Facility: HOSPITAL | Age: 78
Discharge: HOME | End: 2023-03-23
Attending: ORTHOPAEDIC SURGERY
Payer: COMMERCIAL

## 2023-03-23 ENCOUNTER — OFFICE VISIT (OUTPATIENT)
Dept: ORTHOPEDICS CLINIC | Facility: CLINIC | Age: 78
End: 2023-03-23

## 2023-03-23 DIAGNOSIS — M25.562 LEFT KNEE PAIN, UNSPECIFIED CHRONICITY: ICD-10-CM

## 2023-03-23 DIAGNOSIS — M17.12 PRIMARY OSTEOARTHRITIS OF LEFT KNEE: Primary | ICD-10-CM

## 2023-03-23 PROCEDURE — 73564 X-RAY EXAM KNEE 4 OR MORE: CPT | Performed by: ORTHOPAEDIC SURGERY

## 2023-03-23 PROCEDURE — 99213 OFFICE O/P EST LOW 20 MIN: CPT | Performed by: ORTHOPAEDIC SURGERY

## 2023-03-28 ENCOUNTER — HOSPITAL ENCOUNTER (OUTPATIENT)
Dept: CT IMAGING | Facility: HOSPITAL | Age: 78
Discharge: HOME | End: 2023-03-28
Payer: COMMERCIAL

## 2023-03-28 DIAGNOSIS — Z98.1 S/P LUMBAR SPINAL FUSION: ICD-10-CM

## 2023-03-28 PROCEDURE — 72131 CT LUMBAR SPINE W/O DYE: CPT

## 2023-04-10 ENCOUNTER — TELEPHONE (OUTPATIENT)
Dept: PHYSICAL THERAPY | Facility: HOSPITAL | Age: 78
End: 2023-04-10

## 2023-04-13 ENCOUNTER — OFFICE VISIT (OUTPATIENT)
Dept: PHYSICAL THERAPY | Facility: HOSPITAL | Age: 78
End: 2023-04-13
Attending: ORTHOPAEDIC SURGERY
Payer: COMMERCIAL

## 2023-04-13 DIAGNOSIS — M17.12 PRIMARY OSTEOARTHRITIS OF LEFT KNEE: ICD-10-CM

## 2023-04-13 PROCEDURE — 97110 THERAPEUTIC EXERCISES: CPT

## 2023-04-13 PROCEDURE — 97161 PT EVAL LOW COMPLEX 20 MIN: CPT

## 2023-04-18 ENCOUNTER — OFFICE VISIT (OUTPATIENT)
Dept: PHYSICAL THERAPY | Facility: HOSPITAL | Age: 78
End: 2023-04-18
Attending: ORTHOPAEDIC SURGERY
Payer: COMMERCIAL

## 2023-04-18 PROCEDURE — 97110 THERAPEUTIC EXERCISES: CPT

## 2023-04-18 PROCEDURE — 97140 MANUAL THERAPY 1/> REGIONS: CPT

## 2023-04-20 ENCOUNTER — OFFICE VISIT (OUTPATIENT)
Dept: PHYSICAL THERAPY | Facility: HOSPITAL | Age: 78
End: 2023-04-20
Attending: ORTHOPAEDIC SURGERY
Payer: COMMERCIAL

## 2023-04-20 PROCEDURE — 97140 MANUAL THERAPY 1/> REGIONS: CPT

## 2023-04-20 PROCEDURE — 97110 THERAPEUTIC EXERCISES: CPT

## 2023-04-21 ENCOUNTER — LAB ENCOUNTER (OUTPATIENT)
Dept: LAB | Facility: HOSPITAL | Age: 78
End: 2023-04-21
Attending: INTERNAL MEDICINE
Payer: COMMERCIAL

## 2023-04-21 DIAGNOSIS — Z00.00 ADULT GENERAL MEDICAL EXAM: ICD-10-CM

## 2023-04-21 DIAGNOSIS — R97.20 ELEVATED PSA: ICD-10-CM

## 2023-04-21 LAB
EST. AVERAGE GLUCOSE BLD GHB EST-MCNC: 137 MG/DL (ref 68–126)
HBA1C MFR BLD: 6.4 % (ref ?–5.7)
PSA SERPL-MCNC: 6.57 NG/ML (ref ?–4)

## 2023-04-21 PROCEDURE — 84153 ASSAY OF PSA TOTAL: CPT

## 2023-04-21 PROCEDURE — 36415 COLL VENOUS BLD VENIPUNCTURE: CPT

## 2023-04-21 PROCEDURE — 83036 HEMOGLOBIN GLYCOSYLATED A1C: CPT

## 2023-04-25 ENCOUNTER — OFFICE VISIT (OUTPATIENT)
Dept: PHYSICAL THERAPY | Facility: HOSPITAL | Age: 78
End: 2023-04-25
Attending: ORTHOPAEDIC SURGERY
Payer: COMMERCIAL

## 2023-04-25 PROCEDURE — 97110 THERAPEUTIC EXERCISES: CPT

## 2023-04-25 PROCEDURE — 97140 MANUAL THERAPY 1/> REGIONS: CPT

## 2023-04-27 ENCOUNTER — OFFICE VISIT (OUTPATIENT)
Dept: PHYSICAL THERAPY | Facility: HOSPITAL | Age: 78
End: 2023-04-27
Attending: ORTHOPAEDIC SURGERY
Payer: COMMERCIAL

## 2023-04-27 PROCEDURE — 97110 THERAPEUTIC EXERCISES: CPT

## 2023-05-01 ENCOUNTER — OFFICE VISIT (OUTPATIENT)
Dept: PHYSICAL THERAPY | Facility: HOSPITAL | Age: 78
End: 2023-05-01
Attending: ORTHOPAEDIC SURGERY
Payer: COMMERCIAL

## 2023-05-01 PROCEDURE — 97110 THERAPEUTIC EXERCISES: CPT

## 2023-05-02 ENCOUNTER — OFFICE VISIT (OUTPATIENT)
Facility: CLINIC | Age: 78
End: 2023-05-02

## 2023-05-02 VITALS
HEART RATE: 84 BPM | HEIGHT: 72 IN | OXYGEN SATURATION: 95 % | DIASTOLIC BLOOD PRESSURE: 70 MMHG | SYSTOLIC BLOOD PRESSURE: 134 MMHG | BODY MASS INDEX: 26.28 KG/M2 | WEIGHT: 194 LBS | RESPIRATION RATE: 14 BRPM

## 2023-05-02 DIAGNOSIS — Z23 NEED FOR SHINGLES VACCINE: ICD-10-CM

## 2023-05-02 DIAGNOSIS — I10 ESSENTIAL HYPERTENSION: Primary | ICD-10-CM

## 2023-05-02 DIAGNOSIS — I35.0 NONRHEUMATIC AORTIC VALVE STENOSIS: ICD-10-CM

## 2023-05-02 DIAGNOSIS — E11.9 DIABETES MELLITUS TYPE 2 WITHOUT RETINOPATHY (HCC): ICD-10-CM

## 2023-05-02 DIAGNOSIS — J44.9 CHRONIC OBSTRUCTIVE PULMONARY DISEASE, UNSPECIFIED COPD TYPE (HCC): ICD-10-CM

## 2023-05-02 DIAGNOSIS — E78.2 MIXED HYPERLIPIDEMIA: ICD-10-CM

## 2023-05-02 PROCEDURE — 90471 IMMUNIZATION ADMIN: CPT | Performed by: INTERNAL MEDICINE

## 2023-05-02 PROCEDURE — 3078F DIAST BP <80 MM HG: CPT | Performed by: INTERNAL MEDICINE

## 2023-05-02 PROCEDURE — 99214 OFFICE O/P EST MOD 30 MIN: CPT | Performed by: INTERNAL MEDICINE

## 2023-05-02 PROCEDURE — 90750 HZV VACC RECOMBINANT IM: CPT | Performed by: INTERNAL MEDICINE

## 2023-05-02 PROCEDURE — 3075F SYST BP GE 130 - 139MM HG: CPT | Performed by: INTERNAL MEDICINE

## 2023-05-02 PROCEDURE — 3008F BODY MASS INDEX DOCD: CPT | Performed by: INTERNAL MEDICINE

## 2023-05-02 RX ORDER — FLUTICASONE PROPIONATE AND SALMETEROL 50; 100 UG/1; UG/1
1 POWDER RESPIRATORY (INHALATION) 2 TIMES DAILY
Qty: 180 EACH | Refills: 1 | Status: SHIPPED | OUTPATIENT
Start: 2023-05-02

## 2023-05-04 ENCOUNTER — TELEPHONE (OUTPATIENT)
Dept: ORTHOPEDICS CLINIC | Facility: CLINIC | Age: 78
End: 2023-05-04

## 2023-05-04 NOTE — TELEPHONE ENCOUNTER
Managed care please assist patient scheduled for appt tomorrow 5/5/23 with Dr. Kamilah Collins    3/23/23 [address] [facility] [address] CARE-Durolane injection Left knee     It looks system auto authorized please advise on status.

## 2023-05-05 ENCOUNTER — OFFICE VISIT (OUTPATIENT)
Dept: ORTHOPEDICS CLINIC | Facility: CLINIC | Age: 78
End: 2023-05-05

## 2023-05-05 DIAGNOSIS — M17.12 PRIMARY OSTEOARTHRITIS OF LEFT KNEE: Primary | ICD-10-CM

## 2023-05-05 PROCEDURE — 99213 OFFICE O/P EST LOW 20 MIN: CPT | Performed by: ORTHOPAEDIC SURGERY

## 2023-05-22 ENCOUNTER — HOSPITAL ENCOUNTER (OUTPATIENT)
Dept: CV DIAGNOSTICS | Age: 78
Discharge: HOME | End: 2023-05-22
Attending: INTERNAL MEDICINE
Payer: COMMERCIAL

## 2023-05-22 DIAGNOSIS — I35.0 NONRHEUMATIC AORTIC VALVE STENOSIS: ICD-10-CM

## 2023-05-22 PROCEDURE — 93306 TTE W/DOPPLER COMPLETE: CPT | Performed by: INTERNAL MEDICINE

## 2023-05-23 ENCOUNTER — HOSPITAL ENCOUNTER (OUTPATIENT)
Dept: MRI IMAGING | Age: 78
Discharge: HOME | End: 2023-05-23
Payer: COMMERCIAL

## 2023-05-23 DIAGNOSIS — Z98.1 S/P LUMBAR SPINAL FUSION: ICD-10-CM

## 2023-05-23 DIAGNOSIS — M54.50 ACUTE MIDLINE LOW BACK PAIN WITHOUT SCIATICA: ICD-10-CM

## 2023-05-23 PROCEDURE — 72148 MRI LUMBAR SPINE W/O DYE: CPT

## 2023-05-24 ENCOUNTER — PATIENT MESSAGE (OUTPATIENT)
Facility: CLINIC | Age: 78
End: 2023-05-24

## 2023-05-25 ENCOUNTER — APPOINTMENT (OUTPATIENT)
Dept: URBAN - METROPOLITAN AREA CLINIC 244 | Age: 78
Setting detail: DERMATOLOGY
End: 2023-05-26

## 2023-05-25 DIAGNOSIS — L81.4 OTHER MELANIN HYPERPIGMENTATION: ICD-10-CM

## 2023-05-25 DIAGNOSIS — L82.0 INFLAMED SEBORRHEIC KERATOSIS: ICD-10-CM

## 2023-05-25 DIAGNOSIS — L82.1 OTHER SEBORRHEIC KERATOSIS: ICD-10-CM

## 2023-05-25 DIAGNOSIS — D22 MELANOCYTIC NEVI: ICD-10-CM

## 2023-05-25 PROBLEM — D22.5 MELANOCYTIC NEVI OF TRUNK: Status: ACTIVE | Noted: 2023-05-25

## 2023-05-25 PROCEDURE — 17110 DESTRUCT B9 LESION 1-14: CPT

## 2023-05-25 PROCEDURE — OTHER COUNSELING: OTHER

## 2023-05-25 PROCEDURE — OTHER LIQUID NITROGEN: OTHER

## 2023-05-25 PROCEDURE — 99213 OFFICE O/P EST LOW 20 MIN: CPT | Mod: 25

## 2023-05-25 ASSESSMENT — LOCATION DETAILED DESCRIPTION DERM
LOCATION DETAILED: LEFT DISTAL PRETIBIAL REGION
LOCATION DETAILED: LEFT MEDIAL UPPER BACK
LOCATION DETAILED: UPPER STERNUM
LOCATION DETAILED: RIGHT SUPERIOR MEDIAL UPPER BACK
LOCATION DETAILED: LEFT LATERAL TRAPEZIAL NECK

## 2023-05-25 ASSESSMENT — LOCATION SIMPLE DESCRIPTION DERM
LOCATION SIMPLE: LEFT PRETIBIAL REGION
LOCATION SIMPLE: CHEST
LOCATION SIMPLE: LEFT UPPER BACK
LOCATION SIMPLE: POSTERIOR NECK
LOCATION SIMPLE: RIGHT UPPER BACK

## 2023-05-25 ASSESSMENT — LOCATION ZONE DERM
LOCATION ZONE: TRUNK
LOCATION ZONE: NECK
LOCATION ZONE: LEG

## 2023-05-25 NOTE — TELEPHONE ENCOUNTER
From: Emeka Haider  To: Nisha Serrano MD  Sent: 5/24/2023 6:51 PM CDT  Subject: re-fill    Hello Dr. Josue Moralez.  I have requested a re-fill for Methocarbamol 500 mg tab gran from Dr. Cherelle Macias to help when my lower back tightens up after activity. It helps, but I only use it sparingly. Can you prescribe it for me as I can't contact Dr. Cherelle Macias through My Chart. Thank you.   Neil Montelongo

## 2023-05-25 NOTE — PROCEDURE: LIQUID NITROGEN
Spray Paint Technique: No
Consent: The patient's consent was obtained including but not limited to risks of crusting, scabbing, blistering, scarring, darker or lighter pigmentary change, recurrence, incomplete removal and infection.
Show Topical Anesthesia Variable?: Yes
Medical Necessity Information: It is in your best interest to select a reason for this procedure from the list below. All of these items fulfill various CMS LCD requirements except the new and changing color options.
Detail Level: Detailed
Spray Paint Text: The liquid nitrogen was applied to the skin utilizing a spray paint frosting technique.
Application Tool (Optional): Liquid Nitrogen Sprayer
Number Of Freeze-Thaw Cycles: 1 freeze-thaw cycle
Medical Necessity Clause: This procedure was medically necessary because the lesions that were treated were:
Duration Of Freeze Thaw-Cycle (Seconds): 10-15
Post-Care Instructions: I reviewed with the patient in detail post-care instructions. Patient is to wear sunprotection, and avoid picking at any of the treated lesions. Pt may apply Vaseline to crusted or scabbing areas.

## 2023-05-26 RX ORDER — METHOCARBAMOL 500 MG/1
1000 TABLET, FILM COATED ORAL 3 TIMES DAILY PRN
Qty: 30 TABLET | Refills: 0 | Status: SHIPPED | OUTPATIENT
Start: 2023-05-26

## 2023-05-26 NOTE — TELEPHONE ENCOUNTER
Patient calling, confirmed name and . He is requesting a refill for this pended medication due to an exacerbation of his low back pain. He states that he takes it intermittently when he has \"flare-ups\". He is seeing Dr. Henry Gray for this issue but is having difficulty reaching his office for a refill and to discuss the results of lumbar spine MRI. Pharmacy confirmed. Please advise.

## 2023-06-12 ENCOUNTER — OFFICE VISIT (OUTPATIENT)
Dept: SURGERY | Facility: CLINIC | Age: 78
End: 2023-06-12

## 2023-06-12 DIAGNOSIS — R35.0 BENIGN PROSTATIC HYPERPLASIA WITH URINARY FREQUENCY: ICD-10-CM

## 2023-06-12 DIAGNOSIS — N40.1 BENIGN PROSTATIC HYPERPLASIA WITH URINARY FREQUENCY: ICD-10-CM

## 2023-06-12 DIAGNOSIS — R97.20 ELEVATED PSA: ICD-10-CM

## 2023-06-12 DIAGNOSIS — N52.01 ERECTILE DYSFUNCTION DUE TO ARTERIAL INSUFFICIENCY: ICD-10-CM

## 2023-06-12 DIAGNOSIS — R59.0 PELVIC LYMPHADENOPATHY: Primary | ICD-10-CM

## 2023-06-12 PROCEDURE — 99214 OFFICE O/P EST MOD 30 MIN: CPT | Performed by: UROLOGY

## 2023-06-12 PROCEDURE — 1159F MED LIST DOCD IN RCRD: CPT | Performed by: UROLOGY

## 2023-06-12 RX ORDER — TADALAFIL 5 MG/1
5 TABLET ORAL
Qty: 90 TABLET | Refills: 3 | Status: SHIPPED | OUTPATIENT
Start: 2023-06-12

## 2023-07-13 RX ORDER — LOVASTATIN 40 MG/1
40 TABLET ORAL NIGHTLY
Qty: 90 TABLET | Refills: 3 | Status: SHIPPED | OUTPATIENT
Start: 2023-07-13

## 2023-07-13 RX ORDER — OMEPRAZOLE 40 MG/1
40 CAPSULE, DELAYED RELEASE ORAL DAILY
Qty: 90 CAPSULE | Refills: 3 | Status: SHIPPED | OUTPATIENT
Start: 2023-07-13

## 2023-07-13 NOTE — TELEPHONE ENCOUNTER
Refill passed per [place], [facility] protocol. Requested Prescriptions   Pending Prescriptions Disp Refills    NIFEDIPINE ER 90 MG Oral Tablet 24 Hr [Pharmacy Med Name: Nifedipine Er 24hr 90 Mg Tab Brittni] 90 tablet 0     Sig: TAKE ONE TABLET BY MOUTH ONE TIME DAILY       Hypertensive Medications Protocol Failed - 7/13/2023 10:19 AM        Failed - CMP or BMP in past 6 months     No results found for this or any previous visit (from the past 4392 hour(s)).             Passed - In person appointment in the past 12 or next 3 months     Recent Outpatient Visits              1 month ago Pelvic lymphadenopathy    [facility], [address], Abdoulaye Damon MD    Office Visit    2 months ago Primary osteoarthritis of left knee    [facility], [address], Ree Lagos MD    Office Visit    2 months ago Essential hypertension    Nelly Elias MD    Office Visit    2 months ago     [id number] [place] [address], PT    Office Visit    2 months ago     [address] [place] [address], [address] [facility]    Office Visit          Future Appointments         Provider Department Appt Notes    In 2 months Sujata Hudson MD [address], [facility] 84 Will need an order for A1C test.    In 3 months Vincent Costa MD [address], [address] [place] [address] EP/ DM EE               Passed - Last BP reading less than 140/90     BP Readings from Last 1 Encounters:  05/02/23 : 134/70              Passed - In person appointment or virtual visit in the past 6 months     Recent Outpatient Visits              1 month ago Pelvic lymphadenopathy    [address], [address] [facility],[address], Abdoulaye Damon MD    Office Visit    2 months ago Primary osteoarthritis of left knee    [facility], [address], Ree Lagos MD    Office Visit    2 months ago Essential hypertension    Nelly Christie MD    Office Visit    2 months ago     [address] [facility], PT    Office Visit    2 months ago     [id number] [place] [address], [place]    Office Visit          Future Appointments         Provider Department Appt Notes    In 2 months Madi Daly MD [address], [place] 84 Will need an order for A1C test.    In 3 months Trey Carreon MD [address], [address], [place] EP/ DM EE               Passed - [facility] or GFRNAA > 50     GFR Evaluation  EGFRCR: 77 , resulted on 12/27/2022            OMEPRAZOLE 40 MG Oral Capsule Delayed Release [Pharmacy Med Name: Omeprazole Dr 40 Mg Cap Nort] 90 capsule 0     Sig: TAKE ONE CAPSULE BY MOUTH ONE TIME DAILY       Gastrointestional Medication Protocol Passed - 7/12/2023  8:51 AM        Passed - In person appointment or virtual visit in the past 12 mos or appointment in next 3 mos     Recent Outpatient Visits              1 month ago [facility]    [facility], [address], Stephane Camacho MD    Office Visit    2 months ago Primary osteoarthritis of left knee    [address], [address], [place], Evgeny Wilson MD    Office Visit    2 months ago Essential hypertension    Nelly Christie MD    Office Visit    2 months ago     [address] [facility], PT    Office Visit    2 months ago     [address] [place] [address], [place]    Office Visit          Future Appointments         Provider Department Appt Notes    In 2 months Madi Daly MD [address], [place] Will need an order for A1C test.    In 3 months [facility], [address], [place] EP/ DM EE                 LOVASTATIN 40 MG Oral Tab [Pharmacy Med Name: Lovastatin 40 Mg Tab Lupi] 90 tablet 0     Sig: TAKE ONE TABLET BY MOUTH AT BEDTIME       Cholesterol Medication Protocol Passed - 7/12/2023  8:51 AM        Passed - ALT in past 12 months        Passed - LDL in past 12 months        Passed - Last ALT < 80     Lab Results   Component Value Date    ALT 21 12/27/2022             Passed - Last LDL < 130     Lab Results   Component Value Date    LDL 93 12/27/2022             Passed - In person appointment or virtual visit in the past 12 mos or appointment in next 3 mos     Recent Outpatient Visits              1 month ago Pelvic lymphadenopathy    [facility], [address], Abdoulaye Damon MD    Office Visit    2 months ago Primary osteoarthritis of left knee    [facility], [address], Ree Lagos MD    Office Visit    2 months ago Essential hypertension    Nelly Elias MD    Office Visit    2 months ago     [address] [facility], PT    Office Visit    2 months ago     [id number] [place] [address], [facility]    Office Visit          Future Appointments         Provider Department Appt Notes    In 2 months Sujata Hudson MD [address], [facility] Will need an order for A1C test.    In 3 months Vincent Costa MD [address], [address] [facility] EP/ DM EE                  Future Appointments         Provider Department Appt Notes    In 2 months Sujata Hudson MD [address], [facility] Will need an order for A1C test.    In 3 months Vincent Costa MD [address], [address], [place] EP/ DM EE          Recent Outpatient Visits              1 month ago Pelvic lymphadenopathy Juan David Garces MD    Office Visit    2 months ago Primary osteoarthritis of left knee    [facility], [address] [facility] [address], [place], Domi Johnson MD    Office Visit    2 months ago Essential hypertension    Nelly Hess MD    Office Visit    2 months ago     [address] [facility], [address] [place]    Office Visit    2 months ago     [address] [facility], [address] [place]    Office Visit

## 2023-07-13 NOTE — TELEPHONE ENCOUNTER
Please review. Protocol failed / Has no protocol. No Active/ Future labs pended for CMP or BMP    Requested Prescriptions   Pending Prescriptions Disp Refills    NIFEdipine ER 90 MG Oral Tablet 24 Hr [Pharmacy Med Name: Nifedipine Er 24hr 90 Mg Tab Brittni] 90 tablet 0     Sig: Take 1 tablet (90 mg total) by mouth daily. Hypertensive Medications Protocol Failed - 7/13/2023 10:19 AM        Failed - CMP or BMP in past 6 months     No results found for this or any previous visit (from the past 4392 hour(s)).             Passed - In person appointment in the past 12 or next 3 months     Recent Outpatient Visits              1 month ago Pelvic lymphadenopathy    [facility], [address], Sorin Davison MD    Office Visit    2 months ago Primary osteoarthritis of left knee    [facility], [address], Keenan Lagos MD    Office Visit    2 months ago Essential hypertension    Nelly Bolanos MD    Office Visit    2 months ago     [address] [place] [address], PT    Office Visit    2 months ago     [address] [place] [address], [address] [place] [address]    Office Visit          Future Appointments         Provider Department Appt Notes    In 2 months Bianca Raza MD [address], [facility] 84 Will need an order for A1C test.    In 3 months Pete Lyles MD [address], [address] [place] [address] EP/ DM EE               Passed - Last BP reading less than 140/90     BP Readings from Last 1 Encounters:  05/02/23 : 134/70              Passed - In person appointment or virtual visit in the past 6 months     Recent Outpatient Visits              1 month ago Pelvic lymphadenopathy    [address], [address] [facility] [address], Sorin Davison MD    Office Visit    2 months ago Primary osteoarthritis of left knee Jessica Rhodes [place], Letitia Starr MD    Office Visit    2 months ago Essential hypertension    Nelly Robertson MD    Office Visit    2 months ago     [address], [address]    Office Visit    2 months ago     [address], [address]    Office Visit          Future Appointments         Provider Department Appt Notes    In 2 months Naun Harp MD [address], [place] 84 Will need an order for A1C test.    In 3 months Ramón Anderson MD [address], [address], [place] EP/ DM EE               Passed - [facility] or GFRNAA > 50     GFR Evaluation  EGFRCR: 77 , resulted on 12/27/2022           Signed Prescriptions Disp Refills    Omeprazole 40 MG Oral Capsule Delayed Release 90 capsule 3     Sig: Take 1 capsule (40 mg total) by mouth daily.        Gastrointestional Medication Protocol Passed - 7/12/2023  8:51 AM        Passed - In person appointment or virtual visit in the past 12 mos or appointment in next 3 mos     Recent Outpatient Visits              1 month ago Pelvic lymphadenopathy    [facility], [address], Yudith August MD    Office Visit    2 months ago Primary osteoarthritis of left knee    [address], [address], [place], Letitia Starr MD    Office Visit    2 months ago Essential hypertension    Nelly Robertson MD    Office Visit    2 months ago     [address] [facility], PT    Office Visit    2 months ago     [address], [address]    Office Visit          Future Appointments         Provider Department Appt Notes    In 2 months Naun Harp MD [address], [place] 84 Will need an order for A1C test.    In 3 months Isauro Vanessa MD [facility], [address], [place] EP/ DM EE                 Lovastatin 40 MG Oral Tab 90 tablet 3     Sig: Take 1 tablet (40 mg total) by mouth nightly.        Cholesterol Medication Protocol Passed - 7/12/2023  8:51 AM        Passed - ALT in past 12 months        Passed - LDL in past 12 months        Passed - Last ALT < 80     Lab Results   Component Value Date    ALT 21 12/27/2022             Passed - Last LDL < 130     Lab Results   Component Value Date    LDL 93 12/27/2022             Passed - In person appointment or virtual visit in the past 12 mos or appointment in next 3 mos     Recent Outpatient Visits              1 month ago Pelvic lymphadenopathy    [facility], [address], Femi Woodard MD    Office Visit    2 months ago Primary osteoarthritis of left knee    [facility], [address], Nancy Lagos MD    Office Visit    2 months ago Essential hypertension    Nelly Thurman MD    Office Visit    2 months ago     [address] [facility], PT    Office Visit    2 months ago     [address] [facility], [address] [facility]    Office Visit          Future Appointments         Provider Department Appt Notes    In 2 months Parmjit Ramos MD [address], [facility] Will need an order for A1C test.    In 3 months Isauro Vanessa MD [address], [address], [place] EP/ DM EE                  Future Appointments         Provider Department Appt Notes    In 2 months Parmjit Ramos MD [address], [place] Will need an order for A1C test.    In 3 months Isauro Vanessa MD [address], [address], [place] EP/ DM EE           Recent Outpatient Visits              1 month ago Pelvic lymphadenopathy    [facility], [address], Zena Francisco MD    Office Visit    2 months ago Primary osteoarthritis of left knee    [facility], [address], [place]    Office Visit    2 months ago Essential hypertension    Nelly Edwards MD    Office Visit    2 months ago     [address], [address]    Office Visit    2 months ago     [address], [address]    Office Visit

## 2023-07-14 RX ORDER — NIFEDIPINE 90 MG/1
90 TABLET, FILM COATED, EXTENDED RELEASE ORAL DAILY
Qty: 90 TABLET | Refills: 3 | Status: SHIPPED | OUTPATIENT
Start: 2023-07-14

## 2023-07-27 ENCOUNTER — PATIENT MESSAGE (OUTPATIENT)
Facility: CLINIC | Age: 78
End: 2023-07-27

## 2023-07-27 DIAGNOSIS — E11.9 DIABETES MELLITUS TYPE 2 WITHOUT RETINOPATHY (HCC): Primary | ICD-10-CM

## 2023-07-27 NOTE — TELEPHONE ENCOUNTER
From: Barth Hamman  To: Katina Marley MD  Sent: 7/27/2023 10:32 AM CDT  Subject: blood test    Hello Dr. Toy Leon,  I will need a order for a blood test for my Sept. 12th visit.  Is this a fasting test?  Thank you,  Myra Negron

## 2023-07-31 NOTE — TELEPHONE ENCOUNTER
Dr. Gordon Hernandez, please advise on repeat A1C. He stated you told him you wanted it rechecked in September to see if he needs to be on Glipizide.

## 2023-08-09 ENCOUNTER — PATIENT MESSAGE (OUTPATIENT)
Facility: CLINIC | Age: 78
End: 2023-08-09

## 2023-08-14 NOTE — TELEPHONE ENCOUNTER
Physical Therapy Treatment    Patient Name:  Tessa Enriquez   MRN:  [id number]    Recommendations:     Discharge Recommendations: LTACH (long-term acute care hospital)  Discharge Equipment Recommendations: to be determined by next level of care  Barriers to discharge: None    Assessment:     Tessa Enriquez is a 64 y.o. female admitted with a medical diagnosis of Ischemic necrosis of small bowel.  She presents with the following impairments/functional limitations: weakness, impaired endurance, impaired self care skills, impaired functional mobility, decreased lower extremity function, pain. Patient is agreeable to participation with PT treatment. She declines out of bed after sitting up this morning with OT, but is agreeable to LE therex in bed. She performed 20 reps of bilateral LE therex with assistance as needed.     Rehab Prognosis: Fair; patient would benefit from acute skilled PT services to address these deficits and reach maximum level of function.    Recent Surgery: Procedure(s) (LRB):  LAPAROTOMY, EXPLORATORY (N/A)  EXCISION, SMALL INTESTINE  CREATION, ILEOSTOMY 17 Days Post-Op    Plan:     During this hospitalization, patient to be seen 5 x/week to address the identified rehab impairments via gait training, therapeutic activities, therapeutic exercises and progress toward the following goals:    Plan of Care Expires:  09/06/23    Subjective   Patient shaking upon PT entering the room stating she is cold and asking PT to turn thermostat up. While performing therex PT discovered patients linens were wet possibly due to malfunctioning purewick with PCT notified   Chief Complaint: pain - called  who will ask RN for pain medication   Patient/Family Comments/goals: get pain medication   Pain/Comfort:  Pain Rating 1: 10/10  Location 1: back  Pain Addressed 1: Cessation of Activity      Objective:     Communicated with RN prior to session.  Patient found HOB elevated with bed alarm,  Received in basket from [email or web address]  advising of approval for physical therapy. Will call Pt. To inform. Pt. Informed 8 P sessions were approved. Can proceed with scheduling appts. colostomy, JUANITA drain, PureWick, telemetry upon PT entry to room.     General Precautions: Standard, contact, fall  Orthopedic Precautions: N/A  Braces: N/A  Respiratory Status: Room air     Functional Mobility:  No mobility today      AM-PAC 6 CLICK MOBILITY          Treatment & Education:  Patient was educated on the importance of OOB activity and functional mobility to negate negative effects of prolonged bed rest during hospitalization, safe transfers, and D/C planning     Patient performed 20 reps of bilateral LE therapeutic exercise including ankle pumps, quad sets, straight leg raises (assisted), and heel slides (assisted)    Patient left HOB elevated with all lines intact, call button in reach, and bed alarm on..    GOALS:   Multidisciplinary Problems       Physical Therapy Goals          Problem: Physical Therapy    Goal Priority Disciplines Outcome Goal Variances Interventions   Physical Therapy Goal     PT, PT/OT Ongoing, Not Progressing     Description: Goals to be met by: 23     Patient will increase functional independence with mobility by performin. Supine to sit with Moderate Assistance  2. Sit to supine with Moderate Assistance  3. Sit to stand transfer with Moderate Assistance  4. Bed to chair transfer with Moderate Assistanceusing Rolling Walker  5. Gait  x 10 feet with Moderate Assistance using Rolling Walker.                          Time Tracking:     PT Received On: 23  PT Start Time: 1348     PT Stop Time: 1406  PT Total Time (min): 18 min     Billable Minutes: Therapeutic Exercise 18    Treatment Type: Treatment  PT/PTA: PT     Number of PTA visits since last PT visit: 0     2023

## 2023-08-30 ENCOUNTER — LAB ENCOUNTER (OUTPATIENT)
Dept: LAB | Facility: HOSPITAL | Age: 78
End: 2023-08-30
Attending: INTERNAL MEDICINE
Payer: MEDICARE

## 2023-08-30 DIAGNOSIS — E11.9 DIABETES MELLITUS TYPE 2 WITHOUT RETINOPATHY (HCC): ICD-10-CM

## 2023-08-30 LAB
EST. AVERAGE GLUCOSE BLD GHB EST-MCNC: 140 MG/DL (ref 68–126)
HBA1C MFR BLD: 6.5 % (ref ?–5.7)

## 2023-08-30 PROCEDURE — 83036 HEMOGLOBIN GLYCOSYLATED A1C: CPT

## 2023-08-30 PROCEDURE — 36415 COLL VENOUS BLD VENIPUNCTURE: CPT

## 2023-10-12 ENCOUNTER — OFFICE VISIT (OUTPATIENT)
Dept: ORTHOPEDICS CLINIC | Facility: CLINIC | Age: 78
End: 2023-10-12

## 2023-10-12 ENCOUNTER — HOSPITAL ENCOUNTER (OUTPATIENT)
Dept: GENERAL RADIOLOGY | Facility: HOSPITAL | Age: 78
Discharge: HOME | End: 2023-10-12
Attending: ORTHOPAEDIC SURGERY
Payer: MEDICARE

## 2023-10-12 VITALS — SYSTOLIC BLOOD PRESSURE: 125 MMHG | HEART RATE: 67 BPM | DIASTOLIC BLOOD PRESSURE: 61 MMHG

## 2023-10-12 DIAGNOSIS — M17.11 PRIMARY OSTEOARTHRITIS OF RIGHT KNEE: ICD-10-CM

## 2023-10-12 DIAGNOSIS — M17.11 PRIMARY OSTEOARTHRITIS OF RIGHT KNEE: Primary | ICD-10-CM

## 2023-10-12 PROCEDURE — 1160F RVW MEDS BY RX/DR IN RCRD: CPT | Performed by: ORTHOPAEDIC SURGERY

## 2023-10-12 PROCEDURE — 73564 X-RAY EXAM KNEE 4 OR MORE: CPT | Performed by: ORTHOPAEDIC SURGERY

## 2023-10-12 PROCEDURE — 99213 OFFICE O/P EST LOW 20 MIN: CPT | Performed by: ORTHOPAEDIC SURGERY

## 2023-10-12 PROCEDURE — 1159F MED LIST DOCD IN RCRD: CPT | Performed by: ORTHOPAEDIC SURGERY

## 2023-10-12 PROCEDURE — 3074F SYST BP LT 130 MM HG: CPT | Performed by: ORTHOPAEDIC SURGERY

## 2023-10-12 PROCEDURE — 3078F DIAST BP <80 MM HG: CPT | Performed by: ORTHOPAEDIC SURGERY

## 2023-10-12 PROCEDURE — 1125F AMNT PAIN NOTED PAIN PRSNT: CPT | Performed by: ORTHOPAEDIC SURGERY

## 2023-10-12 PROCEDURE — 20610 DRAIN/INJ JOINT/BURSA W/O US: CPT | Performed by: ORTHOPAEDIC SURGERY

## 2023-10-12 NOTE — PROGRESS NOTES
NURSING INTAKE COMMENTS: Patient presents with:  Knee Pain: R  knee f/u- rates pain 2-6/10 on and off- here for Orthovisc injection #1      HPI: This 77year old male presents today with complaints of right knee pain and arthritis follow-up. He had a series of Orthovisc injections last summer which helped significantly. He did very well up until about a month ago. He is developed some pain along the medial knee. No history of injury. No fevers or chills. No night sweats. No recent immunizations or travel. He has been exercising regularly in a pool.     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    PPVx, Cryo, GFX, SF6    Myopia with astigmatism and presbyopia 1957    Nocturia 9/7/2014    Osteoarthritis     Rotator cuff tear, left 2011    repair    Seasonal allergic rhinitis 4/24/2017    Stiffness of joint, hand 8/2010    bilateral, hand stiffness & weakness post trigger release    Trigger finger 9/29/2011    Irf-trigger finger, recurrent - LRF trigger finger release    Type II or unspecified type diabetes mellitus without mention of complication, not stated as uncontrolled     Visual impairment     readers     Past Surgical History:   Procedure Laterality Date    ARTHROSCOPY OF JOINT UNLISTED  1990    knee    ARTHROSCOPY OF JOINT UNLISTED Left 2011    rotator cuff repair    ARTHROSCOPY OF JOINT UNLISTED Left 1992    hip resurfacing    ARTHROSCOPY OF JOINT UNLISTED Left 12/2008    hip resurfacing    CATARACT      CATARACT EXTRACTION W/  INTRAOCULAR LENS IMPLANT Right 09/17/2012    [facility]    CATARACT EXTRACTION W/  INTRAOCULAR LENS IMPLANT Left 01/11/2010    [facility]    COLONOSCOPY  04/2015    repeat in 10 years. COLONOSCOPY      EYE SURGERY Right 2013    repair detached retina    EYE SURGERY Right 02/20/2006    S/ P PPV RD repair (S/P PPVx, cryo, GFX, SF6 OD 4/25/13) Dr. Arturo Oneil Left     left wrist surgery    HAND/FINGER SURGERY UNLISTED Left 9/29/2011    LRF trigger finger release    HC INJ EPIDURAL STEROID LUMBAR OR SACRAL W IMG GUIDANCE      x2 2019,x2 2017    HERNIA SURGERY      hernia repair, herniorraphy    LASER SURGERY OF EYE      OTHER      bladder surgery x2    OTHER SURGICAL HISTORY      esophageal dilatation    OTHER SURGICAL HISTORY  6/15/2010    release triggers: RMF, RRF, LMF/ RRF Dupuytren's nodule excision    REPAIR OF BICEPS TENDON AT ELBOW Left 1992    RETINAL LASER PROCEDURE      SPINE SURGERY PROCEDURE UNLISTED  02/23/2022    L3-5 laminectomy, left L4-5 transforaminal lumbar interbody fusion;    TOTAL HIP REPLACEMENT Left 2008    YAG CAPSULOTOMY - OD - RIGHT EYE Right 4/30/14    [facility]     Current Outpatient Medications   Medication Sig Dispense Refill    NIFEdipine ER 90 MG Oral Tablet 24 Hr Take 1 tablet (90 mg total) by mouth daily. 90 tablet 3    Omeprazole 40 MG Oral Capsule Delayed Release Take 1 capsule (40 mg total) by mouth daily. 90 capsule 3    Lovastatin 40 MG Oral Tab Take 1 tablet (40 mg total) by mouth nightly. 90 tablet 3    tadalafil 5 MG Oral Tab Take 1 tablet (5 mg total) by mouth daily as needed for Erectile Dysfunction. 90 tablet 3    methocarbamol 500 MG Oral Tab Take 2 tablets (1,000 mg total) by mouth 3 (three) times daily as needed. 30 tablet 0    Risedronate Sodium 150 MG Oral Tab Take 1 tablet (150 mg total) by mouth every 30 (thirty) days. 3 tablet 3    ADVAIR DISKUS 100-50 MCG/ACT Inhalation Aerosol Powder, Breath Activated Inhale 1 puff into the lungs 2 (two) times daily.  180 each 1 cetirizine 10 MG Oral Tab Take 1 tablet (10 mg total) by mouth daily. 90 tablet 3    fluticasone propionate 50 MCG/ACT Nasal Suspension 1 spray by Each Nare route as needed for Rhinitis. 1 each 3    diclofenac 1 % External Gel       gabapentin 300 MG Oral Cap Take 1 capsule (300 mg total) by mouth nightly.      ketoconazole 2 % External Cream Apply 1 Application topically 2 (two) times daily. sennosides-docusate 8.6-50 MG Oral Tab Take 1 tablet by mouth daily. Vitamin B-12 1000 MCG Oral Tab Take 1 tablet (1,000 mcg total) by mouth daily. Cholecalciferol (VITAMIN D) 1000 UNITS Oral Cap Take  by mouth daily. Multiple Vitamins-Minerals (CENTRUM SILVER) Oral Tab Take 1 tablet by mouth daily. Cinnamon 500 MG Oral Cap Take 500 mg by mouth daily. Penicillins             HIVES  Family History   Problem Relation Age of Onset    Colon Cancer Mother     Other (pulmonary Embolism) Father     Glaucoma Neg     Diabetes Neg     Macular degeneration Neg        Social History    Occupational History      Not on file    Tobacco Use      Smoking status: Never      Smokeless tobacco: Never    Vaping Use      Vaping Use: Never used    Substance and Sexual Activity      Alcohol use:  Yes        Alcohol/week: 2.0 - 4.0 standard drinks of alcohol        Types: 2 - 4 Cans of beer per week        Comment: 2-3 times per week      Drug use: No      Sexual activity: Not on file       Review of Systems:  GENERAL: denies fevers, chills, night sweats, fatigue, unintentional weight loss/gain  SKIN: denies skin lesions, open sores, rash  HEENT:denies recent vision change, new nasal congestion,hearing loss, tinnitus, sore throat, headaches  RESPIRATORY: denies new shortness of breath, cough, asthma, wheezing  CARDIOVASCULAR: denies chest pain, leg cramps with exertion, palpitations, leg swelling  GI: denies abdominal pain, nausea, vomiting, diarrhea, constipation, hematochezia, worsening heartburn or stomach ulcers  : denies dysuria, hematuria, incontinence, increased frequency, urgency, difficulty urinating  MUSCULOSKELETAL: denies musculoskeletal complaints other than in HPI  NEURO: denies numbness, tingling, weakness, balance issues, dizziness, memory loss  PSYCHIATRIC: denies Hx of depression, anxiety, other psychiatric disorders  HEMATOLOGIC: denies blood clots, anemia, blood clotting disorders, blood transfusion  ENDOCRINE: denies autoimmune disease, thyroid issues, or diabetes  ALLERGY: denies asthma, seasonal allergies    Physical Examination:    /61   Pulse 67   Constitutional: appears well hydrated, alert and responsive, no acute distress noted  Extremities: Right knee mild varus deformity. Tender the medial joint line. Trace effusion. Neurological: Unchanged    Imaging:   X-rays of both knees were obtained in the office today. They demonstrate mild to moderate tricompartmental degenerative changes. No soft tissue calcification or fracture. Labs:  Lab Results   Component Value Date    WBC 8.5 12/27/2022    HGB 14.6 12/27/2022    .0 12/27/2022      Lab Results   Component Value Date     (H) 12/27/2022    BUN 16 12/27/2022    CREATSERUM 1.00 12/27/2022    GFRNAA 81 02/19/2022    GFRAA 94 02/19/2022        Assessment and Plan:  Diagnoses and all orders for this visit:    Primary osteoarthritis of right knee  -     XR KNEE, COMPLETE (4 OR MORE VIEWS), RIGHT ([id number]); Future  -     arthrocentesis major joint  -     hyaluronan (Orthovisc) 30 MG/2ML intra-articular injection 30 mg        Assessment: Right knee osteoarthritis, primary    Plan: I again discussed operative and nonoperative treatment options. He like to proceed with Orthovisc injection series. The right knee was injected with Orthovisc 30 mg using a superolateral parapatellar approach. Risks and benefits were discussed. Follow-up again in 1 week. Advised icing and oral anti-inflammatory use.     Follow Up: Return in about 1 week (around 10/19/2023).     Edyta Saavedra MD

## 2023-10-19 ENCOUNTER — OFFICE VISIT (OUTPATIENT)
Dept: ORTHOPEDICS CLINIC | Facility: CLINIC | Age: 78
End: 2023-10-19
Payer: MEDICARE

## 2023-10-19 VITALS — HEART RATE: 78 BPM | SYSTOLIC BLOOD PRESSURE: 134 MMHG | DIASTOLIC BLOOD PRESSURE: 65 MMHG

## 2023-10-19 DIAGNOSIS — M17.11 PRIMARY OSTEOARTHRITIS OF RIGHT KNEE: Primary | ICD-10-CM

## 2023-10-19 PROCEDURE — 20610 DRAIN/INJ JOINT/BURSA W/O US: CPT | Performed by: ORTHOPAEDIC SURGERY

## 2023-10-19 PROCEDURE — 1159F MED LIST DOCD IN RCRD: CPT | Performed by: ORTHOPAEDIC SURGERY

## 2023-10-19 PROCEDURE — 3078F DIAST BP <80 MM HG: CPT | Performed by: ORTHOPAEDIC SURGERY

## 2023-10-19 PROCEDURE — 99213 OFFICE O/P EST LOW 20 MIN: CPT | Performed by: ORTHOPAEDIC SURGERY

## 2023-10-19 PROCEDURE — 1160F RVW MEDS BY RX/DR IN RCRD: CPT | Performed by: ORTHOPAEDIC SURGERY

## 2023-10-19 PROCEDURE — 3075F SYST BP GE 130 - 139MM HG: CPT | Performed by: ORTHOPAEDIC SURGERY

## 2023-10-19 PROCEDURE — 1126F AMNT PAIN NOTED NONE PRSNT: CPT | Performed by: ORTHOPAEDIC SURGERY

## 2023-10-19 NOTE — PROGRESS NOTES
NURSING INTAKE COMMENTS: Patient presents with:  Knee Pain: Right f/u - here for Orthovisc inj # 2 - states he is pretty good - has no c/o now -       HPI: This 77year old male presents today with complaints of right knee arthritis follow-up. He is here for right knee injection. The first injection given last week to provided some improvement and no complication. 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    PPVx, Cryo, GFX, SF6    Myopia with astigmatism and presbyopia 1957    Nocturia 9/7/2014    Osteoarthritis     Rotator cuff tear, left 2011    repair    Seasonal allergic rhinitis 4/24/2017    Stiffness of joint, hand 8/2010    bilateral, hand stiffness & weakness post trigger release    Trigger finger 9/29/2011    Irf-trigger finger, recurrent - LRF trigger finger release    Type II or unspecified type diabetes mellitus without mention of complication, not stated as uncontrolled     Visual impairment     readers     Past Surgical History:   Procedure Laterality Date    ARTHROSCOPY OF JOINT UNLISTED  1990    knee    ARTHROSCOPY OF JOINT UNLISTED Left 2011    rotator cuff repair    ARTHROSCOPY OF JOINT UNLISTED Left 1992    hip resurfacing    ARTHROSCOPY OF JOINT UNLISTED Left 12/2008    hip resurfacing    CATARACT      CATARACT EXTRACTION W/  INTRAOCULAR LENS IMPLANT Right 09/17/2012    [facility]    CATARACT EXTRACTION W/  INTRAOCULAR LENS IMPLANT Left 01/11/2010    [facility]    COLONOSCOPY  04/2015    repeat in 10 years.     COLONOSCOPY      EYE SURGERY Right 2013 repair detached retina    EYE SURGERY Right 02/20/2006    S/ P PPV RD repair (S/P PPVx, cryo, GFX, SF6 OD 4/25/13) Dr. Mia Rees Left     left wrist surgery    HAND/FINGER SURGERY UNLISTED Left 9/29/2011    LRF trigger finger release    HC INJ EPIDURAL STEROID LUMBAR OR SACRAL W IMG GUIDANCE      x2 2019,x2 2017    HERNIA SURGERY      hernia repair, herniorraphy    LASER SURGERY OF EYE      OTHER      bladder surgery x2    OTHER SURGICAL HISTORY      esophageal dilatation    OTHER SURGICAL HISTORY  6/15/2010    release triggers: RMF, RRF, LMF/ RRF Dupuytren's nodule excision    REPAIR OF BICEPS TENDON AT ELBOW Left 1992    RETINAL LASER PROCEDURE      SPINE SURGERY PROCEDURE UNLISTED  02/23/2022    L3-5 laminectomy, left L4-5 transforaminal lumbar interbody fusion;    TOTAL HIP REPLACEMENT Left 2008    YAG CAPSULOTOMY - OD - RIGHT EYE Right 4/30/14    [facility]     Current Outpatient Medications   Medication Sig Dispense Refill    NIFEdipine ER 90 MG Oral Tablet 24 Hr Take 1 tablet (90 mg total) by mouth daily. 90 tablet 3    Omeprazole 40 MG Oral Capsule Delayed Release Take 1 capsule (40 mg total) by mouth daily. 90 capsule 3    Lovastatin 40 MG Oral Tab Take 1 tablet (40 mg total) by mouth nightly. 90 tablet 3    tadalafil 5 MG Oral Tab Take 1 tablet (5 mg total) by mouth daily as needed for Erectile Dysfunction. 90 tablet 3    methocarbamol 500 MG Oral Tab Take 2 tablets (1,000 mg total) by mouth 3 (three) times daily as needed. 30 tablet 0    Risedronate Sodium 150 MG Oral Tab Take 1 tablet (150 mg total) by mouth every 30 (thirty) days. 3 tablet 3    ADVAIR DISKUS 100-50 MCG/ACT Inhalation Aerosol Powder, Breath Activated Inhale 1 puff into the lungs 2 (two) times daily. 180 each 1    cetirizine 10 MG Oral Tab Take 1 tablet (10 mg total) by mouth daily. 90 tablet 3    fluticasone propionate 50 MCG/ACT Nasal Suspension 1 spray by Each Nare route as needed for Rhinitis.  1 each 3 diclofenac 1 % External Gel       gabapentin 300 MG Oral Cap Take 1 capsule (300 mg total) by mouth nightly.      ketoconazole 2 % External Cream Apply 1 Application topically 2 (two) times daily. sennosides-docusate 8.6-50 MG Oral Tab Take 1 tablet by mouth daily. Vitamin B-12 1000 MCG Oral Tab Take 1 tablet (1,000 mcg total) by mouth daily. Cholecalciferol (VITAMIN D) 1000 UNITS Oral Cap Take  by mouth daily. Multiple Vitamins-Minerals (CENTRUM SILVER) Oral Tab Take 1 tablet by mouth daily. Cinnamon 500 MG Oral Cap Take 500 mg by mouth daily. Penicillins             HIVES  Family History   Problem Relation Age of Onset    Colon Cancer Mother     Other (pulmonary Embolism) Father     Glaucoma Neg     Diabetes Neg     Macular degeneration Neg        Social History    Occupational History      Not on file    Tobacco Use      Smoking status: Never      Smokeless tobacco: Never    Vaping Use      Vaping Use: Never used    Substance and Sexual Activity      Alcohol use:  Yes        Alcohol/week: 2.0 - 4.0 standard drinks of alcohol        Types: 2 - 4 Cans of beer per week        Comment: 2-3 times per week      Drug use: No      Sexual activity: Not on file       Review of Systems:  GENERAL: denies fevers, chills, night sweats, fatigue, unintentional weight loss/gain  SKIN: denies skin lesions, open sores, rash  HEENT:denies recent vision change, new nasal congestion,hearing loss, tinnitus, sore throat, headaches  RESPIRATORY: denies new shortness of breath, cough, asthma, wheezing  CARDIOVASCULAR: denies chest pain, leg cramps with exertion, palpitations, leg swelling  GI: denies abdominal pain, nausea, vomiting, diarrhea, constipation, hematochezia, worsening heartburn or stomach ulcers  : denies dysuria, hematuria, incontinence, increased frequency, urgency, difficulty urinating  MUSCULOSKELETAL: denies musculoskeletal complaints other than in HPI  NEURO: denies numbness, tingling, weakness, balance issues, dizziness, memory loss  PSYCHIATRIC: denies Hx of depression, anxiety, other psychiatric disorders  HEMATOLOGIC: denies blood clots, anemia, blood clotting disorders, blood transfusion  ENDOCRINE: denies autoimmune disease, thyroid issues, or diabetes  ALLERGY: denies asthma, seasonal allergies    Physical Examination:    /65   Pulse 78   Constitutional: appears well hydrated, alert and responsive, no acute distress noted  Extremities: Right knee no effusion. Neurological: Unchanged    Imaging:   XR KNEE, COMPLETE (4 OR MORE VIEWS), RIGHT ([id number])    Result Date: 10/12/2023  PROCEDURE: XR KNEE, COMPLETE (4 OR MORE VIEWS), RIGHT ([id number])  COMPARISON: [facility]. for [address], XR KNEE, COMPLETE (4 OR MORE VIEWS), RIGHT ([id number]), 8/25/2022, 8:28 AM.  [facility]. for [address], XR KNEE, COMPLETE (4 OR MORE VIEWS), RIGHT ([id number]), 12/11/2020, 7:56 AM.  [facility] for [address], [address] [place] [address], 5/31/2019, 8:09 AM.  INDICATIONS: Deep right knee pain and stiffness for 3-4 years; no known trauma. TECHNIQUE: 8 views were obtained. FINDINGS:  There is no acute fracture or dislocation. Mild osteopenia. Moderate joint space narrowing bilaterally and small tricompartmental osteophytes. Small bilateral joint effusions. Extensive vascular calcifications bilaterally. Again, there may be an osteochondral defect versus subchondral cyst at the right medial femoral condyle. CONCLUSION:   1. No acute fracture or dislocation. Mild osteopenia. 2. Moderate bilateral degenerative joint disease and small bilateral joint effusions. 3. Redemonstration of osteochondral defect versus subchondral cyst at the right medial femoral condyle.      Dictated by (CST): Ernie Barber MD on 10/12/2023 at 1:43 PM     Finalized by (CST): Ernie Barber MD on 10/12/2023 at 1:48 PM             Labs:  Lab Results Component Value Date    WBC 8.5 12/27/2022    HGB 14.6 12/27/2022    .0 12/27/2022      Lab Results   Component Value Date     (H) 12/27/2022    BUN 16 12/27/2022    CREATSERUM 1.00 12/27/2022    GFRNAA 81 02/19/2022    GFRAA 94 02/19/2022        Assessment and Plan:  Diagnoses and all orders for this visit:    Primary osteoarthritis of right knee  -     arthrocentesis major joint  -     sodium hyaluronate (DUROLANE) 60 mg        Assessment: Right knee osteoarthritis, primary    Plan: Using sterile technique and a superolateral parapatellar approach, the right knee was injected with 60 mg of Durolane. Advised icing, oral anti-inflammatories, activity modifications and home exercises. Follow-up in 1 week for repeat injection. Follow Up: Return in about 1 week (around 10/26/2023).     Clay Darnell MD

## 2023-10-24 ENCOUNTER — OFFICE VISIT (OUTPATIENT)
Dept: OPHTHALMOLOGY | Facility: CLINIC | Age: 78
End: 2023-10-24

## 2023-10-24 DIAGNOSIS — Z96.1 PSEUDOPHAKIA OF BOTH EYES: ICD-10-CM

## 2023-10-24 DIAGNOSIS — H35.371 EPIRETINAL MEMBRANE, RIGHT: ICD-10-CM

## 2023-10-24 DIAGNOSIS — E11.9 DIABETES MELLITUS TYPE 2 WITHOUT RETINOPATHY (HCC): Primary | ICD-10-CM

## 2023-10-24 DIAGNOSIS — H40.003 GLAUCOMA SUSPECT OF BOTH EYES: ICD-10-CM

## 2023-10-24 DIAGNOSIS — Z86.69 HISTORY OF RETINAL TEAR: ICD-10-CM

## 2023-10-24 DIAGNOSIS — H43.392 FLOATER, VITREOUS, LEFT: ICD-10-CM

## 2023-10-24 PROCEDURE — 92014 COMPRE OPH EXAM EST PT 1/>: CPT | Performed by: OPHTHALMOLOGY

## 2023-10-24 PROCEDURE — 1160F RVW MEDS BY RX/DR IN RCRD: CPT | Performed by: OPHTHALMOLOGY

## 2023-10-24 PROCEDURE — 1159F MED LIST DOCD IN RCRD: CPT | Performed by: OPHTHALMOLOGY

## 2023-10-24 PROCEDURE — 92250 FUNDUS PHOTOGRAPHY W/I&R: CPT | Performed by: OPHTHALMOLOGY

## 2023-10-24 PROCEDURE — 92015 DETERMINE REFRACTIVE STATE: CPT | Performed by: OPHTHALMOLOGY

## 2023-10-24 NOTE — ASSESSMENT & PLAN NOTE
Discussed with patient that he is a glaucoma suspect based on increased cupping of the optic nerves in both eyes. Fundus photos taken today. Will not start medication, but will continue to observe. Patient verbalized understanding.

## 2023-10-24 NOTE — PROGRESS NOTES
Zan Cerrato is a 77year old male. HPI:     HPI    Pt is here for a diabetic eye exam and photos. Pt denies any vision changes. Pt was taken off his diabetes medication around February 2023 because blood sugar had dropped. Patient was seen by Dr. Leach Rather January 2023 and will follow-up with him in 1 year.       Pt has been a diabetic for 10 years       Pt's diabetes is currently controlled by diet  Pt checks BS every few days   Pt's last blood sugar was  121 last week  Last HA1C was 6.5 on 8/30/23  Endocrinologist: None    Consult:reba Tirado  Last edited by Madalyn Tarango OT on 10/24/2023  9:17 AM.        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    PPVx, Cryo, GFX, SF6    Myopia with astigmatism and presbyopia 1957    Nocturia 9/7/2014    Osteoarthritis     Rotator cuff tear, left 2011    repair    Seasonal allergic rhinitis 4/24/2017    Stiffness of joint, hand 8/2010    bilateral, hand stiffness & weakness post trigger release    Trigger finger 9/29/2011    Irf-trigger finger, recurrent - LRF trigger finger release    Type II or unspecified type diabetes mellitus without mention of complication, not stated as uncontrolled     Visual impairment     readers       Surgical History: Zan Cerrato has a past surgical history that includes hernia surgery (hernia repair, herniorraphy); other surgical history (esophageal dilatation); hand/finger surgery unlisted (Left, 9/29/2011) (LRF trigger finger release); other surgical history (6/15/2010) (release triggers: RMF, RRF, LMF/ RRF Dupuytren's nodule excision); forearm/wrist surgery unlisted (Left) (left wrist surgery); laser surgery of eye; Yag Capsulotomy - OD - Right Eye (Right, 4/30/14) ([facility]); Retinal laser procedure; total hip replacement (Left, 2008); Eye surgery (Right, 2013) (repair detached retina); Eye surgery (Right, 02/20/2006) (S/ P PPV RD repair (S/P PPVx, cryo, GFX, SF6 OD 4/25/13) Dr. Clinton Corey ); arthroscopy of joint unlisted (1990) (knee); arthroscopy of joint unlisted (Left, 2011) (rotator cuff repair); arthroscopy of joint unlisted (Left, 1992) (hip resurfacing); arthroscopy of joint unlisted (Left, 12/2008) (hip resurfacing); colonoscopy (04/2015) (repeat in 10 years.); Cataract extraction w/  intraocular lens implant (Right, 09/17/2012) ([facility]); Cataract extraction w/  intraocular lens implant (Left, 01/11/2010) ([facility]); repair of biceps tendon at elbow (Left, 1992); hc inj epidural steroid lumbar or sacral w img guidance (x2 2019,x2 2017); cataract; other (bladder surgery x2); spine surgery procedure unlisted (02/23/2022) (L3-5 laminectomy, left L4-5 transforaminal lumbar interbody fusion;); and colonoscopy. Family History   Problem Relation Age of Onset    Colon Cancer Mother     Other (pulmonary Embolism) Father     Glaucoma Neg     Diabetes Neg     Macular degeneration Neg        Social History:   Social History     Socioeconomic History    Marital status:    Tobacco Use    Smoking status: Never    Smokeless tobacco: Never   Vaping Use    Vaping Use: Never used   Substance and Sexual Activity    Alcohol use:  Yes     Alcohol/week: 2.0 - 4.0 standard drinks of alcohol     Types: 2 - 4 Cans of beer per week     Comment: 2-3 times per week    Drug use: No   Other Topics Concern    Caffeine Concern Yes     Comment: soda, 8 oz daily    Exercise No    Pt has a pacemaker No    Reaction to local anesthetic No   Social History Narrative    The patient does  use an assistive device. Mary Shaw 50% of time for knee pain. The patient does live in a home with stairs. Medications:  Current Outpatient Medications   Medication Sig Dispense Refill    NIFEdipine ER 90 MG Oral Tablet 24 Hr Take 1 tablet (90 mg total) by mouth daily. 90 tablet 3    Omeprazole 40 MG Oral Capsule Delayed Release Take 1 capsule (40 mg total) by mouth daily. 90 capsule 3    Lovastatin 40 MG Oral Tab Take 1 tablet (40 mg total) by mouth nightly. 90 tablet 3    tadalafil 5 MG Oral Tab Take 1 tablet (5 mg total) by mouth daily as needed for Erectile Dysfunction. 90 tablet 3    methocarbamol 500 MG Oral Tab Take 2 tablets (1,000 mg total) by mouth 3 (three) times daily as needed. 30 tablet 0    Risedronate Sodium 150 MG Oral Tab Take 1 tablet (150 mg total) by mouth every 30 (thirty) days. 3 tablet 3    ADVAIR DISKUS 100-50 MCG/ACT Inhalation Aerosol Powder, Breath Activated Inhale 1 puff into the lungs 2 (two) times daily. 180 each 1    cetirizine 10 MG Oral Tab Take 1 tablet (10 mg total) by mouth daily. 90 tablet 3    fluticasone propionate 50 MCG/ACT Nasal Suspension 1 spray by Each Nare route as needed for Rhinitis. 1 each 3    diclofenac 1 % External Gel       gabapentin 300 MG Oral Cap Take 1 capsule (300 mg total) by mouth nightly.      ketoconazole 2 % External Cream Apply 1 Application topically 2 (two) times daily. sennosides-docusate 8.6-50 MG Oral Tab Take 1 tablet by mouth daily. Vitamin B-12 1000 MCG Oral Tab Take 1 tablet (1,000 mcg total) by mouth daily. Cholecalciferol (VITAMIN D) 1000 UNITS Oral Cap Take  by mouth daily. Multiple Vitamins-Minerals (CENTRUM SILVER) Oral Tab Take 1 tablet by mouth daily. Cinnamon 500 MG Oral Cap Take 500 mg by mouth daily.          Allergies:    Penicillins             HIVES    ROS:     ROS    Positive for: Endocrine, Eyes  Negative for: Constitutional, Gastrointestinal, Neurological, Skin, Genitourinary, Musculoskeletal, HENT, Cardiovascular, Respiratory, Psychiatric, Allergic/Imm, Heme/Lymph  Last edited by Fannie Gordon OVIVIANA on 10/24/2023  8:24 AM.          PHYSICAL EXAM:     Base Eye Exam       Visual Acuity (Snellen - Linear)         Right Left    Dist cc 20/30 +2 20/20 -2    Near cc 20/25 20/20      Correction: Glasses              Tonometry (Applanation, 8:50 AM)         Right Left    Pressure 11 12              Pachymetry (11/20/07)         Right Left    Thickness 560/ -1 559/ -1              Pupils         Pupils    Right PERRL    Left PERRL              Visual Fields         Left Right     Full Full              Extraocular Movement         Right Left     Full, Ortho Full, Ortho              Neuro/Psych       Oriented x3: Yes    Mood/Affect: Normal              Dilation       Both eyes: 1.0% Mydriacyl and 2.5% Jake Synephrine @ 8:51 AM                  Slit Lamp and Fundus Exam       Slit Lamp Exam         Right Left    Lids/Lashes Dermatochalasis, Meibomian gland dysfunction Dermatochalasis, Meibomian gland dysfunction    Conjunctiva/Sclera Normal Normal    Cornea Clear Clear    Anterior Chamber Deep and quiet Deep and quiet    Iris Normal Normal    Lens PC IOL with YAG  PC IOL with Trace  PC opacity superiorly    Vitreous Clear Vitreous floaters              Fundus Exam         Right Left    Disc Sloping margin, Temporal crescent Sloping margin, Temporal crescent    C/D Ratio 0.8 0.8    Macula ERM, no BDR Normal- no BDR    Vessels Normal Normal    Periphery laser TX for retinal tear superior, retinal tear with surrounding cryo superior, demarcation line superotemporal, cobblestone degeneration inferior Normal                  Refraction       Wearing Rx         Sphere Cylinder Axis Add    Right -0.50 +0.50 175 +2.50    Left -0.25 +0.75 030 +2.50      Age: 1yrs    Type: Flat top bifocal              Manifest Refraction         Sphere Cylinder [place] Dist [place] Add Near [place]    Right -1.00 +0.75 175 20/25- +2.75 20/20    Left -0.50 +0.25 030 20/20 +2.75 20/20              Final Rx         Sphere Cylinder [place] Dist [place] Add Near [place]    Right -1.00 +0.75 175 20/25- +2.75 20/20    Left -0.50 +0.25 030 20/20 +2.75 20/20      Type: Flat top bifocal              Final Rx #2         Sphere Cylinder [place] Dist VA Add Near [place]    Right +0.50 +0.75 175 20/25- +1.50 20/20    Left +1.00 +0.25 030 20/20 +1.50 20/20      Type: Computer bifocal                     ASSESSMENT/PLAN:     Diagnoses and Plan:     Diabetes mellitus type 2 without retinopathy ([address]  Diet controlled diabetes type II: no background of retinopathy, no signs of neovascularization noted. Discussed ocular and systemic benefits of blood sugar control. Diagnosis and treatment discussed in detail with patient. Will see patient in 1 year for a diabetic exam    Glaucoma suspect of both eyes  Discussed with patient that he is a glaucoma suspect based on increased cupping of the optic nerves in both eyes. Fundus photos taken today. Will not start medication, but will continue to observe. Patient verbalized understanding. Pseudophakia of both eyes  New glasses Rx given. Suggest update. Epiretinal membrane, right  Stable; no treatment. Floater, vitreous, left   There is no evidence of retinal pathology. All signs and symptoms of retinal detachment/tears explained in detail. Patient instructed to call the office if they experience increase in floaters, increase in flashes of light, loss of vision or curtain or veil effect. History of retinal tear  Continue to follow up with Dr. Jessica Avilez yearly.      Orders Placed This Encounter      Fundus Photos - OU - Both Eyes      Meds This Visit:  Requested Prescriptions      No prescriptions requested or ordered in this encounter        Follow up instructions:  Return in about 1 year (around 10/24/2024) for Diabetic eye exam.    10/24/2023  Scribed by: Erika Maguire MD

## 2023-10-24 NOTE — ASSESSMENT & PLAN NOTE
Diet controlled diabetes type II: no background of retinopathy, no signs of neovascularization noted. Discussed ocular and systemic benefits of blood sugar control. Diagnosis and treatment discussed in detail with patient.     Will see patient in 1 year for a diabetic exam

## 2023-10-26 ENCOUNTER — OFFICE VISIT (OUTPATIENT)
Dept: ORTHOPEDICS CLINIC | Facility: CLINIC | Age: 78
End: 2023-10-26

## 2023-10-26 VITALS — DIASTOLIC BLOOD PRESSURE: 64 MMHG | SYSTOLIC BLOOD PRESSURE: 136 MMHG | HEART RATE: 77 BPM

## 2023-10-26 DIAGNOSIS — M17.11 PRIMARY OSTEOARTHRITIS OF RIGHT KNEE: Primary | ICD-10-CM

## 2023-10-26 PROCEDURE — 20610 DRAIN/INJ JOINT/BURSA W/O US: CPT | Performed by: ORTHOPAEDIC SURGERY

## 2023-10-26 PROCEDURE — 1160F RVW MEDS BY RX/DR IN RCRD: CPT | Performed by: ORTHOPAEDIC SURGERY

## 2023-10-26 PROCEDURE — 3075F SYST BP GE 130 - 139MM HG: CPT | Performed by: ORTHOPAEDIC SURGERY

## 2023-10-26 PROCEDURE — 1159F MED LIST DOCD IN RCRD: CPT | Performed by: ORTHOPAEDIC SURGERY

## 2023-10-26 PROCEDURE — 3078F DIAST BP <80 MM HG: CPT | Performed by: ORTHOPAEDIC SURGERY

## 2023-10-26 PROCEDURE — 1126F AMNT PAIN NOTED NONE PRSNT: CPT | Performed by: ORTHOPAEDIC SURGERY

## 2023-10-26 NOTE — PROGRESS NOTES
NURSING INTAKE COMMENTS: Patient presents with:  Knee Pain: R knee f/u- rates pain 5/10 on and off- here for Orthovisc injection #3      HPI: This 77year old male presents today with complaints of right knee arthritis follow-up. He is here for his third injection of Orthovisc into the right knee. He reports slight improvement with the first 2 injections. No recent trauma to the knee. 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    PPVx, Cryo, GFX, SF6    Myopia with astigmatism and presbyopia 1957    Nocturia 9/7/2014    Osteoarthritis     Rotator cuff tear, left 2011    repair    Seasonal allergic rhinitis 4/24/2017    Stiffness of joint, hand 8/2010    bilateral, hand stiffness & weakness post trigger release    Trigger finger 9/29/2011    Irf-trigger finger, recurrent - LRF trigger finger release    Type II or unspecified type diabetes mellitus without mention of complication, not stated as uncontrolled     Visual impairment     readers     Past Surgical History:   Procedure Laterality Date    ARTHROSCOPY OF JOINT UNLISTED  1990    knee    ARTHROSCOPY OF JOINT UNLISTED Left 2011    rotator cuff repair    ARTHROSCOPY OF JOINT UNLISTED Left 1992    hip resurfacing    ARTHROSCOPY OF JOINT UNLISTED Left 12/2008    hip resurfacing    CATARACT      CATARACT EXTRACTION W/  INTRAOCULAR LENS IMPLANT Right 09/17/2012    [facility]    CATARACT EXTRACTION W/  INTRAOCULAR LENS IMPLANT Left 01/11/2010    [facility]    COLONOSCOPY  04/2015    repeat in 10 years.     COLONOSCOPY      EYE SURGERY Right 2013    repair detached retina    EYE SURGERY Right 02/20/2006    S/ P PPV RD repair (S/P PPVx, cryo, GFX, SF6 OD 4/25/13) Dr. Fish Reynolds Left     left wrist surgery    HAND/FINGER SURGERY UNLISTED Left 9/29/2011    LRF trigger finger release    HC INJ EPIDURAL STEROID LUMBAR OR SACRAL W IMG GUIDANCE      x2 2019,x2 2017    HERNIA SURGERY      hernia repair, herniorraphy    LASER SURGERY OF EYE      OTHER      bladder surgery x2    OTHER SURGICAL HISTORY      esophageal dilatation    OTHER SURGICAL HISTORY  6/15/2010    release triggers: RMF, RRF, LMF/ RRF Dupuytren's nodule excision    REPAIR OF BICEPS TENDON AT ELBOW Left 1992    RETINAL LASER PROCEDURE      SPINE SURGERY PROCEDURE UNLISTED  02/23/2022    L3-5 laminectomy, left L4-5 transforaminal lumbar interbody fusion;    TOTAL HIP REPLACEMENT Left 2008    YAG CAPSULOTOMY - OD - RIGHT EYE Right 4/30/14    [facility]     Current Outpatient Medications   Medication Sig Dispense Refill    NIFEdipine ER 90 MG Oral Tablet 24 Hr Take 1 tablet (90 mg total) by mouth daily. 90 tablet 3    Omeprazole 40 MG Oral Capsule Delayed Release Take 1 capsule (40 mg total) by mouth daily. 90 capsule 3    Lovastatin 40 MG Oral Tab Take 1 tablet (40 mg total) by mouth nightly. 90 tablet 3    tadalafil 5 MG Oral Tab Take 1 tablet (5 mg total) by mouth daily as needed for Erectile Dysfunction. 90 tablet 3    methocarbamol 500 MG Oral Tab Take 2 tablets (1,000 mg total) by mouth 3 (three) times daily as needed. 30 tablet 0    Risedronate Sodium 150 MG Oral Tab Take 1 tablet (150 mg total) by mouth every 30 (thirty) days. 3 tablet 3    ADVAIR DISKUS 100-50 MCG/ACT Inhalation Aerosol Powder, Breath Activated Inhale 1 puff into the lungs 2 (two) times daily. 180 each 1    cetirizine 10 MG Oral Tab Take 1 tablet (10 mg total) by mouth daily.  90 tablet 3    fluticasone propionate 50 MCG/ACT Nasal Suspension 1 spray by Each Nare route as needed for Rhinitis. 1 each 3    diclofenac 1 % External Gel       gabapentin 300 MG Oral Cap Take 1 capsule (300 mg total) by mouth nightly.      ketoconazole 2 % External Cream Apply 1 Application topically 2 (two) times daily. sennosides-docusate 8.6-50 MG Oral Tab Take 1 tablet by mouth daily. Vitamin B-12 1000 MCG Oral Tab Take 1 tablet (1,000 mcg total) by mouth daily. Cholecalciferol (VITAMIN D) 1000 UNITS Oral Cap Take  by mouth daily. Multiple Vitamins-Minerals (CENTRUM SILVER) Oral Tab Take 1 tablet by mouth daily. Cinnamon 500 MG Oral Cap Take 500 mg by mouth daily. Penicillins             HIVES  Family History   Problem Relation Age of Onset    Colon Cancer Mother     Other (pulmonary Embolism) Father     Glaucoma Neg     Diabetes Neg     Macular degeneration Neg        Social History    Occupational History      Not on file    Tobacco Use      Smoking status: Never      Smokeless tobacco: Never    Vaping Use      Vaping Use: Never used    Substance and Sexual Activity      Alcohol use:  Yes        Alcohol/week: 2.0 - 4.0 standard drinks of alcohol        Types: 2 - 4 Cans of beer per week        Comment: 2-3 times per week      Drug use: No      Sexual activity: Not on file       Review of Systems:  GENERAL: denies fevers, chills, night sweats, fatigue, unintentional weight loss/gain  SKIN: denies skin lesions, open sores, rash  HEENT:denies recent vision change, new nasal congestion,hearing loss, tinnitus, sore throat, headaches  RESPIRATORY: denies new shortness of breath, cough, asthma, wheezing  CARDIOVASCULAR: denies chest pain, leg cramps with exertion, palpitations, leg swelling  GI: denies abdominal pain, nausea, vomiting, diarrhea, constipation, hematochezia, worsening heartburn or stomach ulcers  : denies dysuria, hematuria, incontinence, increased frequency, urgency, difficulty urinating  MUSCULOSKELETAL: denies musculoskeletal complaints other than in HPI  NEURO: denies numbness, tingling, weakness, balance issues, dizziness, memory loss  PSYCHIATRIC: denies Hx of depression, anxiety, other psychiatric disorders  HEMATOLOGIC: denies blood clots, anemia, blood clotting disorders, blood transfusion  ENDOCRINE: denies autoimmune disease, thyroid issues, or diabetes  ALLERGY: denies asthma, seasonal allergies    Physical Examination:    /64   Pulse 77   Constitutional: appears well hydrated, alert and responsive, no acute distress noted  Extremities: Unchanged  Neurological: Unchanged    Imaging:   XR KNEE, COMPLETE (4 OR MORE VIEWS), RIGHT ([id number])    Result Date: 10/12/2023  PROCEDURE: XR KNEE, COMPLETE (4 OR MORE VIEWS), RIGHT ([id number])  COMPARISON: [facility]. for [address], XR KNEE, COMPLETE (4 OR MORE VIEWS), RIGHT ([id number]), 8/25/2022, 8:28 AM.  [facility] for [address], XR KNEE, COMPLETE (4 OR MORE VIEWS), RIGHT ([id number]), 12/11/2020, 7:56 AM.  [facility] for [address], [address] [place] [address], 5/31/2019, 8:09 AM.  INDICATIONS: Deep right knee pain and stiffness for 3-4 years; no known trauma. TECHNIQUE: 8 views were obtained. FINDINGS:  There is no acute fracture or dislocation. Mild osteopenia. Moderate joint space narrowing bilaterally and small tricompartmental osteophytes. Small bilateral joint effusions. Extensive vascular calcifications bilaterally. Again, there may be an osteochondral defect versus subchondral cyst at the right medial femoral condyle. CONCLUSION:   1. No acute fracture or dislocation. Mild osteopenia. 2. Moderate bilateral degenerative joint disease and small bilateral joint effusions. 3. Redemonstration of osteochondral defect versus subchondral cyst at the right medial femoral condyle.      Dictated by (CST): Gita Noyola MD on 10/12/2023 at 1:43 PM     Finalized by (CST): Gita Noyola MD on 10/12/2023 at 1:48 PM             Labs:  Lab Results Component Value Date    WBC 8.5 12/27/2022    HGB 14.6 12/27/2022    .0 12/27/2022      Lab Results   Component Value Date     (H) 12/27/2022    BUN 16 12/27/2022    CREATSERUM 1.00 12/27/2022    GFRNAA 81 02/19/2022    GFRAA 94 02/19/2022        Assessment and Plan:  Diagnoses and all orders for this visit:    Primary osteoarthritis of right knee  -     arthrocentesis major joint  -     hyaluronan (Orthovisc) 30 MG/2ML intra-articular injection 30 mg        Assessment: Right knee osteoarthritis, primary    Plan: Using sterile technique and a superolateral parapatellar approach, the right knee was injected with 30 mg of Orthovisc. Advised icing, oral anti-inflammatories, activity modifications and home exercises. Follow-up with me again in 1 week for the next injection. Follow Up: Return in about 1 week (around 11/2/2023).     Kyle Reardon MD

## 2023-11-02 ENCOUNTER — OFFICE VISIT (OUTPATIENT)
Dept: ORTHOPEDICS CLINIC | Facility: CLINIC | Age: 78
End: 2023-11-02
Payer: MEDICARE

## 2023-11-02 VITALS — HEART RATE: 78 BPM | DIASTOLIC BLOOD PRESSURE: 69 MMHG | SYSTOLIC BLOOD PRESSURE: 161 MMHG

## 2023-11-02 DIAGNOSIS — M17.11 PRIMARY OSTEOARTHRITIS OF RIGHT KNEE: Primary | ICD-10-CM

## 2023-11-02 PROCEDURE — 1160F RVW MEDS BY RX/DR IN RCRD: CPT | Performed by: ORTHOPAEDIC SURGERY

## 2023-11-02 PROCEDURE — 99213 OFFICE O/P EST LOW 20 MIN: CPT | Performed by: ORTHOPAEDIC SURGERY

## 2023-11-02 PROCEDURE — 3078F DIAST BP <80 MM HG: CPT | Performed by: ORTHOPAEDIC SURGERY

## 2023-11-02 PROCEDURE — 1159F MED LIST DOCD IN RCRD: CPT | Performed by: ORTHOPAEDIC SURGERY

## 2023-11-02 PROCEDURE — 3077F SYST BP >= 140 MM HG: CPT | Performed by: ORTHOPAEDIC SURGERY

## 2023-11-02 PROCEDURE — 20610 DRAIN/INJ JOINT/BURSA W/O US: CPT | Performed by: ORTHOPAEDIC SURGERY

## 2023-11-02 NOTE — PROGRESS NOTES
NURSING INTAKE COMMENTS: Patient presents with:  Knee Pain: R knee- Pt is here for Orthovisc injection #4. HPI: This 77year old male presents today with complaints of right knee arthritis follow-up. He is here for his fourth Orthovisc injection series. He reports minimal improvement respect to the knee. 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    PPVx, Cryo, GFX, SF6    Myopia with astigmatism and presbyopia 1957    Nocturia 9/7/2014    Osteoarthritis     Rotator cuff tear, left 2011    repair    Seasonal allergic rhinitis 4/24/2017    Stiffness of joint, hand 8/2010    bilateral, hand stiffness & weakness post trigger release    Trigger finger 9/29/2011    Irf-trigger finger, recurrent - LRF trigger finger release    Type II or unspecified type diabetes mellitus without mention of complication, not stated as uncontrolled     Visual impairment     readers     Past Surgical History:   Procedure Laterality Date    ARTHROSCOPY OF JOINT UNLISTED  1990    knee    ARTHROSCOPY OF JOINT UNLISTED Left 2011    rotator cuff repair    ARTHROSCOPY OF JOINT UNLISTED Left 1992    hip resurfacing    ARTHROSCOPY OF JOINT UNLISTED Left 12/2008    hip resurfacing    CATARACT      CATARACT EXTRACTION W/  INTRAOCULAR LENS IMPLANT Right 09/17/2012    [facility] [place]    CATARACT EXTRACTION W/  INTRAOCULAR LENS IMPLANT Left 01/11/2010    RJ    COLONOSCOPY  04/2015    repeat in 10 years.     COLONOSCOPY      EYE SURGERY Right 2013    repair detached retina    EYE SURGERY Right 02/20/2006    S/ P PPV RD repair (S/P PPVx, cryo, GFX, SF6 OD 4/25/13) Dr. Alexus Mercado Left     left wrist surgery    HAND/FINGER SURGERY UNLISTED Left 9/29/2011    LRF trigger finger release    HC INJ EPIDURAL STEROID LUMBAR OR SACRAL W IMG GUIDANCE      x2 2019,x2 2017    HERNIA SURGERY      hernia repair, herniorraphy    LASER SURGERY OF EYE      OTHER      bladder surgery x2    OTHER SURGICAL HISTORY      esophageal dilatation    OTHER SURGICAL HISTORY  6/15/2010    release triggers: RMF, RRF, LMF/ RRF Dupuytren's nodule excision    REPAIR OF BICEPS TENDON AT ELBOW Left 1992    RETINAL LASER PROCEDURE      SPINE SURGERY PROCEDURE UNLISTED  02/23/2022    L3-5 laminectomy, left L4-5 transforaminal lumbar interbody fusion;    TOTAL HIP REPLACEMENT Left 2008    YAG CAPSULOTOMY - OD - RIGHT EYE Right 4/30/14    [facility]     Current Outpatient Medications   Medication Sig Dispense Refill    NIFEdipine ER 90 MG Oral Tablet 24 Hr Take 1 tablet (90 mg total) by mouth daily. 90 tablet 3    Omeprazole 40 MG Oral Capsule Delayed Release Take 1 capsule (40 mg total) by mouth daily. 90 capsule 3    Lovastatin 40 MG Oral Tab Take 1 tablet (40 mg total) by mouth nightly. 90 tablet 3    tadalafil 5 MG Oral Tab Take 1 tablet (5 mg total) by mouth daily as needed for Erectile Dysfunction. 90 tablet 3    methocarbamol 500 MG Oral Tab Take 2 tablets (1,000 mg total) by mouth 3 (three) times daily as needed. 30 tablet 0    Risedronate Sodium 150 MG Oral Tab Take 1 tablet (150 mg total) by mouth every 30 (thirty) days. 3 tablet 3    ADVAIR DISKUS 100-50 MCG/ACT Inhalation Aerosol Powder, Breath Activated Inhale 1 puff into the lungs 2 (two) times daily. 180 each 1    cetirizine 10 MG Oral Tab Take 1 tablet (10 mg total) by mouth daily. 90 tablet 3    fluticasone propionate 50 MCG/ACT Nasal Suspension 1 spray by Each Nare route as needed for Rhinitis.  1 each 3    diclofenac 1 % External Gel       gabapentin 300 MG Oral Cap Take 1 capsule (300 mg total) by mouth nightly.      ketoconazole 2 % External Cream Apply 1 Application topically 2 (two) times daily. sennosides-docusate 8.6-50 MG Oral Tab Take 1 tablet by mouth daily. Vitamin B-12 1000 MCG Oral Tab Take 1 tablet (1,000 mcg total) by mouth daily. Cholecalciferol (VITAMIN D) 1000 UNITS Oral Cap Take  by mouth daily. Multiple Vitamins-Minerals (CENTRUM SILVER) Oral Tab Take 1 tablet by mouth daily. Cinnamon 500 MG Oral Cap Take 500 mg by mouth daily. Penicillins             HIVES  Family History   Problem Relation Age of Onset    Colon Cancer Mother     Other (pulmonary Embolism) Father     Glaucoma Neg     Diabetes Neg     Macular degeneration Neg        Social History    Occupational History      Not on file    Tobacco Use      Smoking status: Never      Smokeless tobacco: Never    Vaping Use      Vaping Use: Never used    Substance and Sexual Activity      Alcohol use:  Yes        Alcohol/week: 2.0 - 4.0 standard drinks of alcohol        Types: 2 - 4 Cans of beer per week        Comment: 2-3 times per week      Drug use: No      Sexual activity: Not on file       Review of Systems:  GENERAL: denies fevers, chills, night sweats, fatigue, unintentional weight loss/gain  SKIN: denies skin lesions, open sores, rash  HEENT:denies recent vision change, new nasal congestion,hearing loss, tinnitus, sore throat, headaches  RESPIRATORY: denies new shortness of breath, cough, asthma, wheezing  CARDIOVASCULAR: denies chest pain, leg cramps with exertion, palpitations, leg swelling  GI: denies abdominal pain, nausea, vomiting, diarrhea, constipation, hematochezia, worsening heartburn or stomach ulcers  : denies dysuria, hematuria, incontinence, increased frequency, urgency, difficulty urinating  MUSCULOSKELETAL: denies musculoskeletal complaints other than in HPI  NEURO: denies numbness, tingling, weakness, balance issues, dizziness, memory loss  PSYCHIATRIC: denies Hx of depression, anxiety, other psychiatric disorders  HEMATOLOGIC: denies blood clots, anemia, blood clotting disorders, blood transfusion  ENDOCRINE: denies autoimmune disease, thyroid issues, or diabetes  ALLERGY: denies asthma, seasonal allergies    Physical Examination:    BP (!) 161/69   Pulse 78   Constitutional: appears well hydrated, alert and responsive, no acute distress noted  Extremities: Right knee examination unchanged. No effusion  Neurological: Unchanged    Imaging:   XR KNEE, COMPLETE (4 OR MORE VIEWS), RIGHT ([id number])    Result Date: 10/12/2023  PROCEDURE: XR KNEE, COMPLETE (4 OR MORE VIEWS), RIGHT ([id number])  COMPARISON: [facility], [facility]. for [address], XR KNEE, COMPLETE (4 OR MORE VIEWS), RIGHT ([id number]), 8/25/2022, 8:28 AM.  [facility], [facility]. for [address], XR KNEE, COMPLETE (4 OR MORE VIEWS), RIGHT ([id number]), 12/11/2020, 7:56 AM.  [facility], [facility]. for [address], [address] [place] [address], 5/31/2019, 8:09 AM.  INDICATIONS: Deep right knee pain and stiffness for 3-4 years; no known trauma. TECHNIQUE: 8 views were obtained. FINDINGS:  There is no acute fracture or dislocation. Mild osteopenia. Moderate joint space narrowing bilaterally and small tricompartmental osteophytes. Small bilateral joint effusions. Extensive vascular calcifications bilaterally. Again, there may be an osteochondral defect versus subchondral cyst at the right medial femoral condyle. CONCLUSION:   1. No acute fracture or dislocation. Mild osteopenia. 2. Moderate bilateral degenerative joint disease and small bilateral joint effusions. 3. Redemonstration of osteochondral defect versus subchondral cyst at the right medial femoral condyle.      Dictated by (CST): Ally Hernandez MD on 10/12/2023 at 1:43 PM     Finalized by (CST): Ally Hernandez MD on 10/12/2023 at 1:48 PM             Labs:  Lab Results   Component Value Date    WBC 8.5 12/27/2022    HGB 14.6 12/27/2022    .0 12/27/2022      Lab Results   Component Value Date     (H) 12/27/2022    BUN 16 12/27/2022    CREATSERUM 1.00 12/27/2022    GFRNAA 81 02/19/2022    GFRAA 94 02/19/2022        Assessment and Plan:  Diagnoses and all orders for this visit:    Primary osteoarthritis of right knee  -     arthrocentesis major joint  -     hyaluronan (Orthovisc) 30 MG/2ML intra-articular injection 30 mg        Assessment: Right knee osteoarthritis, primary    Plan: Using sterile technique and a superolateral parapatellar approach, the right knee was injected with Orthovisc 30 mg. No fluid was aspirated. Advised icing, oral anti-inflammatories, activity modifications, home exercises. Follow-up with me again as needed. Follow Up: Return if symptoms worsen or fail to improve.     Ana Srinivasan MD

## 2024-01-16 ENCOUNTER — OFFICE VISIT (OUTPATIENT)
Facility: CLINIC | Age: 79
End: 2024-01-16
Payer: MEDICARE

## 2024-01-16 VITALS
WEIGHT: 196.81 LBS | BODY MASS INDEX: 26.66 KG/M2 | DIASTOLIC BLOOD PRESSURE: 70 MMHG | SYSTOLIC BLOOD PRESSURE: 136 MMHG | HEART RATE: 81 BPM | OXYGEN SATURATION: 98 % | HEIGHT: 72 IN

## 2024-01-16 DIAGNOSIS — I10 ESSENTIAL HYPERTENSION: ICD-10-CM

## 2024-01-16 DIAGNOSIS — E78.2 MIXED HYPERLIPIDEMIA: ICD-10-CM

## 2024-01-16 DIAGNOSIS — Z86.69 HISTORY OF RETINAL TEAR: ICD-10-CM

## 2024-01-16 DIAGNOSIS — N35.916 STRICTURE OF OVERLAPPING SITES OF URETHRA IN MALE, UNSPECIFIED STRICTURE TYPE: ICD-10-CM

## 2024-01-16 DIAGNOSIS — N21.0 BLADDER STONE: ICD-10-CM

## 2024-01-16 DIAGNOSIS — E11.9 DIABETES MELLITUS TYPE 2 WITHOUT RETINOPATHY (HCC): Primary | ICD-10-CM

## 2024-01-16 DIAGNOSIS — Z96.1 PSEUDOPHAKIA OF BOTH EYES: ICD-10-CM

## 2024-01-16 DIAGNOSIS — N52.01 ERECTILE DYSFUNCTION DUE TO ARTERIAL INSUFFICIENCY: ICD-10-CM

## 2024-01-16 DIAGNOSIS — N13.8 BPH WITH OBSTRUCTION/LOWER URINARY TRACT SYMPTOMS: ICD-10-CM

## 2024-01-16 DIAGNOSIS — R97.20 ELEVATED PSA: ICD-10-CM

## 2024-01-16 DIAGNOSIS — N40.1 BPH WITH OBSTRUCTION/LOWER URINARY TRACT SYMPTOMS: ICD-10-CM

## 2024-01-16 DIAGNOSIS — H40.003 GLAUCOMA SUSPECT OF BOTH EYES: ICD-10-CM

## 2024-01-16 DIAGNOSIS — M48.061 LUMBAR FORAMINAL STENOSIS: ICD-10-CM

## 2024-01-16 DIAGNOSIS — H35.371 EPIRETINAL MEMBRANE, RIGHT: ICD-10-CM

## 2024-01-16 DIAGNOSIS — M54.12 CERVICAL RADICULOPATHY: ICD-10-CM

## 2024-01-16 DIAGNOSIS — J41.0 SIMPLE CHRONIC BRONCHITIS (HCC): ICD-10-CM

## 2024-01-16 DIAGNOSIS — H43.392 FLOATER, VITREOUS, LEFT: ICD-10-CM

## 2024-01-16 PROBLEM — Q76.1 CERVICAL FUSION SYNDROME: Status: RESOLVED | Noted: 2022-05-23 | Resolved: 2024-01-16

## 2024-01-16 PROBLEM — G89.29 CHRONIC PAIN OF LEFT THUMB: Status: RESOLVED | Noted: 2018-05-24 | Resolved: 2024-01-16

## 2024-01-16 PROBLEM — M75.21 BICEPS TENDONITIS ON RIGHT: Status: RESOLVED | Noted: 2018-10-11 | Resolved: 2024-01-16

## 2024-01-16 PROBLEM — M25.561 CHRONIC PAIN OF RIGHT KNEE: Status: RESOLVED | Noted: 2017-11-09 | Resolved: 2024-01-16

## 2024-01-16 PROBLEM — M54.16 LUMBAR RADICULOPATHY: Status: RESOLVED | Noted: 2018-04-16 | Resolved: 2024-01-16

## 2024-01-16 PROBLEM — M25.512 CHRONIC LEFT SHOULDER PAIN: Status: RESOLVED | Noted: 2019-04-25 | Resolved: 2024-01-16

## 2024-01-16 PROBLEM — M79.641 RIGHT HAND PAIN: Status: RESOLVED | Noted: 2022-08-22 | Resolved: 2024-01-16

## 2024-01-16 PROBLEM — G56.21 ULNAR NEUROPATHY AT ELBOW OF RIGHT UPPER EXTREMITY: Status: RESOLVED | Noted: 2022-05-23 | Resolved: 2024-01-16

## 2024-01-16 PROBLEM — G89.29 CHRONIC LEFT-SIDED LOW BACK PAIN WITH LEFT-SIDED SCIATICA: Status: RESOLVED | Noted: 2017-04-24 | Resolved: 2024-01-16

## 2024-01-16 PROBLEM — M67.911 DYSFUNCTION OF RIGHT ROTATOR CUFF: Status: RESOLVED | Noted: 2018-04-16 | Resolved: 2024-01-16

## 2024-01-16 PROBLEM — M19.011 ARTHRITIS OF RIGHT ACROMIOCLAVICULAR JOINT: Status: RESOLVED | Noted: 2018-05-09 | Resolved: 2024-01-16

## 2024-01-16 PROBLEM — M65.311 TRIGGER FINGER OF RIGHT THUMB: Status: RESOLVED | Noted: 2021-03-01 | Resolved: 2024-01-16

## 2024-01-16 PROBLEM — G89.29 CHRONIC RIGHT SHOULDER PAIN: Status: RESOLVED | Noted: 2017-11-09 | Resolved: 2024-01-16

## 2024-01-16 PROBLEM — M79.645 CHRONIC PAIN OF LEFT THUMB: Status: RESOLVED | Noted: 2018-05-24 | Resolved: 2024-01-16

## 2024-01-16 PROBLEM — M54.32 SCIATICA OF LEFT SIDE: Status: RESOLVED | Noted: 2020-07-06 | Resolved: 2024-01-16

## 2024-01-16 PROBLEM — M19.042 PRIMARY OSTEOARTHRITIS OF LEFT HAND: Status: RESOLVED | Noted: 2018-10-20 | Resolved: 2024-01-16

## 2024-01-16 PROBLEM — M19.041 PRIMARY OSTEOARTHRITIS OF RIGHT HAND: Status: RESOLVED | Noted: 2022-08-22 | Resolved: 2024-01-16

## 2024-01-16 PROBLEM — M17.0 PRIMARY OSTEOARTHRITIS OF BOTH KNEES: Status: RESOLVED | Noted: 2021-10-07 | Resolved: 2024-01-16

## 2024-01-16 PROBLEM — Z96.651 S/P TOTAL KNEE REPLACEMENT, RIGHT: Status: RESOLVED | Noted: 2017-09-13 | Resolved: 2024-01-16

## 2024-01-16 PROBLEM — M54.42 CHRONIC LEFT-SIDED LOW BACK PAIN WITH LEFT-SIDED SCIATICA: Status: RESOLVED | Noted: 2017-04-24 | Resolved: 2024-01-16

## 2024-01-16 PROBLEM — G89.29 CHRONIC LEFT SHOULDER PAIN: Status: RESOLVED | Noted: 2019-04-25 | Resolved: 2024-01-16

## 2024-01-16 PROBLEM — M43.10 RETROLISTHESIS: Status: RESOLVED | Noted: 2018-05-09 | Resolved: 2024-01-16

## 2024-01-16 PROBLEM — G56.01 RIGHT CARPAL TUNNEL SYNDROME: Status: RESOLVED | Noted: 2022-07-05 | Resolved: 2024-01-16

## 2024-01-16 PROBLEM — M19.049 HAND ARTHRITIS: Status: RESOLVED | Noted: 2021-03-01 | Resolved: 2024-01-16

## 2024-01-16 PROBLEM — G89.29 CHRONIC PAIN OF RIGHT KNEE: Status: RESOLVED | Noted: 2017-11-09 | Resolved: 2024-01-16

## 2024-01-16 PROBLEM — M25.511 CHRONIC RIGHT SHOULDER PAIN: Status: RESOLVED | Noted: 2017-11-09 | Resolved: 2024-01-16

## 2024-01-16 PROBLEM — M51.9 LUMBAR DISC DISEASE: Status: RESOLVED | Noted: 2018-05-09 | Resolved: 2024-01-16

## 2024-01-16 PROCEDURE — 1125F AMNT PAIN NOTED PAIN PRSNT: CPT | Performed by: INTERNAL MEDICINE

## 2024-01-16 PROCEDURE — 3078F DIAST BP <80 MM HG: CPT | Performed by: INTERNAL MEDICINE

## 2024-01-16 PROCEDURE — 99499 UNLISTED E&M SERVICE: CPT | Performed by: INTERNAL MEDICINE

## 2024-01-16 PROCEDURE — 96160 PT-FOCUSED HLTH RISK ASSMT: CPT | Performed by: INTERNAL MEDICINE

## 2024-01-16 PROCEDURE — G0439 PPPS, SUBSEQ VISIT: HCPCS | Performed by: INTERNAL MEDICINE

## 2024-01-16 PROCEDURE — 99213 OFFICE O/P EST LOW 20 MIN: CPT | Performed by: INTERNAL MEDICINE

## 2024-01-16 PROCEDURE — 3008F BODY MASS INDEX DOCD: CPT | Performed by: INTERNAL MEDICINE

## 2024-01-16 PROCEDURE — 1159F MED LIST DOCD IN RCRD: CPT | Performed by: INTERNAL MEDICINE

## 2024-01-16 PROCEDURE — 3075F SYST BP GE 130 - 139MM HG: CPT | Performed by: INTERNAL MEDICINE

## 2024-01-16 PROCEDURE — 1170F FXNL STATUS ASSESSED: CPT | Performed by: INTERNAL MEDICINE

## 2024-01-16 RX ORDER — LISINOPRIL 5 MG/1
5 TABLET ORAL DAILY
Qty: 90 TABLET | Refills: 3 | Status: SHIPPED | OUTPATIENT
Start: 2024-01-16

## 2024-01-16 NOTE — PROGRESS NOTES
Subjective:   Linwood Morales is a 78 year old male who presents for a MA (Medicare Advantage) Supervisit (Once per calendar year) and scheduled follow up of multiple significant but stable problems and acute exacerbation of a chronic illness.   78-year-old gentleman here for Medicare advantage super visit.  He reports that he is doing well.  No abuse no depression no falls reported.    History/Other:   Fall Risk Assessment:   He has been screened for Falls and is High Risk. Fall Prevention information provided to patient in After Visit Summary.          Cognitive Assessment:   He had a completely normal cognitive assessment - see flowsheet entries     Functional Ability/Status:   Linwood Morales has some abnormal functions as listed below:  He has Walking problems based on screening of functional status. He has problems with Daily Activities based on screening of functional status.       Depression Screening (PHQ-2/PHQ-9): PHQ-2 SCORE: 0, done 1/16/2024        <5 minutes spent screening and counseling for depression    Advanced Directives:   He does NOT have a Living Will. [ ]  He does NOT have a Power of  for Health Care. [ ]  Discussed Advance Care Planning with patient (and family/surrogate if present). Standard forms made available to patient in After Visit Summary.      Patient Active Problem List   Diagnosis    BPH with obstruction/lower urinary tract symptoms    Erectile dysfunction    Elevated PSA    right C7 radiculopathy    Glaucoma suspect of both eyes    Pseudophakia of both eyes    Epiretinal membrane, right    History of retinal tear    Essential hypertension    Mixed hyperlipidemia    Urethral stricture    Diabetes mellitus type 2 without retinopathy (HCC)    Osteopenia    L5-S1 right severe/left mod foraminal stenosis    right subscapularis mild-mod full thickness inferior tear, right supraspinatus anterior full thickness complete tear, right infraspinatus mild articular surface  partial thickness tear    Floater, vitreous, left    Bladder stone    Long-term use of Plaquenil    Lumbar post-laminectomy syndrome: L3-5 laminectomy and L4-5 fusion    Chronic obstructive pulmonary disease, unspecified (HCC)     Allergies:  He is allergic to penicillins.    Current Medications:  Outpatient Medications Marked as Taking for the 1/16/24 encounter (Office Visit) with Mark Gupta MD   Medication Sig    lisinopril 5 MG Oral Tab Take 1 tablet (5 mg total) by mouth daily.    NIFEdipine ER 90 MG Oral Tablet 24 Hr Take 1 tablet (90 mg total) by mouth daily.    Omeprazole 40 MG Oral Capsule Delayed Release Take 1 capsule (40 mg total) by mouth daily.    Lovastatin 40 MG Oral Tab Take 1 tablet (40 mg total) by mouth nightly.    tadalafil 5 MG Oral Tab Take 1 tablet (5 mg total) by mouth daily as needed for Erectile Dysfunction.    Risedronate Sodium 150 MG Oral Tab Take 1 tablet (150 mg total) by mouth every 30 (thirty) days.    ADVAIR DISKUS 100-50 MCG/ACT Inhalation Aerosol Powder, Breath Activated Inhale 1 puff into the lungs 2 (two) times daily.    cetirizine 10 MG Oral Tab Take 1 tablet (10 mg total) by mouth daily.    fluticasone propionate 50 MCG/ACT Nasal Suspension 1 spray by Each Nare route as needed for Rhinitis.    diclofenac 1 % External Gel     ketoconazole 2 % External Cream Apply 1 Application topically 2 (two) times daily.    Vitamin B-12 1000 MCG Oral Tab Take 1 tablet (1,000 mcg total) by mouth daily.    Cholecalciferol (VITAMIN D) 1000 UNITS Oral Cap Take  by mouth daily.    Multiple Vitamins-Minerals (CENTRUM SILVER) Oral Tab Take 1 tablet by mouth daily.      Cinnamon 500 MG Oral Cap Take 500 mg by mouth daily.       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  Surgical History:  He  has a past surgical history that includes hernia surgery; other surgical history; hand/finger surgery unlisted (Left, 9/29/2011); other surgical history (6/15/2010); forearm/wrist surgery unlisted (Left); laser surgery of eye; Yag Capsulotomy - OD - Right Eye (Right, 4/30/14); Retinal laser procedure; total hip replacement (Left, 2008); Eye surgery (Right, 2013); Eye surgery (Right, 02/20/2006); arthroscopy of joint unlisted (1990); arthroscopy of joint unlisted (Left, 2011); arthroscopy of joint unlisted (Left, 1992); arthroscopy of joint unlisted (Left, 12/2008); colonoscopy (04/2015); Cataract extraction w/  intraocular lens implant (Right, 09/17/2012); Cataract extraction w/  intraocular lens implant (Left, 01/11/2010); repair of biceps tendon at elbow (Left, 1992); hc inj epidural steroid lumbar or sacral w img guidance; cataract; other; spine surgery procedure unlisted (02/23/2022); and colonoscopy.   Family History:  His family history includes Colon Cancer in his mother; pulmonary Embolism in his father.  Social History:  He  reports that he has never smoked. He has never used smokeless tobacco. He reports current alcohol use of about 2.0 - 4.0 standard drinks of alcohol per week. He reports that he does not use drugs.    Tobacco:  He has never smoked tobacco.    CAGE Alcohol Screen:   CAGE screening score of 0 on 1/4/2024, showing low risk of alcohol abuse.      Patient Care  Team:  Mark Gupta MD as PCP - General (Internal Medicine)  Nikos Arvizu MD as Consulting Physician (NEUROSURGERY)  Iker Campos MD (Physical Medicine)  Kale Kirby MD (OPHTHALMOLOGY)  Miguel Vázquez MD (GASTROENTEROLOGY)  Victorina Sue MD (UROLOGY)  Martín Akins MD (Allergy and Immunology)  Camilo Carlin (UROLOGY)  Lian Muñoz, PT as Physical Therapist (Physical Therapy)  Iker Campos MD as Consulting Physician (Physical Medicine)  Kristen Rasmussen (Physical Therapy)  Brunner, Heather, ANA MARÍA as Physical Therapist (Physical Therapy)    Review of Systems   Constitutional:  Negative for activity change, appetite change and fever.   HENT:  Negative for congestion and voice change.    Respiratory:  Negative for cough and shortness of breath.    Cardiovascular:  Negative for chest pain.   Gastrointestinal:  Negative for abdominal distention, abdominal pain and vomiting.   Genitourinary:  Negative for hematuria.   Skin:  Negative for wound.   Psychiatric/Behavioral:  Negative for behavioral problems.          Objective:   Physical Exam  Constitutional:       Appearance: Normal appearance.   HENT:      Head: Normocephalic.      Right Ear: Tympanic membrane normal.      Left Ear: Tympanic membrane normal.      Nose: Nose normal.      Mouth/Throat:      Mouth: Mucous membranes are moist.      Pharynx: Oropharynx is clear.   Eyes:      General: No scleral icterus.     Conjunctiva/sclera: Conjunctivae normal.   Neck:      Vascular: No carotid bruit.   Cardiovascular:      Rate and Rhythm: Normal rate and regular rhythm.      Pulses: Normal pulses.      Heart sounds: Normal heart sounds. No murmur heard.  Pulmonary:      Effort: Pulmonary effort is normal. No respiratory distress.      Breath sounds: Normal breath sounds. No wheezing or rales.   Abdominal:      General: Bowel sounds are normal.      Palpations: Abdomen is soft.      Tenderness: There is no abdominal tenderness. There is no  guarding or rebound.   Musculoskeletal:      Cervical back: Neck supple. No rigidity or tenderness.      Right lower leg: No edema.      Left lower leg: No edema.   Lymphadenopathy:      Cervical: No cervical adenopathy.   Skin:     General: Skin is warm and dry.   Neurological:      Mental Status: He is alert and oriented to person, place, and time.      Cranial Nerves: No cranial nerve deficit.      Coordination: Coordination normal.   Psychiatric:         Mood and Affect: Mood normal.         Behavior: Behavior normal.         Thought Content: Thought content normal.         Judgment: Judgment normal.         /70   Pulse 81   Ht 6' (1.829 m)   Wt 196 lb 12.8 oz (89.3 kg)   SpO2 98%   BMI 26.69 kg/m²  Estimated body mass index is 26.69 kg/m² as calculated from the following:    Height as of this encounter: 6' (1.829 m).    Weight as of this encounter: 196 lb 12.8 oz (89.3 kg).      Bilateral barefoot skin diabetic exam is normal, visualized feet and the appearance is normal.  Bilateral monofilament/sensation of both feet is normal.  Pulsation pedal pulse exam of both lower legs/feet is normal as well.     Medicare Hearing Assessment:   Hearing Screening    Screening Method: Questionnaire  I have a problem hearing over the telephone: No I have trouble following the conversations when two or more people are talking at the same time: Sometimes   I have trouble understanding things on the TV: No I have to strain to understand conversations: Sometimes   I have to worry about missing the telephone ring or doorbell: No I have trouble hearing conversations in a noisy background such as a crowded room or restaurant: No   I get confused about where sounds come from: No I misunderstand some words in a sentence and need to ask people to repeat themselves: No   I especially have trouble understanding the speech of women and children: No I have trouble understanding the speaker in a large room such as at a meeting or  place of [employer]: No   Many people I talk to seem to mumble (or don't speak clearly): No People get annoyed because I misunderstand what they say: No   I misunderstand what others are saying and make inappropriate responses: No I avoid social activities because I cannot hear well and fear I will reply improperly: No   Family members and friends have told me they think I may have hearing loss: No                   Assessment & Plan:   Linwood Morales is a 78 year old male who presents for a Medicare Assessment.     1. Diabetes mellitus type 2 without retinopathy (HCC) (Primary)  Foot exam completed today.  Up-to-date on eye exam.  Check A1c.  -     CBC, Platelet; No Differential; Future; Expected date: 01/16/2024  -     Comp Metabolic Panel (14); Future; Expected date: 01/16/2024  -     Hemoglobin A1C; Future; Expected date: 01/16/2024  -     Lipid Panel; Future; Expected date: 01/16/2024  -     TSH W Reflex To Free T4; Future; Expected date: 01/16/2024  -     Microalb/Creat Ratio, Random Urine; Future; Expected date: 01/16/2024  2. Simple chronic bronchitis (HCC) stable    4. Mixed hyperlipidemia check lipid profile LFT  5. L5-S1 right severe/left mod foraminal stenosis stable  6. right C7 radiculopathy stable  7. BPH with obstruction/lower urinary tract symptoms stable and follows with urology  8. Erectile dysfunction due to arterial insufficiency stable  9. Elevated PSA  Overview:  Follows up with Dr Cortes  10. Stricture of overlapping sites of urethra in male, unspecified stricture type  And   11. Bladder stone-follows with urology  12. Glaucoma suspect of both eyes  And  13. Pseudophakia of both eyes  And  14. Epiretinal membrane, right  And  15. History of retinal tear  And  16. Floater, vitreous, left-follows with ophthalmology    Additional evaluation apart from medical advantage super visit  3. Essential hypertension      Blood pressure is elevated.  I have rechecked the blood pressure it is still  running slightly higher side.  Started him on lisinopril.  Discussed low-salt diet.  Discussed side effect that includes angioedema and cough.  -     CBC, Platelet; No Differential; Future; Expected date: 01/16/2024  -     Comp Metabolic Panel (14); Future; Expected date: 01/16/2024  -     Hemoglobin A1C; Future; Expected date: 01/16/2024  -     Lipid Panel; Future; Expected date: 01/16/2024  -     TSH W Reflex To Free T4; Future; Expected date: 01/16/2024  -     Microalb/Creat Ratio, Random Urine; Future; Expected date: 01/16/2024  Other orders  -     Lisinopril; Take 1 tablet (5 mg total) by mouth daily.  Dispense: 90 tablet; Refill: 3    The patient indicates understanding of these issues and agrees to the plan.  Reinforced healthy diet, lifestyle, and exercise.      No follow-ups on file.     Mark Gupta MD, 1/16/2024     Supplementary Documentation:   General Health:           Linwood Morales's SCREENING SCHEDULE   Tests on this list are recommended by your physician but may not be covered, or covered at this frequency, by your insurer.   Please check with your insurance carrier before scheduling to verify coverage.   PREVENTATIVE SERVICES FREQUENCY &  COVERAGE DETAILS LAST COMPLETION DATE   Diabetes Screening    Fasting Blood Sugar / Glucose    One screening every 12 months if never tested or if previously tested but not diagnosed with pre-diabetes   One screening every 6 months if diagnosed with pre-diabetes Lab Results   Component Value Date     (H) 12/27/2022        Cardiovascular Disease Screening    Lipid Panel  Cholesterol  Lipoprotein (HDL)  Triglycerides Covered every 5 years for all Medicare beneficiaries without apparent signs or symptoms of cardiovascular disease Lab Results   Component Value Date    CHOLEST 156 12/27/2022    HDL 48 12/27/2022    LDL 93 12/27/2022    TRIG 80 12/27/2022         Electrocardiogram (EKG)   Covered if needed at Welcome to Medicare, and non-screening if  indicated for medical reasons 01/03/2022      Ultrasound Screening for Abdominal Aortic Aneurysm (AAA) Covered once in a lifetime for one of the following risk factors    Men who are 65-75 years old and have ever smoked    Anyone with a family history -     Colorectal Cancer Screening  Covered for ages 50-85; only need ONE of the following:    Colonoscopy   Covered every 10 years    Covered every 2 years if patient is at high risk or previous colonoscopy was abnormal 04/10/2015    No recommendations at this time    Flexible Sigmoidoscopy   Covered every 4 years -    Fecal Occult Blood Test Covered annually -   Prostate Cancer Screening    Prostate-Specific Antigen (PSA) Annually Lab Results   Component Value Date    PSA 6.57 (H) 04/21/2023     Health Maintenance   Topic Date Due    PSA  04/21/2024      Immunizations    Influenza Covered once per flu season  Please get every year 09/27/2023  No recommendations at this time    Pneumococcal Each vaccine ([id number] & [id number]) covered once after 65 Prevnar 13: 12/28/2016    [id number]: 11/09/2010     No recommendations at this time    Hepatitis B One screening covered for patients with certain risk factors   -  No recommendations at this time    Tetanus Toxoid Not covered by Medicare Part B unless medically necessary (cut with metal); may be covered with your pharmacy prescription benefits 08/13/2009    Tetanus, Diptheria and Pertusis TD and TDaP Not covered by Medicare Part B -  No recommendations at this time    Zoster Not covered by Medicare Part B; may be covered with your pharmacy  prescription benefits 10/08/2014  No recommendations at this time     Diabetes      Hemoglobin A1C Annually; if last result is elevated, may be asked to retest more frequently.    Medicare covers every 3 months Lab Results   Component Value Date     (H) 08/30/2023    A1C 6.5 (H) 08/30/2023       No recommendations at this time    Creat/alb ratio Annually Lab Results    Component Value Date    MICROALBCREA 19.3 12/27/2022       LDL Annually Lab Results   Component Value Date    LDL 93 12/27/2022       Dilated Eye Exam Annually Last Diabetic Eye Exam:  Last Dilated Eye Exam: 10/24/23  Eye Exam shows Diabetic Retinopathy?: No       Annual Monitoring of Persistent Medications (ACE/ARB, digoxin diuretics, anticonvulsants)    Potassium Annually Lab Results   Component Value Date    K 4.2 12/27/2022         Creatinine   Annually Lab Results   Component Value Date    CREATSERUM 1.00 12/27/2022         BUN Annually Lab Results   Component Value Date    BUN 16 12/27/2022       Drug Serum Conc Annually No results found for: \"DIGOXIN\", \"DIG\", \"VALP\"           Chronic Obstructive Pulmonary Disease (COPD)    Spirometry Annually Spirometry date:

## 2024-02-06 ENCOUNTER — LAB ENCOUNTER (OUTPATIENT)
Dept: LAB | Facility: HOSPITAL | Age: 79
End: 2024-02-06
Attending: INTERNAL MEDICINE
Payer: MEDICARE

## 2024-02-06 DIAGNOSIS — I10 ESSENTIAL HYPERTENSION: ICD-10-CM

## 2024-02-06 DIAGNOSIS — E11.9 DIABETES MELLITUS TYPE 2 WITHOUT RETINOPATHY (HCC): ICD-10-CM

## 2024-02-06 LAB
ALBUMIN SERPL-MCNC: 4.3 G/DL (ref 3.2–4.8)
ALBUMIN/GLOB SERPL: 1.7 {RATIO} (ref 1–2)
ALP LIVER SERPL-CCNC: 83 U/L
ALT SERPL-CCNC: 9 U/L
ANION GAP SERPL CALC-SCNC: 7 MMOL/L (ref 0–18)
AST SERPL-CCNC: 14 U/L (ref ?–34)
BILIRUB SERPL-MCNC: 1 MG/DL (ref 0.2–1.1)
BUN BLD-MCNC: 8 MG/DL (ref 9–23)
BUN/CREAT SERPL: 8.2 (ref 10–20)
CALCIUM BLD-MCNC: 9.5 MG/DL (ref 8.7–10.4)
CHLORIDE SERPL-SCNC: 108 MMOL/L (ref 98–112)
CHOLEST SERPL-MCNC: 145 MG/DL (ref ?–200)
CO2 SERPL-SCNC: 28 MMOL/L (ref 21–32)
CREAT BLD-MCNC: 0.97 MG/DL
CREAT UR-SCNC: 58 MG/DL
DEPRECATED RDW RBC AUTO: 44.8 FL (ref 35.1–46.3)
EGFRCR SERPLBLD CKD-EPI 2021: 79 ML/MIN/1.73M2 (ref 60–?)
ERYTHROCYTE [DISTWIDTH] IN BLOOD BY AUTOMATED COUNT: 12.9 % (ref 11–15)
EST. AVERAGE GLUCOSE BLD GHB EST-MCNC: 137 MG/DL (ref 68–126)
FASTING PATIENT LIPID ANSWER: YES
FASTING STATUS PATIENT QL REPORTED: YES
GLOBULIN PLAS-MCNC: 2.6 G/DL (ref 2.8–4.4)
GLUCOSE BLD-MCNC: 121 MG/DL (ref 70–99)
HBA1C MFR BLD: 6.4 % (ref ?–5.7)
HCT VFR BLD AUTO: 44.2 %
HDLC SERPL-MCNC: 45 MG/DL (ref 40–59)
HGB BLD-MCNC: 15.5 G/DL
LDLC SERPL CALC-MCNC: 86 MG/DL (ref ?–100)
MCH RBC QN AUTO: 33.3 PG (ref 26–34)
MCHC RBC AUTO-ENTMCNC: 35.1 G/DL (ref 31–37)
MCV RBC AUTO: 94.8 FL
MICROALBUMIN UR-MCNC: 1.1 MG/DL
MICROALBUMIN/CREAT 24H UR-RTO: 19 UG/MG (ref ?–30)
NONHDLC SERPL-MCNC: 100 MG/DL (ref ?–130)
OSMOLALITY SERPL CALC.SUM OF ELEC: 296 MOSM/KG (ref 275–295)
PLATELET # BLD AUTO: 259 10(3)UL (ref 150–450)
POTASSIUM SERPL-SCNC: 4.2 MMOL/L (ref 3.5–5.1)
PROT SERPL-MCNC: 6.9 G/DL (ref 5.7–8.2)
RBC # BLD AUTO: 4.66 X10(6)UL
SODIUM SERPL-SCNC: 143 MMOL/L (ref 136–145)
TRIGL SERPL-MCNC: 73 MG/DL (ref 30–149)
TSI SER-ACNC: 1.12 MIU/ML (ref 0.55–4.78)
VLDLC SERPL CALC-MCNC: 12 MG/DL (ref 0–30)
WBC # BLD AUTO: 8.7 X10(3) UL (ref 4–11)

## 2024-02-06 PROCEDURE — 82570 ASSAY OF URINE CREATININE: CPT

## 2024-02-06 PROCEDURE — 85027 COMPLETE CBC AUTOMATED: CPT

## 2024-02-06 PROCEDURE — 80061 LIPID PANEL: CPT

## 2024-02-06 PROCEDURE — 83036 HEMOGLOBIN GLYCOSYLATED A1C: CPT

## 2024-02-06 PROCEDURE — 36415 COLL VENOUS BLD VENIPUNCTURE: CPT

## 2024-02-06 PROCEDURE — 84443 ASSAY THYROID STIM HORMONE: CPT

## 2024-02-06 PROCEDURE — 80053 COMPREHEN METABOLIC PANEL: CPT

## 2024-02-06 PROCEDURE — 82043 UR ALBUMIN QUANTITATIVE: CPT

## 2024-03-11 ENCOUNTER — OFFICE VISIT (OUTPATIENT)
Dept: INTERNAL MEDICINE CLINIC | Facility: CLINIC | Age: 79
End: 2024-03-11
Payer: MEDICARE

## 2024-03-11 VITALS
HEIGHT: 72 IN | SYSTOLIC BLOOD PRESSURE: 128 MMHG | WEIGHT: 193 LBS | BODY MASS INDEX: 26.14 KG/M2 | HEART RATE: 82 BPM | DIASTOLIC BLOOD PRESSURE: 72 MMHG

## 2024-03-11 DIAGNOSIS — E11.9 DIABETES MELLITUS TYPE 2 WITHOUT RETINOPATHY (HCC): Primary | ICD-10-CM

## 2024-03-11 DIAGNOSIS — E11.9 TYPE 2 DIABETES MELLITUS WITHOUT COMPLICATION, WITHOUT LONG-TERM CURRENT USE OF INSULIN (HCC): ICD-10-CM

## 2024-03-11 PROCEDURE — 99213 OFFICE O/P EST LOW 20 MIN: CPT | Performed by: NURSE PRACTITIONER

## 2024-03-11 NOTE — PROGRESS NOTES
HPI:    Patient ID: Linwood Morales is a 78 year old male.    HPI Diabetes  78-year-old gentleman I am meeting for the first time.  He is a patient of Dr. Mark Gupta.  In review of his hemoglobin A1c's been running around 6.4.  He is here to get tighter control of his glucose levels.        Glucose Monitoring  Patient is checking his glucose level intermittently before breakfast.  Immunization History   Administered Date(s) Administered    Covid-19 Vaccine Moderna 100 mcg/0.5 ml 01/22/2021, 02/19/2021, 11/11/2021    Covid-19 Vaccine Moderna 50 Mcg/0.25 Ml 06/10/2022    Covid-19 Vaccine Moderna Bivalent 50mcg/0.5mL 12+ years 11/01/2022    FLU VAC High Dose 65 YRS & Older PRSV Free (75998) 10/11/2019, 10/12/2020, 11/04/2021, 10/27/2022, 09/27/2023    FLU VAC QIV SPLIT 3 YRS AND OLDER (97433) 11/09/2010    FLUAD High Dose 65 yr and older (19308) 09/13/2017, 10/05/2018    FLUZONE 6 months and older PFS 0.5 ml (00140) 10/08/2014    Fluvirin, 3 Years & >, Im 12/20/2012    Fluzone Vaccine Medicare () 11/28/2016, 10/11/2019, 10/12/2020    HIGH DOSE FLU 65 YRS AND OLDER PRSV FREE SINGLE D (03687) FLU CLINIC 11/28/2016    Influenza 11/09/2010    Moderna Covid-19 Vaccine 50mcg/0.5ml 12yrs+ (9078-4319) 10/05/2023    Pneumococcal (Prevnar 13) 12/28/2016    Pneumovax 23 11/09/2010    RSV, recombinant, RSVpreF, adjuvanted (Arexvy) 01/03/2024    TD 08/13/2009, 08/13/2009    Zoster Vaccine Live (Zostavax) 10/08/2014    Zoster Vaccine Recombinant Adjuvanted (Shingrix) 02/09/2023, 05/02/2023       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    [facility] arthritis - 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    PPVx, Cryo, GFX, SF6    Myopia with astigmatism and presbyopia 1957    Nocturia 9/7/2014    Osteoarthritis     Rotator cuff tear, left 2011    repair    Seasonal allergic rhinitis 4/24/2017    Stiffness of joint, hand 8/2010    bilateral, hand stiffness & weakness post trigger release    Trigger finger 9/29/2011    Irf-trigger finger, recurrent - LRF trigger finger release    Type II or unspecified type diabetes mellitus without mention of complication, not stated as uncontrolled     Visual impairment     readers      Past Surgical History:   Procedure Laterality Date    Arthroscopy of joint unlisted  1990    knee    Arthroscopy of joint unlisted Left 2011    rotator cuff repair    Arthroscopy of joint unlisted Left 1992    hip resurfacing    Arthroscopy of joint unlisted Left 12/2008    hip resurfacing    Cataract      Cataract extraction w/  intraocular lens implant Right 09/17/2012    RJM    Cataract extraction w/  intraocular lens implant Left 01/11/2010    RJM    Colonoscopy  04/2015    repeat in 10 years.    Colonoscopy      Eye surgery Right 2013    repair detached retina    Eye surgery Right 02/20/2006    S/ P PPV RD repair (S/P PPVx, cryo, GFX, SF6 OD 4/25/13) Dr. Howe     Forearm/wrist surgery unlisted Left     left wrist surgery    Hand/finger surgery unlisted Left 9/29/2011    LRF trigger finger release    Hc inj epidural steroid lumbar or sacral w img guidance      x2 2019,x2 2017    Hernia surgery      hernia repair, herniorraphy    Laser surgery of eye      Other      bladder surgery x2    Other surgical history      esophageal dilatation    Other surgical history  6/15/2010    release triggers: RMF, RRF, LMF/ RRF Dupuytren's nodule excision    Repair of biceps tendon at elbow Left 1992    Retinal laser procedure      Spine surgery procedure unlisted  02/23/2022    L3-5 laminectomy, left L4-5 transforaminal lumbar interbody  fusion;    Total hip replacement Left 2008    Yag capsulotomy - od - right eye Right 4/30/14    RJM      Social History     Socioeconomic History    Marital status:    Tobacco Use    Smoking status: Never    Smokeless tobacco: Never   Vaping Use    Vaping Use: Never used   Substance and Sexual Activity    Alcohol use: Yes     Alcohol/week: 2.0 - 4.0 standard drinks of alcohol     Types: 2 - 4 Cans of beer per week     Comment: 2-3 times per week    Drug use: No   Other Topics Concern    Caffeine Concern Yes     Comment: soda, 8 oz daily    Exercise No    Pt has a pacemaker No    Reaction to local anesthetic No          Review of Systems   Constitutional:  Negative for chills, fatigue and fever.   HENT:  Negative for ear pain, hearing loss, sinus pain, sore throat and trouble swallowing.    Eyes:  Negative for pain and visual disturbance.   Respiratory:  Negative for cough, chest tightness and shortness of breath.    Cardiovascular:  Negative for chest pain, palpitations and leg swelling.   Gastrointestinal:  Negative for abdominal pain, constipation, diarrhea, nausea and vomiting.   Endocrine: Negative for cold intolerance and heat intolerance.   Genitourinary:  Negative for dysuria and hematuria.   Musculoskeletal:  Negative for back pain and joint swelling.   Skin:  Negative for rash.   Allergic/Immunologic: Negative for environmental allergies.   Neurological:  Negative for weakness, numbness and headaches.   Hematological:  Does not bruise/bleed easily.   Psychiatric/Behavioral:  Negative for dysphoric mood and sleep disturbance. The patient is not nervous/anxious.               Current Outpatient Medications   Medication Sig Dispense Refill    metFORMIN 500 MG Oral Tab Take 1 tablet (500 mg total) by mouth 2 (two) times daily. 180 tablet 3    lisinopril 5 MG Oral Tab Take 1 tablet (5 mg total) by mouth daily. 90 tablet 3    NIFEdipine ER 90 MG Oral Tablet 24 Hr Take 1 tablet (90 mg total) by mouth daily.  90 tablet 3    Omeprazole 40 MG Oral Capsule Delayed Release Take 1 capsule (40 mg total) by mouth daily. 90 capsule 3    Lovastatin 40 MG Oral Tab Take 1 tablet (40 mg total) by mouth nightly. 90 tablet 3    tadalafil 5 MG Oral Tab Take 1 tablet (5 mg total) by mouth daily as needed for Erectile Dysfunction. 90 tablet 3    Risedronate Sodium 150 MG Oral Tab Take 1 tablet (150 mg total) by mouth every 30 (thirty) days. 3 tablet 3    ADVAIR DISKUS 100-50 MCG/ACT Inhalation Aerosol Powder, Breath Activated Inhale 1 puff into the lungs 2 (two) times daily. 180 each 1    cetirizine 10 MG Oral Tab Take 1 tablet (10 mg total) by mouth daily. 90 tablet 3    fluticasone propionate 50 MCG/ACT Nasal Suspension 1 spray by Each Nare route as needed for Rhinitis. 1 each 3    diclofenac 1 % External Gel       ketoconazole 2 % External Cream Apply 1 Application topically 2 (two) times daily.      Vitamin B-12 1000 MCG Oral Tab Take 1 tablet (1,000 mcg total) by mouth daily.      Multiple Vitamins-Minerals (CENTRUM SILVER) Oral Tab Take 1 tablet by mouth daily.        Cinnamon 500 MG Oral Cap Take 500 mg by mouth daily.      Cholecalciferol (VITAMIN D) 1000 UNITS Oral Cap Take  by mouth daily. (Patient not taking: Reported on 3/11/2024)       Allergies:  Allergies   Allergen Reactions    Penicillins HIVES      PHYSICAL EXAM:   Physical Exam  Constitutional:       Appearance: Normal appearance.   HENT:      Head: Normocephalic.      Mouth/Throat:      Mouth: Mucous membranes are moist.   Cardiovascular:      Rate and Rhythm: Normal rate and regular rhythm.   Pulmonary:      Effort: Pulmonary effort is normal. No respiratory distress.      Breath sounds: No wheezing or rales.   Abdominal:      General: Abdomen is flat. There is no distension.   Musculoskeletal:         General: Normal range of motion.      Cervical back: Normal range of motion and neck supple.   Skin:     General: Skin is dry.      Comments: Bilateral barefoot skin  diabetic exam is normal, visualized feet and the appearance is normal.  Bilateral monofilament/sensation of both feet is normal.  Pulsation pedal pulse exam of both lower legs/feet is normal as well.        Neurological:      Mental Status: He is alert and oriented to person, place, and time.   Psychiatric:         Mood and Affect: Mood normal.         Behavior: Behavior normal.         Thought Content: Thought content normal.         Judgment: Judgment normal.       /72   Pulse 82   Ht 6' (1.829 m)   Wt 193 lb (87.5 kg)   BMI 26.18 kg/m²   Wt Readings from Last 2 Encounters:   03/11/24 193 lb (87.5 kg)   01/16/24 196 lb 12.8 oz (89.3 kg)     Body mass index is 26.18 kg/m².(2)  Lab Results   Component Value Date    WBC 8.7 02/06/2024    RBC 4.66 02/06/2024    HGB 15.5 02/06/2024    HCT 44.2 02/06/2024    MCV 94.8 02/06/2024    MCH 33.3 02/06/2024    MCHC 35.1 02/06/2024    RDW 12.9 02/06/2024    .0 02/06/2024    MPV 9.9 09/26/2017      Lab Results   Component Value Date     (H) 02/06/2024    BUN 8 (L) 02/06/2024    BUNCREA 8.2 (L) 02/06/2024    CREATSERUM 0.97 02/06/2024    ANIONGAP 7 02/06/2024    GFRNAA 81 02/19/2022    GFRAA 94 02/19/2022    CA 9.5 02/06/2024    OSMOCALC 296 (H) 02/06/2024    ALKPHO 83 02/06/2024    AST 14 02/06/2024    ALT 9 (L) 02/06/2024    ALKPHOS 72 09/13/2016    BILT 1.0 02/06/2024    TP 6.9 02/06/2024    ALB 4.3 02/06/2024    GLOBULIN 2.6 (L) 02/06/2024    AGRATIO 1.5 09/13/2016     02/06/2024    K 4.2 02/06/2024     02/06/2024    CO2 28.0 02/06/2024      Lab Results   Component Value Date     (H) 02/06/2024    A1C 6.4 (H) 02/06/2024      Lab Results   Component Value Date    CHOLEST 145 02/06/2024    TRIG 73 02/06/2024    HDL 45 02/06/2024    LDL 86 02/06/2024    VLDL 12 02/06/2024    NONHDLC 100 02/06/2024    CALCNONHDL 122 09/13/2016      Lab Results   Component Value Date    TSH 1.125 02/06/2024                ASSESSMENT/PLAN:     Problem List  Items Addressed This Visit       Diabetes mellitus type 2 without retinopathy (HCC) - Primary    Relevant Medications    metFORMIN 500 MG Oral Tab    Type 2 diabetes mellitus without complication, without long-term current use of insulin (HCC)     Last 2 hemoglobin A1c's were 6.4.  Patient checks his glucose intermittently before breakfast  Patient was on metformin several years ago.  He is willing to start the medication again to keep his glucose better controlled  Plan    Start metformin 500 mg po twice a day before breakfast and Dinner.    Discussed lifestyle modifications including reductions in dietary total and saturated fat, weight loss, aerobic exercise, and eating a diet rich in fruits and vegetables.  Reduce bread, pasta and rice in your diet.    Eliminate as much sugar from your diet as possible.     Walk 30 minutes twice a day.    Follow up in 3 months.         Relevant Medications    metFORMIN 500 MG Oral Tab        No orders of the defined types were placed in this encounter.      Meds This Visit:  Requested Prescriptions     Signed Prescriptions Disp Refills    metFORMIN 500 MG Oral Tab 180 tablet 3     Sig: Take 1 tablet (500 mg total) by mouth 2 (two) times daily.       Imaging & Referrals:  None         WILMAR Angel

## 2024-03-11 NOTE — ASSESSMENT & PLAN NOTE
Last 2 hemoglobin A1c's were 6.4.  Patient checks his glucose intermittently before breakfast  Patient was on metformin several years ago.  He is willing to start the medication again to keep his glucose better controlled  Plan    Start metformin 500 mg po twice a day before breakfast and Dinner.    Discussed lifestyle modifications including reductions in dietary total and saturated fat, weight loss, aerobic exercise, and eating a diet rich in fruits and vegetables.  Reduce bread, pasta and rice in your diet.    Eliminate as much sugar from your diet as possible.     Walk 30 minutes twice a day.    Follow up in 3 months.

## 2024-03-11 NOTE — PATIENT INSTRUCTIONS
Start metformin 500 mg po twice a day before breakfast and Dinner.    Discussed lifestyle modifications including reductions in dietary total and saturated fat, weight loss, aerobic exercise, and eating a diet rich in fruits and vegetables.  Reduce bread, pasta and rice in your diet.    Eliminate as much sugar from your diet as possible.     Walk 30 minutes twice a day.

## 2024-04-04 NOTE — TELEPHONE ENCOUNTER
Current Outpatient Medications:     lisinopril 5 MG Oral Tab, Take 1 tablet (5 mg total) by mouth daily., Disp: 90 tablet, Rfl: 3

## 2024-04-05 RX ORDER — LISINOPRIL 5 MG/1
5 TABLET ORAL DAILY
Qty: 90 TABLET | Refills: 3 | OUTPATIENT
Start: 2024-04-05

## 2024-04-05 NOTE — TELEPHONE ENCOUNTER
Duplicate request, previously addressed.     lisinopril 5 MG Oral Tab 90 tablet 3 1/16/2024 --    Sig - Route: Take 1 tablet (5 mg total) by mouth daily. - Oral    Sent to pharmacy as: Lisinopril 5 MG Oral Tablet (Prinivil; Zestril)    E-Prescribing Status: Receipt confirmed by pharmacy (1/16/2024  9:25 AM CST)      Pharmacy    [id number] - [place], [place] - [address] [place] [address] [phone], [phone]

## 2024-04-30 ENCOUNTER — MED REC SCAN ONLY (OUTPATIENT)
Dept: INTERNAL MEDICINE CLINIC | Facility: CLINIC | Age: 79
End: 2024-04-30

## 2024-05-14 ENCOUNTER — OFFICE VISIT (OUTPATIENT)
Dept: INTERNAL MEDICINE CLINIC | Facility: CLINIC | Age: 79
End: 2024-05-14

## 2024-05-14 VITALS
DIASTOLIC BLOOD PRESSURE: 62 MMHG | HEIGHT: 72 IN | SYSTOLIC BLOOD PRESSURE: 139 MMHG | WEIGHT: 192 LBS | HEART RATE: 74 BPM | BODY MASS INDEX: 26.01 KG/M2

## 2024-05-14 DIAGNOSIS — E11.9 TYPE 2 DIABETES MELLITUS WITHOUT COMPLICATION, WITHOUT LONG-TERM CURRENT USE OF INSULIN (HCC): ICD-10-CM

## 2024-05-14 DIAGNOSIS — E11.9 DIABETES MELLITUS TYPE 2 WITHOUT RETINOPATHY (HCC): Primary | ICD-10-CM

## 2024-05-14 PROCEDURE — 99213 OFFICE O/P EST LOW 20 MIN: CPT | Performed by: NURSE PRACTITIONER

## 2024-05-14 NOTE — ASSESSMENT & PLAN NOTE
Patient was started on Metfrom 500mg Bid 3 months ago.    Plan  Check Hemoglobin A1C- Discuss with patient  CMP

## 2024-05-14 NOTE — PROGRESS NOTES
HPI:    Patient ID: Linwood Morales is a 79 year old male.    HPI Follow up on Diabetes.  79 year old male who started taking Metformin Twice a day.  In the past he was taking glipizide and his sugars were controlled.  He stopped this and wanted to try metformin.  He is taking Metformin 500mg  twice a day. He sees now change in his blood glucose levels ranging 110- to 130.    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      Immunization History   Administered Date(s) Administered    Covid-19 Vaccine Moderna 100 mcg/0.5 ml 01/22/2021, 02/19/2021, 11/11/2021    Covid-19 Vaccine Moderna 50 Mcg/0.25 Ml 06/10/2022    Covid-19 Vaccine Moderna Bivalent 50mcg/0.5mL 12+ years 11/01/2022    FLU VAC High Dose 65 YRS & Older PRSV Free (66471) 10/11/2019, 10/12/2020, 11/04/2021, 10/27/2022, 09/27/2023    FLU VAC QIV SPLIT 3 YRS AND OLDER (60290) 11/09/2010    FLUAD High Dose 65 yr and older (55120) 09/13/2017, 10/05/2018    FLUZONE 6 months and older PFS 0.5 ml (58063) 10/08/2014    Fluvirin, 3 Years & >, Im 12/20/2012    Fluzone Vaccine Medicare () 11/28/2016, 10/11/2019, 10/12/2020    HIGH DOSE FLU 65 YRS AND OLDER PRSV FREE SINGLE D (30868) FLU CLINIC 11/28/2016    Influenza 11/09/2010    Moderna Covid-19 Vaccine 50mcg/0.5ml 12yrs+ (7521-7299) 10/05/2023    Pneumococcal (Prevnar 13) 12/28/2016    Pneumovax 23 11/09/2010    RSV, recombinant, RSVpreF, adjuvanted (Arexvy) 01/03/2024    TD 08/13/2009, 08/13/2009    Zoster Vaccine Live (Zostavax) 10/08/2014    Zoster Vaccine Recombinant Adjuvanted (Shingrix) 02/09/2023, 05/02/2023       Past Medical History:    Acute medial meniscus tear of right knee    Angular cheilitis    Back problem    BPH (benign prostatic hyperplasia)    Calculus of kidney    Cheilitis    CMC arthritis    R TH [facility] arthritis - R TH [facility] aristospan / lidocaine injection    Cough    Esophageal reflux    High blood pressure    High cholesterol    Lower urinary tract symptoms (LUTS)    Macula-on  rhegmatogenous retinal detachment    PPVx, Cryo, GFX, SF6    Myopia with astigmatism and presbyopia    Nocturia    Osteoarthritis    Rotator cuff tear, left    repair    Seasonal allergic rhinitis    Stiffness of joint, hand    bilateral, hand stiffness & weakness post trigger release    Trigger finger    Irf-trigger finger, recurrent - LRF trigger finger release    Type II or unspecified type diabetes mellitus without mention of complication, not stated as uncontrolled    Visual impairment    readers      Past Surgical History:   Procedure Laterality Date    Arthroscopy of joint unlisted  1990    knee    Arthroscopy of joint unlisted Left 2011    rotator cuff repair    Arthroscopy of joint unlisted Left 1992    hip resurfacing    Arthroscopy of joint unlisted Left 12/2008    hip resurfacing    Cataract      Cataract extraction w/  intraocular lens implant Right 09/17/2012    [facility]    Cataract extraction w/  intraocular lens implant Left 01/11/2010    [facility]    Colonoscopy  04/2015    repeat in 10 years.    Colonoscopy      Eye surgery Right 2013    repair detached retina    Eye surgery Right 02/20/2006    S/ P PPV RD repair (S/P PPVx, cryo, GFX, SF6 OD 4/25/13) Dr. Howe     Forearm/wrist surgery unlisted Left     left wrist surgery    Hand/finger surgery unlisted Left 9/29/2011    LRF trigger finger release    Hc inj epidural steroid lumbar or sacral w img guidance      x2 2019,x2 2017    Hernia surgery      hernia repair, herniorraphy    Laser surgery of eye      Other      bladder surgery x2    Other surgical history      esophageal dilatation    Other surgical history  6/15/2010    release triggers: RMF, RRF, LMF/ RRF Dupuytren's nodule excision    Repair of biceps tendon at elbow Left 1992    Retinal laser procedure      Spine surgery procedure unlisted  02/23/2022    L3-5 laminectomy, left L4-5 transforaminal lumbar interbody fusion;    Total hip replacement Left 2008    Yag capsulotomy - od - right eye Right  4/30/14    [facility]      Social History     Socioeconomic History    Marital status:    Tobacco Use    Smoking status: Never    Smokeless tobacco: Never   Vaping Use    Vaping status: Never Used   Substance and Sexual Activity    Alcohol use: Yes     Alcohol/week: 2.0 - 4.0 standard drinks of alcohol     Types: 2 - 4 Cans of beer per week     Comment: 2-3 times per week    Drug use: No   Other Topics Concern    Caffeine Concern Yes     Comment: soda, 8 oz daily    Exercise No    Pt has a pacemaker No    Reaction to local anesthetic No          Review of Systems   Constitutional:  Negative for chills, fatigue and fever.   HENT:  Negative for ear pain, hearing loss, sinus pain, sore throat and trouble swallowing.    Eyes:  Negative for pain and visual disturbance.   Respiratory:  Negative for cough, chest tightness and shortness of breath.    Cardiovascular:  Negative for chest pain, palpitations and leg swelling.   Gastrointestinal:  Negative for abdominal pain, constipation, diarrhea, nausea and vomiting.   Endocrine: Negative for cold intolerance and heat intolerance.   Genitourinary:  Negative for dysuria and hematuria.   Musculoskeletal:  Negative for back pain and joint swelling.   Skin:  Negative for rash.   Allergic/Immunologic: Negative for environmental allergies.   Neurological:  Negative for weakness, numbness and headaches.   Hematological:  Does not bruise/bleed easily.   Psychiatric/Behavioral:  Negative for dysphoric mood and sleep disturbance. The patient is not nervous/anxious.               Current Outpatient Medications   Medication Sig Dispense Refill    metFORMIN 500 MG Oral Tab Take 1 tablet (500 mg total) by mouth 2 (two) times daily. 180 tablet 3    lisinopril 5 MG Oral Tab Take 1 tablet (5 mg total) by mouth daily. 90 tablet 3    NIFEdipine ER 90 MG Oral Tablet 24 Hr Take 1 tablet (90 mg total) by mouth daily. 90 tablet 3    Omeprazole 40 MG Oral Capsule Delayed Release Take 1 capsule (40  mg total) by mouth daily. 90 capsule 3    Lovastatin 40 MG Oral Tab Take 1 tablet (40 mg total) by mouth nightly. 90 tablet 3    tadalafil 5 MG Oral Tab Take 1 tablet (5 mg total) by mouth daily as needed for Erectile Dysfunction. 90 tablet 3    Risedronate Sodium 150 MG Oral Tab Take 1 tablet (150 mg total) by mouth every 30 (thirty) days. 3 tablet 3    ADVAIR DISKUS 100-50 MCG/ACT Inhalation Aerosol Powder, Breath Activated Inhale 1 puff into the lungs 2 (two) times daily. 180 each 1    fluticasone propionate 50 MCG/ACT Nasal Suspension 1 spray by Each Nare route as needed for Rhinitis. 1 each 3    diclofenac 1 % External Gel       ketoconazole 2 % External Cream Apply 1 Application topically 2 (two) times daily.      Vitamin B-12 1000 MCG Oral Tab Take 1 tablet (1,000 mcg total) by mouth daily.      Multiple Vitamins-Minerals (CENTRUM SILVER) Oral Tab Take 1 tablet by mouth daily.        Cinnamon 500 MG Oral Cap Take 500 mg by mouth daily.      cetirizine 10 MG Oral Tab Take 1 tablet (10 mg total) by mouth daily. (Patient not taking: Reported on 5/14/2024) 90 tablet 3    Cholecalciferol (VITAMIN D) 1000 UNITS Oral Cap Take  by mouth daily. (Patient not taking: Reported on 3/11/2024)       Allergies:  Allergies   Allergen Reactions    Penicillins HIVES      PHYSICAL EXAM:   Physical Exam  Constitutional:       Appearance: Normal appearance. He is well-developed.   HENT:      Head: Normocephalic.      Right Ear: Tympanic membrane normal.      Left Ear: Tympanic membrane normal.      Nose: Nose normal.      Mouth/Throat:      Mouth: Mucous membranes are moist.      Pharynx: No oropharyngeal exudate or posterior oropharyngeal erythema.   Eyes:      General:         Right eye: No discharge.         Left eye: No discharge.      Pupils: Pupils are equal, round, and reactive to light.   Cardiovascular:      Rate and Rhythm: Normal rate and regular rhythm.      Heart sounds: Normal heart sounds. No murmur heard.     No  friction rub. No gallop.   Pulmonary:      Effort: Pulmonary effort is normal. No respiratory distress.      Breath sounds: Normal breath sounds. No wheezing, rhonchi or rales.   Abdominal:      General: Bowel sounds are normal. There is no distension.      Palpations: Abdomen is soft. There is no mass.      Tenderness: There is no abdominal tenderness. There is no right CVA tenderness, left CVA tenderness or guarding.   Musculoskeletal:         General: No tenderness.      Cervical back: Normal range of motion and neck supple. No tenderness.      Right lower leg: No edema.      Left lower leg: No edema.   Lymphadenopathy:      Cervical: No cervical adenopathy.   Skin:     General: Skin is warm and dry.      Findings: No rash.   Neurological:      Mental Status: He is alert and oriented to person, place, and time.      Coordination: Coordination normal.      Gait: Gait normal.   Psychiatric:         Mood and Affect: Mood normal.         Behavior: Behavior normal.         Thought Content: Thought content normal.         Judgment: Judgment normal.       /62 (BP Location: Right arm, Patient Position: Sitting, Cuff Size: adult)   Pulse 74   Ht 6' (1.829 m)   Wt 192 lb (87.1 kg)   BMI 26.04 kg/m²   Wt Readings from Last 2 Encounters:   05/14/24 192 lb (87.1 kg)   03/11/24 193 lb (87.5 kg)     Body mass index is 26.04 kg/m².(2)  Lab Results   Component Value Date    WBC 8.7 02/06/2024    RBC 4.66 02/06/2024    HGB 15.5 02/06/2024    HCT 44.2 02/06/2024    MCV 94.8 02/06/2024    MCH 33.3 02/06/2024    MCHC 35.1 02/06/2024    RDW 12.9 02/06/2024    .0 02/06/2024    MPV 9.9 09/26/2017      Lab Results   Component Value Date     (H) 02/06/2024    BUN 8 (L) 02/06/2024    BUNCREA 8.2 (L) 02/06/2024    CREATSERUM 0.97 02/06/2024    ANIONGAP 7 02/06/2024    GFRNAA 81 02/19/2022    GFRAA 94 02/19/2022    CA 9.5 02/06/2024    OSMOCALC 296 (H) 02/06/2024    ALKPHO 83 02/06/2024    AST 14 02/06/2024    ALT 9  (L) 02/06/2024    ALKPHOS 72 09/13/2016    BILT 1.0 02/06/2024    TP 6.9 02/06/2024    ALB 4.3 02/06/2024    GLOBULIN 2.6 (L) 02/06/2024    AGRATIO 1.5 09/13/2016     02/06/2024    K 4.2 02/06/2024     02/06/2024    CO2 28.0 02/06/2024      Lab Results   Component Value Date     (H) 02/06/2024    A1C 6.4 (H) 02/06/2024      Lab Results   Component Value Date    CHOLEST 145 02/06/2024    TRIG 73 02/06/2024    HDL 45 02/06/2024    LDL 86 02/06/2024    VLDL 12 02/06/2024    NONHDLC 100 02/06/2024    CALCNONHDL 122 09/13/2016      Lab Results   Component Value Date    TSH 1.125 02/06/2024                ASSESSMENT/PLAN:     Problem List Items Addressed This Visit       Diabetes mellitus type 2 without retinopathy (HCC) - Primary    Relevant Orders    Hemoglobin A1C [E]    Type 2 diabetes mellitus without complication, without long-term current use of insulin (HCC)     Patient was started on Metfrom 500mg Bid 3 months ago.    Plan  Check Hemoglobin A1C- Discuss with patient  CMP         Relevant Orders    Comp Metabolic Panel (14)          Orders Placed This Encounter   Procedures    Hemoglobin A1C [E]    Comp Metabolic Panel (14)       Meds This Visit:  Requested Prescriptions      No prescriptions requested or ordered in this encounter       Imaging & Referrals:  None         WILMAR Angel

## 2024-05-15 ENCOUNTER — LAB ENCOUNTER (OUTPATIENT)
Dept: LAB | Facility: HOSPITAL | Age: 79
End: 2024-05-15
Attending: NURSE PRACTITIONER

## 2024-05-15 DIAGNOSIS — E11.9 DIABETES MELLITUS TYPE 2 WITHOUT RETINOPATHY (HCC): ICD-10-CM

## 2024-05-15 DIAGNOSIS — R97.20 ELEVATED PSA: ICD-10-CM

## 2024-05-15 DIAGNOSIS — E11.9 TYPE 2 DIABETES MELLITUS WITHOUT COMPLICATION, WITHOUT LONG-TERM CURRENT USE OF INSULIN (HCC): ICD-10-CM

## 2024-05-15 LAB
ALBUMIN SERPL-MCNC: 4.2 G/DL (ref 3.2–4.8)
ALBUMIN/GLOB SERPL: 1.7 {RATIO} (ref 1–2)
ALP LIVER SERPL-CCNC: 76 U/L
ALT SERPL-CCNC: 28 U/L
ANION GAP SERPL CALC-SCNC: 5 MMOL/L (ref 0–18)
AST SERPL-CCNC: 21 U/L (ref ?–34)
BILIRUB SERPL-MCNC: 0.9 MG/DL (ref 0.2–1.1)
BUN BLD-MCNC: 9 MG/DL (ref 9–23)
BUN/CREAT SERPL: 10 (ref 10–20)
CALCIUM BLD-MCNC: 9.4 MG/DL (ref 8.7–10.4)
CHLORIDE SERPL-SCNC: 110 MMOL/L (ref 98–112)
CO2 SERPL-SCNC: 30 MMOL/L (ref 21–32)
CREAT BLD-MCNC: 0.9 MG/DL
EGFRCR SERPLBLD CKD-EPI 2021: 86 ML/MIN/1.73M2 (ref 60–?)
EST. AVERAGE GLUCOSE BLD GHB EST-MCNC: 126 MG/DL (ref 68–126)
FASTING STATUS PATIENT QL REPORTED: YES
GLOBULIN PLAS-MCNC: 2.5 G/DL (ref 2–3.5)
GLUCOSE BLD-MCNC: 106 MG/DL (ref 70–99)
HBA1C MFR BLD: 6 % (ref ?–5.7)
OSMOLALITY SERPL CALC.SUM OF ELEC: 299 MOSM/KG (ref 275–295)
POTASSIUM SERPL-SCNC: 4.5 MMOL/L (ref 3.5–5.1)
PROT SERPL-MCNC: 6.7 G/DL (ref 5.7–8.2)
PSA SERPL-MCNC: 3.22 NG/ML (ref ?–4)
SODIUM SERPL-SCNC: 145 MMOL/L (ref 136–145)

## 2024-05-15 PROCEDURE — 83036 HEMOGLOBIN GLYCOSYLATED A1C: CPT

## 2024-05-15 PROCEDURE — 84153 ASSAY OF PSA TOTAL: CPT

## 2024-05-15 PROCEDURE — 36415 COLL VENOUS BLD VENIPUNCTURE: CPT

## 2024-05-15 PROCEDURE — 80053 COMPREHEN METABOLIC PANEL: CPT

## 2024-05-23 RX ORDER — FLUTICASONE PROPIONATE 50 MCG
1 SPRAY, SUSPENSION (ML) NASAL DAILY PRN
Qty: 16 G | Refills: 2 | Status: SHIPPED | OUTPATIENT
Start: 2024-05-23

## 2024-05-23 NOTE — TELEPHONE ENCOUNTER
REFILL PASSED PER [facility] PROTOCOLS    Requested Prescriptions   Pending Prescriptions Disp Refills    FLUTICASONE PROPIONATE 50 MCG/ACT Nasal Suspension [Pharmacy Med Name: Fluticasone Propionate 50 Mcg/Act Spr Hikm] 16 g 0     Sig: spray 1 spray into each nostril daily as needed       Allergy Medication Protocol Passed - 5/21/2024  8:30 AM        Passed - In person appointment or virtual visit in the past 12 mos or appointment in next 3 mos     Recent Outpatient Visits              1 week ago Diabetes mellitus type 2 without retinopathy (HCC)    [facility], WILMAR    Office Visit    2 months ago Diabetes mellitus type 2 without retinopathy ([facility])    [facility] Sahara Pope, WILMAR    Office Visit    4 months ago Diabetes mellitus type 2 without retinopathy ([facility])    [facility] Mark Gupta MD    Office Visit    6 months ago [facility]    7 months ago [facility]          Future Appointments         Provider Department Appt Notes    In 2 months Mark Gupta MD [facility]     In 2 months Danna Ahumada DO [facility] coughing, post nasal drip.    In 5 months Kale Kirby MD [facility] confirmed EP/ DM EE                         Future Appointments         Provider Department Appt Notes    In 2 months Mark Gupta MD [facility]     In 2 months Danna Ahumada DO [facility] coughing,  post nasal drip.    In 5 months Kale Kirby MD [facility] confirmed EP/ DM EE          Recent Outpatient Visits              1 week ago Diabetes mellitus type 2 without retinopathy (HCC)    [facility] Sahara Pope, APRDAVID    Office Visit    2 months ago Diabetes mellitus type 2 without retinopathy (HCC)    [facility] Sahara Pope, WILMAR    Office Visit    4 months ago Diabetes mellitus type 2 without retinopathy (HCC)    [facility] Mark Gupta MD    Office Visit    6 months ago [facility] osteoarthritis of right knee    [facility]    [facility]    7 months ago Primary osteoarthritis of right knee    [facility]    [facility] Visit

## 2024-05-31 ENCOUNTER — TELEPHONE (OUTPATIENT)
Dept: ORTHOPEDICS CLINIC | Facility: CLINIC | Age: 79
End: 2024-05-31

## 2024-05-31 NOTE — TELEPHONE ENCOUNTER
Attempted to obtain approval for Orthovisc, per patient's insurance that is not a preferred product.   The following are the preferred products, Durolane or Synvisc. Would provider like to change to preferred product?    Thank You,  Cintia

## 2024-06-05 NOTE — TELEPHONE ENCOUNTER
Patient has had success with Orthovisc in the past. It would be our preferred medication. If not, then we can try for Durolane but please let patient know of the changes. Thanks

## 2024-06-06 NOTE — TELEPHONE ENCOUNTER
It appears that patient got orthovisc in 2023 after appeal. Unsure if that is an option for patient.    Please advise

## 2024-06-14 NOTE — TELEPHONE ENCOUNTER
Patient's insurance has approved [facility]  Authorization: [id number]  Valid from: 6/13/24---12/13/24    Okay to schedule.

## 2024-06-17 NOTE — TELEPHONE ENCOUNTER
Called patient and scheduled 4 appointments for orthovisc injections right knee with maribell starting 6/19

## 2024-06-18 NOTE — TELEPHONE ENCOUNTER
Insurance is requesting 100 day supply       Current Outpatient Medications:       Lovastatin 40 MG Oral Tab, Take 1 tablet (40 mg total) by mouth nightly., Disp: 90 tablet, Rfl: 3

## 2024-06-19 ENCOUNTER — OFFICE VISIT (OUTPATIENT)
Dept: ORTHOPEDICS CLINIC | Facility: CLINIC | Age: 79
End: 2024-06-19

## 2024-06-19 VITALS — DIASTOLIC BLOOD PRESSURE: 60 MMHG | HEART RATE: 77 BPM | SYSTOLIC BLOOD PRESSURE: 132 MMHG

## 2024-06-19 DIAGNOSIS — M17.11 OSTEOARTHRITIS OF RIGHT KNEE, UNSPECIFIED OSTEOARTHRITIS TYPE: Primary | ICD-10-CM

## 2024-06-19 PROCEDURE — 20610 DRAIN/INJ JOINT/BURSA W/O US: CPT

## 2024-06-19 NOTE — PROGRESS NOTES
NURSING INTAKE COMMENTS:   Chief Complaint   Patient presents with    Knee Pain     Right f/u - here for Orthovisc inj #1 - rates pain as 3-8/10 on and off        HPI: This 79 year old male presents today with complaints of right knee follow up. Patient is here for Orthovisc injections in the right knee. States that his knee pain has been returning off and on for the last several weeks. It has been almost a year since last gel injections in the right knee. He would like to proceed with right knee injection today.     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    PPVx, Cryo, GFX, SF6    Myopia with astigmatism and presbyopia    Nocturia    Osteoarthritis    Rotator cuff tear, left    repair    Seasonal allergic rhinitis    Stiffness of joint, hand    bilateral, hand stiffness & weakness post trigger release    Trigger finger    Irf-trigger finger, recurrent - LRF trigger finger release    Type II or unspecified type diabetes mellitus without mention of complication, not stated as uncontrolled    Visual impairment    readers     Past Surgical History:   Procedure Laterality Date    Arthroscopy of joint unlisted  1990    knee    Arthroscopy of joint unlisted Left 2011    rotator cuff repair    Arthroscopy of joint unlisted Left 1992    hip resurfacing    Arthroscopy of joint unlisted Left 12/2008    hip resurfacing    Cataract      Cataract extraction w/  intraocular lens implant Right 09/17/2012    [facility]    Cataract extraction w/  intraocular lens implant Left 01/11/2010    [facility]    Colonoscopy  04/2015    repeat in 10 years.    Colonoscopy      Eye  surgery Right 2013    repair detached retina    Eye surgery Right 02/20/2006    S/ P PPV RD repair (S/P PPVx, cryo, GFX, SF6 OD 4/25/13) Dr. Howe     Forearm/wrist surgery unlisted Left     left wrist surgery    Hand/finger surgery unlisted Left 9/29/2011    LRF trigger finger release    Hc inj epidural steroid lumbar or sacral w img guidance      x2 2019,x2 2017    Hernia surgery      hernia repair, herniorraphy    Laser surgery of eye      Other      bladder surgery x2    Other surgical history      esophageal dilatation    Other surgical history  6/15/2010    release triggers: RMF, RRF, LMF/ RRF Dupuytren's nodule excision    Repair of biceps tendon at elbow Left 1992    Retinal laser procedure      Spine surgery procedure unlisted  02/23/2022    L3-5 laminectomy, left L4-5 transforaminal lumbar interbody fusion;    Total hip replacement Left 2008    Yag capsulotomy - od - right eye Right 4/30/14    [facility]     Current Outpatient Medications   Medication Sig Dispense Refill    fluticasone propionate 50 MCG/ACT Nasal Suspension 1 spray by Each Nare route daily as needed. 16 g 2    metFORMIN 500 MG Oral Tab Take 1 tablet (500 mg total) by mouth 2 (two) times daily. 180 tablet 3    lisinopril 5 MG Oral Tab Take 1 tablet (5 mg total) by mouth daily. 90 tablet 3    NIFEdipine ER 90 MG Oral Tablet 24 Hr Take 1 tablet (90 mg total) by mouth daily. 90 tablet 3    Omeprazole 40 MG Oral Capsule Delayed Release Take 1 capsule (40 mg total) by mouth daily. 90 capsule 3    Lovastatin 40 MG Oral Tab Take 1 tablet (40 mg total) by mouth nightly. 90 tablet 3    tadalafil 5 MG Oral Tab Take 1 tablet (5 mg total) by mouth daily as needed for Erectile Dysfunction. 90 tablet 3    Risedronate Sodium 150 MG Oral Tab Take 1 tablet (150 mg total) by mouth every 30 (thirty) days. 3 tablet 3    ADVAIR DISKUS 100-50 MCG/ACT Inhalation Aerosol Powder, Breath Activated Inhale 1 puff into the lungs 2 (two) times daily. 180 each 1     cetirizine 10 MG Oral Tab Take 1 tablet (10 mg total) by mouth daily. (Patient not taking: Reported on 5/14/2024) 90 tablet 3    diclofenac 1 % External Gel       ketoconazole 2 % External Cream Apply 1 Application topically 2 (two) times daily.      Vitamin B-12 1000 MCG Oral Tab Take 1 tablet (1,000 mcg total) by mouth daily.      Cholecalciferol (VITAMIN D) 1000 UNITS Oral Cap Take  by mouth daily. (Patient not taking: Reported on 3/11/2024)      Multiple Vitamins-Minerals (CENTRUM SILVER) Oral Tab Take 1 tablet by mouth daily.        Cinnamon 500 MG Oral Cap Take 500 mg by mouth daily.       Allergies   Allergen Reactions    Penicillins HIVES     Family History   Problem Relation Age of Onset    Colon Cancer Mother     Other (pulmonary Embolism) Father     Glaucoma Neg     Diabetes Neg     Macular degeneration Neg        Social History     Occupational History    Not on file   Tobacco Use    Smoking status: Never    Smokeless tobacco: Never   Vaping Use    Vaping status: Never Used   Substance and Sexual Activity    Alcohol use: Yes     Alcohol/week: 2.0 - 4.0 standard drinks of alcohol     Types: 2 - 4 Cans of beer per week     Comment: 2-3 times per week    Drug use: No    Sexual activity: Not on file        Review of Systems:  GENERAL: denies fevers, chills, night sweats, fatigue, unintentional weight loss/gain  SKIN: denies skin lesions, open sores, rash  HEENT:denies recent vision change, new nasal congestion,hearing loss, tinnitus, sore throat, headaches  RESPIRATORY: denies new shortness of breath, cough, asthma, wheezing  CARDIOVASCULAR: denies chest pain, leg cramps with exertion, palpitations, leg swelling  GI: denies abdominal pain, nausea, vomiting, diarrhea, constipation, hematochezia, worsening heartburn or stomach ulcers  : denies dysuria, hematuria, incontinence, increased frequency, urgency, difficulty urinating  MUSCULOSKELETAL: denies musculoskeletal complaints other than in HPI  NEURO:  denies numbness, tingling, weakness, balance issues, dizziness, memory loss  PSYCHIATRIC: denies Hx of depression, anxiety, other psychiatric disorders  HEMATOLOGIC: denies blood clots, anemia, blood clotting disorders, blood transfusion  ENDOCRINE: denies autoimmune disease, thyroid issues, or diabetes  ALLERGY: denies asthma, seasonal allergies    Physical Examination:    /60   Pulse 77   Constitutional: appears well hydrated, alert and responsive, no acute distress noted  Extremities: Right knee exam is unchanged.   Neurological: Unchanged     Imaging:   No results found.     Labs:  Lab Results   Component Value Date    WBC 8.7 02/06/2024    HGB 15.5 02/06/2024    .0 02/06/2024      Lab Results   Component Value Date     (H) 05/15/2024    BUN 9 05/15/2024    CREATSERUM 0.90 05/15/2024    GFRNAA 81 02/19/2022    GFRAA 94 02/19/2022        Assessment and Plan:  Diagnoses and all orders for this visit:    Osteoarthritis of right knee, unspecified osteoarthritis type  -     Drain/Inject Large Joint/Bursa  -     hyaluronan (Orthovisc) 30 MG/2ML intra-articular injection 30 mg        Assessment: Right knee osteoarthritis     Plan: Using sterile technique and superior lateral parapatellar approach, the right knee was injected with Orthovisc #1. Patient tolerated the procedure well. Advised icing and oral anti-inflammatories as needed for pain. Follow up in one week for #2 Orthovisc injection.     Follow Up: Return in about 1 week (around 6/26/2024).    Gunjan Leija PA-C

## 2024-06-20 ENCOUNTER — HOSPITAL ENCOUNTER (OUTPATIENT)
Dept: GENERAL RADIOLOGY | Facility: HOSPITAL | Age: 79
Discharge: HOME | End: 2024-06-20
Attending: ORTHOPAEDIC SURGERY

## 2024-06-20 ENCOUNTER — OFFICE VISIT (OUTPATIENT)
Dept: ORTHOPEDICS CLINIC | Facility: CLINIC | Age: 79
End: 2024-06-20

## 2024-06-20 VITALS — WEIGHT: 193 LBS | HEIGHT: 72 IN | BODY MASS INDEX: 26.14 KG/M2

## 2024-06-20 DIAGNOSIS — R52 PAIN: ICD-10-CM

## 2024-06-20 DIAGNOSIS — M48.062 SPINAL STENOSIS OF LUMBAR REGION WITH NEUROGENIC CLAUDICATION: Primary | ICD-10-CM

## 2024-06-20 DIAGNOSIS — M25.552 PAIN OF LEFT HIP: ICD-10-CM

## 2024-06-20 PROCEDURE — 99214 OFFICE O/P EST MOD 30 MIN: CPT | Performed by: ORTHOPAEDIC SURGERY

## 2024-06-20 PROCEDURE — 73523 X-RAY EXAM HIPS BI 5/> VIEWS: CPT | Performed by: ORTHOPAEDIC SURGERY

## 2024-06-20 NOTE — PROGRESS NOTES
NURSING INTAKE COMMENTS:   Chief Complaint   Patient presents with    Hip Pain     Consult - B/l hips, L hip is worse- HX of L  MARGARET from 2008. Some numbness and tingling down leg. Pain onset for a couple of years, getting worse. Pain when walking.       HPI: This 79 year old male presents today with complaints of bilateral posterior hip pain.  He feels more pain on the left than the right.  He has had lower back pain chronically for the last 3 to 4 years.  A lumbar spine surgery back in 2021.  He was having numbness in both lower extremities at that time which necessitated the surgery.  He now has minimal tingling or numbness in the left lower extremity on occasion.  He has occasional clicking in his left hip with internal rotation.  He has no groin pain or anterior hip or thigh pain.  Does have some pain radiating from the posterior hips lower back and into the posterolateral thighs.  He notices improvement when leaning forward on a shopping cart.    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    PPVx, Cryo, GFX, SF6    Myopia with astigmatism and presbyopia    Nocturia    Osteoarthritis    Rotator cuff tear, left    repair    Seasonal allergic rhinitis    Stiffness of joint, hand    bilateral, hand stiffness & weakness post trigger release    Trigger finger    Irf-trigger finger, recurrent - LRF trigger finger release    Type II or unspecified type diabetes mellitus without mention of complication, not stated as uncontrolled    Visual impairment    readers     Past Surgical History:   Procedure Laterality Date    Arthroscopy of joint unlisted   1990    knee    Arthroscopy of joint unlisted Left 2011    rotator cuff repair    Arthroscopy of joint unlisted Left 1992    hip resurfacing    Arthroscopy of joint unlisted Left 12/2008    hip resurfacing    Cataract      Cataract extraction w/  intraocular lens implant Right 09/17/2012    RJM    Cataract extraction w/  intraocular lens implant Left 01/11/2010    [facility]    Colonoscopy  04/2015    repeat in 10 years.    Colonoscopy      Eye surgery Right 2013    repair detached retina    Eye surgery Right 02/20/2006    S/ P PPV RD repair (S/P PPVx, cryo, GFX, SF6 OD 4/25/13) Dr. Howe     Forearm/wrist surgery unlisted Left     left wrist surgery    Hand/finger surgery unlisted Left 9/29/2011    LRF trigger finger release    Hc inj epidural steroid lumbar or sacral w img guidance      x2 2019,x2 2017    Hernia surgery      hernia repair, herniorraphy    Laser surgery of eye      Other      bladder surgery x2    Other surgical history      esophageal dilatation    Other surgical history  6/15/2010    release triggers: RMF, RRF, LMF/ RRF Dupuytren's nodule excision    Repair of biceps tendon at elbow Left 1992    Retinal laser procedure      Spine surgery procedure unlisted  02/23/2022    L3-5 laminectomy, left L4-5 transforaminal lumbar interbody fusion;    Total hip replacement Left 2008    Yag capsulotomy - od - right eye Right 4/30/14    RJM     Current Outpatient Medications   Medication Sig Dispense Refill    fluticasone propionate 50 MCG/ACT Nasal Suspension 1 spray by Each Nare route daily as needed. 16 g 2    metFORMIN 500 MG Oral Tab Take 1 tablet (500 mg total) by mouth 2 (two) times daily. 180 tablet 3    lisinopril 5 MG Oral Tab Take 1 tablet (5 mg total) by mouth daily. 90 tablet 3    NIFEdipine ER 90 MG Oral Tablet 24 Hr Take 1 tablet (90 mg total) by mouth daily. 90 tablet 3    Omeprazole 40 MG Oral Capsule Delayed Release Take 1 capsule (40 mg total) by mouth daily. 90 capsule 3    Lovastatin 40 MG  Oral Tab Take 1 tablet (40 mg total) by mouth nightly. 90 tablet 3    tadalafil 5 MG Oral Tab Take 1 tablet (5 mg total) by mouth daily as needed for Erectile Dysfunction. 90 tablet 3    Risedronate Sodium 150 MG Oral Tab Take 1 tablet (150 mg total) by mouth every 30 (thirty) days. 3 tablet 3    ADVAIR DISKUS 100-50 MCG/ACT Inhalation Aerosol Powder, Breath Activated Inhale 1 puff into the lungs 2 (two) times daily. 180 each 1    diclofenac 1 % External Gel       ketoconazole 2 % External Cream Apply 1 Application topically 2 (two) times daily.      Vitamin B-12 1000 MCG Oral Tab Take 1 tablet (1,000 mcg total) by mouth daily.      Cholecalciferol (VITAMIN D) 1000 UNITS Oral Cap Take by mouth daily.      Multiple Vitamins-Minerals (CENTRUM SILVER) Oral Tab Take 1 tablet by mouth daily.        Cinnamon 500 MG Oral Cap Take 500 mg by mouth daily.      cetirizine 10 MG Oral Tab Take 1 tablet (10 mg total) by mouth daily. (Patient not taking: Reported on 5/14/2024) 90 tablet 3     Allergies   Allergen Reactions    Penicillins HIVES     Family History   Problem Relation Age of Onset    Colon Cancer Mother     Other (pulmonary Embolism) Father     Glaucoma Neg     Diabetes Neg     Macular degeneration Neg        Social History     Occupational History    Not on file   Tobacco Use    Smoking status: Never    Smokeless tobacco: Never   Vaping Use    Vaping status: Never Used   Substance and Sexual Activity    Alcohol use: Yes     Alcohol/week: 2.0 - 4.0 standard drinks of alcohol     Types: 2 - 4 Cans of beer per week     Comment: 2-3 times per week    Drug use: No    Sexual activity: Not on file        Review of Systems:  GENERAL: denies fevers, chills, night sweats, fatigue, unintentional weight loss/gain  SKIN: denies skin lesions, open sores, rash  HEENT:denies recent vision change, new nasal congestion,hearing loss, tinnitus, sore throat, headaches  RESPIRATORY: denies new shortness of breath, cough, asthma,  wheezing  CARDIOVASCULAR: denies chest pain, leg cramps with exertion, palpitations, leg swelling  GI: denies abdominal pain, nausea, vomiting, diarrhea, constipation, hematochezia, worsening heartburn or stomach ulcers  : denies dysuria, hematuria, incontinence, increased frequency, urgency, difficulty urinating  MUSCULOSKELETAL: denies musculoskeletal complaints other than in HPI  NEURO: denies numbness, tingling, weakness, balance issues, dizziness, memory loss  PSYCHIATRIC: denies Hx of depression, anxiety, other psychiatric disorders  HEMATOLOGIC: denies blood clots, anemia, blood clotting disorders, blood transfusion  ENDOCRINE: denies autoimmune disease, thyroid issues, or diabetes  ALLERGY: denies asthma, seasonal allergies    Physical Examination:    Ht 6' (1.829 m)   Wt 193 lb (87.5 kg)   BMI 26.18 kg/m²   Constitutional: appears well hydrated, alert and responsive, no acute distress noted  Extremities: He walks without a limp.  Further exam left hip reveals no pain with active straight leg raising.  I am able to passively straight leg raise to 70 degrees with pain in the posterior hip.  The sciatic notch is moderately tender to palpation.  No tenderness over the greater trochanter or anterior hip.  Passive flexion to 90 degrees produces no pain.  Internal rotation to 30 degrees produces minimal mild pain.  Passive external rotation to 70 degrees produces no pain.  Right hip range of motion is similar with no pain with passive internal rotation and flexion.  Sciatic notch is mildly tender on the right.  Neurological: Light touch and pinprick sensation intact throughout the lower extremities.  Ankle dorsiflexion plantarflexion EHL knee extension and hip flexion strength are 5 out of 5 bilaterally.  No clonus.  There may be slight increased pinprick sensation over the lateral leg on the left when compared to the right.    Imaging:   XR HIP W OR WO PELVIS(MIN 5 VIEWS),BILAT(CPT=73523)    Result Date:  6/20/2024  PROCEDURE: XR HIP W OR WO PELVIS (MIN 5 VIEWS), BILAT (CPT=73523)  COMPARISON: [facility], X HIP RT, 6/02/2006, 10:14 AM.  INDICATIONS: Deep bilateral hip pain for 2 years; no known trauma.  TECHNIQUE: 5 views were obtained.   FINDINGS:  BONES: Changes of prior left hip arthroplasty, without radiographic evidence of hardware failure or fracture.  No acute osseous fracture or malalignment.  A well-defined sclerotic lesion within the right femoral neck, unchanged since 2006, most compatible with a benign etiology, likely a bone island.  Mild right hip joint narrowing with sclerosis and osteophyte formation as well as subchondral cyst. Partially imaged lower lumbar spinal fusion hardware, incompletely assessed.  There is partially imaged multilevel degenerative disease of the spine. SOFT TISSUES: Vascular calcifications EFFUSION: None visible. OTHER: Surgical clips over the scrotal region..         CONCLUSION:   Mild-to-moderate right hip osteoarthritis.  Changes of prior left hip arthroplasty, without radiographic evidence of hardware failure.  No acute fracture or dislocation.  Degenerative disease of the spine with partially imaged lower lumbar spinal fusion hardware, incompletely assessed.    Dictated by (CST): Helga Doshi MD on 6/20/2024 at 12:05 PM     Finalized by (CST): Helga Doshi MD on 6/20/2024 at 12:07 PM             Labs:  Lab Results   Component Value Date    WBC 8.7 02/06/2024    HGB 15.5 02/06/2024    .0 02/06/2024      Lab Results   Component Value Date     (H) 05/15/2024    BUN 9 05/15/2024    CREATSERUM 0.90 05/15/2024    GFRNAA 81 02/19/2022    GFRAA 94 02/19/2022        Assessment and Plan:  Diagnoses and all orders for this visit:    Spinal stenosis of lumbar region with neurogenic claudication  -     PHYSICAL THERAPY - INTERNAL    Pain  -     Cancel: XR HIP W OR WO PELVIS 3 OR 4 VIEWS, BILAT(CPT=73522); Future    Pain of left hip  -     Sed Rate,  Drea (Automated); Future  -     C-Reactive Protein; Future  -     Chromium, serum; Future  -     Cobalt, Serum or Plasma; Future        Assessment: Bilateral posterior hip pain, findings consistent with lumbar radiculopathy and lumbar spinal stenosis.  Mild right hip osteoarthritis.  Apparently well-functioning left total hip arthroplasty    Plan: Given the metal on metal articulation in the left hip, I advised some serologic testing to rule out significant metal debris or metallosis.  I ordered serum cobalt and chromium levels.  Also ordered ESR and CRP to rule out periprosthetic infection.  Advised outpatient physical therapy for core strengthening and hamstring stretching.  Suggested follow-up with physiatry for possible pain management related to spinal stenosis.  Would advise follow-up with Dr. Cruz if symptoms continue or worsen.    Follow Up: No follow-ups on file.    RADHA SINGH MD

## 2024-06-21 ENCOUNTER — LAB ENCOUNTER (OUTPATIENT)
Dept: LAB | Facility: HOSPITAL | Age: 79
End: 2024-06-21
Attending: ORTHOPAEDIC SURGERY

## 2024-06-21 DIAGNOSIS — M25.552 PAIN OF LEFT HIP: ICD-10-CM

## 2024-06-21 LAB
CRP SERPL-MCNC: <0.4 MG/DL (ref ?–1)
ERYTHROCYTE [SEDIMENTATION RATE] IN BLOOD: 4 MM/HR

## 2024-06-21 PROCEDURE — 85652 RBC SED RATE AUTOMATED: CPT

## 2024-06-21 PROCEDURE — 86140 C-REACTIVE PROTEIN: CPT

## 2024-06-21 PROCEDURE — 83015 HEAVY METAL QUAL ANY NUMBER: CPT

## 2024-06-21 PROCEDURE — 82495 ASSAY OF CHROMIUM: CPT

## 2024-06-21 PROCEDURE — 36415 COLL VENOUS BLD VENIPUNCTURE: CPT

## 2024-06-22 ENCOUNTER — TELEPHONE (OUTPATIENT)
Dept: INTERNAL MEDICINE CLINIC | Facility: CLINIC | Age: 79
End: 2024-06-22

## 2024-06-22 NOTE — TELEPHONE ENCOUNTER
Lovastatin 40 MG Oral Tab, Take 1 tablet (40 mg total) by mouth nightly., Disp: 90 tablet, Rfl: 3

## 2024-06-26 ENCOUNTER — OFFICE VISIT (OUTPATIENT)
Dept: ORTHOPEDICS CLINIC | Facility: CLINIC | Age: 79
End: 2024-06-26

## 2024-06-26 VITALS — HEART RATE: 75 BPM | SYSTOLIC BLOOD PRESSURE: 141 MMHG | DIASTOLIC BLOOD PRESSURE: 61 MMHG

## 2024-06-26 DIAGNOSIS — M17.11 PRIMARY OSTEOARTHRITIS OF RIGHT KNEE: Primary | ICD-10-CM

## 2024-06-26 LAB
CHROMIUM: 16.4 UG/L
COBALT: 35.6 UG/L

## 2024-06-26 PROCEDURE — 20610 DRAIN/INJ JOINT/BURSA W/O US: CPT

## 2024-06-26 NOTE — PROGRESS NOTES
NURSING INTAKE COMMENTS:   Chief Complaint   Patient presents with    Knee Pain     R knee f/u- here for orthovisc inj #2        HPI: This 79 year old male presents today with complaints of right knee follow up. He is here for his second Orthovisc injection. States the right knee is already feeling better after first injection last week. He would like to proceed with right knee again today.     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    PPVx, Cryo, GFX, SF6    Myopia with astigmatism and presbyopia    Nocturia    Osteoarthritis    Rotator cuff tear, left    repair    Seasonal allergic rhinitis    Stiffness of joint, hand    bilateral, hand stiffness & weakness post trigger release    Trigger finger    Irf-trigger finger, recurrent - LRF trigger finger release    Type II or unspecified type diabetes mellitus without mention of complication, not stated as uncontrolled    Visual impairment    readers     Past Surgical History:   Procedure Laterality Date    Arthroscopy of joint unlisted  1990    knee    Arthroscopy of joint unlisted Left 2011    rotator cuff repair    Arthroscopy of joint unlisted Left 1992    hip resurfacing    Arthroscopy of joint unlisted Left 12/2008    hip resurfacing    Cataract      Cataract extraction w/  intraocular lens implant Right 09/17/2012    [facility]    Cataract extraction w/  intraocular lens implant Left 01/11/2010    [facility]    Colonoscopy  04/2015    repeat in 10 years.    Colonoscopy      Eye surgery Right 2013    repair detached retina    Eye surgery Right 02/20/2006    S/ P PPV RD repair (S/P PPVx, cryo, GFX, SF6 OD  4/25/13) Dr. Howe     Forearm/wrist surgery unlisted Left     left wrist surgery    Hand/finger surgery unlisted Left 9/29/2011    LRF trigger finger release    Hc inj epidural steroid lumbar or sacral w img guidance      x2 2019,x2 2017    Hernia surgery      hernia repair, herniorraphy    Laser surgery of eye      Other      bladder surgery x2    Other surgical history      esophageal dilatation    Other surgical history  6/15/2010    release triggers: RMF, RRF, LMF/ RRF Dupuytren's nodule excision    Repair of biceps tendon at elbow Left 1992    Retinal laser procedure      Spine surgery procedure unlisted  02/23/2022    L3-5 laminectomy, left L4-5 transforaminal lumbar interbody fusion;    Total hip replacement Left 2008    Yag capsulotomy - od - right eye Right 4/30/14    R[facility]     Current Outpatient Medications   Medication Sig Dispense Refill    fluticasone propionate 50 MCG/ACT Nasal Suspension 1 spray by Each Nare route daily as needed. 16 g 2    metFORMIN 500 MG Oral Tab Take 1 tablet (500 mg total) by mouth 2 (two) times daily. 180 tablet 3    lisinopril 5 MG Oral Tab Take 1 tablet (5 mg total) by mouth daily. 90 tablet 3    NIFEdipine ER 90 MG Oral Tablet 24 Hr Take 1 tablet (90 mg total) by mouth daily. 90 tablet 3    Omeprazole 40 MG Oral Capsule Delayed Release Take 1 capsule (40 mg total) by mouth daily. 90 capsule 3    Lovastatin 40 MG Oral Tab Take 1 tablet (40 mg total) by mouth nightly. 90 tablet 3    tadalafil 5 MG Oral Tab Take 1 tablet (5 mg total) by mouth daily as needed for Erectile Dysfunction. 90 tablet 3    Risedronate Sodium 150 MG Oral Tab Take 1 tablet (150 mg total) by mouth every 30 (thirty) days. 3 tablet 3    ADVAIR DISKUS 100-50 MCG/ACT Inhalation Aerosol Powder, Breath Activated Inhale 1 puff into the lungs 2 (two) times daily. 180 each 1    cetirizine 10 MG Oral Tab Take 1 tablet (10 mg total) by mouth daily. (Patient not taking: Reported on 5/14/2024) 90 tablet 3     diclofenac 1 % External Gel       ketoconazole 2 % External Cream Apply 1 Application topically 2 (two) times daily.      Vitamin B-12 1000 MCG Oral Tab Take 1 tablet (1,000 mcg total) by mouth daily.      Cholecalciferol (VITAMIN D) 1000 UNITS Oral Cap Take by mouth daily.      Multiple Vitamins-Minerals (CENTRUM SILVER) Oral Tab Take 1 tablet by mouth daily.        Cinnamon 500 MG Oral Cap Take 500 mg by mouth daily.       Allergies   Allergen Reactions    Penicillins HIVES     Family History   Problem Relation Age of Onset    Colon Cancer Mother     Other (pulmonary Embolism) Father     Glaucoma Neg     Diabetes Neg     Macular degeneration Neg        Social History     Occupational History    Not on file   Tobacco Use    Smoking status: Never    Smokeless tobacco: Never   Vaping Use    Vaping status: Never Used   Substance and Sexual Activity    Alcohol use: Yes     Alcohol/week: 2.0 - 4.0 standard drinks of alcohol     Types: 2 - 4 Cans of beer per week     Comment: 2-3 times per week    Drug use: No    Sexual activity: Not on file        Review of Systems:  GENERAL: denies fevers, chills, night sweats, fatigue, unintentional weight loss/gain  SKIN: denies skin lesions, open sores, rash  HEENT:denies recent vision change, new nasal congestion,hearing loss, tinnitus, sore throat, headaches  RESPIRATORY: denies new shortness of breath, cough, asthma, wheezing  CARDIOVASCULAR: denies chest pain, leg cramps with exertion, palpitations, leg swelling  GI: denies abdominal pain, nausea, vomiting, diarrhea, constipation, hematochezia, worsening heartburn or stomach ulcers  : denies dysuria, hematuria, incontinence, increased frequency, urgency, difficulty urinating  MUSCULOSKELETAL: denies musculoskeletal complaints other than in HPI  NEURO: denies numbness, tingling, weakness, balance issues, dizziness, memory loss  PSYCHIATRIC: denies Hx of depression, anxiety, other psychiatric disorders  HEMATOLOGIC: denies blood  clots, anemia, blood clotting disorders, blood transfusion  ENDOCRINE: denies autoimmune disease, thyroid issues, or diabetes  ALLERGY: denies asthma, seasonal allergies    Physical Examination:    /61   Pulse 75   Constitutional: appears well hydrated, alert and responsive, no acute distress noted  Extremities: Right knee exam is unchanged. No erythema. No effusion.   Neurological: Unchanged     Imaging:   XR HIP W OR WO PELVIS(MIN 5 VIEWS),BILAT(CPT=73523)    Result Date: 6/20/2024  PROCEDURE: XR HIP W OR WO PELVIS (MIN 5 VIEWS), BILAT (CPT=73523)  COMPARISON: [facility], X HIP RT, 6/02/2006, 10:14 AM.  INDICATIONS: Deep bilateral hip pain for 2 years; no known trauma.  TECHNIQUE: 5 views were obtained.   FINDINGS:  BONES: Changes of prior left hip arthroplasty, without radiographic evidence of hardware failure or fracture.  No acute osseous fracture or malalignment.  A well-defined sclerotic lesion within the right femoral neck, unchanged since 2006, most compatible with a benign etiology, likely a bone island.  Mild right hip joint narrowing with sclerosis and osteophyte formation as well as subchondral cyst. Partially imaged lower lumbar spinal fusion hardware, incompletely assessed.  There is partially imaged multilevel degenerative disease of the spine. SOFT TISSUES: Vascular calcifications EFFUSION: None visible. OTHER: Surgical clips over the scrotal region..         CONCLUSION:   Mild-to-moderate right hip osteoarthritis.  Changes of prior left hip arthroplasty, without radiographic evidence of hardware failure.  No acute fracture or dislocation.  Degenerative disease of the spine with partially imaged lower lumbar spinal fusion hardware, incompletely assessed.    Dictated by (CST): Hegla Doshi MD on 6/20/2024 at 12:05 PM     Finalized by (CST): Helga Doshi MD on 6/20/2024 at 12:07 PM             Labs:  Lab Results   Component Value Date    WBC 8.7 02/06/2024    HGB 15.5  02/06/2024    .0 02/06/2024      Lab Results   Component Value Date     (H) 05/15/2024    BUN 9 05/15/2024    CREATSERUM 0.90 05/15/2024    GFRNAA 81 02/19/2022    GFRAA 94 02/19/2022        Assessment and Plan:  Diagnoses and all orders for this visit:    Primary osteoarthritis of right knee  -     Drain/Inject Large Joint/Bursa  -     hyaluronan (Orthovisc) 30 MG/2ML intra-articular injection 30 mg        Assessment: Right knee osteoarthritis     Plan: Using sterile technique and superior lateral parapatellar approach, the right knee was injected with Orthovisc. Patient tolerated procedure well. Advised icing and oral anti-inflammatories as needed. Follow up next week with Orthovisc #3.     Follow Up: Return in about 1 week (around 7/3/2024).    Gunjan Leija PA-C

## 2024-07-01 RX ORDER — LOVASTATIN 40 MG/1
40 TABLET ORAL NIGHTLY
Qty: 100 TABLET | Refills: 2 | Status: SHIPPED | OUTPATIENT
Start: 2024-07-01

## 2024-07-01 NOTE — TELEPHONE ENCOUNTER
Refill passed per protocol.    Requested Prescriptions   Pending Prescriptions Disp Refills    Lovastatin 40 MG Oral Tab 100 tablet 2     Sig: Take 1 tablet (40 mg total) by mouth nightly.       Cholesterol Medication Protocol Passed - 7/1/2024 10:45 AM        Passed - ALT < 80     Lab Results   Component Value Date    ALT 28 05/15/2024             Passed - ALT resulted within past year        Passed - Lipid panel within past 12 months     Lab Results   Component Value Date    CHOLEST 145 02/06/2024    TRIG 73 02/06/2024    HDL 45 02/06/2024    LDL 86 02/06/2024    VLDL 12 02/06/2024    NONHDLC 100 02/06/2024             Passed - In person appointment or virtual visit in the past 12 mos or appointment in next 3 mos     Recent Outpatient Visits              5 days ago [facility] of right [facility]    Office Visit    1 week ago Spinal stenosis of lumbar region with neurogenic claudication    [facility] Pablo Gamboa MD    Office Visit    1 week ago Osteoarthritis of right knee, unspecified osteoarthritis type    [facility]    Office Visit    1 month ago Diabetes mellitus type 2 without retinopathy (HCC)    [facility] Sahara Pope APRN    Office Visit    3 months ago Diabetes mellitus type 2 without retinopathy (HCC)    [facility]    Office Visit          Future Appointments         Provider Department Appt Notes    In 2 days Gunjan Leija PA-C [facility] 3rd Orthovisc injection right knee    In 1 week Michael Barksdale MD [facility] Back causing hip pain, had back surgery in 2021, have hard time walking any distance.    In 1 week Heladio  PEÑA [facility] 4th Orthovisc injection right knee    In 1 week Danna Ahumada DO [facility] coughing, post nasal drip.    In 3 weeks CremeriusKwesiir, PT [facility]    In 4 weeks [facility]     In 1 month CreLloyd rincon, PT [facility]     In 1 month Mark Gupta MD [facility]     In 1 month [facility]     In 1 month Cre[facility] Ja[facility], PT [facility]                          Future Appointments         Provider Department Appt Notes    In 2 days Gunjan Leija PA-C [facility] 3rd Orthovisc injection right knee    In 1 week Michael Barksdale MD [facility] Back causing hip pain, had back surgery in 2021, have hard time walking any distance.    In 1 week Gunjan Leija PA-C [facility] 4th Orthovisc injection right knee    In 1 week Danna Ahumada DO [facility] coughing, post nasal drip.    In 3 weeks Lloyd Fox, PT [facility]    In 4 weeks [facility] [place]     In 1 month CreLloyd rincon, PT [place]  [facility]     In 1 month Mark Gupta MD [facility]     In 1 month Cre[facility] Ja[facility] [place]  [facility]     In 1 month Cre[facility] Jaques [facility] [place]  [facility]           Recent Outpatient Visits              5 days ago Primary osteoarthritis of right knee    [facility] Gunjan Leija PA-C    Office Visit    1 week ago Spinal stenosis of  lumbar region with neurogenic claudication    [facility]    Office Visit    1 week ago Osteoarthritis of right knee, unspecified osteoarthritis type    [facility] Gunjan Leija PA-C    Office Visit    1 month ago Diabetes mellitus type 2 without retinopathy (HCC)    [facility]    Office Visit    3 months ago Diabetes mellitus type 2 without retinopathy (HCC)    [facility] [place]    Office Visit

## 2024-07-03 ENCOUNTER — APPOINTMENT (OUTPATIENT)
Dept: URBAN - METROPOLITAN AREA CLINIC 244 | Age: 79
Setting detail: DERMATOLOGY
End: 2024-07-03

## 2024-07-03 ENCOUNTER — OFFICE VISIT (OUTPATIENT)
Dept: ORTHOPEDICS CLINIC | Facility: CLINIC | Age: 79
End: 2024-07-03
Payer: MEDICARE

## 2024-07-03 VITALS — DIASTOLIC BLOOD PRESSURE: 61 MMHG | SYSTOLIC BLOOD PRESSURE: 126 MMHG | HEART RATE: 75 BPM

## 2024-07-03 DIAGNOSIS — L57.0 ACTINIC KERATOSIS: ICD-10-CM

## 2024-07-03 DIAGNOSIS — L82.1 OTHER SEBORRHEIC KERATOSIS: ICD-10-CM

## 2024-07-03 DIAGNOSIS — L81.4 OTHER MELANIN HYPERPIGMENTATION: ICD-10-CM

## 2024-07-03 DIAGNOSIS — D22 MELANOCYTIC NEVI: ICD-10-CM

## 2024-07-03 DIAGNOSIS — M17.11 PRIMARY OSTEOARTHRITIS OF RIGHT KNEE: Primary | ICD-10-CM

## 2024-07-03 PROBLEM — D22.5 MELANOCYTIC NEVI OF TRUNK: Status: ACTIVE | Noted: 2024-07-03

## 2024-07-03 PROBLEM — D48.5 NEOPLASM OF UNCERTAIN BEHAVIOR OF SKIN: Status: ACTIVE | Noted: 2024-07-03

## 2024-07-03 PROCEDURE — 17000 DESTRUCT PREMALG LESION: CPT | Mod: 59

## 2024-07-03 PROCEDURE — 17003 DESTRUCT PREMALG LES 2-14: CPT

## 2024-07-03 PROCEDURE — 11102 TANGNTL BX SKIN SINGLE LES: CPT

## 2024-07-03 PROCEDURE — 99213 OFFICE O/P EST LOW 20 MIN: CPT | Mod: 25

## 2024-07-03 PROCEDURE — OTHER COUNSELING: OTHER

## 2024-07-03 PROCEDURE — 20610 DRAIN/INJ JOINT/BURSA W/O US: CPT

## 2024-07-03 PROCEDURE — OTHER BIOPSY BY SHAVE METHOD: OTHER

## 2024-07-03 PROCEDURE — OTHER LIQUID NITROGEN: OTHER

## 2024-07-03 ASSESSMENT — LOCATION ZONE DERM
LOCATION ZONE: TRUNK
LOCATION ZONE: FACE
LOCATION ZONE: SCALP

## 2024-07-03 ASSESSMENT — LOCATION DETAILED DESCRIPTION DERM
LOCATION DETAILED: GLABELLA
LOCATION DETAILED: MEDIAL FRONTAL SCALP
LOCATION DETAILED: PERIUMBILICAL SKIN

## 2024-07-03 ASSESSMENT — LOCATION SIMPLE DESCRIPTION DERM
LOCATION SIMPLE: FRONTAL SCALP
LOCATION SIMPLE: ABDOMEN
LOCATION SIMPLE: GLABELLA

## 2024-07-03 NOTE — PROCEDURE: BIOPSY BY SHAVE METHOD
Dressing: bandage
Was A Bandage Applied: Yes
Information: Selecting Yes will display possible errors in your note based on the variables you have selected. This validation is only offered as a suggestion for you. PLEASE NOTE THAT THE VALIDATION TEXT WILL BE REMOVED WHEN YOU FINALIZE YOUR NOTE. IF YOU WANT TO FAX A PRELIMINARY NOTE YOU WILL NEED TO TOGGLE THIS TO 'NO' IF YOU DO NOT WANT IT IN YOUR FAXED NOTE.
Consent: Written consent was obtained and risks were reviewed including but not limited to scarring, infection, bleeding, scabbing, incomplete removal, nerve damage and allergy to anesthesia.
Validate That Anesthesia Is Not 0: No
Biopsy Method: double edge Personna blade
Biopsy Type: H and E
Silver Nitrate Text: The wound bed was treated with silver nitrate after the biopsy was performed.
Billing Type: Third-Party Bill
Anesthesia Type: 0.5% lidocaine with 1:100,000 epinephrine and a 1:10 solution of 8.4% sodium bicarbonate
Cryotherapy Text: The wound bed was treated with cryotherapy after the biopsy was performed.
Electrodesiccation Text: The wound bed was treated with electrodesiccation after the biopsy was performed.
Size Of Lesion In Cm: 0
Anesthesia Volume In Cc: 0.5
Depth Of Biopsy: dermis
Curettage Text: The wound bed was treated with curettage after the biopsy was performed.
Wound Care: Petrolatum
Post-Care Instructions: I reviewed with the patient in detail post-care instructions. Patient is to keep the biopsy site dry overnight, and then apply bacitracin twice daily until healed. Patient may apply hydrogen peroxide soaks to remove any crusting.
Hemostasis: Drysol
Notification Instructions: Patient will be notified of biopsy results. However, patient instructed to call the office if not contacted within 2 weeks.
Lab: [id number]
Type Of Destruction Used: Curettage
Electrodesiccation And Curettage Text: The wound bed was treated with electrodesiccation and curettage after the biopsy was performed.
Detail Level: Detailed

## 2024-07-03 NOTE — PROGRESS NOTES
NURSING INTAKE COMMENTS:   Chief Complaint   Patient presents with    Procedure     R knee f/u- here for orthovisc inj #3. Pt states pain at times, depending on activity.        HPI: This 79 year old male presents today with complaints of right knee follow up. He is here for his third orthovisc injection in the right knee. States right knee pain has continued to improve. He would like to proceed with injection #3.     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    PPVx, Cryo, GFX, SF6    Myopia with astigmatism and presbyopia    Nocturia    Osteoarthritis    Rotator cuff tear, left    repair    Seasonal allergic rhinitis    Stiffness of joint, hand    bilateral, hand stiffness & weakness post trigger release    Trigger finger    Irf-trigger finger, recurrent - LRF trigger finger release    Type II or unspecified type diabetes mellitus without mention of complication, not stated as uncontrolled    Visual impairment    readers     Past Surgical History:   Procedure Laterality Date    Arthroscopy of joint unlisted  1990    knee    Arthroscopy of joint unlisted Left 2011    rotator cuff repair    Arthroscopy of joint unlisted Left 1992    hip resurfacing    Arthroscopy of joint unlisted Left 12/2008    hip resurfacing    Cataract      Cataract extraction w/  intraocular lens implant Right 09/17/2012    [facility]    Cataract extraction w/  intraocular lens implant Left 01/11/2010    [facility]    Colonoscopy  04/2015    repeat in 10 years.    Colonoscopy      Eye surgery Right 2013    repair detached retina    Eye surgery Right 02/20/2006    S/ P PPV RD repair (S/P  PPVx, cryo, GFX, SF6 OD 4/25/13) Dr. Howe     Forearm/wrist surgery unlisted Left     left wrist surgery    Hand/finger surgery unlisted Left 9/29/2011    LRF trigger finger release    Hc inj epidural steroid lumbar or sacral w img guidance      x2 2019,x2 2017    Hernia surgery      hernia repair, herniorraphy    Laser surgery of eye      Other      bladder surgery x2    Other surgical history      esophageal dilatation    Other surgical history  6/15/2010    release triggers: RMF, RRF, LMF/ RRF Dupuytren's nodule excision    Repair of biceps tendon at elbow Left 1992    Retinal laser procedure      Spine surgery procedure unlisted  02/23/2022    L3-5 laminectomy, left L4-5 transforaminal lumbar interbody fusion;    Total hip replacement Left 2008    Yag capsulotomy - od - right eye Right 4/30/14    [facility]     Current Outpatient Medications   Medication Sig Dispense Refill    Lovastatin 40 MG Oral Tab Take 1 tablet (40 mg total) by mouth nightly. 100 tablet 2    fluticasone propionate 50 MCG/ACT Nasal Suspension 1 spray by Each Nare route daily as needed. 16 g 2    metFORMIN 500 MG Oral Tab Take 1 tablet (500 mg total) by mouth 2 (two) times daily. 180 tablet 3    lisinopril 5 MG Oral Tab Take 1 tablet (5 mg total) by mouth daily. 90 tablet 3    NIFEdipine ER 90 MG Oral Tablet 24 Hr Take 1 tablet (90 mg total) by mouth daily. 90 tablet 3    Omeprazole 40 MG Oral Capsule Delayed Release Take 1 capsule (40 mg total) by mouth daily. 90 capsule 3    tadalafil 5 MG Oral Tab Take 1 tablet (5 mg total) by mouth daily as needed for Erectile Dysfunction. 90 tablet 3    Risedronate Sodium 150 MG Oral Tab Take 1 tablet (150 mg total) by mouth every 30 (thirty) days. 3 tablet 3    ADVAIR DISKUS 100-50 MCG/ACT Inhalation Aerosol Powder, Breath Activated Inhale 1 puff into the lungs 2 (two) times daily. 180 each 1    cetirizine 10 MG Oral Tab Take 1 tablet (10 mg total) by mouth daily. (Patient not taking: Reported on  5/14/2024) 90 tablet 3    diclofenac 1 % External Gel       ketoconazole 2 % External Cream Apply 1 Application topically 2 (two) times daily.      Vitamin B-12 1000 MCG Oral Tab Take 1 tablet (1,000 mcg total) by mouth daily.      Cholecalciferol (VITAMIN D) 1000 UNITS Oral Cap Take by mouth daily.      Multiple Vitamins-Minerals (CENTRUM SILVER) Oral Tab Take 1 tablet by mouth daily.        Cinnamon 500 MG Oral Cap Take 500 mg by mouth daily.       Allergies   Allergen Reactions    Penicillins HIVES     Family History   Problem Relation Age of Onset    Colon Cancer Mother     Other (pulmonary Embolism) Father     Glaucoma Neg     Diabetes Neg     Macular degeneration Neg        Social History     Occupational History    Not on file   Tobacco Use    Smoking status: Never    Smokeless tobacco: Never   Vaping Use    Vaping status: Never Used   Substance and Sexual Activity    Alcohol use: Yes     Alcohol/week: 2.0 - 4.0 standard drinks of alcohol     Types: 2 - 4 Cans of beer per week     Comment: 2-3 times per week    Drug use: No    Sexual activity: Not on file        Review of Systems:  GENERAL: denies fevers, chills, night sweats, fatigue, unintentional weight loss/gain  SKIN: denies skin lesions, open sores, rash  HEENT:denies recent vision change, new nasal congestion,hearing loss, tinnitus, sore throat, headaches  RESPIRATORY: denies new shortness of breath, cough, asthma, wheezing  CARDIOVASCULAR: denies chest pain, leg cramps with exertion, palpitations, leg swelling  GI: denies abdominal pain, nausea, vomiting, diarrhea, constipation, hematochezia, worsening heartburn or stomach ulcers  : denies dysuria, hematuria, incontinence, increased frequency, urgency, difficulty urinating  MUSCULOSKELETAL: denies musculoskeletal complaints other than in HPI  NEURO: denies numbness, tingling, weakness, balance issues, dizziness, memory loss  PSYCHIATRIC: denies Hx of depression, anxiety, other psychiatric  disorders  HEMATOLOGIC: denies blood clots, anemia, blood clotting disorders, blood transfusion  ENDOCRINE: denies autoimmune disease, thyroid issues, or diabetes  ALLERGY: denies asthma, seasonal allergies    Physical Examination:    /61   Pulse 75   Constitutional: appears well hydrated, alert and responsive, no acute distress noted  Extremities: Right knee exam is unchanged.  Neurological: Unchanged     Imaging:   XR HIP W OR WO PELVIS(MIN 5 VIEWS),BILAT(CPT=73523)    Result Date: 6/20/2024  PROCEDURE: XR HIP W OR WO PELVIS (MIN 5 VIEWS), BILAT (CPT=73523)  COMPARISON: [facility], X HIP RT, 6/02/2006, 10:14 AM.  INDICATIONS: Deep bilateral hip pain for 2 years; no known trauma.  TECHNIQUE: 5 views were obtained.   FINDINGS:  BONES: Changes of prior left hip arthroplasty, without radiographic evidence of hardware failure or fracture.  No acute osseous fracture or malalignment.  A well-defined sclerotic lesion within the right femoral neck, unchanged since 2006, most compatible with a benign etiology, likely a bone island.  Mild right hip joint narrowing with sclerosis and osteophyte formation as well as subchondral cyst. Partially imaged lower lumbar spinal fusion hardware, incompletely assessed.  There is partially imaged multilevel degenerative disease of the spine. SOFT TISSUES: Vascular calcifications EFFUSION: None visible. OTHER: Surgical clips over the scrotal region..         CONCLUSION:   Mild-to-moderate right hip osteoarthritis.  Changes of prior left hip arthroplasty, without radiographic evidence of hardware failure.  No acute fracture or dislocation.  Degenerative disease of the spine with partially imaged lower lumbar spinal fusion hardware, incompletely assessed.    Dictated by (CST): Helga Doshi MD on 6/20/2024 at 12:05 PM     Finalized by (CST): Helga Doshi MD on 6/20/2024 at 12:07 PM             Labs:  Lab Results   Component Value Date    WBC 8.7 02/06/2024     HGB 15.5 02/06/2024    .0 02/06/2024      Lab Results   Component Value Date     (H) 05/15/2024    BUN 9 05/15/2024    CREATSERUM 0.90 05/15/2024    GFRNAA 81 02/19/2022    GFRAA 94 02/19/2022        Assessment and Plan:  Diagnoses and all orders for this visit:    Primary osteoarthritis of right knee  -     Drain/Inject Large Joint/Bursa  -     hyaluronan (Orthovisc) 30 MG/2ML intra-articular injection 30 mg        Assessment: Right knee osteoarthritis     Plan: Using sterile technique and superiorlateral parapatellar approach, the right knee was injected with Orthovisc #3. Patient tolerated procedure well. Advised icing and oral anti-inflammatories as needed. Follow up in one week for injection #4.     Follow Up: No follow-ups on file.    Gunjan Leija PA-C

## 2024-07-03 NOTE — PROCEDURE: LIQUID NITROGEN
Consent: The patient's consent was obtained including but not limited to risks of crusting, scabbing, blistering, scarring, darker or lighter pigmentary change, recurrence, incomplete removal and infection.
Total Number Of Aks Treated: 6
Render In Bullet Format When Appropriate: No
Post-Care Instructions: I reviewed with the patient in detail post-care instructions. Patient is to wear sunprotection, and avoid picking at any of the treated lesions. Pt may apply Vaseline to crusted or scabbing areas.
Duration Of Freeze Thaw-Cycle (Seconds): 0
Detail Level: Zone

## 2024-07-09 ENCOUNTER — HOSPITAL ENCOUNTER (OUTPATIENT)
Dept: GENERAL RADIOLOGY | Facility: HOSPITAL | Age: 79
Discharge: HOME | End: 2024-07-09
Attending: PHYSICAL MEDICINE & REHABILITATION
Payer: MEDICARE

## 2024-07-09 ENCOUNTER — OFFICE VISIT (OUTPATIENT)
Dept: PHYSICAL MEDICINE AND REHAB | Facility: CLINIC | Age: 79
End: 2024-07-09
Payer: MEDICARE

## 2024-07-09 ENCOUNTER — MED REC SCAN ONLY (OUTPATIENT)
Dept: INTERNAL MEDICINE CLINIC | Facility: CLINIC | Age: 79
End: 2024-07-09

## 2024-07-09 VITALS
DIASTOLIC BLOOD PRESSURE: 72 MMHG | SYSTOLIC BLOOD PRESSURE: 134 MMHG | HEIGHT: 72 IN | WEIGHT: 193 LBS | RESPIRATION RATE: 18 BRPM | BODY MASS INDEX: 26.14 KG/M2

## 2024-07-09 DIAGNOSIS — Z79.899 LONG-TERM USE OF PLAQUENIL: ICD-10-CM

## 2024-07-09 DIAGNOSIS — N13.8 BPH WITH OBSTRUCTION/LOWER URINARY TRACT SYMPTOMS: ICD-10-CM

## 2024-07-09 DIAGNOSIS — M96.1 LUMBAR POST-LAMINECTOMY SYNDROME: ICD-10-CM

## 2024-07-09 DIAGNOSIS — M96.1 LUMBAR POST-LAMINECTOMY SYNDROME: Primary | ICD-10-CM

## 2024-07-09 DIAGNOSIS — E11.9 TYPE 2 DIABETES MELLITUS WITHOUT COMPLICATION, WITHOUT LONG-TERM CURRENT USE OF INSULIN (HCC): ICD-10-CM

## 2024-07-09 DIAGNOSIS — M85.80 OSTEOPENIA, UNSPECIFIED LOCATION: ICD-10-CM

## 2024-07-09 DIAGNOSIS — N40.1 BPH WITH OBSTRUCTION/LOWER URINARY TRACT SYMPTOMS: ICD-10-CM

## 2024-07-09 DIAGNOSIS — K21.9 GASTROESOPHAGEAL REFLUX DISEASE WITHOUT ESOPHAGITIS: ICD-10-CM

## 2024-07-09 PROCEDURE — 72110 X-RAY EXAM L-2 SPINE 4/>VWS: CPT | Performed by: PHYSICAL MEDICINE & REHABILITATION

## 2024-07-09 NOTE — PROGRESS NOTES
[id number] [facility]  [facility]  Progress Note    CHIEF COMPLAINT:    Chief Complaint   Patient presents with    Follow - Up     Patient here for bilateral  hip pain traveling down the left leg down the calf, worst with walking or laying down. Pain is burning on the hips and dull on left leg. Denies any n/t. Pain  old, started years ago, in which patient also had spinal fusion in 2021. Pain now rated 03/10, worst 06/10. Patient stated that he is not able to walk a long distance due to pain. Patient had XR of hips on 6/20/2024. Patient will start PT ordered by Dr Gamboa. Patient on Tylenol as needed for pain (some help)       History of Present Illness:  Liwnood Morales is a 79 year old male who presents today for a new patient evaluation.  He has a chief complaint of left radicular pain worse with walking or lying down.  There is no numbness or tingling.  He has a history of spinal fusion in 2021.  Walking long distances aggravates symptoms.  He has right hip arthritis being treated by Dr. Gamboa, also a left hip arthroplasty that sometimes clicks.  Symptoms are better when leaning on a shopping cart, he was sent to physical therapy by Dr. Gamboa.  He last saw Dr. Campos in August 2022 for primarily upper extremity symptoms.    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    PPVx, Cryo, GFX, SF6    Myopia with astigmatism and presbyopia    Nocturia    Osteoarthritis    Rotator cuff  tear, left    repair    Seasonal allergic rhinitis    Stiffness of joint, hand    bilateral, hand stiffness & weakness post trigger release    Trigger finger    Irf-trigger finger, recurrent - LRF trigger finger release    Type II or unspecified type diabetes mellitus without mention of complication, not stated as uncontrolled    Visual impairment    readers       SURGICAL HISTORY:  Past Surgical History:   Procedure Laterality Date    Arthroscopy of joint unlisted  1990    knee    Arthroscopy of joint unlisted Left 2011    rotator cuff repair    Arthroscopy of joint unlisted Left 1992    hip resurfacing    Arthroscopy of joint unlisted Left 12/2008    hip resurfacing    Cataract      Cataract extraction w/  intraocular lens implant Right 09/17/2012    [facility]    Cataract extraction w/  intraocular lens implant Left 01/11/2010    [facility]    Colonoscopy  04/2015    repeat in 10 years.    Colonoscopy      Eye surgery Right 2013    repair detached retina    Eye surgery Right 02/20/2006    S/ P PPV RD repair (S/P PPVx, cryo, GFX, SF6 OD 4/25/13) Dr. Howe     Forearm/wrist surgery unlisted Left     left wrist surgery    Hand/finger surgery unlisted Left 9/29/2011    LRF trigger finger release    Hc inj epidural steroid lumbar or sacral w img guidance      x2 2019,x2 2017    Hernia surgery      hernia repair, herniorraphy    Laser surgery of eye      Other      bladder surgery x2    Other surgical history      esophageal dilatation    Other surgical history  6/15/2010    release triggers: RMF, RRF, LMF/ RRF Dupuytren's nodule excision    Repair of biceps tendon at elbow Left 1992    Retinal laser procedure      Spine surgery procedure unlisted  02/23/2022    L3-5 laminectomy, left L4-5 transforaminal lumbar interbody fusion;    Total hip replacement Left 2008    Yag capsulotomy - od - right eye Right 4/30/14    [facility]       SOCIAL HISTORY:   Social History     Occupational History    Not on file   Tobacco Use    Smoking status:  Never    Smokeless tobacco: Never    Tobacco comments:     none   Vaping Use    Vaping status: Never Used   Substance and Sexual Activity    Alcohol use: Yes     Alcohol/week: 3.0 standard drinks of alcohol     Types: 2 Cans of beer, 1 Standard drinks or equivalent per week     Comment: 2-3 times per week    Drug use: No    Sexual activity: Not on file       CURRENT MEDICATIONS:   Current Outpatient Medications   Medication Sig Dispense Refill    ipratropium 0.03 % Nasal Solution 2 sprays by Nasal route every 12 (twelve) hours. 1 each 5    Omeprazole 40 MG Oral Capsule Delayed Release Take 1 capsule (40 mg total) by mouth daily. 90 capsule 3    NIFEdipine ER 90 MG Oral Tablet 24 Hr Take 1 tablet (90 mg total) by mouth daily. 90 tablet 3    Lovastatin 40 MG Oral Tab Take 1 tablet (40 mg total) by mouth nightly. 100 tablet 2    fluticasone propionate 50 MCG/ACT Nasal Suspension 1 spray by Each Nare route daily as needed. 16 g 2    metFORMIN 500 MG Oral Tab Take 1 tablet (500 mg total) by mouth 2 (two) times daily. 180 tablet 3    lisinopril 5 MG Oral Tab Take 1 tablet (5 mg total) by mouth daily. 90 tablet 3    tadalafil 5 MG Oral Tab Take 1 tablet (5 mg total) by mouth daily as needed for Erectile Dysfunction. 90 tablet 3    Risedronate Sodium 150 MG Oral Tab Take 1 tablet (150 mg total) by mouth every 30 (thirty) days. 3 tablet 3    ADVAIR DISKUS 100-50 MCG/ACT Inhalation Aerosol Powder, Breath Activated Inhale 1 puff into the lungs 2 (two) times daily. 180 each 1    cetirizine 10 MG Oral Tab Take 1 tablet (10 mg total) by mouth daily. 90 tablet 3    diclofenac 1 % External Gel       ketoconazole 2 % External Cream Apply 1 Application topically 2 (two) times daily.      Vitamin B-12 1000 MCG Oral Tab Take 1 tablet (1,000 mcg total) by mouth daily.      Cholecalciferol (VITAMIN D) 1000 UNITS Oral Cap Take by mouth daily.      Multiple Vitamins-Minerals (CENTRUM SILVER) Oral Tab Take 1 tablet by mouth daily.         Cinnamon 500 MG Oral Cap Take 500 mg by mouth daily.         ALLERGIES:   Allergies   Allergen Reactions    Penicillins HIVES           PHYSICAL EXAM:   /72   Resp 18   Ht 72\"   Wt 193 lb (87.5 kg)   BMI 26.18 kg/m²     Body mass index is 26.18 kg/m².      General: No immediate distress  Extremities: No lower extremity edema bilaterally   Spine: Limited lumbar extension  Hips: full and painfree ROM   Neuro:   Cognition: alert & oriented x 3, attentive, able to follow 2 step commands, comprehention intact, spontaneous speech intact  Strength: Lower extremities have 5/5 strength  Sensation: Normal lower extremities  Reflexes: Normal lower extremities  SLR: neg        Data    Radiology Imaging:  I personally reviewed a plain film x-ray of the pelvis showing an intact right partial hip arthroplasty, there is also right sided joint space narrowing.  There is also evidence of an L4-5 PLIF.  2.  I personally reviewed A lumbar MRI from May 2023 shows significant disc degeneration and fusion at L4-5    ASSESSMENT AND PLAN:  1. Lumbar post-laminectomy syndrome: L3-5 laminectomy and L4-5 fusion  Patient has a complex set of symptoms.  He has obvious osteoarthritis.  He also has symptoms reminiscent of radiculopathy due to stenosis.  His last MRI was over a year ago.  I therefore recommend imaging.  He should continue with physical therapy.  I will get back to him once his images are available for review.  - XR LUMBAR SPINE AP LAT FLEX EXT (CPT=72110); Future  - MRI SPINE LUMBAR (CPT=72148); Future    2. Type 2 diabetes mellitus without complication, without long-term current use of insulin (HCC)  Avoiding steroids if possible    3. Gastroesophageal reflux disease without esophagitis  No NSAIDs, limits treatment options    4. Osteopenia, unspecified location  Avoiding steroids if possible    5. BPH with obstruction/lower urinary tract symptoms  No TCAs, limits treatment options    6. Long-term use of Plaquenil  Has a  history of long-term widespread polyarthralgia.  Last saw a rheumatology in 2021.  May be a candidate for Cymbalta.        [facility] for imaging review.      The patient was in agreement with the assessment and plan.  All questions were answered.        Michael Barksdale MD  Physical Medicine and Rehabilitation/Sports Medicine  [facility]

## 2024-07-10 ENCOUNTER — OFFICE VISIT (OUTPATIENT)
Dept: ORTHOPEDICS CLINIC | Facility: CLINIC | Age: 79
End: 2024-07-10
Payer: MEDICARE

## 2024-07-10 VITALS — SYSTOLIC BLOOD PRESSURE: 144 MMHG | HEART RATE: 68 BPM | DIASTOLIC BLOOD PRESSURE: 66 MMHG

## 2024-07-10 DIAGNOSIS — M17.11 OSTEOARTHRITIS OF RIGHT KNEE, UNSPECIFIED OSTEOARTHRITIS TYPE: Primary | ICD-10-CM

## 2024-07-10 PROCEDURE — 20610 DRAIN/INJ JOINT/BURSA W/O US: CPT

## 2024-07-10 RX ORDER — OMEPRAZOLE 40 MG/1
40 CAPSULE, DELAYED RELEASE ORAL DAILY
Qty: 90 CAPSULE | Refills: 3 | Status: SHIPPED | OUTPATIENT
Start: 2024-07-10

## 2024-07-10 RX ORDER — NIFEDIPINE 90 MG/1
90 TABLET, FILM COATED, EXTENDED RELEASE ORAL DAILY
Qty: 90 TABLET | Refills: 3 | Status: SHIPPED | OUTPATIENT
Start: 2024-07-10

## 2024-07-10 NOTE — TELEPHONE ENCOUNTER
REFILL PASSED PER [facility] PROTOCOLS    Requested Prescriptions   Pending Prescriptions Disp Refills    OMEPRAZOLE 40 MG Oral Capsule Delayed Release [Pharmacy Med Name: Omeprazole Dr 40 Mg Cap Nort] 90 capsule 0     Sig: Take 1 capsule by mouth daily.       Gastrointestional Medication Protocol Passed - 7/7/2024  8:12 AM        Passed - In person appointment or virtual visit in the past 12 mos or appointment in next 3 mos     Recent Outpatient Visits              Yesterday Lumbar post-laminectomy syndrome: L3-5 laminectomy and L4-5 fusion    [facility], Michael Graves MD    Office Visit    1 week ago Primary osteoarthritis of right knee    [facility], Gunjan May PA-C    Office Visit    2 weeks ago Primary osteoarthritis of right knee    [facility], Gunjan May PA-C    Office Visit    2 weeks ago Spinal stenosis of lumbar region with neurogenic claudication    [facility]    [facility] Visit    3 weeks ago Osteoarthritis of right knee, unspecified osteoarthritis type    [facility], Gunjan May PA-C    Office Visit          Future Appointments         Provider Department Appt Notes    Today Gunjan Leija PA-C [facility] 4th Orthovisc injection right knee    In 2 days Danna Ahumada DO [facility] coughing, post nasal drip.    In 2 weeks Lloyd Fox PT [facility]    [facility]     In 3 weeks Lloyd Fox, PT [facility]     In 3 weeks Mark Gupta MD [facility]     In 3 weeks [facility]      In 4 weeks [facility] MRI RM1 (1.5T WIDE) [facility] [place]     In 1 month Lloyd Fox, PT [facility]                       NIFEDIPINE ER 90 MG Oral Tablet 24 Hr [Pharmacy Med Name: Nifedipine Er 24hr 90 Mg Tab Brittni] 90 tablet 0     Sig: TAKE ONE TABLET BY MOUTH ONE TIME DAILY       Hypertension Medications Protocol Passed - 7/7/2024  8:12 AM        Passed - CMP or BMP in past 12 months        Passed - Last BP reading less than 140/90     BP Readings from Last 1 Encounters:   07/09/24 134/72               Passed - In person appointment or virtual visit in the past 12 mos or appointment in next 3 mos     Recent Outpatient Visits              Yesterday Lumbar post-laminectomy syndrome: L3-5 laminectomy and L4-5 fusion    [facility] Michael Barksdale MD    Office Visit    1 week ago Primary osteoarthritis of right knee    [facility] Gunjan May PA-C    Office Visit    2 weeks ago Primary osteoarthritis of right knee    [facility] Gunjan May PA-C    Office Visit    2 weeks ago Spinal stenosis of lumbar region with neurogenic claudication    [facility] Pablo Gamboa MD    Office Visit    3 weeks ago Osteoarthritis of right knee, unspecified osteoarthritis type    [facility] Gunjan May PA-C    Office Visit          Future Appointments         Provider Department Appt Notes    Today Gunjan Leija PA-C [facility] 4th Orthovisc injection right knee    In 2 days Danna Ahumada,  [facility] coughing, post nasal drip.    In 2 weeks Lloyd Fox PT [facility]    In 2 weeks Lloyd Fox, ANA MARÍA [facility]     In 3 weeks  Lloyd Fox PT [facility]     In 3 weeks Mark Gupta MD [facility]     In 3 weeks [facility]     In 4 weeks [facility] (1.5T WIDE) [facility] [place]     In 1 month [facility]                     Passed - EGFRCR or GFRNAA > 50     GFR Evaluation  EGFRCR: 86 , resulted on 5/15/2024               Future Appointments         Provider Department Appt Notes    Today Gunjan Leija PA-C [facility] 4th Orthovisc injection right knee    In 2 days Danna Ahumada DO [facility] coughing, post nasal drip.    In 2 weeks [facility]    In 2 weeks [facility]     In 3 weeks [facility]     In 3 weeks Mark Gupta MD [facility]     In 3 weeks [facility]     In 4 weeks [facility] MRI [facility] (1.5T WIDE) [facility] [place]     In 1 month [facility]           Recent Outpatient Visits              Yesterday Lumbar post-laminectomy syndrome: L3-5 laminectomy and L4-5 fusion    [facility] Michael Barksdale MD    Office Visit    1 week ago Primary osteoarthritis of right knee    [facility]    Office Visit    2 weeks ago Primary osteoarthritis of right knee    [facility]    Office Visit    2 weeks ago Spinal stenosis of lumbar region with neurogenic claudication    [facility] Pablo Gamboa MD     Office Visit    3 weeks ago Osteoarthritis of right knee, unspecified osteoarthritis type    [facility] Gunjan Leija, PA-C    Office Visit

## 2024-07-10 NOTE — PROGRESS NOTES
NURSING INTAKE COMMENTS:   Chief Complaint   Patient presents with    Knee Pain     F/u Right knee pain 2/10 pain has decreased.  here for orthovisc injection  #4       HPI: This 79 year old male presents today with complaints of right knee follow up. He is here for Orthovisc injection in the right knee. States his knee pain seems to be improving each day. He is hoping this gel series will last him a year. He would like to proceed with Orthovisc injection #4 in the right knee today.     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    PPVx, Cryo, GFX, SF6    Myopia with astigmatism and presbyopia    Nocturia    Osteoarthritis    Rotator cuff tear, left    repair    Seasonal allergic rhinitis    Stiffness of joint, hand    bilateral, hand stiffness & weakness post trigger release    Trigger finger    Irf-trigger finger, recurrent - LRF trigger finger release    Type II or unspecified type diabetes mellitus without mention of complication, not stated as uncontrolled    Visual impairment    readers     Past Surgical History:   Procedure Laterality Date    Arthroscopy of joint unlisted  1990    knee    Arthroscopy of joint unlisted Left 2011    rotator cuff repair    Arthroscopy of joint unlisted Left 1992    hip resurfacing    Arthroscopy of joint unlisted Left 12/2008    hip resurfacing    Cataract      Cataract extraction w/  intraocular lens implant Right 09/17/2012    [facility]    Cataract extraction w/  intraocular lens implant Left 01/11/2010    RJ    Colonoscopy  04/2015    repeat in 10 years.    Colonoscopy       Eye surgery Right 2013    repair detached retina    Eye surgery Right 02/20/2006    S/ P PPV RD repair (S/P PPVx, cryo, GFX, SF6 OD 4/25/13) Dr. Howe     Forearm/wrist surgery unlisted Left     left wrist surgery    Hand/finger surgery unlisted Left 9/29/2011    LRF trigger finger release    Hc inj epidural steroid lumbar or sacral w img guidance      x2 2019,x2 2017    Hernia surgery      hernia repair, herniorraphy    Laser surgery of eye      Other      bladder surgery x2    Other surgical history      esophageal dilatation    Other surgical history  6/15/2010    release triggers: RMF, RRF, LMF/ RRF Dupuytren's nodule excision    Repair of biceps tendon at elbow Left 1992    Retinal laser procedure      Spine surgery procedure unlisted  02/23/2022    L3-5 laminectomy, left L4-5 transforaminal lumbar interbody fusion;    Total hip replacement Left 2008    Yag capsulotomy - od - right eye Right 4/30/14    RJ     Current Outpatient Medications   Medication Sig Dispense Refill    Omeprazole 40 MG Oral Capsule Delayed Release Take 1 capsule (40 mg total) by mouth daily. 90 capsule 3    NIFEdipine ER 90 MG Oral Tablet 24 Hr Take 1 tablet (90 mg total) by mouth daily. 90 tablet 3    Lovastatin 40 MG Oral Tab Take 1 tablet (40 mg total) by mouth nightly. 100 tablet 2    fluticasone propionate 50 MCG/ACT Nasal Suspension 1 spray by Each Nare route daily as needed. 16 g 2    metFORMIN 500 MG Oral Tab Take 1 tablet (500 mg total) by mouth 2 (two) times daily. 180 tablet 3    lisinopril 5 MG Oral Tab Take 1 tablet (5 mg total) by mouth daily. 90 tablet 3    tadalafil 5 MG Oral Tab Take 1 tablet (5 mg total) by mouth daily as needed for Erectile Dysfunction. 90 tablet 3    Risedronate Sodium 150 MG Oral Tab Take 1 tablet (150 mg total) by mouth every 30 (thirty) days. 3 tablet 3    ADVAIR DISKUS 100-50 MCG/ACT Inhalation Aerosol Powder, Breath Activated Inhale 1 puff into the lungs 2 (two) times daily. 180 each 1     cetirizine 10 MG Oral Tab Take 1 tablet (10 mg total) by mouth daily. 90 tablet 3    diclofenac 1 % External Gel       ketoconazole 2 % External Cream Apply 1 Application topically 2 (two) times daily.      Vitamin B-12 1000 MCG Oral Tab Take 1 tablet (1,000 mcg total) by mouth daily.      Cholecalciferol (VITAMIN D) 1000 UNITS Oral Cap Take by mouth daily.      Multiple Vitamins-Minerals (CENTRUM SILVER) Oral Tab Take 1 tablet by mouth daily.        Cinnamon 500 MG Oral Cap Take 500 mg by mouth daily.       Allergies   Allergen Reactions    Penicillins HIVES     Family History   Problem Relation Age of Onset    Colon Cancer Mother     Other (pulmonary Embolism) Father     Glaucoma Neg     Diabetes Neg     Macular degeneration Neg        Social History     Occupational History    Not on file   Tobacco Use    Smoking status: Never    Smokeless tobacco: Never    Tobacco comments:     none   Vaping Use    Vaping status: Never Used   Substance and Sexual Activity    Alcohol use: Yes     Alcohol/week: 3.0 standard drinks of alcohol     Types: 2 Cans of beer, 1 Standard drinks or equivalent per week     Comment: 2-3 times per week    Drug use: No    Sexual activity: Not on file        Review of Systems:  GENERAL: denies fevers, chills, night sweats, fatigue, unintentional weight loss/gain  SKIN: denies skin lesions, open sores, rash  HEENT:denies recent vision change, new nasal congestion,hearing loss, tinnitus, sore throat, headaches  RESPIRATORY: denies new shortness of breath, cough, asthma, wheezing  CARDIOVASCULAR: denies chest pain, leg cramps with exertion, palpitations, leg swelling  GI: denies abdominal pain, nausea, vomiting, diarrhea, constipation, hematochezia, worsening heartburn or stomach ulcers  : denies dysuria, hematuria, incontinence, increased frequency, urgency, difficulty urinating  MUSCULOSKELETAL: denies musculoskeletal complaints other than in HPI  NEURO: denies numbness, tingling, weakness,  balance issues, dizziness, memory loss  PSYCHIATRIC: denies Hx of depression, anxiety, other psychiatric disorders  HEMATOLOGIC: denies blood clots, anemia, blood clotting disorders, blood transfusion  ENDOCRINE: denies autoimmune disease, thyroid issues, or diabetes  ALLERGY: denies asthma, seasonal allergies    Physical Examination:    /66 (BP Location: Right arm, Patient Position: Sitting, Cuff Size: adult)   Pulse 68   Constitutional: appears well hydrated, alert and responsive, no acute distress noted  Extremities: Right knee exam is unchanged. No erythema. No effusion.   Neurological: Unchanged     Imaging:   XR LUMBAR SPINE AP LAT FLEX EXT (CPT=72110)    Result Date: 7/9/2024  PROCEDURE: XR LUMBAR SPINE AP LAT FLEX EXT EM (CPT=72120)  COMPARISON: [facility], XR LUMBAR SPINE (MIN 2 VIEWS) (CPT=72100), 2/14/2023, 10:51 AM.  [facility], XR LUMBAR SPINE (MIN 2 VIEWS) (CPT=72100), 8/02/2022, 9:22 AM.  [facility], XR  LUMBAR SPINE (MIN 2 VIEWS) (CPT=72100), 5/02/2022, 9:20 AM.  INDICATIONS: Chronic bilateral hip pain and low back pain radiating down both legs.  Left sided worse.  History of surgery in 2021.  TECHNIQUE: Lumbar spine radiographs, 4 views (AP, lateral, flexion, and extension views)   FINDINGS:   ALIGNMENT:   Retrolisthesis of L1 on L2 measuring 4 mm. VERTEBRAL BODIES:   No acute fracture.  Prior postsurgical changes of posterior and interbody fusion at L4-5.  The hardware appears intact and well seated.  Prior L3 and L4 laminectomies. DISC SPACES: Moderate narrowing of the native discs. SACROILIAC JOINTS: Moderate degenerative joint disease FLEXION/EXTENSION VIEWS:   Reduced range of motion with no subluxation during flexion or extension. OTHER:   Vascular calcifications.         CONCLUSION:   Prior postsurgical changes of posterior and interbody fusion at L4-5 with no evidence of hardware complication.  Prior L3 and L4  laminectomies.  No acute fracture.  Retrolisthesis of L1 on L2 measuring 4 mm, similar to the prior exam.  Overall moderate spondylosis.    Dictated by (CST): David Currie MD on 7/09/2024 at 1:44 PM     Finalized by (CST): David Currie MD on 7/09/2024 at 1:51 PM          XR HIP W OR WO PELVIS(MIN 5 VIEWS),BILAT(CPT=73523)    Result Date: 6/20/2024  PROCEDURE: XR HIP W OR WO PELVIS (MIN 5 VIEWS), BILAT (CPT=73523)  COMPARISON: [facility], X HIP RT, 6/02/2006, 10:14 AM.  INDICATIONS: Deep bilateral hip pain for 2 years; no known trauma.  TECHNIQUE: 5 views were obtained.   FINDINGS:  BONES: Changes of prior left hip arthroplasty, without radiographic evidence of hardware failure or fracture.  No acute osseous fracture or malalignment.  A well-defined sclerotic lesion within the right femoral neck, unchanged since 2006, most compatible with a benign etiology, likely a bone island.  Mild right hip joint narrowing with sclerosis and osteophyte formation as well as subchondral cyst. Partially imaged lower lumbar spinal fusion hardware, incompletely assessed.  There is partially imaged multilevel degenerative disease of the spine. SOFT TISSUES: Vascular calcifications EFFUSION: None visible. OTHER: Surgical clips over the scrotal region..         CONCLUSION:   Mild-to-moderate right hip osteoarthritis.  Changes of prior left hip arthroplasty, without radiographic evidence of hardware failure.  No acute fracture or dislocation.  Degenerative disease of the spine with partially imaged lower lumbar spinal fusion hardware, incompletely assessed.    Dictated by (CST): Helga Doshi MD on 6/20/2024 at 12:05 PM     Finalized by (CST): Helga Doshi MD on 6/20/2024 at 12:07 PM             Labs:  Lab Results   Component Value Date    WBC 8.7 02/06/2024    HGB 15.5 02/06/2024    .0 02/06/2024      Lab Results   Component Value Date     (H) 05/15/2024    BUN 9 05/15/2024    CREATSERUM 0.90  05/15/2024    GFRNAA 81 02/19/2022    GFRAA 94 02/19/2022        Assessment and Plan:  Diagnoses and all orders for this visit:    Osteoarthritis of right knee, unspecified osteoarthritis type  -     Drain/Inject Large Joint/Bursa  -     hyaluronan (Orthovisc) 30 MG/2ML intra-articular injection 30 mg        Assessment: Right knee osteoarthritis     Plan: Using sterile technique and superior lateral parapatellar approach, the right knee was injected with Orthovisc #4. Patient tolerated procedure well. Advised icing and oral anti-inflammatories as needed for pain. Follow up as needed for knee pain. Patient understands he must wait a minimum of six months before having gel injections in the same knee again.     Follow Up: Return if symptoms worsen or fail to improve.    Gunjan Leija PA-C

## 2024-07-12 ENCOUNTER — OFFICE VISIT (OUTPATIENT)
Dept: PULMONOLOGY | Facility: CLINIC | Age: 79
End: 2024-07-12
Payer: MEDICARE

## 2024-07-12 VITALS
WEIGHT: 193 LBS | BODY MASS INDEX: 26.14 KG/M2 | SYSTOLIC BLOOD PRESSURE: 139 MMHG | DIASTOLIC BLOOD PRESSURE: 67 MMHG | HEIGHT: 72 IN | RESPIRATION RATE: 14 BRPM | OXYGEN SATURATION: 98 % | HEART RATE: 73 BPM

## 2024-07-12 DIAGNOSIS — R05.9 COUGH, UNSPECIFIED TYPE: Primary | ICD-10-CM

## 2024-07-12 PROCEDURE — 99204 OFFICE O/P NEW MOD 45 MIN: CPT | Performed by: INTERNAL MEDICINE

## 2024-07-12 RX ORDER — IPRATROPIUM BROMIDE 21 UG/1
2 SPRAY, METERED NASAL EVERY 12 HOURS
Qty: 1 EACH | Refills: 5 | Status: SHIPPED | OUTPATIENT
Start: 2024-07-12

## 2024-07-12 NOTE — H&P
Referring Physician  Mark Gupta MD    Chief Complaint  Cough, postnasal drip,    History of Present Illness  Patient presents to pulmonary clinic for evaluation of chronic cough with associated postnasal drip.  Admits to increased symptoms during cold weather.  States postnasal drip currently improved.  Intermittently has used Advair along with cetirizine and Flonase with good response although some decreased response noticed recently.  Denies significant dyspnea symptoms.  Denies history of known lung disease.    Review of Systems  Constitutional: denies weight loss, fevers, chills, weakness, fatigue, recent illness  HEENT: denies epistaxis, sore throat, + postnasal drip  Cardio: denies chest pain, chest pressure, palpitations  Respiratory: denies dyspnea, + cough, denies wheezing, hemoptysis   GI: denies nausea, vomiting, abdominal pain  : denies dysuria, hematuria  Musculoskeletal: denies arthralgia, myalgia  Integumentary: denies rash, itching  Neurological: denies syncope, weakness, dizziness,   Psychiatric: denies depression, anxiety  Hematologic: denies bruising    Past Medical History  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    PPVx, Cryo, GFX, SF6    Myopia with astigmatism and presbyopia    Nocturia    Osteoarthritis    Rotator cuff tear, left    repair    Seasonal allergic rhinitis    Stiffness of joint, hand    bilateral, hand stiffness & weakness post trigger release    Trigger finger    Irf-trigger finger, recurrent - LRF trigger finger release    Type II or unspecified type  diabetes mellitus without mention of complication, not stated as uncontrolled    Visual impairment    readers        Surgical History  Past Surgical History:   Procedure Laterality Date    Arthroscopy of joint unlisted  1990    knee    Arthroscopy of joint unlisted Left 2011    rotator cuff repair    Arthroscopy of joint unlisted Left 1992    hip resurfacing    Arthroscopy of joint unlisted Left 12/2008    hip resurfacing    Cataract      Cataract extraction w/  intraocular lens implant Right 09/17/2012    RJM    Cataract extraction w/  intraocular lens implant Left 01/11/2010    RJM    Colonoscopy  04/2015    repeat in 10 years.    Colonoscopy      Eye surgery Right 2013    repair detached retina    Eye surgery Right 02/20/2006    S/ P PPV RD repair (S/P PPVx, cryo, GFX, SF6 OD 4/25/13) Dr. Howe     Forearm/wrist surgery unlisted Left     left wrist surgery    Hand/finger surgery unlisted Left 9/29/2011    LRF trigger finger release    Hc inj epidural steroid lumbar or sacral w img guidance      x2 2019,x2 2017    Hernia surgery      hernia repair, herniorraphy    Laser surgery of eye      Other      bladder surgery x2    Other surgical history      esophageal dilatation    Other surgical history  6/15/2010    release triggers: RMF, RRF, LMF/ RRF Dupuytren's nodule excision    Repair of biceps tendon at elbow Left 1992    Retinal laser procedure      Spine surgery procedure unlisted  02/23/2022    L3-5 laminectomy, left L4-5 transforaminal lumbar interbody fusion;    Total hip replacement Left 2008    Yag capsulotomy - od - right eye Right 4/30/14    RJM       Family History  Family History   Problem Relation Age of Onset    Colon Cancer Mother     Other (pulmonary Embolism) Father     Glaucoma Neg     Diabetes Neg     Macular degeneration Neg         Social History  Tobacco: Denies  Alcohol: Denies significant intake  Illicit Drugs: Denies    Medications  Current Outpatient Medications on File Prior to Visit    Medication Sig Dispense Refill    Omeprazole 40 MG Oral Capsule Delayed Release Take 1 capsule (40 mg total) by mouth daily. 90 capsule 3    NIFEdipine ER 90 MG Oral Tablet 24 Hr Take 1 tablet (90 mg total) by mouth daily. 90 tablet 3    Lovastatin 40 MG Oral Tab Take 1 tablet (40 mg total) by mouth nightly. 100 tablet 2    fluticasone propionate 50 MCG/ACT Nasal Suspension 1 spray by Each Nare route daily as needed. 16 g 2    metFORMIN 500 MG Oral Tab Take 1 tablet (500 mg total) by mouth 2 (two) times daily. 180 tablet 3    lisinopril 5 MG Oral Tab Take 1 tablet (5 mg total) by mouth daily. 90 tablet 3    tadalafil 5 MG Oral Tab Take 1 tablet (5 mg total) by mouth daily as needed for Erectile Dysfunction. 90 tablet 3    Risedronate Sodium 150 MG Oral Tab Take 1 tablet (150 mg total) by mouth every 30 (thirty) days. 3 tablet 3    ADVAIR DISKUS 100-50 MCG/ACT Inhalation Aerosol Powder, Breath Activated Inhale 1 puff into the lungs 2 (two) times daily. 180 each 1    cetirizine 10 MG Oral Tab Take 1 tablet (10 mg total) by mouth daily. 90 tablet 3    diclofenac 1 % External Gel       ketoconazole 2 % External Cream Apply 1 Application topically 2 (two) times daily.      Vitamin B-12 1000 MCG Oral Tab Take 1 tablet (1,000 mcg total) by mouth daily.      Cholecalciferol (VITAMIN D) 1000 UNITS Oral Cap Take by mouth daily.      Multiple Vitamins-Minerals (CENTRUM SILVER) Oral Tab Take 1 tablet by mouth daily.        Cinnamon 500 MG Oral Cap Take 500 mg by mouth daily.       No current facility-administered medications on file prior to visit.       Allergies  Allergies   Allergen Reactions    Penicillins HIVES       Physical Exam  Constitutional: no acute distress  HEENT: PERRL  Neck: supple, no JVD  Cardio: RRR, S1 S2  Respiratory: clear to auscultation bilaterally, no wheezing, rales, rhonchi, crackles  GI: abdomen soft, non tender, active bowel sounds, no organomegaly  Extremities: no clubbing, cyanosis,  edema  Neurologic: no gross motor deficits  Skin: warm, dry  Lymphatic: no supraclavicular lymphadenopathy     Assessment  1.  Upper airway cough syndrome  2.  Possible reactive airway disease    Plan  -Patient presents today for evaluation of occasional postnasal drip and chronic cough.  Symptoms overall fairly well-controlled.  States regimen of Flonase cetirizine and Advair not as effective as before.  Will switch nasal spray to Atrovent.  Continue Advair and cetirizine on as-needed basis.    Danna Ahumada DO  Pulmonary Critical Care Medicine  [facility]  7/12/2024  11:37 AM

## 2024-07-25 ENCOUNTER — APPOINTMENT (OUTPATIENT)
Dept: PHYSICAL THERAPY | Facility: HOSPITAL | Age: 79
End: 2024-07-25
Attending: ORTHOPAEDIC SURGERY
Payer: MEDICARE

## 2024-07-26 ENCOUNTER — TELEPHONE (OUTPATIENT)
Dept: PHYSICAL THERAPY | Facility: HOSPITAL | Age: 79
End: 2024-07-26

## 2024-07-29 ENCOUNTER — OFFICE VISIT (OUTPATIENT)
Dept: PHYSICAL THERAPY | Facility: HOSPITAL | Age: 79
End: 2024-07-29
Attending: ORTHOPAEDIC SURGERY
Payer: MEDICARE

## 2024-07-29 DIAGNOSIS — M48.062 SPINAL STENOSIS OF LUMBAR REGION WITH NEUROGENIC CLAUDICATION: Primary | ICD-10-CM

## 2024-07-29 PROCEDURE — 97110 THERAPEUTIC EXERCISES: CPT

## 2024-07-29 PROCEDURE — 97162 PT EVAL MOD COMPLEX 30 MIN: CPT

## 2024-07-29 NOTE — PROGRESS NOTES
SPINE EVALUATION:     Diagnosis:   Spinal stenosis of lumbar region with neurogenic claudication (M48.062)       Referring Provider: Pablo Gamboa  Date of Evaluation:    7/29/2024    Precautions:   HTN and diabetes Next MD visit:   none scheduled  Date of Surgery: n/a     PATIENT SUMMARY   Linwood Morales is a 79 year old male who presents to therapy today with complaints of low back pain and left>right hip discomfort/weakness. Laminectomy of L2-3 and fusion of L4-5 February 2021 by Dr. Cruz. Pt has been experiencing this symptoms for the last year and a half, but has progressed over the last few months.    Had L hip replaced in 2008. Has been following up with Dr. Barksdale. Has MRI next week.   Pt uses a SPC today, but says he rarely uses it. Will use for community ambulation for longer distances for balance.    Standing and walking >10-15 minutes will increased the burning both hips. Laying on left side also worsens pain in left hip.   Pt reports occasional numbness in his legs depending on his positioning. Not often though.     Pt describes pain level current 0/10, at best 0/10, at worst 6/10.   Current functional limitations include standing and walking >10-15 minutes, laying on left side, cutting the grass, lifting, and stair negotiation.     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 Pt goals include reduce pain.  Past medical history was reviewed with Linwood. 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   Pt denies diplopia, dysarthria, dysphasia, dizziness, drop attacks, bowel/bladder changes, saddle anesthesia, and MARGARITA LE N/T.    ASSESSMENT  Linwood presents to physical therapy evaluation with primary c/o low back pain and hip weakness, L>R. The results of the objective tests and measures show marked limitations in lumbar mobility, LE weakness, balance deficits, and abnormal and antalgic functional mobility and posture.  Functional deficits include but are not limited to standing and walking >10-15 minutes, laying on left side, cutting the grass, lifting, and stair negotiation.  Signs and symptoms are consistent with diagnosis of physician diagnosis with positive LE nerve tension. Pt and PT discussed evaluation findings, pathology, POC and HEP.  Pt voiced understanding and performs HEP correctly without reported pain. Skilled Physical Therapy is medically necessary to address the above impairments and reach functional goals.     OBJECTIVE:   Observation/Posture: rounded posture  Neuro Screen: light touch grossly intact    Thoracolumbar AROM: (* denotes performed with pain)  Flexion: marked limitations  Extension: marked limitations  Sidebending: R mod limitations; L marked limitations  Rotation: R mod limitations; L mod limitations    Accessory motion: marked hypomobility throughout lumbar spine  Palpation: no tenderness around L hip     Strength: (* denotes performed with pain)  LE   Hip flexion (L2): R 4/5; L 4+/5 (pop)  Hip abduction: R 4+/5; L NT/5  Hip Extension: R 3+/5; L 3/5*   Hip ER: R 5/5; L 4/5  Hip IR: R 5/5; L 4/5  Knee Flexion: R 5-/5; L  4+/5   Knee extension (L3): R 5/5; L 5/5   DF (L4): R 5/5; L 5/5  Great Toe Ext (L5): R 5/5, L 5/5  PF (S1): R 5/5; L 5/5       Special tests:   Repeated Motions Testing: NT  Slump: + L>R  Active SLR: R -, L -    Hip tests and SIJ cluster next session    Gait: pt ambulates on level ground with assistive device of SPC, antalgia, decreased hip/knee flex or ext (decreased knee extension, increased hip flexion, shuffle, and stooped posture/forward lean.  Balance: SLS R 3 sec, L <1 sec  TUG: 10.4 sec with SPC; 10.7 sec no AD    Today’s Treatment and Response:   Pt education was provided on exam findings, treatment diagnosis, treatment plan, expectations, and prognosis. Pt was also provided recommendations for activity modifications, possible soreness after evaluation, modalities as needed [ice/heat], and importance of remaining active  Patient was instructed in and issued a HEP for:   - Supine Lower Trunk Rotation  - 2 x daily - 7 x weekly - 10-20 reps  - Supine Piriformis Stretch with Foot on Ground  - 2-3 x daily - 7 x weekly - 3-4 sets - 10 sec hold  - Supine Figure 4 Piriformis Stretch  - 2-3 x daily - 7 x weekly - 3-4 sets - 10 sec hold  - Hooklying Gluteal Sets  - 2 x daily - 7 x weekly - 10 reps - 5 sec hold  - Seated Hamstring Stretch  - 2-3 x daily - 7 x weekly - 3-4 sets - 10 sec hold    Charges: PT Eval Moderate Complexity, 1 TE      Total Timed Treatment: 15 min     Total Treatment Time: 45 min     Based on clinical rationale and outcome measures, this evaluation involved Moderate Complexity decision making due to 3+ personal factors/comorbidities, 3 body structures involved/activity limitations, and evolving symptoms including changing pain levels.  PLAN OF CARE:    Goals: (to be met in 10 visits)   Pt will improve transversus abdominis recruitment to perform proper isometric contraction without requiring verbal or tactile cuing to promote advancement of therex   Pt will demonstrate good understanding of  proper posture and body mechanics to decrease pain and improve spinal safety   Pt will improve lumbar spine AROM flexion to allow increase ease with bending forward to don shoes   Pt will report improved symptom centralization and absence of radicular symptoms for 3 consecutive days to improve function with ADL   Pt will have decreased paraspinal mm tension to tolerate standing >15 minutes for work and home activities   Pt will demonstrate improved core strength to be able to perform walking with <5/10 pain   Pt will be independent and compliant with comprehensive HEP to maintain progress achieved in PT      Frequency / Duration: Patient will be seen for 1-2 x/week or a total of 10 visits over a 90 day period. Treatment will include: Manual Therapy, Neuromuscular Re-education, Therapeutic Exercise, and Home Exercise Program instruction    Education or treatment limitation: None  Rehab Potential:good    Oswestry Disability Index Score  Score: 38 % (7/25/2024  9:09 PM)      Patient/Family/Caregiver was advised of these findings, precautions, and treatment options and has agreed to actively participate in planning and for this course of care.    Thank you for your referral. Please co-sign or sign and return this letter via fax as soon as possible to [phone]. If you have any questions, please contact me at Dept: [phone]    Sincerely,  Electronically signed by therapist: Lloyd Fox, PT    Physician's certification required: Yes  I certify the need for these services furnished under this plan of treatment and while under my care.    X___________________________________________________ Date____________________    Certification From: 7/29/2024  To:10/27/2024

## 2024-07-31 ENCOUNTER — OFFICE VISIT (OUTPATIENT)
Dept: PHYSICAL THERAPY | Facility: HOSPITAL | Age: 79
End: 2024-07-31
Attending: ORTHOPAEDIC SURGERY
Payer: MEDICARE

## 2024-07-31 PROCEDURE — 97110 THERAPEUTIC EXERCISES: CPT

## 2024-07-31 PROCEDURE — 97112 NEUROMUSCULAR REEDUCATION: CPT

## 2024-07-31 NOTE — PROGRESS NOTES
Diagnosis:   Spinal stenosis of lumbar region with neurogenic claudication (M48.062)       Referring Provider: Pablo Gamboa  Date of Evaluation:    7/29/2024    Precautions:   HTN and diabetes Next MD visit:   none scheduled  Date of Surgery: n/a     Insurance Primary/Secondary: [employer] [facility] / N/A     # Auth Visits: n/a            Subjective: Pt says the hip is the hip, and the back is okay. Did the exercises.  Pain: 0/10 low back sitting at rest    Objective:     Crepitus and creaking noted with left hip movements with pain     Hip Pathology:  Hip Scour: R -; L +  CONRAD: R -; L +    Labral Lesion:  FADDIR: R -; L -    SI Cluster:  SI Distraction Provocation: -  SI Compression Provocation: -  Thigh Thrust: - bilat      IE:  Thoracolumbar AROM: (* denotes performed with pain)  Flexion: marked limitations  Extension: marked limitations  Sidebending: R mod limitations; L marked limitations  Rotation: R mod limitations; L mod limitations    Accessory motion: marked hypomobility throughout lumbar spine  Palpation: no tenderness around L hip     Strength: (* denotes performed with pain)  LE   Hip flexion (L2): R 4/5; L 4+/5 (pop)  Hip abduction: R 4+/5; L NT/5  Hip Extension: R 3+/5; L 3/5*   Hip ER: R 5/5; L 4/5  Hip IR: R 5/5; L 4/5  Knee Flexion: R 5-/5; L 4+/5   Knee extension (L3): R 5/5; L 5/5   DF (L4): R 5/5; L 5/5  Great Toe Ext (L5): R 5/5, L 5/5  PF (S1): R 5/5; L 5/5     Special tests:   Repeated Motions Testing: NT  Slump: + L>R  Active SLR: R -, L -    Gait: pt ambulates on level ground with assistive device of SPC, antalgia, decreased hip/knee flex or ext (decreased knee extension, increased hip flexion, shuffle, and stooped posture/forward lean.  Balance: SLS R 3 sec, L <1 sec  TUG: 10.4 sec with SPC; 10.7 sec no AD    Assessment: Pt with ongoing pain in the left hip and reports burning while standing and walking. Symptoms are not consistent with lumbar radiculitis- Tracie and CONRAD were positive and  painful on the left and there is audible creaking with left hip movement (flexion, extension, ER and IR) Laying hook lying was even painful in the left hip and he was only able to stay in that position for 2-3 minutes. Hip isometrics were slightly painful and very fatiguing. Pt will benefit from further evaluation of the left hip.       Goals:   Pt will improve transversus abdominis recruitment to perform proper isometric contraction without requiring verbal or tactile cuing to promote advancement of therex   Pt will demonstrate good understanding of proper posture and body mechanics to decrease pain and improve spinal safety   Pt will improve lumbar spine AROM flexion to allow increase ease with bending forward to don shoes   Pt will report improved symptom centralization and absence of radicular symptoms for 3 consecutive days to improve function with ADL   Pt will have decreased paraspinal mm tension to tolerate standing >15 minutes for work and home activities   Pt will demonstrate improved core strength to be able to perform walking with <5/10 pain   Pt will be independent and compliant with comprehensive HEP to maintain progress achieved in PT      Plan: Patient will be seen for 1-2 x/week or a total of 10 visits over a 90 day period. Treatment will include: Manual Therapy, Neuromuscular Re-education, Therapeutic Exercise, and Home Exercise Program instruction  Date: 7/31/2024  TX#: 2/10 Date:                 TX#: 3/ Date:                 TX#: 4/ Date:                 TX#: 5/ Date:   Tx#: 6/   TE 35'  Special tests- hip and SIJ  SB rolls fwd only x20   90/90 nerve glide L (HELD- PAIN IN THE LEFT HIP)  Seated HS stretch 5x10\" bilat  Slantboard stretching x2'  Shuttle DLP 50# x10; 55# x10  Seated slump nerve glide  HEP update    NR x10'  Hip add iso 5\" hold 2x10 (hooklying)  Hip abd iso 5\" hold 2x10 (hooklying)   Seated TA iso with pilates ring x10  Seated oblique iso with pilates ring x10 bilat       HEP:   -  Supine Lower Trunk Rotation  - 2 x daily - 7 x weekly - 10-20 reps  - Supine Piriformis Stretch with Foot on Ground  - 2-3 x daily - 7 x weekly - 3-4 sets - 10 sec hold  - Supine Figure 4 Piriformis Stretch  - 2-3 x daily - 7 x weekly - 3-4 sets - 10 sec hold  - Hooklying Gluteal Sets  - 2 x daily - 7 x weekly - 10 reps - 5 sec hold  - Seated Hamstring Stretch  - 2-3 x daily - 7 x weekly - 3-4 sets - 10 sec hold  - Seated Hip Adduction Isometrics with Ball  - 1 x daily - 3-5 x weekly - 1-2 sets - 10 reps - 5 sec hold  - Seated Isometric Hip Abduction with Belt  - 1 x daily - 3-5 x weekly - 1-2 sets - 10 reps - 5 sec hold  - Standing Bilateral Gastroc Stretch with Step  - 2 x daily - 7 x weekly - 10 reps - 10 sec hold  - Seated Transversus Abdominis Bracing with PLB  - 1 x daily - 7 x weekly - 10 reps    Charges: 2 TE 1 NR       Total Timed Treatment: 45 min  Total Treatment Time: 45 min

## 2024-08-01 ENCOUNTER — APPOINTMENT (OUTPATIENT)
Dept: URBAN - METROPOLITAN AREA CLINIC 244 | Age: 79
Setting detail: DERMATOLOGY
End: 2024-08-01

## 2024-08-01 PROBLEM — C44.612 BASAL CELL CARCINOMA OF SKIN OF RIGHT UPPER LIMB, INCLUDING SHOULDER: Status: ACTIVE | Noted: 2024-08-01

## 2024-08-01 PROCEDURE — OTHER ADDITIONAL NOTES: OTHER

## 2024-08-01 PROCEDURE — OTHER CURETTAGE AND DESTRUCTION: OTHER

## 2024-08-01 PROCEDURE — 17262 DSTRJ MAL LES T/A/L 1.1-2.0: CPT

## 2024-08-01 NOTE — PROCEDURE: ADDITIONAL NOTES
Render Risk Assessment In Note?: no
Detail Level: Simple
Additional Notes: Recommend Hydrocolloid dressing and  Vinegar soaks Mix 2 cups of warm water with one tablespoon of white vinegar. 2. Soak washcloth or gauze in the diluted white vinegar water so that it is wet, but not dripping, and apply to the wound for 5 to 10 minutes, once to twice a day, for 7 to 10 days.

## 2024-08-01 NOTE — PROCEDURE: CURETTAGE AND DESTRUCTION
Size Of Lesion After Curettage: 1.6
Add Intralesional Injection: No
Detail Level: Detailed
Bill As A Line Item Or As Units: Line Item
Final Size Statement: The size of the lesion after curettage was
Anesthesia Volume In Cc: 2
Biopsy Photograph Reviewed: Yes
What Was Performed First?: Curettage
Consent was obtained from the patient. The risks, benefits and alternatives to therapy were discussed in detail. Specifically, the risks of infection, scarring, bleeding, prolonged wound healing, nerve injury, incomplete removal, allergy to anesthesia and recurrence were addressed. Alternatives to ED&C, such as: surgical removal, topical tx, and XRT were also discussed. Risks/benefits of procedures discussed in detail and patient elected for ED&C. \\n\\nPrior to the procedure, the treatment site was clearly identified and confirmed by the patient. \\n\\nPatient aware that ED&C on lower extremities poses a particular risk for a prolonged, poor healing wound that can become infected and require antibiotics for treatment.
Number Of Curettages: 3
Cautery Type: electrodesiccation
Total Volume (Ccs): 1
Anesthesia Type: 0.5% lidocaine with 1:200,000 epinephrine and a 1:10 solution of 8.4% sodium bicarbonate
Additional Information: (Optional): The wound was cleaned, and a pressure dressing was applied.  The patient received detailed post-op instructions.
Post-Care Instructions: I reviewed with the patient in detail post-care instructions. Patient is to keep the area dry for 48 hours, and not to engage in any swimming until the area is healed. If patient does have water exposure, recommend waterproof (hydrocolloid dressing) application. Should the patient develop any fevers, chills, bleeding, severe pain, then the patient will contact the office immediately. \\n\\nDilute vinegar soaks can be useful to help prevent infection (1 tbs white distilled vinegar in 5-8 fluid oz of distilled water). \\n\\n** The patient was explicitely instructed to NOT apply any other topicals to area other than plain vaseline with a clean q-tip **. No neosporin or topical Ab unless prescribed/recommended by the Dr is to be used.\\n\\nPatient aware to have area re-evaluated if it is not healing.
Concentration (Mg/Ml Or Millions Of Plaque Forming Units/Cc): 0.01

## 2024-08-02 NOTE — PROGRESS NOTES
Diagnosis:   Spinal stenosis of lumbar region with neurogenic claudication (M48.062)       Referring Provider: Pablo Gamboa  Date of Evaluation:    7/29/2024    Precautions:   HTN and diabetes Next MD visit:   none scheduled  Date of Surgery: n/a     Insurance Primary/Secondary: [employer]ET[employer] [facility] / N/A     # Auth Visits: n/a            Subjective: Pt says the exercises from last time actually helped quite a bit.   Pain: 0/10 low back sitting at rest    Objective: see rx flowsheet    Crepitus and creaking noted with left hip movements with pain     Hip Pathology:  Hip Scour: R -; L +  CONRAD: R -; L +    IE:  Thoracolumbar AROM: (* denotes performed with pain)  Flexion: marked limitations  Extension: marked limitations  Sidebending: R mod limitations; L marked limitations  Rotation: R mod limitations; L mod limitations    Accessory motion: marked hypomobility throughout lumbar spine  Palpation: no tenderness around L hip     Strength: (* denotes performed with pain)  LE   Hip flexion (L2): R 4/5; L 4+/5 (pop)  Hip abduction: R 4+/5; L NT/5  Hip Extension: R 3+/5; L 3/5*   Hip ER: R 5/5; L 4/5  Hip IR: R 5/5; L 4/5  Knee Flexion: R 5-/5; L 4+/5   Knee extension (L3): R 5/5; L 5/5   DF (L4): R 5/5; L 5/5  Great Toe Ext (L5): R 5/5, L 5/5  PF (S1): R 5/5; L 5/5     Special tests:   Repeated Motions Testing: NT  Slump: + L>R  Active SLR: R -, L -    Gait: pt ambulates on level ground with assistive device of SPC, antalgia, decreased hip/knee flex or ext (decreased knee extension, increased hip flexion, shuffle, and stooped posture/forward lean.  Balance: SLS R 3 sec, L <1 sec  TUG: 10.4 sec with SPC; 10.7 sec no AD      Assessment: Discussed conversations I had with Dr. Gamboa and Dr. Barksdale regarding the symptoms in his left hip. Pt wants to remain in therapy, as he feels the exercises thus far have helped, but he would like to wait until the MRI of his spine is completed (Aug 8). He has an appointment 8/20 with   IRWIN'Julius to address the left hip symptoms.       Goals:   Pt will improve transversus abdominis recruitment to perform proper isometric contraction without requiring verbal or tactile cuing to promote advancement of therex   Pt will demonstrate good understanding of proper posture and body mechanics to decrease pain and improve spinal safety   Pt will improve lumbar spine AROM flexion to allow increase ease with bending forward to don shoes   Pt will report improved symptom centralization and absence of radicular symptoms for 3 consecutive days to improve function with ADL   Pt will have decreased paraspinal mm tension to tolerate standing >15 minutes for work and home activities   Pt will demonstrate improved core strength to be able to perform walking with <5/10 pain   Pt will be independent and compliant with comprehensive HEP to maintain progress achieved in PT      Plan: Patient will be seen for 1-2 x/week or a total of 10 visits over a 90 day period. Treatment will include: Manual Therapy, Neuromuscular Re-education, Therapeutic Exercise, and Home Exercise Program instruction  Date: 7/31/2024  TX#: 2/10 Date: 8/6/24                TX#: 3/10 Date:                 TX#: 4/ Date:                 TX#: 5/ Date:   Tx#: 6/   TE 35'  Special tests- hip and SIJ  SB rolls fwd only x20   90/90 nerve glide L (HELD- PAIN IN THE LEFT HIP)  Seated HS stretch 5x10\" bilat  Slantboard stretching x2'  Shuttle DLP 50# x10; 55# x10  Seated slump nerve glide  HEP update    NR x10'  Hip add iso 5\" hold 2x10 (hooklying)  Hip abd iso 5\" hold 2x10 (hooklying)   Seated TA iso with pilates ring x10  Seated oblique iso with pilates ring x10 bilat TE 33'  SB rolls fwd x10; lat x10 B (pain to the left)   Hip flexor to HS stretching on step x20 bilat   Slantboard stretching 10x10\"   Shuttle DLP 55# x20; 25# x10 bilat  Discussing POC    MT x10'  Contract relax HS bilat x2 rounds bilat  Contract relax Left hip flexors in right SL       HEP:   -  Supine Lower Trunk Rotation  - 2 x daily - 7 x weekly - 10-20 reps  - Supine Piriformis Stretch with Foot on Ground  - 2-3 x daily - 7 x weekly - 3-4 sets - 10 sec hold  - Supine Figure 4 Piriformis Stretch  - 2-3 x daily - 7 x weekly - 3-4 sets - 10 sec hold  - Hooklying Gluteal Sets  - 2 x daily - 7 x weekly - 10 reps - 5 sec hold  - Seated Hamstring Stretch  - 2-3 x daily - 7 x weekly - 3-4 sets - 10 sec hold  - Seated Hip Adduction Isometrics with Ball  - 1 x daily - 3-5 x weekly - 1-2 sets - 10 reps - 5 sec hold  - Seated Isometric Hip Abduction with Belt  - 1 x daily - 3-5 x weekly - 1-2 sets - 10 reps - 5 sec hold  - Standing Bilateral Gastroc Stretch with Step  - 2 x daily - 7 x weekly - 10 reps - 10 sec hold  - Seated Transversus Abdominis Bracing with PLB  - 1 x daily - 7 x weekly - 10 reps    Charges: 2 TE 1 MT       Total Timed Treatment: 43 min  Total Treatment Time: 43 min

## 2024-08-06 ENCOUNTER — OFFICE VISIT (OUTPATIENT)
Dept: PHYSICAL THERAPY | Facility: HOSPITAL | Age: 79
End: 2024-08-06
Attending: ORTHOPAEDIC SURGERY
Payer: MEDICARE

## 2024-08-06 ENCOUNTER — OFFICE VISIT (OUTPATIENT)
Facility: CLINIC | Age: 79
End: 2024-08-06
Payer: MEDICARE

## 2024-08-06 VITALS
DIASTOLIC BLOOD PRESSURE: 60 MMHG | HEART RATE: 77 BPM | SYSTOLIC BLOOD PRESSURE: 138 MMHG | HEIGHT: 72 IN | OXYGEN SATURATION: 97 % | BODY MASS INDEX: 25.47 KG/M2 | WEIGHT: 188 LBS

## 2024-08-06 DIAGNOSIS — I10 ESSENTIAL HYPERTENSION: ICD-10-CM

## 2024-08-06 DIAGNOSIS — E78.2 MIXED HYPERLIPIDEMIA: ICD-10-CM

## 2024-08-06 DIAGNOSIS — E11.9 DIABETES MELLITUS TYPE 2 WITHOUT RETINOPATHY (HCC): Primary | ICD-10-CM

## 2024-08-06 DIAGNOSIS — I35.0 NONRHEUMATIC AORTIC VALVE STENOSIS: ICD-10-CM

## 2024-08-06 PROCEDURE — G2211 COMPLEX E/M VISIT ADD ON: HCPCS | Performed by: INTERNAL MEDICINE

## 2024-08-06 PROCEDURE — 97140 MANUAL THERAPY 1/> REGIONS: CPT

## 2024-08-06 PROCEDURE — 99214 OFFICE O/P EST MOD 30 MIN: CPT | Performed by: INTERNAL MEDICINE

## 2024-08-06 PROCEDURE — 97110 THERAPEUTIC EXERCISES: CPT

## 2024-08-06 NOTE — PROGRESS NOTES
Linwood Morales is a 79 year old male.  Chief Complaint   Patient presents with    Follow - Up     HPI:   79-year-old gentleman here for follow-up.  He has been following up closely with orthopedics for his hip pain.  He received injections for his knee pain.  Denies any hypoglycemic events.  Denies any chest pain or shortness of breath.  Denies any abdominal pain nausea vomiting.      Current Outpatient Medications   Medication Sig Dispense Refill    ipratropium 0.03 % Nasal Solution 2 sprays by Nasal route every 12 (twelve) hours. 1 each 5    Omeprazole 40 MG Oral Capsule Delayed Release Take 1 capsule (40 mg total) by mouth daily. 90 capsule 3    NIFEdipine ER 90 MG Oral Tablet 24 Hr Take 1 tablet (90 mg total) by mouth daily. 90 tablet 3    Lovastatin 40 MG Oral Tab Take 1 tablet (40 mg total) by mouth nightly. 100 tablet 2    metFORMIN 500 MG Oral Tab Take 1 tablet (500 mg total) by mouth 2 (two) times daily. 180 tablet 3    lisinopril 5 MG Oral Tab Take 1 tablet (5 mg total) by mouth daily. 90 tablet 3    tadalafil 5 MG Oral Tab Take 1 tablet (5 mg total) by mouth daily as needed for Erectile Dysfunction. 90 tablet 3    Risedronate Sodium 150 MG Oral Tab Take 1 tablet (150 mg total) by mouth every 30 (thirty) days. 3 tablet 3    ADVAIR DISKUS 100-50 MCG/ACT Inhalation Aerosol Powder, Breath Activated Inhale 1 puff into the lungs 2 (two) times daily. 180 each 1    cetirizine 10 MG Oral Tab Take 1 tablet (10 mg total) by mouth daily. 90 tablet 3    diclofenac 1 % External Gel       ketoconazole 2 % External Cream Apply 1 Application topically 2 (two) times daily.      Vitamin B-12 1000 MCG Oral Tab Take 1 tablet (1,000 mcg total) by mouth daily.      Cholecalciferol (VITAMIN D) 1000 UNITS Oral Cap Take by mouth daily.      Multiple Vitamins-Minerals (CENTRUM SILVER) Oral Tab Take 1 tablet by mouth daily.        Cinnamon 500 MG Oral Cap Take 500 mg by mouth daily.      fluticasone propionate 50 MCG/ACT Nasal  Suspension 1 spray by Each Nare route daily as needed. 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    PPVx, Cryo, GFX, SF6    Myopia with astigmatism and presbyopia    Nocturia    Osteoarthritis    Rotator cuff tear, left    repair    Seasonal allergic rhinitis    Stiffness of joint, hand    bilateral, hand stiffness & weakness post trigger release    Trigger finger    Irf-trigger finger, recurrent - LRF trigger finger release    Type II or unspecified type diabetes mellitus without mention of complication, not stated as uncontrolled    Visual impairment    readers      Past Surgical History:   Procedure Laterality Date    Arthroscopy of joint unlisted  1990    knee    Arthroscopy of joint unlisted Left 2011    rotator cuff repair    Arthroscopy of joint unlisted Left 1992    hip resurfacing    Arthroscopy of joint unlisted Left 12/2008    hip resurfacing    Cataract      Cataract extraction w/  intraocular lens implant Right 09/17/2012    [facility]    Cataract extraction w/  intraocular lens implant Left 01/11/2010    [facility]    Colonoscopy  04/2015    repeat in 10 years.    Colonoscopy      Eye surgery Right 2013    repair detached retina    Eye surgery Right 02/20/2006    S/ P PPV RD repair (S/P PPVx, cryo, GFX, SF6 OD 4/25/13) Dr. Howe     Forearm/wrist surgery unlisted Left     left wrist surgery    Hand/finger surgery unlisted Left 9/29/2011    LRF trigger finger release    Hc inj epidural steroid lumbar or sacral w img guidance      x2 2019,x2 2017     Hernia surgery      hernia repair, herniorraphy    Laser surgery of eye      Other      bladder surgery x2    Other surgical history      esophageal dilatation    Other surgical history  6/15/2010    release triggers: RMF, RRF, LMF/ RRF Dupuytren's nodule excision    Repair of biceps tendon at elbow Left 1992    Retinal laser procedure      Spine surgery procedure unlisted  02/23/2022    L3-5 laminectomy, left L4-5 transforaminal lumbar interbody fusion;    Total hip replacement Left 2008    Yag capsulotomy - od - right eye Right 4/30/14    [facility]      Social History:  Social History     Socioeconomic History    Marital status:    Tobacco Use    Smoking status: Never    Smokeless tobacco: Never    Tobacco comments:     none   Vaping Use    Vaping status: Never Used   Substance and Sexual Activity    Alcohol use: Yes     Alcohol/week: 3.0 standard drinks of alcohol     Types: 2 Cans of beer, 1 Standard drinks or equivalent per week     Comment: 2-3 times per week    Drug use: No   Other Topics Concern    Caffeine Concern Yes     Comment: soda, 8 oz daily    Exercise No    Pt has a pacemaker No    Reaction to local anesthetic No   Social History Narrative    The patient does  use an assistive device..  Cane 50% of time for knee pain.     The patient does live in a home with stairs.      Family History   Problem Relation Age of Onset    Colon Cancer Mother     Other (pulmonary Embolism) Father     Glaucoma Neg     Diabetes Neg     Macular degeneration Neg       Allergies   Allergen Reactions    Penicillins HIVES        REVIEW OF SYSTEMS:   Review of Systems   Review of Systems   Constitutional: Negative for activity change, appetite change and fever.   HENT: Negative for congestion and voice change.    Respiratory: Negative for cough and shortness of breath.    Cardiovascular: Negative for chest pain.   Gastrointestinal: Negative for abdominal distention, abdominal pain and vomiting.   Genitourinary: Negative for  hematuria.   Skin: Negative for wound.   Psychiatric/Behavioral: Negative for behavioral problems.   Wt Readings from Last 5 Encounters:   08/06/24 188 lb (85.3 kg)   07/12/24 193 lb (87.5 kg)   07/09/24 193 lb (87.5 kg)   06/20/24 193 lb (87.5 kg)   05/14/24 192 lb (87.1 kg)     Body mass index is 25.5 kg/m².      EXAM:   /60   Pulse 77   Ht 6' (1.829 m)   Wt 188 lb (85.3 kg)   SpO2 97%   BMI 25.50 kg/m²   Physical Exam   Constitutional:       Appearance: Normal appearance.   HENT:      Head: Normocephalic.   Eyes:      Conjunctiva/sclera: Conjunctivae normal.   Cardiovascular:      Rate and Rhythm: Normal rate and regular rhythm.      Heart sounds: Normal heart sounds.  Systolic murmur heard.  Pulmonary:      Effort: Pulmonary effort is normal.      Breath sounds: Normal breath sounds. No rhonchi or rales.   Abdominal:      General: Bowel sounds are normal.      Palpations: Abdomen is soft.      Tenderness: There is no abdominal tenderness.   Musculoskeletal:      Cervical back: Neck supple.      Right lower leg: No edema.      Left lower leg: No edema.   Skin:     General: Skin is warm and dry.   Neurological:      General: No focal deficit present.      Mental Status: He is alert and oriented to person, place, and time. Mental status is at baseline.   Psychiatric:         Mood and Affect: Mood normal.         Behavior: Behavior normal.       ASSESSMENT AND PLAN:   1. Diabetes mellitus type 2 without retinopathy (HCC)  Lab Results   Component Value Date    A1C 6.0 (H) 05/15/2024    LDL 86 02/06/2024    MICROALBCREA 19.0 02/06/2024      Up-to-date on foot exam.  Continue statins.  - Ophthalmology Referral - External    2. Essential hypertension  Lab Results   Component Value Date    CREATSERUM 0.90 05/15/2024    TSH 1.125 02/06/2024     Will continue to monitor blood pressure.  Encouraged patient to avoid salt.  Continue current medical regimen.      3. Mixed hyperlipidemia  Lab Results   Component  Value Date    LDL 86 02/06/2024    AST 21 05/15/2024    ALT 28 05/15/2024      Encouraged patient to eat healthy.  Avoid fat fried foods and increase activity as tolerated.  We will monitor lipid profile and liver function test.      4. Nonrheumatic aortic valve stenosis  Continue the surveillance echocardiogram  - CARD ECHO 2D DOPPLER (CPT=93306); Future    Plan: Echocardiogram ordered ophthalmology referral given I will see him back in 6 months.  Meanwhile, if there is any concerns, he will contact me.      The patient indicates understanding of these issues and agrees to the plan.  No follow-ups on file.    This note was prepared using Dragon Medical voice recognition dictation software. As a result errors may occur. When identified these errors have been corrected. While every attempt is made to correct errors during dictation discrepancies may still exist.

## 2024-08-07 ENCOUNTER — MED REC SCAN ONLY (OUTPATIENT)
Dept: INTERNAL MEDICINE CLINIC | Facility: CLINIC | Age: 79
End: 2024-08-07

## 2024-08-08 ENCOUNTER — HOSPITAL ENCOUNTER (OUTPATIENT)
Dept: MRI IMAGING | Age: 79
Discharge: HOME | End: 2024-08-08
Attending: PHYSICAL MEDICINE & REHABILITATION
Payer: MEDICARE

## 2024-08-08 DIAGNOSIS — M96.1 LUMBAR POST-LAMINECTOMY SYNDROME: ICD-10-CM

## 2024-08-08 PROCEDURE — 72148 MRI LUMBAR SPINE W/O DYE: CPT | Performed by: PHYSICAL MEDICINE & REHABILITATION

## 2024-08-09 ENCOUNTER — APPOINTMENT (OUTPATIENT)
Dept: PHYSICAL THERAPY | Facility: HOSPITAL | Age: 79
End: 2024-08-09
Attending: ORTHOPAEDIC SURGERY
Payer: MEDICARE

## 2024-08-13 ENCOUNTER — APPOINTMENT (OUTPATIENT)
Dept: PHYSICAL THERAPY | Facility: HOSPITAL | Age: 79
End: 2024-08-13
Attending: INTERNAL MEDICINE
Payer: MEDICARE

## 2024-08-19 ENCOUNTER — APPOINTMENT (OUTPATIENT)
Dept: PHYSICAL THERAPY | Facility: HOSPITAL | Age: 79
End: 2024-08-19
Attending: INTERNAL MEDICINE
Payer: MEDICARE

## 2024-08-21 ENCOUNTER — OFFICE VISIT (OUTPATIENT)
Dept: ORTHOPEDICS CLINIC | Facility: CLINIC | Age: 79
End: 2024-08-21
Payer: MEDICARE

## 2024-08-21 DIAGNOSIS — M25.552 PAIN OF LEFT HIP: Primary | ICD-10-CM

## 2024-08-21 PROCEDURE — 99213 OFFICE O/P EST LOW 20 MIN: CPT | Performed by: ORTHOPAEDIC SURGERY

## 2024-08-21 NOTE — PROGRESS NOTES
NURSING INTAKE COMMENTS:   Chief Complaint   Patient presents with    Follow - Up     Follow up on left hip pain. Patient denies any pain at this time. He states that he has pain when walking.        HPI: This 79 year old male presents today with complaints of left hip and lower back pain follow-up.  He completed some physical therapy and notes some improvement with respect to his back and his hip pain.  He continues to have some mild pain in the left groin and thigh.  He also has posterior lateral hip pain as well.  His hip pain is fairly mild at this time.  He has a vacation scheduled next week.  He will be returning in a few weeks.    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    PPVx, Cryo, GFX, SF6    Myopia with astigmatism and presbyopia    Nocturia    Osteoarthritis    Rotator cuff tear, left    repair    Seasonal allergic rhinitis    Stiffness of joint, hand    bilateral, hand stiffness & weakness post trigger release    Trigger finger    Irf-trigger finger, recurrent - LRF trigger finger release    Type II or unspecified type diabetes mellitus without mention of complication, not stated as uncontrolled    Visual impairment    readers     Past Surgical History:   Procedure Laterality Date    Arthroscopy of joint unlisted  1990    knee    Arthroscopy of joint unlisted Left 2011    rotator cuff repair    Arthroscopy of joint unlisted Left 1992    hip resurfacing    Arthroscopy of joint unlisted Left 12/2008    hip resurfacing    Cataract      Cataract extraction w/  intraocular lens implant Right  09/17/2012    [facility]    Cataract extraction w/  intraocular lens implant Left 01/11/2010    [facility]    Colonoscopy  04/2015    repeat in 10 years.    Colonoscopy      Eye surgery Right 2013    repair detached retina    Eye surgery Right 02/20/2006    S/ P PPV RD repair (S/P PPVx, cryo, GFX, SF6 OD 4/25/13) Dr. Howe     Forearm/wrist surgery unlisted Left     left wrist surgery    Hand/finger surgery unlisted Left 9/29/2011    LRF trigger finger release    Hc inj epidural steroid lumbar or sacral w img guidance      x2 2019,x2 2017    Hernia surgery      hernia repair, herniorraphy    Laser surgery of eye      Other      bladder surgery x2    Other surgical history      esophageal dilatation    Other surgical history  6/15/2010    release triggers: RMF, RRF, LMF/ RRF Dupuytren's nodule excision    Repair of biceps tendon at elbow Left 1992    Retinal laser procedure      Spine surgery procedure unlisted  02/23/2022    L3-5 laminectomy, left L4-5 transforaminal lumbar interbody fusion;    Total hip replacement Left 2008    Yag capsulotomy - od - right eye Right 4/30/14    [facility]     Current Outpatient Medications   Medication Sig Dispense Refill    ipratropium 0.03 % Nasal Solution 2 sprays by Nasal route every 12 (twelve) hours. 1 each 5    Omeprazole 40 MG Oral Capsule Delayed Release Take 1 capsule (40 mg total) by mouth daily. 90 capsule 3    NIFEdipine ER 90 MG Oral Tablet 24 Hr Take 1 tablet (90 mg total) by mouth daily. 90 tablet 3    Lovastatin 40 MG Oral Tab Take 1 tablet (40 mg total) by mouth nightly. 100 tablet 2    fluticasone propionate 50 MCG/ACT Nasal Suspension 1 spray by Each Nare route daily as needed. 16 g 2    metFORMIN 500 MG Oral Tab Take 1 tablet (500 mg total) by mouth 2 (two) times daily. 180 tablet 3    lisinopril 5 MG Oral Tab Take 1 tablet (5 mg total) by mouth daily. 90 tablet 3    tadalafil 5 MG Oral Tab Take 1 tablet (5 mg total) by mouth daily as needed for Erectile Dysfunction. 90 tablet 3     Risedronate Sodium 150 MG Oral Tab Take 1 tablet (150 mg total) by mouth every 30 (thirty) days. 3 tablet 3    ADVAIR DISKUS 100-50 MCG/ACT Inhalation Aerosol Powder, Breath Activated Inhale 1 puff into the lungs 2 (two) times daily. 180 each 1    cetirizine 10 MG Oral Tab Take 1 tablet (10 mg total) by mouth daily. 90 tablet 3    diclofenac 1 % External Gel       ketoconazole 2 % External Cream Apply 1 Application topically 2 (two) times daily.      Vitamin B-12 1000 MCG Oral Tab Take 1 tablet (1,000 mcg total) by mouth daily.      Cholecalciferol (VITAMIN D) 1000 UNITS Oral Cap Take by mouth daily.      Multiple Vitamins-Minerals (CENTRUM SILVER) Oral Tab Take 1 tablet by mouth daily.        Cinnamon 500 MG Oral Cap Take 500 mg by mouth daily.       Allergies   Allergen Reactions    Penicillins HIVES     Family History   Problem Relation Age of Onset    Colon Cancer Mother     Other (pulmonary Embolism) Father     Glaucoma Neg     Diabetes Neg     Macular degeneration Neg        Social History     Occupational History    Not on file   Tobacco Use    Smoking status: Never    Smokeless tobacco: Never    Tobacco comments:     none   Vaping Use    Vaping status: Never Used   Substance and Sexual Activity    Alcohol use: Yes     Alcohol/week: 3.0 standard drinks of alcohol     Types: 2 Cans of beer, 1 Standard drinks or equivalent per week     Comment: 2-3 times per week    Drug use: No    Sexual activity: Not on file        Review of Systems:  GENERAL: denies fevers, chills, night sweats, fatigue, unintentional weight loss/gain  SKIN: denies skin lesions, open sores, rash  HEENT:denies recent vision change, new nasal congestion,hearing loss, tinnitus, sore throat, headaches  RESPIRATORY: denies new shortness of breath, cough, asthma, wheezing  CARDIOVASCULAR: denies chest pain, leg cramps with exertion, palpitations, leg swelling  GI: denies abdominal pain, nausea, vomiting, diarrhea, constipation, hematochezia,  worsening heartburn or stomach ulcers  : denies dysuria, hematuria, incontinence, increased frequency, urgency, difficulty urinating  MUSCULOSKELETAL: denies musculoskeletal complaints other than in HPI  NEURO: denies numbness, tingling, weakness, balance issues, dizziness, memory loss  PSYCHIATRIC: denies Hx of depression, anxiety, other psychiatric disorders  HEMATOLOGIC: denies blood clots, anemia, blood clotting disorders, blood transfusion  ENDOCRINE: denies autoimmune disease, thyroid issues, or diabetes  ALLERGY: denies asthma, seasonal allergies    Physical Examination:    There were no vitals taken for this visit.  Constitutional: appears well hydrated, alert and responsive, no acute distress noted  Extremities: Left hip examination unchanged.  Mild pain with passive flexion internal rotation.  No palpable crepitus.  Lumbar spine diffusely tender to palpation.  Neurological: Unchanged    Imaging:   MRI SPINE LUMBAR (CPT=72148)    Result Date: 8/8/2024  PROCEDURE: MRI SPINE LUMBAR (CPT=72148)  COMPARISON: [facility], XR LUMBAR SPINE AP LAT FLEX EXT (CPT=72110), 7/09/2024, 9:25 AM.  [facility], MRI SPINE LUMBAR (CPT=72148), 5/23/2023, 12:01 PM.  INDICATIONS: M96.1 Lumbar post-laminectomy syndrome  TECHNIQUE: A variety of imaging planes and parameters were utilized for visualization of suspected pathology.  Counting Reference: Lumbosacral junction.  For the purposes of this exam, it will be assumed that there are 5 lumbar-type vertebral bodies. L4-L5 is at the level of the iliac crest.  FINDINGS:  PARASPINAL AREA: Normal with no visible mass.  BONES: There are hypertrophic changes of the lower lumbar facet joints. Small anterior endplate osteophytes seen within the lumbar vertebral bodies. There is no acute fracture, dislocation or marrow replacing lesion.  Postoperative changes of pedicle screw and posterior fusion of L4-L5.  There has been posterior  decompression. CORD/CAUDA EQUINA: Normal caliber, contour, and signal intensity.  OTHER: Negative.  LUMBAR DISC LEVELS: L1-L2: Disk desiccation with bulging disk, facet, and ligamentous hypertrophy results in mild central canal narrowing and mild bilateral neural foraminal narrowing. L2-L3: Disk desiccation with bulging disk, facet, and ligamentous hypertrophy results in mild central canal narrowing and mild bilateral neural foraminal narrowing. L3-L4: Disk desiccation with bulging disk, facet, and ligamentous hypertrophy results in severe central canal narrowing and severe bilateral neural foraminal narrowing. There is bony encroachment upon the exiting bilateral L3 nerve roots. L4-L5: Postoperative changes of pedicle screw and posterior fusion with posterior decompression.  There are hypertrophic changes of the facet joints and ligamentum flavum resulting in mild narrowing of the bilateral neural foramen.  No significant canal narrowing. L5-S1: Disk desiccation with bulging disk, facet, and ligamentous hypertrophy results in mild central canal narrowing and mild bilateral neural foraminal narrowing.         CONCLUSION:   Degenerative disc and facet disease throughout the lumbar spine, most prominent at L3-L4, where there is severe narrowing of the canal and bilateral neural foramen which is unchanged since 5/23/2023.  Posterior decompression with pedicle screw and posterior fusion of L4-L5 without periprosthetic collection.  No acute fracture or vertebral body height loss.    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  His cobalt and chromium levels are significantly elevated.  I advised follow-up with Dr. Mauro for potential discussion of hip revision options.  CT scan of left hip was ordered.  Follow-up with me again as needed.    Follow Up: Return for follow-up visit after ordered tests completed.    RADHA SINGH MD

## 2024-08-28 ENCOUNTER — APPOINTMENT (OUTPATIENT)
Dept: PHYSICAL THERAPY | Facility: HOSPITAL | Age: 79
End: 2024-08-28
Attending: INTERNAL MEDICINE
Payer: MEDICARE

## 2024-09-03 ENCOUNTER — APPOINTMENT (OUTPATIENT)
Dept: PHYSICAL THERAPY | Facility: HOSPITAL | Age: 79
End: 2024-09-03
Attending: INTERNAL MEDICINE
Payer: MEDICARE

## 2024-09-09 ENCOUNTER — HOSPITAL ENCOUNTER (OUTPATIENT)
Dept: CV DIAGNOSTICS | Facility: HOSPITAL | Age: 79
Discharge: HOME | End: 2024-09-09
Attending: INTERNAL MEDICINE
Payer: MEDICARE

## 2024-09-09 DIAGNOSIS — I35.0 NONRHEUMATIC AORTIC VALVE STENOSIS: ICD-10-CM

## 2024-09-09 PROCEDURE — 93306 TTE W/DOPPLER COMPLETE: CPT | Performed by: INTERNAL MEDICINE

## 2024-09-10 ENCOUNTER — APPOINTMENT (OUTPATIENT)
Dept: PHYSICAL THERAPY | Facility: HOSPITAL | Age: 79
End: 2024-09-10
Attending: INTERNAL MEDICINE
Payer: MEDICARE

## 2024-09-10 ENCOUNTER — TELEPHONE (OUTPATIENT)
Dept: PHYSICAL THERAPY | Facility: HOSPITAL | Age: 79
End: 2024-09-10

## 2024-09-10 ENCOUNTER — HOSPITAL ENCOUNTER (OUTPATIENT)
Dept: CT IMAGING | Facility: HOSPITAL | Age: 79
Discharge: HOME | End: 2024-09-10
Attending: ORTHOPAEDIC SURGERY
Payer: MEDICARE

## 2024-09-10 DIAGNOSIS — M25.552 PAIN OF LEFT HIP: ICD-10-CM

## 2024-09-10 PROCEDURE — 73700 CT LOWER EXTREMITY W/O DYE: CPT | Performed by: ORTHOPAEDIC SURGERY

## 2024-09-11 ENCOUNTER — OFFICE VISIT (OUTPATIENT)
Dept: PHYSICAL MEDICINE AND REHAB | Facility: CLINIC | Age: 79
End: 2024-09-11
Payer: MEDICARE

## 2024-09-11 ENCOUNTER — TELEPHONE (OUTPATIENT)
Dept: PHYSICAL MEDICINE AND REHAB | Facility: CLINIC | Age: 79
End: 2024-09-11

## 2024-09-11 VITALS — BODY MASS INDEX: 25 KG/M2 | WEIGHT: 185 LBS

## 2024-09-11 DIAGNOSIS — N40.1 BPH WITH OBSTRUCTION/LOWER URINARY TRACT SYMPTOMS: ICD-10-CM

## 2024-09-11 DIAGNOSIS — M85.80 OSTEOPENIA, UNSPECIFIED LOCATION: ICD-10-CM

## 2024-09-11 DIAGNOSIS — K21.9 GASTROESOPHAGEAL REFLUX DISEASE WITHOUT ESOPHAGITIS: ICD-10-CM

## 2024-09-11 DIAGNOSIS — M47.816 LUMBAR SPONDYLOSIS: Primary | ICD-10-CM

## 2024-09-11 DIAGNOSIS — E11.9 TYPE 2 DIABETES MELLITUS WITHOUT COMPLICATION, WITHOUT LONG-TERM CURRENT USE OF INSULIN (HCC): ICD-10-CM

## 2024-09-11 DIAGNOSIS — M96.1 LUMBAR POST-LAMINECTOMY SYNDROME: Primary | ICD-10-CM

## 2024-09-11 DIAGNOSIS — N13.8 BPH WITH OBSTRUCTION/LOWER URINARY TRACT SYMPTOMS: ICD-10-CM

## 2024-09-11 PROCEDURE — 99214 OFFICE O/P EST MOD 30 MIN: CPT | Performed by: PHYSICAL MEDICINE & REHABILITATION

## 2024-09-11 NOTE — PROGRESS NOTES
[facility]  Progress Note    CHIEF COMPLAINT:    Chief Complaint   Patient presents with    Follow - Up     Lov 7/9/24 lower back pain here for results of CT scan hip 9/10/24 and Mri spine lumbar 8/8/24 . Pain 2/10 increase with walking.No T/N. Tylenol daily.        History of Present Illness:  Linwood Morales is a 79 year old male who presents today for follow up for symptoms of left leg pain.  I last saw him in early August for left radicular pain with a history of spinal fusion and possibly symptomatic hip arthritis.  At that time I recommended a plain film x-ray and an MRI.  He is also treating with Dr. Gamboa for his hip.  A recent hip CT showed an intact hip replacement.  His pain is bilateral flank pain over the gluteus medius, quite severe when he walks any distance.  No leg radiation, no numbness or tingling.  He had a very difficult time walking at [employer] recently.  Does use a cane chronically.    PAST MEDICAL HISTORY:  Past Medical History:    Acute medial meniscus tear of right knee    Angular cheilitis    Back problem    BPH (benign prostatic hyperplasia)    Calculus of kidney    Cheilitis    [facility] arthritis    [facility] arthritis - [facility] aristospan / lidocaine injection    Cough    Esophageal reflux    Essential hypertension    High blood pressure    High cholesterol    Hyperlipidemia    Lower urinary tract symptoms (LUTS)    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    PPVx, Cryo, GFX, SF6    Myopia with astigmatism and presbyopia    Nocturia    Osteoarthritis    Rotator cuff tear, left    repair    Seasonal allergic rhinitis    Stiffness of joint, hand    bilateral, hand stiffness & weakness post trigger release    Trigger finger    Irf-trigger finger, recurrent - LRF trigger finger release    Type II or unspecified type diabetes mellitus without mention of complication, not stated as uncontrolled    Visual impairment    readers        SURGICAL HISTORY:  Past Surgical History:   Procedure Laterality Date    Arthroscopy of joint unlisted  1990    knee    Arthroscopy of joint unlisted Left 2011    rotator cuff repair    Arthroscopy of joint unlisted Left 1992    hip resurfacing    Arthroscopy of joint unlisted Left 12/2008    hip resurfacing    Cataract      Cataract extraction w/  intraocular lens implant Right 09/17/2012    RJM    Cataract extraction w/  intraocular lens implant Left 01/11/2010    RJM    Colonoscopy  04/2015    repeat in 10 years.    Colonoscopy      Eye surgery Right 2013    repair detached retina    Eye surgery Right 02/20/2006    S/ P PPV RD repair (S/P PPVx, cryo, GFX, SF6 OD 4/25/13) Dr. Howe     Forearm/wrist surgery unlisted Left     left wrist surgery    Hand/finger surgery unlisted Left 9/29/2011    LRF trigger finger release    Hc inj epidural steroid lumbar or sacral w img guidance      x2 2019,x2 2017    Hernia surgery      hernia repair, herniorraphy    Laser surgery of eye      Other      bladder surgery x2    Other surgical history      esophageal dilatation    Other surgical history  6/15/2010    release triggers: RMF, RRF, LMF/ RRF Dupuytren's nodule excision    Repair of biceps tendon at elbow Left 1992    Retinal laser procedure      Spine surgery procedure unlisted  02/23/2022    L3-5 laminectomy, left L4-5 transforaminal lumbar interbody fusion;    Total hip replacement Left 2008    Yag capsulotomy - od - right eye Right 4/30/14    RJ       SOCIAL HISTORY:   Social History     Occupational History    Not on file   Tobacco Use    Smoking status: Never    Smokeless tobacco: Never    Tobacco comments:     none   Vaping Use    Vaping status: Never Used   Substance and Sexual Activity    Alcohol use: Yes     Alcohol/week: 3.0 standard drinks of alcohol     Types: 2 Cans of beer, 1 Standard drinks or equivalent per week     Comment: 2-3 times per week    Drug use: No    Sexual activity: Not on file        CURRENT MEDICATIONS:   Current Outpatient Medications   Medication Sig Dispense Refill    ipratropium 0.03 % Nasal Solution 2 sprays by Nasal route every 12 (twelve) hours. 1 each 5    Omeprazole 40 MG Oral Capsule Delayed Release Take 1 capsule (40 mg total) by mouth daily. 90 capsule 3    NIFEdipine ER 90 MG Oral Tablet 24 Hr Take 1 tablet (90 mg total) by mouth daily. 90 tablet 3    Lovastatin 40 MG Oral Tab Take 1 tablet (40 mg total) by mouth nightly. 100 tablet 2    fluticasone propionate 50 MCG/ACT Nasal Suspension 1 spray by Each Nare route daily as needed. 16 g 2    metFORMIN 500 MG Oral Tab Take 1 tablet (500 mg total) by mouth 2 (two) times daily. 180 tablet 3    lisinopril 5 MG Oral Tab Take 1 tablet (5 mg total) by mouth daily. 90 tablet 3    tadalafil 5 MG Oral Tab Take 1 tablet (5 mg total) by mouth daily as needed for Erectile Dysfunction. 90 tablet 3    Risedronate Sodium 150 MG Oral Tab Take 1 tablet (150 mg total) by mouth every 30 (thirty) days. 3 tablet 3    ADVAIR DISKUS 100-50 MCG/ACT Inhalation Aerosol Powder, Breath Activated Inhale 1 puff into the lungs 2 (two) times daily. 180 each 1    cetirizine 10 MG Oral Tab Take 1 tablet (10 mg total) by mouth daily. 90 tablet 3    diclofenac 1 % External Gel       ketoconazole 2 % External Cream Apply 1 Application topically 2 (two) times daily.      Vitamin B-12 1000 MCG Oral Tab Take 1 tablet (1,000 mcg total) by mouth daily.      Cholecalciferol (VITAMIN D) 1000 UNITS Oral Cap Take by mouth daily.      Multiple Vitamins-Minerals (CENTRUM SILVER) Oral Tab Take 1 tablet by mouth daily.        Cinnamon 500 MG Oral Cap Take 500 mg by mouth daily.         ALLERGIES:   Allergies   Allergen Reactions    Penicillins HIVES           PHYSICAL EXAM:   Wt 185 lb (83.9 kg)   BMI 25.09 kg/m²     Body mass index is 25.09 kg/m².      General: No immediate distress  Extremities: No lower extremity edema bilaterally   Spine: R extension recreates  symptoms  Hips: full and painfree ROM, tender over the glued medius bilaterally  Neuro:   Cognition: alert & oriented x 3, attentive, able to follow 2 step commands, comprehention intact, spontaneous speech intact  Strength: Lower extremities have 5/5 strength, bilateral half grade weakness of the glute medius  Sensation: Normal lower extremities  Reflexes: Areflexic lower extremities  SLR: neg        Data    Radiology Imaging:  I personally reviewed a CT of the left hip which shows no evidence of loosening of previous hemiarthroplasty.  There is minimal heterotopic ossification not impacting joint range of motion.  I personally reviewed a plain film x-ray of the lumbar spine with flexion and extension.  There is intact surgical fusion hardware at L4-5 with severe disc degeneration at L5-S1.  At L1-2 there are several millimeters of subluxation reduced with lumbar flexion causing visible foraminal narrowing.  There is demineralization.   I personally reviewed a lumbar MRI from August 2024 showing an intact L4-5 fusion.  There is wavy appearance of the cauda equina, there is superjacent moderate to severe L3-4 central canal stenosis on a multifactorial basis primarily due to facet hypertrophy.       ASSESSMENT AND PLAN:  1. Lumbar post-laminectomy syndrome: L3-5 laminectomy and L4-5 fusion  Given presence of severe stenosis and decreased ambulation distance I think he has symptomatic stenosis.  For this reason I recommend bilateral L4 transforaminal epidurals.  Presentation is definitely atypical given lack of leg symptoms.  - Lumbar Transforaminal Epidural Injection (TFESI); Future    2. Type 2 diabetes mellitus without complication, without long-term current use of insulin (HCC)  May need to watch blood sugars after injection    3. Gastroesophageal reflux disease without esophagitis  No NSAIDs, limits treatment options    4. Osteopenia, unspecified location  No compression fractures seen on imaging, caution with  steroids.    5. BPH with obstruction/lower urinary tract symptoms  No TCAs, limits treatment options        RTC 2 weeks postinjection      The patient was in agreement with the assessment and plan.  All questions were answered.        Michael Barksdale MD  Physical Medicine and Rehabilitation/Sports Medicine  [facility]

## 2024-09-11 NOTE — TELEPHONE ENCOUNTER
Initiated authorization for Bilateral L4-5 TFESI under fluoroscopy guidance. CPT/HCPCS 10825-06 dx:M96.1 to be done at [facility] with [employer]    Status: Approved  Reference/Authorization # [id number]  Valid: 9/11/24-3/10/25

## 2024-09-13 NOTE — TELEPHONE ENCOUNTER
Patient has been scheduled for Bilateral L4-5 TFESI under fluoroscopy guidance  on 9/26/24 at the [facility] with .   -Anesthesia type: local.  -If scheduling [facility] covid testing required for all procedures whether patient is vaccinated or not.  - Is patient in a nursing home or assisted living? If so injection needs to be scheduled at the hospital. (Per [facility] policy.)  -Patient informed not to eat or drink anything after midnight the night prior to the procedure, if being sedated. (For afternoon injections: Patient to fast minimum of 8 hours prior to procedure with IVCS/MAC. Patient's on weight loss medications/injectables will need to fast 10-12 hours prior to.)   -Patient was advised that if he/she does receive the covid vaccine it needs to be at least 2 weeks before or after the injection.  -Medications and allergies reviewed.  -Patient reminded to hold NSAIDs (Ibuprofen, ASA 81, Aleve, Naproxen, Mobic, Diclofenac, Etodolac, Celebrex etc.) for 3 days prior to LUMBAR FACET JOINT INJECTIONS OR MEDIAL BRANCH BLOCK INJECTIONS  if BMI is greater than 35. For Cervical injections only hold multivitamins, Vitamin E, Fish Oil, Phentermine (Lomaira) for 7 days prior to injection and NSAIDS.   mg to be held for 7 days prior to injections.  -If patient is receiving MAC/IVCS Phentermine (Lomaira), Adlyxin (Lixisenatide), Bydureon BCise (Exenatide-release), Byetta (Exenatide), Mounjaro (Tirezepatide), Ozempic (Semaglutide), Rybelsus (oral demaglutide), Saxenda (Liraglutide), Trulicity (Dulaglutide), Victoriza (Liraglutide), Wegovy (Semaglutide), Berberine (oral natures ozempic) will need to be held for 7 days prior to injection.  -If patient's BMI is greater than 45. Patient is unable to get IVCS/MAC at [facility]. (Options: Patient can go to [facility] under MAC or ENDO under IVCS-reminder physician's slots at ENDO are limited. OR Patient can have the procedure done under local anesthesia at [facility] with Valium ( per physician's  preference.)    -If on blood thinner clearance has been received to hold this medication by provider.   -Patient informed he/she will need a  to and from procedure. EFFECTIVE 12/1/23- Per [facility]: \" Our PAT team will notify the patient that their ride MUST remain onsite for the entirety of their visit if the ride is unable to do so the procedure will be canceled. \"    -[facility] is located in the [facility] 1st floor. Patient may park in the yellow or purple parking.    Follow up appointment has been scheduled for patient on: 10/15/2024    Patient verbalized understanding and agrees with plan.  -----> Scheduled in Epic: Yes  -----> Scheduled in [employer]/Surgical Case Request: Yes

## 2024-09-13 NOTE — TELEPHONE ENCOUNTER
Please review; protocol failed/ has no protocol    Requested Prescriptions   Pending Prescriptions Disp Refills    RISEDRONATE SODIUM 150 MG Oral Tab [Pharmacy Med Name: Risedronate Sodium 150 Mg Tab Auro] 3 tablet 0     Sig: Take 1 tablet  by mouth every 30  days.       Osteoporosis Medication Protocol Failed - 9/10/2024  2:32 PM        Failed - DEXA scan within past 2 years        Passed - In person appointment or virtual visit in the past 6 mos or appointment in next 3 mos     Recent Outpatient Visits              2 days ago Lumbar post-laminectomy syndrome: L3-5 laminectomy and L4-5 fusion    [facility] Michael Barksdale MD    Office Visit    3 weeks ago Pain of left hip    [facility] Pablo Gamboa MD    Office Visit    1 month ago     [facility] Lloyd [facility], [facility]    Office Visit    1 month ago Diabetes mellitus type 2 without retinopathy (HCC)    [facility] Mark Gupta MD    Office Visit    1 month ago     [facility] Lloyd [facility], PT    Office Visit          Future Appointments         Provider Department Appt Notes    In 4 days Lloyd Fox PT [facility] c/p $30, no auth, no limit  [employer] [place]    In 1 week Martín Mauro MD [facility] Left hip pain, had replacement hip in 2008. Having CT scan on 9/10/24 ref by Dr. Norman    In 1 week Lloyd Fox PT [facility] c/p $30, no auth, no limit  [employer] [place]    In 1 week Michael Barksdale MD [facility] Bilateral L4-5 TFESI under fluoroscopy guidance, local, [facility]    In 1 month Michael Barksdale MD [facility] Post injection follow up appointment                    Passed - [facility] within the past 12  months        Passed - Calcium level between 8.3 and 10.3     Lab Results   Component Value Date    CA 9.4 05/15/2024               Passed - GFR level greater than 35     GFR Evaluation  EGFRCR: 86 , resulted on 5/15/2024             Recent Outpatient Visits              2 days ago Lumbar post-laminectomy syndrome: L3-5 laminectomy and L4-5 fusion    [facility] Michael Barksdale MD    Office Visit    3 weeks ago Pain of left hip    [facility] Pablo Gamboa MD    Office Visit    1 month ago     [facility] Lloyd Fox, PT    Office Visit    1 month ago Diabetes mellitus type 2 without retinopathy (HCC)    [facility] Mark Gupta MD    Office Visit    1 month ago     [facility], ANA MARÍA    Office Visit          Future Appointments         Provider Department Appt Notes    In 4 days [facility] c/[facility] $[facility], no auth, no limit  [employer] [place]    In 1 week Martín Mauro MD [facility] Left hip pain, had replacement hip in 2008. Having CT scan on 9/10/24 ref by Dr. Norman    [facility], no auth, no limit  [employer] [place]    In 1 week Michael Barksdale MD [facility] Bilateral L4-5 TFESI under fluoroscopy guidance, local, [facility]    In 1 month Michael Barksdale MD [facility] Post injection follow up appointment

## 2024-09-14 RX ORDER — RISEDRONATE SODIUM 150 MG/1
150 TABLET, FILM COATED ORAL
Qty: 3 TABLET | Refills: 3 | Status: SHIPPED | OUTPATIENT
Start: 2024-09-14

## 2024-09-16 NOTE — PROGRESS NOTES
Diagnosis:   Spinal stenosis of lumbar region with neurogenic claudication (M48.062)       Referring Provider: No ref. provider found  Date of Evaluation:    7/29/2024    Precautions:   HTN and diabetes Next MD visit:   none scheduled  Date of Surgery: n/a     Insurance Primary/Secondary: [employer] [facility] / N/A     # Auth Visits: n/a            Subjective: Pt put PT on hold to get additional testing on his spine and left hip. Getting injections on the 26th by Dr. Barksdale for the spine. Is seeing Dr. Mauro for the hip next week to discuss what to do.   Pain: 0/10 low back sitting at rest; when walking 8/10    Objective: see rx flowsheet    IE:  Thoracolumbar AROM: (* denotes performed with pain)  Flexion: marked limitations  Extension: marked limitations  Sidebending: R mod limitations; L marked limitations  Rotation: R mod limitations; L mod limitations    Accessory motion: marked hypomobility throughout lumbar spine  Palpation: no tenderness around L hip     Strength: (* denotes performed with pain)  LE   Hip flexion (L2): R 4/5; L 4+/5 (pop)  Hip abduction: R 4+/5; L NT/5  Hip Extension: R 3+/5; L 3/5*   Hip ER: R 5/5; L 4/5  Hip IR: R 5/5; L 4/5  Knee Flexion: R 5-/5; L 4+/5   Knee extension (L3): R 5/5; L 5/5   DF (L4): R 5/5; L 5/5  Great Toe Ext (L5): R 5/5, L 5/5  PF (S1): R 5/5; L 5/5     Gait: pt ambulates on level ground with assistive device of SPC, antalgia, decreased hip/knee flex or ext (decreased knee extension, increased hip flexion, shuffle, and stooped posture/forward lean.  Balance: SLS R 3 sec, L <1 sec  TUG: 10.4 sec with SPC; 10.7 sec no AD      Assessment: Will be placing PT on hold- pt will be getting a cortisone injection in the spine next week and he will be following up with Dr. Mauro to assess his left hip. Pt will call and schedule appointments when he knows the plan for his left hip and spine. Will keep POC open for another month and update POC when pt returns to PT.      Goals:   Pt will  improve transversus abdominis recruitment to perform proper isometric contraction without requiring verbal or tactile cuing to promote advancement of therex   Pt will demonstrate good understanding of proper posture and body mechanics to decrease pain and improve spinal safety   Pt will improve lumbar spine AROM flexion to allow increase ease with bending forward to don shoes   Pt will report improved symptom centralization and absence of radicular symptoms for 3 consecutive days to improve function with ADL   Pt will have decreased paraspinal mm tension to tolerate standing >15 minutes for work and home activities   Pt will demonstrate improved core strength to be able to perform walking with <5/10 pain   Pt will be independent and compliant with comprehensive HEP to maintain progress achieved in PT      Plan: Hold on PT until after cortisone injection and follow up with Dr. Mauro to evaluate the left hip.   Date: 7/31/2024  TX#: 2/10 Date: 8/6/24                TX#: 3/10 Date:  9/17/24               TX#: 4/10 Date:                 TX#: 5/ Date:   Tx#: 6/ TE 35'  Special tests- hip and SIJ  SB rolls fwd only x20   90/90 nerve glide L (HELD- PAIN IN THE LEFT HIP)  Seated HS stretch 5x10\" bilat  Slantboard stretching x2'  Shuttle DLP 50# x10; 55# x10  Seated slump nerve glide  HEP update    NR x10'  Hip add iso 5\" hold 2x10 (hooklying)  Hip abd iso 5\" hold 2x10 (hooklying)   Seated TA iso with pilates ring x10  Seated oblique iso with pilates ring x10 bilat TE 33'  SB rolls fwd x10; lat x10 B (pain to the left)   Hip flexor to HS stretching on step x20 bilat   Slantboard stretching 10x10\"   Shuttle DLP 55# x20; 25# x10 bilat  Discussing POC    MT x10'  Contract relax HS bilat x2 rounds bilat  Contract relax Left hip flexors in right SL  TE 17'  Discussing POC  R SKTC 5x10\" (held on L)  STS x10  Standing hip abduction with glider x10 bilat  Nustep lvl 1 arms and legs x5'     NMR x15'  Hook lying march (small  range) x10 bilat  Alt BKFO x10 bilat  PPT x15 3\" hold  Hip add iso with small bridge 2x10      HEP:   - Supine Lower Trunk Rotation  - 2 x daily - 7 x weekly - 10-20 reps  - Supine Piriformis Stretch with Foot on Ground  - 2-3 x daily - 7 x weekly - 3-4 sets - 10 sec hold  - Supine Figure 4 Piriformis Stretch  - 2-3 x daily - 7 x weekly - 3-4 sets - 10 sec hold  - Hooklying Gluteal Sets  - 2 x daily - 7 x weekly - 10 reps - 5 sec hold  - Seated Hamstring Stretch  - 2-3 x daily - 7 x weekly - 3-4 sets - 10 sec hold  - Seated Hip Adduction Isometrics with Ball  - 1 x daily - 3-5 x weekly - 1-2 sets - 10 reps - 5 sec hold  - Seated Isometric Hip Abduction with Belt  - 1 x daily - 3-5 x weekly - 1-2 sets - 10 reps - 5 sec hold  - Standing Bilateral Gastroc Stretch with Step  - 2 x daily - 7 x weekly - 10 reps - 10 sec hold  - Seated Transversus Abdominis Bracing with PLB  - 1 x daily - 7 x weekly - 10 reps  - Supine March  - 1 x daily - 3-5 x weekly - 2 sets - 10 reps  - Bent Knee Fallouts with Alternating Legs  - 1 x daily - 3-5 x weekly - 2 sets - 10 reps  - Supine Posterior Pelvic Tilt  - 1 x daily - 3-5 x weekly - 2 sets - 10 reps - 3 sec hold  - Supine Bridge with Mini Swiss Ball Between Knees  - 1 x daily - 3-5 x weekly - 1-2 sets - 10 reps  - Sit to Stand  - 1 x daily - 7 x weekly - 1-2 sets - 10 reps  - Standing Hip Abduction on Slider  - 1 x daily - 3-5 x weekly - 1-2 sets - 10 reps  - Standing Hip Extension with Counter Support  - 1 x daily - 3-5 x weekly - 1-2 sets - 10 reps    Charges: 1 TE 1 NMR      Total Timed Treatment: 32 min  Total Treatment Time: 32 min

## 2024-09-17 ENCOUNTER — OFFICE VISIT (OUTPATIENT)
Dept: PHYSICAL THERAPY | Facility: HOSPITAL | Age: 79
End: 2024-09-17
Attending: INTERNAL MEDICINE
Payer: MEDICARE

## 2024-09-17 PROCEDURE — 97112 NEUROMUSCULAR REEDUCATION: CPT

## 2024-09-17 PROCEDURE — 97110 THERAPEUTIC EXERCISES: CPT

## 2024-09-23 ENCOUNTER — OFFICE VISIT (OUTPATIENT)
Dept: ORTHOPEDICS CLINIC | Facility: CLINIC | Age: 79
End: 2024-09-23
Payer: MEDICARE

## 2024-09-23 ENCOUNTER — TELEPHONE (OUTPATIENT)
Dept: ORTHOPEDICS CLINIC | Facility: CLINIC | Age: 79
End: 2024-09-23

## 2024-09-23 VITALS — HEIGHT: 72 IN | BODY MASS INDEX: 24.84 KG/M2 | WEIGHT: 183.38 LBS

## 2024-09-23 DIAGNOSIS — M25.552 PAIN OF LEFT HIP: Primary | ICD-10-CM

## 2024-09-23 DIAGNOSIS — T84.011A FAILURE OF LEFT TOTAL HIP ARTHROPLASTY, INITIAL ENCOUNTER (HCC): Primary | ICD-10-CM

## 2024-09-23 DIAGNOSIS — T84.011A FAILURE OF LEFT TOTAL HIP ARTHROPLASTY, INITIAL ENCOUNTER (HCC): ICD-10-CM

## 2024-09-23 NOTE — TELEPHONE ENCOUNTER
Spoke with patient to offer surgery dates. He chose 12/3. Patient was reminded that Medical and/or Dental clearance is needed within 30 days of surgical date. Failure to complete all testing in a timely manner will cause surgery to be delayed and/or rescheduled. Patient understood and had no further questions at this time.

## 2024-09-23 NOTE — PROGRESS NOTES
NURSING INTAKE COMMENTS:   Chief Complaint   Patient presents with    Hip Pain     L hip f/u- referred by Dr. Gamboa- had replacement sx in 2008 w/ Dr. Rodrigues - denies injury- rates pain 3-8/10 only w/ WB- has xray and CT in the system       HPI: This 79 year old male presents today with complaints of left hip pain.  He was referred by Dr. Gamboa for evaluation of left hip revision.  He had a left hip resurfacing arthroplasty done by . She had an illness in 2008.  He has been having some pain and clicking in the left hip.  He also has a history of lumbar spine pathology and has had a fusion at L4-5.Recent labs were done including cobalt and chromium levels with a cobalt level of 35.6, and chromium level of 16.4.  C-reactive protein was 0.4, and the sed rate was 4.  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    PPVx, Cryo, GFX, SF6    Myopia with astigmatism and presbyopia    Nocturia    Osteoarthritis    Rotator cuff tear, left    repair    Seasonal allergic rhinitis    Stiffness of joint, hand    bilateral, hand stiffness & weakness post trigger release    Trigger finger    Irf-trigger finger, recurrent - LRF trigger finger release    Type II or unspecified type diabetes mellitus without mention of complication, not stated as uncontrolled    Visual impairment    readers     Past Surgical History:   Procedure Laterality Date    Arthroscopy of joint unlisted  1990    knee    Arthroscopy of joint unlisted  Left 2011    rotator cuff repair    Arthroscopy of joint unlisted Left 1992    hip resurfacing    Arthroscopy of joint unlisted Left 12/2008    hip resurfacing    Cataract      Cataract extraction w/  intraocular lens implant Right 09/17/2012    RJM    Cataract extraction w/  intraocular lens implant Left 01/11/2010    RJM    Colonoscopy  04/2015    repeat in 10 years.    Colonoscopy      Eye surgery Right 2013    repair detached retina    Eye surgery Right 02/20/2006    S/ P PPV RD repair (S/P PPVx, cryo, GFX, SF6 OD 4/25/13) Dr. Howe     Forearm/wrist surgery unlisted Left     left wrist surgery    Hand/finger surgery unlisted Left 9/29/2011    LRF trigger finger release    Hc inj epidural steroid lumbar or sacral w img guidance      x2 2019,x2 2017    Hernia surgery      hernia repair, herniorraphy    Laser surgery of eye      Other      bladder surgery x2    Other surgical history      esophageal dilatation    Other surgical history  6/15/2010    release triggers: RMF, RRF, LMF/ RRF Dupuytren's nodule excision    Repair of biceps tendon at elbow Left 1992    Retinal laser procedure      Spine surgery procedure unlisted  02/23/2022    L3-5 laminectomy, left L4-5 transforaminal lumbar interbody fusion;    Total hip replacement Left 2008    Yag capsulotomy - od - right eye Right 4/30/14    RJM     Current Outpatient Medications   Medication Sig Dispense Refill    Risedronate Sodium 150 MG Oral Tab Take 1 tablet (150 mg total) by mouth every 30 (thirty) days. 3 tablet 3    ipratropium 0.03 % Nasal Solution 2 sprays by Nasal route every 12 (twelve) hours. 1 each 5    Omeprazole 40 MG Oral Capsule Delayed Release Take 1 capsule (40 mg total) by mouth daily. 90 capsule 3    NIFEdipine ER 90 MG Oral Tablet 24 Hr Take 1 tablet (90 mg total) by mouth daily. 90 tablet 3    Lovastatin 40 MG Oral Tab Take 1 tablet (40 mg total) by mouth nightly. 100 tablet 2    fluticasone propionate 50 MCG/ACT Nasal Suspension 1 spray  by Each Nare route daily as needed. 16 g 2    metFORMIN 500 MG Oral Tab Take 1 tablet (500 mg total) by mouth 2 (two) times daily. 180 tablet 3    lisinopril 5 MG Oral Tab Take 1 tablet (5 mg total) by mouth daily. 90 tablet 3    tadalafil 5 MG Oral Tab Take 1 tablet (5 mg total) by mouth daily as needed for Erectile Dysfunction. 90 tablet 3    ADVAIR DISKUS 100-50 MCG/ACT Inhalation Aerosol Powder, Breath Activated Inhale 1 puff into the lungs 2 (two) times daily. 180 each 1    cetirizine 10 MG Oral Tab Take 1 tablet (10 mg total) by mouth daily. 90 tablet 3    diclofenac 1 % External Gel       ketoconazole 2 % External Cream Apply 1 Application topically 2 (two) times daily.      Vitamin B-12 1000 MCG Oral Tab Take 1 tablet (1,000 mcg total) by mouth daily.      Cholecalciferol (VITAMIN D) 1000 UNITS Oral Cap Take by mouth daily.      Multiple Vitamins-Minerals (CENTRUM SILVER) Oral Tab Take 1 tablet by mouth daily.        Cinnamon 500 MG Oral Cap Take 500 mg by mouth daily.       Allergies   Allergen Reactions    Penicillins HIVES     Family History   Problem Relation Age of Onset    Colon Cancer Mother     Other (pulmonary Embolism) Father     Glaucoma Neg     Diabetes Neg     Macular degeneration Neg        Social History     Occupational History    Not on file   Tobacco Use    Smoking status: Never    Smokeless tobacco: Never    Tobacco comments:     none   Vaping Use    Vaping status: Never Used   Substance and Sexual Activity    Alcohol use: Yes     Alcohol/week: 3.0 standard drinks of alcohol     Types: 2 Cans of beer, 1 Standard drinks or equivalent per week     Comment: 2-3 times per week    Drug use: No    Sexual activity: Not on file        Review of Systems:  GENERAL: feels generally well, no significant weight loss or weight gain  SKIN: no ulcerated or worrisome skin lesions  EYES:denies blurred vision or double vision  HEENT: denies new nasal congestion, sinus pain or ST  LUNGS: denies shortness of  breath  CARDIOVASCULAR: denies chest pain  GI: no hematemesis, no worsening heartburn, no diarrhea  : no dysuria, no blood in urine, no difficulty urinating, no incontinence  MUSCULOSKELETAL: no other musculoskeletal complaints other than in HPI  NEURO: no numbness or tingling, no weakness or balance disorder  PSYCHE: no depression or anxiety  HEMATOLOGIC: no hx of blood dyscrasia  ENDOCRINE: no thyroid or diabetes issues  ALL/ASTHMA: no new hx of severe allergy or asthma    Physical Examination:    Ht 6' (1.829 m)   Wt 183 lb 6.4 oz (83.2 kg)   BMI 24.87 kg/m²   Constitutional: appears well hydrated, alert and responsive, no acute distress noted  Extremities: Ambulates with a cane.  Resisted hip flexion reveals a palpable click.  Some discomfort with range of motion of the left hip.      Imaging: CT HIP(BONE) LEFT (CPT=73700)    Result Date: 9/10/2024  PROCEDURE: CT HIP (BONE) LEFT (IFG=23805)  COMPARISON: [facility], CT UROGRAM (W+WO) SH (CPT=74178), 7/25/2019, 1:53 PM.  [facility] 2nd Floor, XR HIP W OR WO PELVIS (MIN 5 VIEWS), BILAT (CPT=73523), 6/20/2024, 9:41 AM.  INDICATIONS: M25.552 Pain of left hip  TECHNIQUE: Multi-planar CT images of the left hip were obtained without intravenous contrast material.  Automated exposure control for dose reduction was used. Adjustment of the mA and/or kV was done based on the patient's size. Use of iterative reconstruction technique for dose reduction was used.  Dose information is transmitted to the ACR (American College of Radiology) NRDR (National Radiology Data Registry) which includes the Dose Index Registry.  Metal suppression technique was utilized.  FINDINGS:  BONES: There is diffuse osseous demineralization, which limits sensitivity for detection of osseous pathology.  There are postsurgical changes of previous resurfacing left hip hemiarthroplasty.  There is no focal perihardware lucency or hardware fracture.   There is approximately 0 degree angle between the long axis of the femoral component and the femoral neck. There is slight cortical irregularity of the lateral aspect the inferior pubic ramus, but this appears similar to the comparison 2019 imaging.  May reflect changes of healed nondisplaced fracture or other remote process.  Otherwise, No osseous malalignment or osseous fracture. There is osseous thinning/ remodeling along the inferior medial aspect of the acetabulum. There are a few ossifications along the inferior aspect of the joint space (series 3, image 68).  Additional heterotopic ossification along the greater trochanter and gluteal tendons. Left sacroiliac and pubic symphysis joint osteoarthritis. SOFT TISSUES: Calcific atherosclerosis present. OTHER: Negative.          CONCLUSION:   Prior left hip hemiarthroplasty, without CT evidence hardware fracture or loosening.  Approximately 0 degree angle between the long axis of the femoral component and the femoral neck.  Thinning/remodeling of the medial left acetabulum.  No acute fracture, within the limitation of diffuse osseous demineralization.  Ossifications along the inferior medial aspect of the joint spaces may represent joint bodies and/or heterotopic ossification.  Heterotopic ossification along the gluteal tendon and greater tuberosity of the proximal femur.        Dictated by (CST): Helga Doshi MD on 9/10/2024 at 2:43 PM     Finalized by (CST): Helga Doshi MD on 9/10/2024 at 3:03 PM          CARD ECHO 2D DOPPLER (CPT=93306)    Result Date: 2024  Transthoracic Echocardiogram Name:Linwood Morales Date: 2024 :  1945 Ht:  (71.65in) BP: 138 / 60 MRN:  [id number]    Age:  79years    Wt:  (188lb)   HR: Loc:  [facility]       Gndr: M          BSA: 2.07m^2 Sonographer: Melida Martini Ordering:    Mark Gupta Referring:   Mark Gupta ---------------------------------------------------------------------------- History/Indications:    Nonrheumatic aortic valve stenosis. ---------------------------------------------------------------------------- Procedure information:  A transthoracic complete 2D study was performed. Additional evaluation included M-mode, complete spectral Doppler, and color Doppler.  Patient status:  Outpatient.  Location:  Echo laboratory. Comparison was made to the study of 05/22/2023.    This was a routine study. Transthoracic echocardiography for diagnosis and ventricular function evaluation. Image quality was adequate. ---------------------------------------------------------------------------- Conclusions: 1. Left ventricle: The cavity size was normal. Wall thickness was mildly    increased. Systolic function was normal. The estimated ejection fraction    was 55-65%, by biplane method of disks. Wall motion is normal; there are    no regional wall motion abnormalities. Doppler parameters are consistent    with abnormal left ventricular relaxation - grade 1 diastolic    dysfunction. 2. Aortic valve: Transvalvular velocity was minimally increased. The    findings were consistent with mild stenosis. The peak systolic velocity    was 2.22m/sec. The mean systolic gradient was 11mm Hg. The valve area    (VTI) was 1.91cm^2. The valve area (VTI) index was 0.92cm^2/m^2. * ---------------------------------------------------------------------------- * Findings: Left ventricle:  The cavity size was normal. Wall thickness was mildly increased. Systolic function was normal. The estimated ejection fraction was 55-65%, by biplane method of disks. Wall motion is normal; there are no regional wall motion abnormalities. Doppler parameters are consistent with abnormal left ventricular relaxation - grade 1 diastolic dysfunction. Left atrium:  The atrium was normal in size. Right ventricle:  The cavity size was normal. Systolic function was normal. Right atrium:  The atrium was normal in size. Mitral valve:  The valve was structurally normal.  Leaflet separation was normal.  Doppler:  Transvalvular velocity was within the normal range. There was no evidence for stenosis. There was trivial regurgitation. Aortic valve:   The valve was trileaflet. The leaflets were mildly calcified. Cusp separation was normal.  Doppler:  Transvalvular velocity was minimally increased. The findings were consistent with mild stenosis. There was no significant regurgitation.    The valve area (VTI) was 1.91cm^2. The valve area (VTI) index was 0.92cm^2/m^2.    The mean systolic gradient was 11mm Hg. The peak systolic gradient was 22mm Hg. Tricuspid valve:  The valve is structurally normal. Leaflet separation was normal.  Doppler:  Transvalvular velocity was within the normal range. There was no evidence for stenosis. There was trivial regurgitation. Pulmonic valve:   The valve is structurally normal. Cusp separation was normal.  Doppler:  Transvalvular velocity was within the normal range. There was no evidence for stenosis. There was trivial regurgitation. Pericardium:   There was no pericardial effusion. Aorta: Aortic root: The aortic root was normal. Ascending aorta: The ascending aorta was normal. Pulmonary arteries: Systolic pressure was estimated to be 31mm Hg. Systemic veins:  Central venous respirophasic diameter changes are in the normal range (>50%). Inferior vena cava: The IVC was normal-sized. ---------------------------------------------------------------------------- Measurements  Left ventricle       Value          Ref       05/22/2023  IVS thickness,   (H) 1.1   cm       0.6 - 1.0 0.9  ED, PLAX  LV ID, ED, PLAX      4.7   cm       4.2 - 5.8 4.8  LV ID, ES, PLAX      2.8   cm       2.5 - 4.0 2.6  LV PW thickness, (H) 1.1   cm       0.6 - 1.0 1.1  ED, PLAX  IVS/LV PW ratio,     1.00           --------- 0.82  ED, PLAX  LV PW/LV ID          0.23           --------- 0.22  ratio, ED, PLAX  LV ejection          71    %        52 - 72   76  fraction  Stroke                42    ml/m^2   --------- 67  volume/bsa, 2D  LV end-diastolic     86    ml       69 - 185  ----------  volume, 1-p A4C  LV ejection          64    %        46 - 74   ----------  fraction, 1-p  A4C  Stroke volume,       55    ml       --------- ----------  1-p A4C  LV end-diastolic     42    ml/m^2   37 - 93   ----------  volume/bsa, 1-p  A4C  Stroke               27    ml/m^2   --------- ----------  volume/bsa, 1-p  A4C  LV end-diastolic     79    ml       62 - 150  ----------  volume, 2-p  LV end-systolic      29    ml       21 - 61   ----------  volume, 2-p  LV ejection          64    %        52 - 72   ----------  fraction, 2-p  Stroke volume,       50    ml       --------- ----------  2-p  LV end-diastolic     38    ml/m^2   34 - 74   ----------  volume/bsa, 2-p  LV end-systolic      14    ml/m^2   11 - 31   ----------  volume/bsa, 2-p  Stroke               24.2  ml/m^2   --------- ----------  volume/bsa, 2-p  LV e', lateral   (L) 8.1   cm/sec   >=10.0    8.0  LV E/e', lateral     11             <=13      12  LV e', medial    (L) 6.2   cm/sec   >=7.0     6.6  LV E/e', medial      15             --------- 15  LV e', average       7.1   cm/sec   --------- 7.3  LV E/e', average     13             <=14      13  LVOT                 Value          Ref       05/22/2023  LVOT ID              2.1   cm       --------- 2.4  LVOT peak            1.19  m/sec    --------- 1.29  velocity, S  LVOT VTI, S          24.2  cm       --------- 29.4  LVOT peak            6     mm Hg    --------- 7  gradient, S  LVOT mean            3     mm Hg    --------- 3  gradient, S  Stroke volume        84    ml       --------- 133  (SV), LVOT DP  Stroke index         41    ml/m^2   --------- 67  (SV/bsa), LVOT  DP  Aortic valve         Value          Ref       05/22/2023  Aortic valve         2.22  m/sec    --------- 2.23  peak velocity, S  Aortic valve         44.1  cm       --------- 50.1  VTI, S  Aortic mean          11    mm Hg     --------- 10  gradient, S  Aortic peak          22    mm Hg    --------- 20  gradient, S  Aortic valve         1.91  cm^2     --------- 2.65  area, VTI  Aortic valve         0.92  cm^2/m^2 --------- 1.33  area/bsa, VTI  Velocity ratio,      0.51           --------- 0.58  peak, LVOT/AV  Aortic root          Value          Ref       05/22/2023  Aortic root ID       2.9   cm       2.7 - 4.2 ----------  Ascending aorta      Value          Ref       05/22/2023  Ascending aorta      3.0   cm       2.2 - 3.8 ----------  ID  Left atrium          Value          Ref       05/22/2023  LA ID, A-P, ES       3.7   cm       3.0 - 4.0 4.2  LA volume, S     (H) 62    ml       18 - 58   87  LA volume/bsa, S     30    ml/m^2   16 - 34   44  LA volume, ES,       57    ml       18 - 58   83  1-p A4C  LA volume, ES,   (H) 65    ml       18 - 58   87  1-p A2C  LA volume, ES,       66    ml       --------- ----------  A/L  LA volume/bsa,       32    ml/m^2   16 - 34   ----------  ES, A/L  LA/aortic root       1.28           --------- 1.31  ratio  Mitral valve         Value          Ref       05/22/2023  Mitral E-wave        0.91  m/sec    --------- 0.98  peak velocity  Mitral A-wave        1.51  m/sec    --------- 1.36  peak velocity  Mitral               285   ms       --------- ----------  deceleration  time  Mitral peak          3     mm Hg    --------- 4  gradient, D  Mitral E/A           0.6            --------- 0.7  ratio, peak  Pulmonary artery     Value          Ref       05/22/2023  PA pressure, S,      31    mm Hg    --------- 31  DP  Tricuspid valve      Value          Ref       05/22/2023  Tricuspid regurg     2.66  m/sec    <=2.8     2.45  peak velocity  Tricuspid peak       28    mm Hg    --------- 26  RV-RA gradient  Systemic veins       Value          Ref       05/22/2023  Estimated CVP        3     mm Hg    --------- 5  Inferior vena        Value          Ref       05/22/2023  cava  ID                   1.2   cm        <=2.1     ----------  Right ventricle      Value          Ref       05/22/2023  RV pressure, S,      31    mm Hg    --------- 31  DP Legend: (L)  and  (H)  adal values outside specified reference range. ---------------------------------------------------------------------------- Prepared and electronically signed by Марина Parnell 09/09/2024 17:30        Lab Results   Component Value Date    WBC 8.7 02/06/2024    HGB 15.5 02/06/2024    .0 02/06/2024      Lab Results   Component Value Date     (H) 05/15/2024    BUN 9 05/15/2024    CREATSERUM 0.90 05/15/2024    GFRNAA 81 02/19/2022    GFRAA 94 02/19/2022        Assessment and Plan:  Diagnoses and all orders for this visit:    Pain of left hip    Failure of left total hip arthroplasty, initial encounter ([facility])        Assessment: He has a resurfaced left hip with a metal-on-metal bearing surface and markedly elevated cobalt and chromium levels.  Inflammatory markers are not suggestive of infection.  His pain may be due to either to local irritation from metallosis, or referred from his lumbar spine.  His markedly elevated cobalt level, however is of great concern, and we had a lengthy discussion regarding this.    Plan: He would like to proceed with revision surgery.  My preference would be to revise the femoral stem with a dual mobility construct since the acetabular component appears well aligned and well-fixed.  If the proper size is not available, then we would revise both implants.  We will attempt to retrieve implant information from the  as the medical records department has purged records prior to 2011.  I reached out to one of the manufacturers.  The patient will return for further discussion after obtaining medical clearance, about a week prior to his surgery.    The above note was creating using Dragon speech recognition technology. Please excuse any typos.    ALEXANDRIA AVERY MD

## 2024-09-23 NOTE — TELEPHONE ENCOUNTER
Type of surgery:  Revision of left hip  Date: 12/3/24  Location: [facility]  Medical Clearance:      *Medical: Yes      *Dental: Yes      *Other:  Prior Authorization Status: Pending   Workers Comp:  [place]/Jf:   [place]: Yes  POV: 12/23/24

## 2024-09-24 ENCOUNTER — APPOINTMENT (OUTPATIENT)
Dept: PHYSICAL THERAPY | Facility: HOSPITAL | Age: 79
End: 2024-09-24
Attending: INTERNAL MEDICINE
Payer: MEDICARE

## 2024-09-26 ENCOUNTER — PATIENT MESSAGE (OUTPATIENT)
Facility: CLINIC | Age: 79
End: 2024-09-26

## 2024-10-15 ENCOUNTER — OFFICE VISIT (OUTPATIENT)
Dept: PHYSICAL MEDICINE AND REHAB | Facility: CLINIC | Age: 79
End: 2024-10-15
Payer: MEDICARE

## 2024-10-15 VITALS — BODY MASS INDEX: 25.47 KG/M2 | WEIGHT: 188 LBS | HEIGHT: 72 IN

## 2024-10-15 DIAGNOSIS — K21.9 GASTROESOPHAGEAL REFLUX DISEASE WITHOUT ESOPHAGITIS: ICD-10-CM

## 2024-10-15 DIAGNOSIS — M85.80 OSTEOPENIA, UNSPECIFIED LOCATION: ICD-10-CM

## 2024-10-15 DIAGNOSIS — N13.8 BPH WITH OBSTRUCTION/LOWER URINARY TRACT SYMPTOMS: ICD-10-CM

## 2024-10-15 DIAGNOSIS — M96.1 LUMBAR POST-LAMINECTOMY SYNDROME: Primary | ICD-10-CM

## 2024-10-15 DIAGNOSIS — E11.9 TYPE 2 DIABETES MELLITUS WITHOUT COMPLICATION, WITHOUT LONG-TERM CURRENT USE OF INSULIN (HCC): ICD-10-CM

## 2024-10-15 DIAGNOSIS — N40.1 BPH WITH OBSTRUCTION/LOWER URINARY TRACT SYMPTOMS: ICD-10-CM

## 2024-10-15 PROCEDURE — 99214 OFFICE O/P EST MOD 30 MIN: CPT | Performed by: PHYSICAL MEDICINE & REHABILITATION

## 2024-10-15 NOTE — PROGRESS NOTES
[facility]  Progress Note    CHIEF COMPLAINT:    Chief Complaint   Patient presents with    Follow - Up     LOV 9/11/2024 Patient follows up on BL L4-5 TFESI done 9/26. Admits to 60% relief, states the pain is much better than it was prior. Has not had as much pain in BL hips when walking. LOP 4/10       History of Present Illness:  Linwood Morales is a 79 year old male who presents today for follow up for atypical symptoms of lumbar stenosis.  I last saw him in late September for bilateral L4 transforaminal epidural injections which resulted in 60% relief.  He has persistent axial low back pain which was his presenting symptom, never had substantial leg pain despite moderate to severe L3-4 stenosis.    His typical pain is described as bilateral flank pain over the gluteus medius, variably severe, exacerbated by walking. No leg radiation, no numbness or tingling.     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    PPVx, Cryo, GFX, SF6    Myopia with astigmatism and presbyopia    Nocturia    Osteoarthritis    Rotator cuff tear, left    repair    Seasonal allergic rhinitis    Stiffness of joint, hand    bilateral, hand stiffness & weakness post trigger release    Trigger finger    Irf-trigger finger, recurrent - LRF trigger finger release    Type II or unspecified type diabetes mellitus without mention of complication, not stated as uncontrolled    Visual impairment    readers       SURGICAL HISTORY:  Past  Surgical History:   Procedure Laterality Date    Arthroscopy of joint unlisted  1990    knee    Arthroscopy of joint unlisted Left 2011    rotator cuff repair    Arthroscopy of joint unlisted Left 1992    hip resurfacing    Arthroscopy of joint unlisted Left 12/2008    hip resurfacing    Cataract      Cataract extraction w/  intraocular lens implant Right 09/17/2012    [facility]    Cataract extraction w/  intraocular lens implant Left 01/11/2010    [facility]    Colonoscopy  04/2015    repeat in 10 years.    Colonoscopy      Eye surgery Right 2013    repair detached retina    Eye surgery Right 02/20/2006    S/ P PPV RD repair (S/P PPVx, cryo, GFX, SF6 OD 4/25/13) Dr. Howe     Forearm/wrist surgery unlisted Left     left wrist surgery    Hand/finger surgery unlisted Left 9/29/2011    LRF trigger finger release    Hc inj epidural steroid lumbar or sacral w img guidance      x2 2019,x2 2017    Hernia surgery      hernia repair, herniorraphy    Laser surgery of eye      Other      bladder surgery x2    Other surgical history      esophageal dilatation    Other surgical history  6/15/2010    release triggers: RMF, RRF, LMF/ RRF Dupuytren's nodule excision    Repair of biceps tendon at elbow Left 1992    Retinal laser procedure      Spine surgery procedure unlisted  02/23/2022    L3-5 laminectomy, left L4-5 transforaminal lumbar interbody fusion;    Total hip replacement Left 2008    Yag capsulotomy - od - right eye Right 4/30/14    [facility]       SOCIAL HISTORY:   Social History     Occupational History    Not on file   Tobacco Use    Smoking status: Never    Smokeless tobacco: Never    Tobacco comments:     none   Vaping Use    Vaping status: Never Used   Substance and Sexual Activity    Alcohol use: Yes     Alcohol/week: 3.0 standard drinks of alcohol     Types: 2 Cans of beer, 1 Standard drinks or equivalent per week     Comment: 2-3 times per week    Drug use: No    Sexual activity: Not on file       CURRENT MEDICATIONS:   Current  Outpatient Medications   Medication Sig Dispense Refill    Risedronate Sodium 150 MG Oral Tab Take 1 tablet (150 mg total) by mouth every 30 (thirty) days. 3 tablet 3    ipratropium 0.03 % Nasal Solution 2 sprays by Nasal route every 12 (twelve) hours. 1 each 5    Omeprazole 40 MG Oral Capsule Delayed Release Take 1 capsule (40 mg total) by mouth daily. 90 capsule 3    NIFEdipine ER 90 MG Oral Tablet 24 Hr Take 1 tablet (90 mg total) by mouth daily. 90 tablet 3    Lovastatin 40 MG Oral Tab Take 1 tablet (40 mg total) by mouth nightly. 100 tablet 2    fluticasone propionate 50 MCG/ACT Nasal Suspension 1 spray by Each Nare route daily as needed. 16 g 2    metFORMIN 500 MG Oral Tab Take 1 tablet (500 mg total) by mouth 2 (two) times daily. 180 tablet 3    lisinopril 5 MG Oral Tab Take 1 tablet (5 mg total) by mouth daily. 90 tablet 3    tadalafil 5 MG Oral Tab Take 1 tablet (5 mg total) by mouth daily as needed for Erectile Dysfunction. 90 tablet 3    ADVAIR DISKUS 100-50 MCG/ACT Inhalation Aerosol Powder, Breath Activated Inhale 1 puff into the lungs 2 (two) times daily. 180 each 1    cetirizine 10 MG Oral Tab Take 1 tablet (10 mg total) by mouth daily. 90 tablet 3    diclofenac 1 % External Gel       ketoconazole 2 % External Cream Apply 1 Application topically 2 (two) times daily.      Vitamin B-12 1000 MCG Oral Tab Take 1 tablet (1,000 mcg total) by mouth daily.      Cholecalciferol (VITAMIN D) 1000 UNITS Oral Cap Take by mouth daily.      Multiple Vitamins-Minerals (CENTRUM SILVER) Oral Tab Take 1 tablet by mouth daily.        Cinnamon 500 MG Oral Cap Take 500 mg by mouth daily.         ALLERGIES:   Allergies[1]        PHYSICAL EXAM:   Ht 72\"   Wt 188 lb (85.3 kg)   BMI 25.50 kg/m²     Body mass index is 25.5 kg/m².      General: No immediate distress  Extremities: No lower extremity edema bilaterally   Spine: full and painfree lumbar ROM in all directions  Hips: full and painfree ROM   Neuro:   Cognition: alert  & oriented x 3, attentive, able to follow 2 step commands, comprehention intact, spontaneous speech intact  Strength: Lower extremities have 5/5 strength          Data    Radiology Imaging:  I reviewed a CT of the left hip which shows no evidence of loosening of previous hemiarthroplasty.  There is minimal heterotopic ossification not impacting joint range of motion.  I reviewed a plain film x-ray of the lumbar spine with flexion and extension.  There is intact surgical fusion hardware at L4-5 with severe disc degeneration at L5-S1.  At L1-2 there are several millimeters of subluxation reduced with lumbar flexion causing visible foraminal narrowing.  There is demineralization.   I reviewed a lumbar MRI from August 2024 showing an intact L4-5 fusion.  There is wavy appearance of the cauda equina, there is superjacent moderate to severe L3-4 central canal stenosis on a multifactorial basis primarily due to facet hypertrophy.      ASSESSMENT AND PLAN:  1. Lumbar post-laminectomy syndrome: L3-5 laminectomy and L4-5 fusion  Atypical symptoms of superjacent stenosis.  Much better after CARLEY, can do a lot more around the house.  He tells me he is scheduled for a redo left hip arthroplasty in December.  Must proceed with caution with steroids in regards to this.  If worsened, might consider neuropathic pain medicine.  Right now we will do watchful waiting.  He also requested sedation if we need to do the injection again.    2. Type 2 diabetes mellitus without complication, without long-term current use of insulin (HCC)  Avoiding steroids if possible    3. Gastroesophageal reflux disease without esophagitis  Avoiding NSAIDs    4. Osteopenia, unspecified location  Avoiding steroids if possible    5. BPH with obstruction/lower urinary tract symptoms  No TCAs        RTC as needed      The patient was in agreement with the assessment and plan.  All questions were answered.        Michael Barksdale MD  Physical Medicine and  Rehabilitation/Sports Medicine  [facility]           [1]   Allergies  Allergen Reactions    Penicillins HIVES

## 2024-10-17 ENCOUNTER — PATIENT MESSAGE (OUTPATIENT)
Facility: CLINIC | Age: 79
End: 2024-10-17

## 2024-10-18 ENCOUNTER — PATIENT MESSAGE (OUTPATIENT)
Dept: ORTHOPEDICS CLINIC | Facility: CLINIC | Age: 79
End: 2024-10-18

## 2024-10-18 NOTE — TELEPHONE ENCOUNTER
I could not find the name of the shots in the message.?  Does he mean flu?  RSV?  COVID?  Please let me know.  Thank you

## 2024-10-31 ENCOUNTER — TELEPHONE (OUTPATIENT)
Dept: INTERNAL MEDICINE CLINIC | Facility: CLINIC | Age: 79
End: 2024-10-31

## 2024-10-31 ENCOUNTER — MED REC SCAN ONLY (OUTPATIENT)
Dept: INTERNAL MEDICINE CLINIC | Facility: CLINIC | Age: 79
End: 2024-10-31

## 2024-11-06 ENCOUNTER — APPOINTMENT (OUTPATIENT)
Dept: URBAN - METROPOLITAN AREA CLINIC 244 | Age: 79
Setting detail: DERMATOLOGY
End: 2024-11-06

## 2024-11-06 ENCOUNTER — MED REC SCAN ONLY (OUTPATIENT)
Dept: INTERNAL MEDICINE CLINIC | Facility: CLINIC | Age: 79
End: 2024-11-06

## 2024-11-06 DIAGNOSIS — L82.1 OTHER SEBORRHEIC KERATOSIS: ICD-10-CM

## 2024-11-06 DIAGNOSIS — D22 MELANOCYTIC NEVI: ICD-10-CM

## 2024-11-06 DIAGNOSIS — L81.4 OTHER MELANIN HYPERPIGMENTATION: ICD-10-CM

## 2024-11-06 PROBLEM — D22.9 MELANOCYTIC NEVI, UNSPECIFIED: Status: ACTIVE | Noted: 2024-11-06

## 2024-11-06 PROCEDURE — OTHER COUNSELING: OTHER

## 2024-11-06 PROCEDURE — 99213 OFFICE O/P EST LOW 20 MIN: CPT

## 2024-11-08 ENCOUNTER — PATIENT MESSAGE (OUTPATIENT)
Dept: ORTHOPEDICS CLINIC | Facility: CLINIC | Age: 79
End: 2024-11-08

## 2024-11-14 ENCOUNTER — OFFICE VISIT (OUTPATIENT)
Facility: CLINIC | Age: 79
End: 2024-11-14
Payer: MEDICARE

## 2024-11-14 VITALS
HEART RATE: 60 BPM | OXYGEN SATURATION: 96 % | HEIGHT: 72 IN | SYSTOLIC BLOOD PRESSURE: 138 MMHG | WEIGHT: 187 LBS | BODY MASS INDEX: 25.33 KG/M2 | DIASTOLIC BLOOD PRESSURE: 80 MMHG

## 2024-11-14 DIAGNOSIS — Z01.818 PREOPERATIVE CLEARANCE: Primary | ICD-10-CM

## 2024-11-14 DIAGNOSIS — E78.2 MIXED HYPERLIPIDEMIA: ICD-10-CM

## 2024-11-14 DIAGNOSIS — I10 ESSENTIAL HYPERTENSION: ICD-10-CM

## 2024-11-14 DIAGNOSIS — I35.0 MILD AORTIC STENOSIS: ICD-10-CM

## 2024-11-14 DIAGNOSIS — E11.9 DIABETES MELLITUS TYPE 2 WITHOUT RETINOPATHY (HCC): ICD-10-CM

## 2024-11-14 PROCEDURE — 1126F AMNT PAIN NOTED NONE PRSNT: CPT | Performed by: INTERNAL MEDICINE

## 2024-11-14 PROCEDURE — 99214 OFFICE O/P EST MOD 30 MIN: CPT | Performed by: INTERNAL MEDICINE

## 2024-11-14 PROCEDURE — G2211 COMPLEX E/M VISIT ADD ON: HCPCS | Performed by: INTERNAL MEDICINE

## 2024-11-14 PROCEDURE — 1159F MED LIST DOCD IN RCRD: CPT | Performed by: INTERNAL MEDICINE

## 2024-11-14 NOTE — PROGRESS NOTES
Linwood Morales is a 79 year old male.  Chief Complaint   Patient presents with    Pre-Op Exam     Surgery scheduled for 12/3 for Revision of left hip resurfacing with Martín Mauro MD     HPI:   79-year-old gentleman with medical history significant for diabetes, hypertension, dyslipidemia, mild aortic stenosis here for preoperative clearance.  He reports that he is doing well.  He denies any chest pain, shortness of breath, palpitations, lower extremity swelling.  No bleeding reported.  He informed me that he is able to walk more than 2 blocks and able to climb 1-2 flight of stairs.        Current Outpatient Medications   Medication Sig Dispense Refill    Risedronate Sodium 150 MG Oral Tab Take 1 tablet (150 mg total) by mouth every 30 (thirty) days. 3 tablet 3    ipratropium 0.03 % Nasal Solution 2 sprays by Nasal route every 12 (twelve) hours. 1 each 5    Omeprazole 40 MG Oral Capsule Delayed Release Take 1 capsule (40 mg total) by mouth daily. 90 capsule 3    NIFEdipine ER 90 MG Oral Tablet 24 Hr Take 1 tablet (90 mg total) by mouth daily. 90 tablet 3    Lovastatin 40 MG Oral Tab Take 1 tablet (40 mg total) by mouth nightly. 100 tablet 2    fluticasone propionate 50 MCG/ACT Nasal Suspension 1 spray by Each Nare route daily as needed. 16 g 2    metFORMIN 500 MG Oral Tab Take 1 tablet (500 mg total) by mouth 2 (two) times daily. 180 tablet 3    lisinopril 5 MG Oral Tab Take 1 tablet (5 mg total) by mouth daily. 90 tablet 3    tadalafil 5 MG Oral Tab Take 1 tablet (5 mg total) by mouth daily as needed for Erectile Dysfunction. 90 tablet 3    diclofenac 1 % External Gel       ketoconazole 2 % External Cream Apply 1 Application topically 2 (two) times daily.      Vitamin B-12 1000 MCG Oral Tab Take 1 tablet (1,000 mcg total) by mouth daily.      Cholecalciferol (VITAMIN D) 1000 UNITS Oral Cap Take by mouth daily.      Multiple Vitamins-Minerals (CENTRUM SILVER) Oral Tab Take 1 tablet by mouth daily.         Cinnamon 500 MG Oral Cap Take 500 mg by mouth daily.      ADVAIR DISKUS 100-50 MCG/ACT Inhalation Aerosol Powder, Breath Activated Inhale 1 puff into the lungs 2 (two) times daily. 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    PPVx, Cryo, GFX, SF6    Myopia with astigmatism and presbyopia    Nocturia    Osteoarthritis    Rotator cuff tear, left    repair    Seasonal allergic rhinitis    Stiffness of joint, hand    bilateral, hand stiffness & weakness post trigger release    Trigger finger    Irf-trigger finger, recurrent - LRF trigger finger release    Type II or unspecified type diabetes mellitus without mention of complication, not stated as uncontrolled    Visual impairment    readers      Past Surgical History:   Procedure Laterality Date    Arthroscopy of joint unlisted  1990    knee    Arthroscopy of joint unlisted Left 2011    rotator cuff repair    Arthroscopy of joint unlisted Left 1992    hip resurfacing    Arthroscopy of joint unlisted Left 12/2008    hip resurfacing    Cataract      Cataract extraction w/  intraocular lens implant Right 09/17/2012    [facility]    Cataract extraction w/  intraocular lens implant Left 01/11/2010    [facility]    Colonoscopy  04/2015    repeat in 10 years.    Colonoscopy      Eye surgery Right 2013    repair detached retina    Eye surgery Right 02/20/2006    S/ P PPV RD repair (S/P PPVx, cryo, GFX, SF6 OD 4/25/13) Dr. Howe     Forearm/wrist surgery unlisted Left     left wrist  surgery    Hand/finger surgery unlisted Left 9/29/2011    LRF trigger finger release    Hc inj epidural steroid lumbar or sacral w img guidance      x2 2019,x2 2017    Hernia surgery      hernia repair, herniorraphy    Laser surgery of eye      Other      bladder surgery x2    Other surgical history      esophageal dilatation    Other surgical history  6/15/2010    release triggers: RMF, RRF, LMF/ RRF Dupuytren's nodule excision    Repair of biceps tendon at elbow Left 1992    Retinal laser procedure      Spine surgery procedure unlisted  02/23/2022    L3-5 laminectomy, left L4-5 transforaminal lumbar interbody fusion;    Total hip replacement Left 2008    Yag capsulotomy - od - right eye Right 4/30/14    [facility]      Social History:  Social History     Socioeconomic History    Marital status:    Tobacco Use    Smoking status: Never    Smokeless tobacco: Never    Tobacco comments:     none   Vaping Use    Vaping status: Never Used   Substance and Sexual Activity    Alcohol use: Yes     Alcohol/week: 3.0 standard drinks of alcohol     Types: 2 Cans of beer, 1 Standard drinks or equivalent per week     Comment: 2-3 times per week    Drug use: No   Other Topics Concern    Caffeine Concern Yes     Comment: soda, 8 oz daily    Exercise No    Pt has a pacemaker No    Reaction to local anesthetic No   Social History Narrative    The patient does  use an assistive device..  Cane 50% of time for knee pain.     The patient does live in a home with stairs.      Family History   Problem Relation Age of Onset    Colon Cancer Mother     Other (pulmonary Embolism) Father     Glaucoma Neg     Diabetes Neg     Macular degeneration Neg       Allergies[1]     REVIEW OF SYSTEMS:   Review of Systems   Review of Systems   Constitutional: Negative for activity change, appetite change and fever.   HENT: Negative for congestion and voice change.    Respiratory: Negative for cough and shortness of breath.    Cardiovascular: Negative for  chest pain.   Gastrointestinal: Negative for abdominal distention, abdominal pain and vomiting.   Genitourinary: Negative for hematuria.   Skin: Negative for wound.   Psychiatric/Behavioral: Negative for behavioral problems.   Wt Readings from Last 5 Encounters:   11/14/24 187 lb (84.8 kg)   10/15/24 188 lb (85.3 kg)   09/13/24 188 lb (85.3 kg)   09/23/24 183 lb 6.4 oz (83.2 kg)   09/11/24 185 lb (83.9 kg)     Body mass index is 25.36 kg/m².      EXAM:   /80   Pulse 60   Ht 6' (1.829 m)   Wt 187 lb (84.8 kg)   SpO2 96%   BMI 25.36 kg/m²   Physical Exam   Constitutional:       Appearance: Normal appearance.   HENT:      Head: Normocephalic.   Eyes:      Conjunctiva/sclera: Conjunctivae normal.   Cardiovascular:      Rate and Rhythm: Normal rate and regular rhythm.      Heart sounds: Normal heart sounds.  Systolic murmur heard.  Pulmonary:      Effort: Pulmonary effort is normal.      Breath sounds: Normal breath sounds. No rhonchi or rales.   Abdominal:      General: Bowel sounds are normal.      Palpations: Abdomen is soft.      Tenderness: There is no abdominal tenderness.   Musculoskeletal:      Cervical back: Neck supple.      Right lower leg: No edema.      Left lower leg: No edema.   Skin:     General: Skin is warm and dry.   Neurological:      General: No focal deficit present.      Mental Status: He is alert and oriented to person, place, and time. Mental status is at baseline.   Psychiatric:         Mood and Affect: Mood normal.         Behavior: Behavior normal.       ASSESSMENT AND PLAN:   1. Preoperative clearance  Patient has no major clinical predictors going for left hip surgery.  Patient has reasonable functional capacity as described above.  I have ordered blood works, EKG, chest x-ray as requested by surgeon.  If they are acceptable, I will consider patient as low to moderate risk for perioperative cardiac events with routine perioperative care.  DVT prophylaxis as protocol  The above  risk assessment was discussed with the patient and would like to proceed with the surgery.  Patient will discuss the risk and benefit of the surgery with the surgeon.  The above note will be faxed to surgeon's office.    Surgeons name Dr Sevilla  Location of surgery [facility]  Date of surgery December 3, 2024  Fax number to surgeon's office surgeon uses epic    - XR CHEST PA + LAT CHEST (CPT=71046); Future  - EKG 12 Lead; Future  - CBC, Platelet; No Differential; Future  - Comp Metabolic Panel (14); Future  - UA Microscopic only, urine; Future  - Urine Culture, Routine; Future    2. Diabetes mellitus type 2 without retinopathy (HCC)  Monitor blood sugars at home.  Hold metformin the night before surgery    3. Essential hypertension  Controlled    4. Mixed hyperlipidemia  Continue statins    5. Mild aortic stenosis  Continue the surveillance echocardiogram    Plan: Risk stratification as above      The patient indicates understanding of these issues and agrees to the plan.  No follow-ups on file.  Addendum dated November 15, 2024 at 2:10 PM.  I have reviewed chest x-ray, EKG, CBC, basic metabolic panel and urinalysis.  They are acceptable for surgery.  Risk stratification as above.    This note was prepared using Dragon [employer] voice recognition dictation software. As a result errors may occur. When identified these errors have been corrected. While every attempt is made to correct errors during dictation discrepancies may still exist.         [1]   Allergies  Allergen Reactions    Penicillins HIVES

## 2024-11-15 ENCOUNTER — TELEPHONE (OUTPATIENT)
Dept: INTERNAL MEDICINE CLINIC | Facility: CLINIC | Age: 79
End: 2024-11-15

## 2024-11-15 ENCOUNTER — LAB ENCOUNTER (OUTPATIENT)
Dept: LAB | Facility: HOSPITAL | Age: 79
End: 2024-11-15
Attending: INTERNAL MEDICINE
Payer: MEDICARE

## 2024-11-15 ENCOUNTER — HOSPITAL ENCOUNTER (OUTPATIENT)
Dept: GENERAL RADIOLOGY | Facility: HOSPITAL | Age: 79
Discharge: HOME | End: 2024-11-15
Attending: INTERNAL MEDICINE
Payer: MEDICARE

## 2024-11-15 DIAGNOSIS — Z01.818 PREOPERATIVE CLEARANCE: ICD-10-CM

## 2024-11-15 LAB
ALBUMIN SERPL-MCNC: 4.5 G/DL (ref 3.2–4.8)
ALBUMIN/GLOB SERPL: 1.9 {RATIO} (ref 1–2)
ALP LIVER SERPL-CCNC: 79 U/L
ALT SERPL-CCNC: 10 U/L
ANION GAP SERPL CALC-SCNC: 6 MMOL/L (ref 0–18)
AST SERPL-CCNC: 13 U/L (ref ?–34)
ATRIAL RATE: 78 BPM
BILIRUB SERPL-MCNC: 0.8 MG/DL (ref 0.2–1.1)
BUN BLD-MCNC: 12 MG/DL (ref 9–23)
BUN/CREAT SERPL: 12.8 (ref 10–20)
CALCIUM BLD-MCNC: 10.1 MG/DL (ref 8.7–10.4)
CHLORIDE SERPL-SCNC: 110 MMOL/L (ref 98–112)
CO2 SERPL-SCNC: 30 MMOL/L (ref 21–32)
CREAT BLD-MCNC: 0.94 MG/DL
DEPRECATED RDW RBC AUTO: 47.2 FL (ref 35.1–46.3)
EGFRCR SERPLBLD CKD-EPI 2021: 82 ML/MIN/1.73M2 (ref 60–?)
ERYTHROCYTE [DISTWIDTH] IN BLOOD BY AUTOMATED COUNT: 13.2 % (ref 11–15)
FASTING STATUS PATIENT QL REPORTED: NO
GLOBULIN PLAS-MCNC: 2.4 G/DL (ref 2–3.5)
GLUCOSE BLD-MCNC: 106 MG/DL (ref 70–99)
HCT VFR BLD AUTO: 41.5 %
HGB BLD-MCNC: 14.4 G/DL
MCH RBC QN AUTO: 34.1 PG (ref 26–34)
MCHC RBC AUTO-ENTMCNC: 34.7 G/DL (ref 31–37)
MCV RBC AUTO: 98.3 FL
OSMOLALITY SERPL CALC.SUM OF ELEC: 302 MOSM/KG (ref 275–295)
P AXIS: 40 DEGREES
P-R INTERVAL: 156 MS
PLATELET # BLD AUTO: 254 10(3)UL (ref 150–450)
POTASSIUM SERPL-SCNC: 4.9 MMOL/L (ref 3.5–5.1)
PROT SERPL-MCNC: 6.9 G/DL (ref 5.7–8.2)
Q-T INTERVAL: 372 MS
QRS DURATION: 94 MS
QTC CALCULATION (BEZET): 424 MS
R AXIS: -68 DEGREES
RBC # BLD AUTO: 4.22 X10(6)UL
SODIUM SERPL-SCNC: 146 MMOL/L (ref 136–145)
T AXIS: 44 DEGREES
VENTRICULAR RATE: 78 BPM
WBC # BLD AUTO: 8 X10(3) UL (ref 4–11)

## 2024-11-15 PROCEDURE — 85027 COMPLETE CBC AUTOMATED: CPT

## 2024-11-15 PROCEDURE — 81015 MICROSCOPIC EXAM OF URINE: CPT

## 2024-11-15 PROCEDURE — 93010 ELECTROCARDIOGRAM REPORT: CPT | Performed by: INTERNAL MEDICINE

## 2024-11-15 PROCEDURE — 71046 X-RAY EXAM CHEST 2 VIEWS: CPT | Performed by: INTERNAL MEDICINE

## 2024-11-15 PROCEDURE — 87086 URINE CULTURE/COLONY COUNT: CPT

## 2024-11-15 PROCEDURE — 93005 ELECTROCARDIOGRAM TRACING: CPT

## 2024-11-15 PROCEDURE — 87081 CULTURE SCREEN ONLY: CPT

## 2024-11-15 PROCEDURE — 80053 COMPREHEN METABOLIC PANEL: CPT

## 2024-11-15 PROCEDURE — 36415 COLL VENOUS BLD VENIPUNCTURE: CPT

## 2024-11-15 NOTE — TELEPHONE ENCOUNTER
Form completed, called pt and confirmed name and , let pt know form is ready for  at [facility], pt verbalized understanding and will pick it up today

## 2024-11-15 NOTE — TELEPHONE ENCOUNTER
Pt dropped off placard form that needs to be filled out by pcp   Please inform pt when form has been completed

## 2024-11-25 ENCOUNTER — OFFICE VISIT (OUTPATIENT)
Dept: ORTHOPEDICS CLINIC | Facility: CLINIC | Age: 79
End: 2024-11-25
Payer: MEDICARE

## 2024-11-25 VITALS — HEIGHT: 72 IN | BODY MASS INDEX: 25.78 KG/M2 | WEIGHT: 190.38 LBS

## 2024-11-25 DIAGNOSIS — T84.011A FAILURE OF LEFT TOTAL HIP ARTHROPLASTY, INITIAL ENCOUNTER (HCC): Primary | ICD-10-CM

## 2024-11-25 PROCEDURE — 1159F MED LIST DOCD IN RCRD: CPT | Performed by: PHYSICIAN ASSISTANT

## 2024-11-25 PROCEDURE — 99213 OFFICE O/P EST LOW 20 MIN: CPT | Performed by: PHYSICIAN ASSISTANT

## 2024-11-25 PROCEDURE — 1160F RVW MEDS BY RX/DR IN RCRD: CPT | Performed by: PHYSICIAN ASSISTANT

## 2024-11-25 RX ORDER — ACETAMINOPHEN 500 MG
500 TABLET ORAL EVERY 6 HOURS PRN
COMMUNITY

## 2024-11-25 NOTE — PROGRESS NOTES
NURSING INTAKE COMMENTS:   Chief Complaint   Patient presents with    Pre-Op     L MARGARET revision scheduled sx on 12/03/2024       HPI: This 79 year old male presents today for preoperative consultation.  He is scheduled for a revision left total hip arthroplasty next week.  He is anxious to proceed with surgery.  He continues to have pain and clicking in the hip.  He is ambulating with a cane.  He has seen his providers.    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    PPVx, Cryo, GFX, SF6    Myopia with astigmatism and presbyopia    Nocturia    Osteoarthritis    Rotator cuff tear, left    repair    Seasonal allergic rhinitis    Stiffness of joint, hand    bilateral, hand stiffness & weakness post trigger release    Trigger finger    Irf-trigger finger, recurrent - LRF trigger finger release    Type II or unspecified type diabetes mellitus without mention of complication, not stated as uncontrolled    Visual impairment    readers     Past Surgical History:   Procedure Laterality Date    Arthroscopy of joint unlisted  1990    knee    Arthroscopy of joint unlisted Left 2011    rotator cuff repair    Arthroscopy of joint unlisted Left 1992    hip resurfacing    Arthroscopy of joint unlisted Left 12/2008    hip resurfacing    Cataract      Cataract extraction w/  intraocular lens implant Right 09/17/2012    [facility]    Cataract extraction w/  intraocular lens implant Left 01/11/2010    [facility]    Colonoscopy  04/2015    repeat in 10 years.    Colonoscopy      Eye surgery Right 2013    repair  detached retina    Eye surgery Right 02/20/2006    S/ P PPV RD repair (S/P PPVx, cryo, GFX, SF6 OD 4/25/13) Dr. Howe     Forearm/wrist surgery unlisted Left     left wrist surgery    Hand/finger surgery unlisted Left 9/29/2011    LRF trigger finger release    Hc inj epidural steroid lumbar or sacral w img guidance      x2 2019,x2 2017    Hernia surgery      hernia repair, herniorraphy    Laser surgery of eye      Other      bladder surgery x2    Other surgical history      esophageal dilatation    Other surgical history  6/15/2010    release triggers: RMF, RRF, LMF/ RRF Dupuytren's nodule excision    Repair of biceps tendon at elbow Left 1992    Retinal laser procedure      Spine surgery procedure unlisted  02/23/2022    L3-5 laminectomy, left L4-5 transforaminal lumbar interbody fusion;    Total hip replacement Left 2008    Yag capsulotomy - od - right eye Right 4/30/14    [facility]     Current Outpatient Medications   Medication Sig Dispense Refill    Risedronate Sodium 150 MG Oral Tab Take 1 tablet (150 mg total) by mouth every 30 (thirty) days. 3 tablet 3    ipratropium 0.03 % Nasal Solution 2 sprays by Nasal route every 12 (twelve) hours. 1 each 5    Omeprazole 40 MG Oral Capsule Delayed Release Take 1 capsule (40 mg total) by mouth daily. 90 capsule 3    NIFEdipine ER 90 MG Oral Tablet 24 Hr Take 1 tablet (90 mg total) by mouth daily. 90 tablet 3    Lovastatin 40 MG Oral Tab Take 1 tablet (40 mg total) by mouth nightly. 100 tablet 2    fluticasone propionate 50 MCG/ACT Nasal Suspension 1 spray by Each Nare route daily as needed. 16 g 2    metFORMIN 500 MG Oral Tab Take 1 tablet (500 mg total) by mouth 2 (two) times daily. 180 tablet 3    lisinopril 5 MG Oral Tab Take 1 tablet (5 mg total) by mouth daily. 90 tablet 3    tadalafil 5 MG Oral Tab Take 1 tablet (5 mg total) by mouth daily as needed for Erectile Dysfunction. 90 tablet 3    ADVAIR DISKUS 100-50 MCG/ACT Inhalation Aerosol Powder, Breath Activated Inhale  1 puff into the lungs 2 (two) times daily. (Patient not taking: Reported on 11/14/2024) 180 each 1    diclofenac 1 % External Gel       ketoconazole 2 % External Cream Apply 1 Application topically 2 (two) times daily.      Vitamin B-12 1000 MCG Oral Tab Take 1 tablet (1,000 mcg total) by mouth daily.      Cholecalciferol (VITAMIN D) 1000 UNITS Oral Cap Take by mouth daily.      Multiple Vitamins-Minerals (CENTRUM SILVER) Oral Tab Take 1 tablet by mouth daily.        Cinnamon 500 MG Oral Cap Take 500 mg by mouth daily.       Allergies[1]  Family History   Problem Relation Age of Onset    Colon Cancer Mother     Other (pulmonary Embolism) Father     Glaucoma Neg     Diabetes Neg     Macular degeneration Neg        Social History     Occupational History    Not on file   Tobacco Use    Smoking status: Never    Smokeless tobacco: Never    Tobacco comments:     none   Vaping Use    Vaping status: Never Used   Substance and Sexual Activity    Alcohol use: Yes     Alcohol/week: 3.0 standard drinks of alcohol     Types: 2 Cans of beer, 1 Standard drinks or equivalent per week     Comment: 2-3 times per week    Drug use: No    Sexual activity: Not on file        Review of Systems:  GENERAL: feels generally well, no significant weight loss or weight gain  SKIN: no ulcerated or worrisome skin lesions  EYES:denies blurred vision or double vision  HEENT: denies new nasal congestion, sinus pain or ST  LUNGS: denies shortness of breath  CARDIOVASCULAR: denies chest pain  GI: no hematemesis, no worsening heartburn, no diarrhea  : no dysuria, no blood in urine, no difficulty urinating, no incontinence  MUSCULOSKELETAL: no other musculoskeletal complaints other than in HPI  NEURO: no numbness or tingling, no weakness or balance disorder  PSYCHE: no depression or anxiety  HEMATOLOGIC: no hx of blood dyscrasia  ENDOCRINE: no thyroid or diabetes issues  ALL/ASTHMA: no new hx of severe allergy or asthma    Physical Examination:    Ht  6' (1.829 m)   Wt 190 lb 6.4 oz (86.4 kg)   BMI 25.82 kg/m²   Constitutional: appears well hydrated, alert and responsive, no acute distress noted  Extremities: His exam is unchanged.      Imaging: XR CHEST PA + LAT CHEST (CPT=71046)    Result Date: 11/15/2024  PROCEDURE: XR CHEST PA + LAT CHEST (CPT=71046)  COMPARISON: [facility], XR CHEST PA + LAT CHEST (OUP=07524), 1/03/2022, 9:47 AM.  INDICATIONS: Pre-operative examination, hip surgery 12/05/2024.  TECHNIQUE:   Two views.   FINDINGS: CARDIAC/VASC: Normal.  No cardiac silhouette abnormality or cardiomegaly.  Unremarkable pulmonary vasculature.  MEDIAST/ANASTASIA: No visible mass or adenopathy. LUNGS/PLEURA: The lungs are hyperinflated with flattening of the diaphragms compatible with emphysematous change.  Minimal scarring is identified at the left lung base.  There is no focal consolidation, effusion, or pneumothorax. BONES: No fracture or visible bony lesion. OTHER: Negative.          CONCLUSION: No acute cardiopulmonary process.    Dictated by (CST): Ector Cano MD on 11/15/2024 at 9:13 AM     Finalized by (CST): Ector Cano MD on 11/15/2024 at 9:13 AM             Lab Results   Component Value Date    WBC 8.0 11/15/2024    HGB 14.4 11/15/2024    .0 11/15/2024      Lab Results   Component Value Date     (H) 11/15/2024    BUN 12 11/15/2024    CREATSERUM 0.94 11/15/2024    GFRNAA 81 02/19/2022    GFRAA 94 02/19/2022        Assessment and Plan:  Diagnoses and all orders for this visit:    Failure of left total hip arthroplasty, subsequent encounter ([facility])        Plan: Mr. Oakley scheduled for a revision left total hip arthroplasty next week.  We discussed the surgery and the expected risks and benefits.  I answered all of his questions.  He has received his clearances.  We will proceed with surgery as scheduled.    The above note was creating using Dragon speech recognition technology. Please excuse any typos.    This  visit was performed under the supervision of Dr. Martín Mauro who formulated the treatment plan and decision making.        [1]   Allergies  Allergen Reactions    Penicillins HIVES

## 2024-11-26 ENCOUNTER — LAB ENCOUNTER (OUTPATIENT)
Dept: LAB | Facility: HOSPITAL | Age: 79
End: 2024-11-26
Attending: ORTHOPAEDIC SURGERY
Payer: MEDICARE

## 2024-11-26 DIAGNOSIS — Z01.818 PREOP TESTING: ICD-10-CM

## 2024-11-26 LAB
ANTIBODY SCREEN: NEGATIVE
RH BLOOD TYPE: POSITIVE

## 2024-11-26 PROCEDURE — 86901 BLOOD TYPING SEROLOGIC RH(D): CPT

## 2024-11-26 PROCEDURE — 36415 COLL VENOUS BLD VENIPUNCTURE: CPT

## 2024-11-26 PROCEDURE — 86900 BLOOD TYPING SEROLOGIC ABO: CPT

## 2024-11-26 PROCEDURE — 86850 RBC ANTIBODY SCREEN: CPT

## 2024-11-29 NOTE — TELEPHONE ENCOUNTER
LOV 5/11/18  RTC 1 month  F/U scheduled 4/14/20    Last office visit note documented Breo inhaler.   Please advise on medication request. Ketty Clark) 2.915 DISCHARGE

## 2024-12-02 NOTE — H&P
[facility]  part of [facility]    History & Physical    Linwood Morales Patient Status:  Outpatient in a Bed    3/17/1945 MRN [id number]   Location [facility] OPERATING ROOM Attending Martín Mauro, *   Hosp Day # 0 PCP Mark Gupta MD     Date:  2024    History provided by:patient  Chief Complaint:   Left hip pain    HPI:   Linwood Morales is a(n) 79 year old male. He presents with complaints of left hip pain.  He was referred by Dr. Gamboa for evaluation of left hip revision.  He had a left hip resurfacing arthroplasty done by . She had an illness in .  He has been having some pain and clicking in the left hip.  He also has a history of lumbar spine pathology and has had a fusion at L4-5.Recent labs were done including cobalt and chromium levels with a cobalt level of 35.6, and chromium level of 16.4.  C-reactive protein was 0.4, and the sed rate was 4.  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    PPVx, Cryo, GFX, SF6    Myopia with astigmatism and presbyopia    Nocturia    Osteoarthritis    Rotator cuff tear, left    repair    Seasonal allergic rhinitis    Stiffness of joint, hand    bilateral, hand stiffness & weakness post trigger release    Trigger finger    Irf-trigger finger, recurrent - LRF trigger finger release    Type II or unspecified type diabetes mellitus without mention of complication, not stated as uncontrolled    Visual impairment     readers     Past Surgical History:   Procedure Laterality Date    Arthroscopy of joint unlisted  1990    knee    Arthroscopy of joint unlisted Left 2011    rotator cuff repair    Arthroscopy of joint unlisted Left 1992    hip resurfacing    Arthroscopy of joint unlisted Left 12/2008    hip resurfacing    Cataract      Cataract extraction w/  intraocular lens implant Right 09/17/2012    [facility]    Cataract extraction w/  intraocular lens implant Left 01/11/2010    [facility]    Colonoscopy  04/2015    repeat in 10 years.    Colonoscopy      Eye surgery Right 2013    repair detached retina    Eye surgery Right 02/20/2006    S/ P PPV RD repair (S/P PPVx, cryo, GFX, SF6 OD 4/25/13) Dr. Howe     Forearm/wrist surgery unlisted Left     left wrist surgery    Hand/finger surgery unlisted Left 9/29/2011    LRF trigger finger release    Hc inj epidural steroid lumbar or sacral w img guidance      x2 2019,x2 2017    Hernia surgery      hernia repair, herniorraphy    Laser surgery of eye      Other      bladder surgery x2    Other surgical history      esophageal dilatation    Other surgical history  6/15/2010    release triggers: RMF, RRF, LMF/ RRF Dupuytren's nodule excision    Repair of biceps tendon at elbow Left 1992    Retinal laser procedure      Spine surgery procedure unlisted  02/23/2022    L3-5 laminectomy, left L4-5 transforaminal lumbar interbody fusion;    Total hip replacement Left 2008    Yag capsulotomy - od - right eye Right 4/30/14    [facility]     Family History   Problem Relation Age of Onset    Other (pulmonary Embolism) Father     Colon Cancer Mother     No Known Problems Brother     Glaucoma Neg     Diabetes Neg     Macular degeneration Neg      Social History:  Social History     Socioeconomic History    Marital status:    Tobacco Use    Smoking status: Never    Smokeless tobacco: Never    Tobacco comments:     none   Vaping Use    Vaping status: Never Used   Substance and Sexual Activity    Alcohol use: Yes      Alcohol/week: 3.0 standard drinks of alcohol     Types: 2 Cans of beer, 1 Standard drinks or equivalent per week     Comment: 2-3 times per week    Drug use: Never   Other Topics Concern    Caffeine Concern Yes     Comment: soda, 8 oz daily    Exercise No    Pt has a pacemaker No    Reaction to local anesthetic No   Social History Narrative    The patient does  use an assistive device..  Cane 50% of time for knee pain.     The patient does live in a home with stairs.     Allergies/Medications:   Allergies: Allergies[1]  No medications prior to admission.       Review of Systems:   Pertinent items are noted in HPI.    Physical Exam:   Vital Signs:  Height 6' (1.829 m), weight 190 lb (86.2 kg).     General appearance: alert, appears stated age and cooperative  Extremities:  Ambulates with a cane.  Resisted hip flexion reveals a palpable click.  Some discomfort with range of motion of the left hip.  Pulses: 2+ and symmetric  Neurologic: Alert and oriented X 3, normal strength and tone. Normal symmetric reflexes. Normal coordination and gait        Results:     Lab Results   Component Value Date    WBC 8.0 11/15/2024    HGB 14.4 11/15/2024    HCT 41.5 11/15/2024    .0 11/15/2024    CREATSERUM 0.94 11/15/2024    BUN 12 11/15/2024     (H) 11/15/2024    K 4.9 11/15/2024     11/15/2024    CO2 30.0 11/15/2024     (H) 11/15/2024    CA 10.1 11/15/2024    ALB 4.5 11/15/2024    ALKPHO 79 11/15/2024    BILT 0.8 11/15/2024    TP 6.9 11/15/2024    AST 13 11/15/2024    ALT 10 11/15/2024    INR 0.94 02/19/2022    TSH 1.125 02/06/2024    PSA 3.22 05/15/2024    ESRML 4 06/21/2024    CRP <0.40 06/21/2024    POCGLU 125 09/26/2024       No results found.        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  Inflammatory markers are not suggestive of infection.  His pain may be due to either to local irritation from  metallosis, or referred from his lumbar spine.  His markedly elevated cobalt level, however is of great concern, and we had a lengthy discussion regarding this. He would like to proceed with revision surgery.  Our preference would be to revise the femoral stem with a dual mobility construct since the acetabular component appears well aligned and well-fixed.  If the proper size is not available, then we would revise both implants. We discussed the surgery and expected risks and benefits.  I answered all of his questions.  He has received his clearances.      ANA NAIK PA-C  12/2/2024       [1]   Allergies  Allergen Reactions    Penicillins HIVES     Denies skin peeling/blister, organ damage/failure

## 2024-12-03 ENCOUNTER — HOSPITAL ENCOUNTER (INPATIENT)
Facility: HOSPITAL | Age: 79
LOS: 1 days | Discharge: HOME HEALTH | End: 2024-12-04
Attending: ORTHOPAEDIC SURGERY | Admitting: ORTHOPAEDIC SURGERY
Payer: MEDICARE

## 2024-12-03 ENCOUNTER — APPOINTMENT (OUTPATIENT)
Dept: GENERAL RADIOLOGY | Facility: HOSPITAL | Age: 79
End: 2024-12-03
Attending: PHYSICIAN ASSISTANT
Payer: MEDICARE

## 2024-12-03 ENCOUNTER — ANESTHESIA EVENT (OUTPATIENT)
Dept: SURGERY | Facility: HOSPITAL | Age: 79
End: 2024-12-03
Payer: MEDICARE

## 2024-12-03 ENCOUNTER — ANESTHESIA (OUTPATIENT)
Dept: SURGERY | Facility: HOSPITAL | Age: 79
End: 2024-12-03
Payer: MEDICARE

## 2024-12-03 DIAGNOSIS — T84.011A FAILURE OF LEFT TOTAL HIP ARTHROPLASTY, INITIAL ENCOUNTER (HCC): ICD-10-CM

## 2024-12-03 DIAGNOSIS — G89.18 POSTOPERATIVE PAIN: Primary | ICD-10-CM

## 2024-12-03 DIAGNOSIS — Z01.818 PREOP TESTING: ICD-10-CM

## 2024-12-03 LAB
DEPRECATED RDW RBC AUTO: 48 FL (ref 35.1–46.3)
ERYTHROCYTE [DISTWIDTH] IN BLOOD BY AUTOMATED COUNT: 13.3 % (ref 11–15)
EST. AVERAGE GLUCOSE BLD GHB EST-MCNC: 134 MG/DL (ref 68–126)
GLUCOSE BLDC GLUCOMTR-MCNC: 115 MG/DL (ref 70–99)
GLUCOSE BLDC GLUCOMTR-MCNC: 124 MG/DL (ref 70–99)
GLUCOSE BLDC GLUCOMTR-MCNC: 136 MG/DL (ref 70–99)
GLUCOSE BLDC GLUCOMTR-MCNC: 183 MG/DL (ref 70–99)
HBA1C MFR BLD: 6.3 % (ref ?–5.7)
HCT VFR BLD AUTO: 39.6 %
HGB BLD-MCNC: 13.6 G/DL
MCH RBC QN AUTO: 33.7 PG (ref 26–34)
MCHC RBC AUTO-ENTMCNC: 34.3 G/DL (ref 31–37)
MCV RBC AUTO: 98.3 FL
PLATELET # BLD AUTO: 230 10(3)UL (ref 150–450)
RBC # BLD AUTO: 4.03 X10(6)UL
WBC # BLD AUTO: 11.5 X10(3) UL (ref 4–11)

## 2024-12-03 PROCEDURE — 0SRB03A REPLACEMENT OF LEFT HIP JOINT WITH CERAMIC SYNTHETIC SUBSTITUTE, UNCEMENTED, OPEN APPROACH: ICD-10-PCS | Performed by: ORTHOPAEDIC SURGERY

## 2024-12-03 PROCEDURE — 0SPB0JZ REMOVAL OF SYNTHETIC SUBSTITUTE FROM LEFT HIP JOINT, OPEN APPROACH: ICD-10-PCS | Performed by: ORTHOPAEDIC SURGERY

## 2024-12-03 PROCEDURE — 73502 X-RAY EXAM HIP UNI 2-3 VIEWS: CPT | Performed by: PHYSICIAN ASSISTANT

## 2024-12-03 PROCEDURE — 99222 1ST HOSP IP/OBS MODERATE 55: CPT | Performed by: HOSPITALIST

## 2024-12-03 DEVICE — FEMORAL HEAD Ø 28 SIZE L
Type: IMPLANTABLE DEVICE | Site: HIP | Status: FUNCTIONAL
Brand: MECTACER BIOLOX DELTA FEMORAL BALL HEAD

## 2024-12-03 DEVICE — IMPLANTABLE DEVICE
Type: IMPLANTABLE DEVICE | Site: HIP | Status: FUNCTIONAL
Brand: VIVACIT-E®

## 2024-12-03 DEVICE — AMISTEM-P LAT STEM #7
Type: IMPLANTABLE DEVICE | Site: HIP | Status: FUNCTIONAL
Brand: AMISTEM-P

## 2024-12-03 RX ORDER — SENNOSIDES 8.6 MG
17.2 TABLET ORAL NIGHTLY
Status: DISCONTINUED | OUTPATIENT
Start: 2024-12-03 | End: 2024-12-04

## 2024-12-03 RX ORDER — NICOTINE POLACRILEX 4 MG
15 LOZENGE BUCCAL
Status: DISCONTINUED | OUTPATIENT
Start: 2024-12-03 | End: 2024-12-04

## 2024-12-03 RX ORDER — DIPHENHYDRAMINE HCL 25 MG
25 CAPSULE ORAL EVERY 4 HOURS PRN
Status: DISCONTINUED | OUTPATIENT
Start: 2024-12-03 | End: 2024-12-04

## 2024-12-03 RX ORDER — SODIUM CHLORIDE 9 MG/ML
INJECTION, SOLUTION INTRAVENOUS CONTINUOUS PRN
Status: DISCONTINUED | OUTPATIENT
Start: 2024-12-03 | End: 2024-12-03 | Stop reason: SURG

## 2024-12-03 RX ORDER — VANCOMYCIN HYDROCHLORIDE
15 ONCE
Status: COMPLETED | OUTPATIENT
Start: 2024-12-03 | End: 2024-12-03

## 2024-12-03 RX ORDER — EPHEDRINE SULFATE 50 MG/ML
INJECTION, SOLUTION INTRAVENOUS AS NEEDED
Status: DISCONTINUED | OUTPATIENT
Start: 2024-12-03 | End: 2024-12-03 | Stop reason: SURG

## 2024-12-03 RX ORDER — FERROUS SULFATE 325(65) MG
325 TABLET, DELAYED RELEASE (ENTERIC COATED) ORAL
Status: DISCONTINUED | OUTPATIENT
Start: 2024-12-04 | End: 2024-12-04

## 2024-12-03 RX ORDER — HYDROCODONE BITARTRATE AND ACETAMINOPHEN 7.5; 325 MG/1; MG/1
1 TABLET ORAL EVERY 6 HOURS PRN
Status: DISCONTINUED | OUTPATIENT
Start: 2024-12-03 | End: 2024-12-04

## 2024-12-03 RX ORDER — DIPHENHYDRAMINE HYDROCHLORIDE 50 MG/ML
25 INJECTION INTRAMUSCULAR; INTRAVENOUS ONCE AS NEEDED
Status: ACTIVE | OUTPATIENT
Start: 2024-12-03 | End: 2024-12-03

## 2024-12-03 RX ORDER — ONDANSETRON 2 MG/ML
4 INJECTION INTRAMUSCULAR; INTRAVENOUS EVERY 6 HOURS PRN
Status: DISCONTINUED | OUTPATIENT
Start: 2024-12-03 | End: 2024-12-03 | Stop reason: HOSPADM

## 2024-12-03 RX ORDER — METOCLOPRAMIDE HYDROCHLORIDE 5 MG/ML
10 INJECTION INTRAMUSCULAR; INTRAVENOUS EVERY 8 HOURS PRN
Status: DISCONTINUED | OUTPATIENT
Start: 2024-12-03 | End: 2024-12-03 | Stop reason: HOSPADM

## 2024-12-03 RX ORDER — PANTOPRAZOLE SODIUM 40 MG/1
40 TABLET, DELAYED RELEASE ORAL
Status: DISCONTINUED | OUTPATIENT
Start: 2024-12-04 | End: 2024-12-04

## 2024-12-03 RX ORDER — ACETAMINOPHEN 10 MG/ML
1000 INJECTION, SOLUTION INTRAVENOUS ONCE
Status: COMPLETED | OUTPATIENT
Start: 2024-12-03 | End: 2024-12-03

## 2024-12-03 RX ORDER — ROCURONIUM BROMIDE 10 MG/ML
INJECTION, SOLUTION INTRAVENOUS AS NEEDED
Status: DISCONTINUED | OUTPATIENT
Start: 2024-12-03 | End: 2024-12-03 | Stop reason: SURG

## 2024-12-03 RX ORDER — HYDROMORPHONE HYDROCHLORIDE 1 MG/ML
0.6 INJECTION, SOLUTION INTRAMUSCULAR; INTRAVENOUS; SUBCUTANEOUS EVERY 5 MIN PRN
Status: DISCONTINUED | OUTPATIENT
Start: 2024-12-03 | End: 2024-12-03 | Stop reason: HOSPADM

## 2024-12-03 RX ORDER — BISACODYL 10 MG
10 SUPPOSITORY, RECTAL RECTAL
Status: DISCONTINUED | OUTPATIENT
Start: 2024-12-03 | End: 2024-12-04

## 2024-12-03 RX ORDER — ASPIRIN 325 MG
325 TABLET, DELAYED RELEASE (ENTERIC COATED) ORAL 2 TIMES DAILY
Status: DISCONTINUED | OUTPATIENT
Start: 2024-12-03 | End: 2024-12-04

## 2024-12-03 RX ORDER — CELECOXIB 200 MG/1
400 CAPSULE ORAL ONCE
Status: COMPLETED | OUTPATIENT
Start: 2024-12-03 | End: 2024-12-03

## 2024-12-03 RX ORDER — FAMOTIDINE 10 MG/ML
20 INJECTION, SOLUTION INTRAVENOUS 2 TIMES DAILY
Status: DISCONTINUED | OUTPATIENT
Start: 2024-12-03 | End: 2024-12-03

## 2024-12-03 RX ORDER — NALOXONE HYDROCHLORIDE 0.4 MG/ML
0.08 INJECTION, SOLUTION INTRAMUSCULAR; INTRAVENOUS; SUBCUTANEOUS AS NEEDED
Status: DISCONTINUED | OUTPATIENT
Start: 2024-12-03 | End: 2024-12-03 | Stop reason: HOSPADM

## 2024-12-03 RX ORDER — TRANEXAMIC ACID 10 MG/ML
INJECTION, SOLUTION INTRAVENOUS AS NEEDED
Status: DISCONTINUED | OUTPATIENT
Start: 2024-12-03 | End: 2024-12-03 | Stop reason: SURG

## 2024-12-03 RX ORDER — HYDROMORPHONE HYDROCHLORIDE 1 MG/ML
0.4 INJECTION, SOLUTION INTRAMUSCULAR; INTRAVENOUS; SUBCUTANEOUS EVERY 5 MIN PRN
Status: DISCONTINUED | OUTPATIENT
Start: 2024-12-03 | End: 2024-12-03 | Stop reason: HOSPADM

## 2024-12-03 RX ORDER — HYDROMORPHONE HYDROCHLORIDE 1 MG/ML
0.2 INJECTION, SOLUTION INTRAMUSCULAR; INTRAVENOUS; SUBCUTANEOUS EVERY 2 HOUR PRN
Status: DISCONTINUED | OUTPATIENT
Start: 2024-12-03 | End: 2024-12-04

## 2024-12-03 RX ORDER — ATORVASTATIN CALCIUM 10 MG/1
10 TABLET, FILM COATED ORAL NIGHTLY
Status: DISCONTINUED | OUTPATIENT
Start: 2024-12-03 | End: 2024-12-04

## 2024-12-03 RX ORDER — ONDANSETRON 2 MG/ML
INJECTION INTRAMUSCULAR; INTRAVENOUS AS NEEDED
Status: DISCONTINUED | OUTPATIENT
Start: 2024-12-03 | End: 2024-12-03 | Stop reason: SURG

## 2024-12-03 RX ORDER — ONDANSETRON 2 MG/ML
4 INJECTION INTRAMUSCULAR; INTRAVENOUS EVERY 6 HOURS PRN
Status: DISCONTINUED | OUTPATIENT
Start: 2024-12-03 | End: 2024-12-04

## 2024-12-03 RX ORDER — SODIUM CHLORIDE, SODIUM LACTATE, POTASSIUM CHLORIDE, CALCIUM CHLORIDE 600; 310; 30; 20 MG/100ML; MG/100ML; MG/100ML; MG/100ML
INJECTION, SOLUTION INTRAVENOUS CONTINUOUS
Status: DISCONTINUED | OUTPATIENT
Start: 2024-12-03 | End: 2024-12-04

## 2024-12-03 RX ORDER — NICOTINE POLACRILEX 4 MG
30 LOZENGE BUCCAL
Status: DISCONTINUED | OUTPATIENT
Start: 2024-12-03 | End: 2024-12-03 | Stop reason: HOSPADM

## 2024-12-03 RX ORDER — DEXTROSE MONOHYDRATE 25 G/50ML
50 INJECTION, SOLUTION INTRAVENOUS
Status: DISCONTINUED | OUTPATIENT
Start: 2024-12-03 | End: 2024-12-03 | Stop reason: HOSPADM

## 2024-12-03 RX ORDER — HYDROMORPHONE HYDROCHLORIDE 1 MG/ML
0.2 INJECTION, SOLUTION INTRAMUSCULAR; INTRAVENOUS; SUBCUTANEOUS EVERY 5 MIN PRN
Status: DISCONTINUED | OUTPATIENT
Start: 2024-12-03 | End: 2024-12-03 | Stop reason: HOSPADM

## 2024-12-03 RX ORDER — DEXTROSE MONOHYDRATE 25 G/50ML
50 INJECTION, SOLUTION INTRAVENOUS
Status: DISCONTINUED | OUTPATIENT
Start: 2024-12-03 | End: 2024-12-04

## 2024-12-03 RX ORDER — SODIUM CHLORIDE 9 MG/ML
INJECTION, SOLUTION INTRAVENOUS CONTINUOUS
Status: DISCONTINUED | OUTPATIENT
Start: 2024-12-03 | End: 2024-12-04

## 2024-12-03 RX ORDER — METOCLOPRAMIDE HYDROCHLORIDE 5 MG/ML
10 INJECTION INTRAMUSCULAR; INTRAVENOUS EVERY 8 HOURS PRN
Status: DISCONTINUED | OUTPATIENT
Start: 2024-12-03 | End: 2024-12-04

## 2024-12-03 RX ORDER — NICOTINE POLACRILEX 4 MG
15 LOZENGE BUCCAL
Status: DISCONTINUED | OUTPATIENT
Start: 2024-12-03 | End: 2024-12-03 | Stop reason: HOSPADM

## 2024-12-03 RX ORDER — DIPHENHYDRAMINE HYDROCHLORIDE 50 MG/ML
12.5 INJECTION INTRAMUSCULAR; INTRAVENOUS EVERY 4 HOURS PRN
Status: DISCONTINUED | OUTPATIENT
Start: 2024-12-03 | End: 2024-12-04

## 2024-12-03 RX ORDER — ACETAMINOPHEN 500 MG
1000 TABLET ORAL ONCE
Status: COMPLETED | OUTPATIENT
Start: 2024-12-03 | End: 2024-12-03

## 2024-12-03 RX ORDER — NIFEDIPINE 30 MG/1
90 TABLET, EXTENDED RELEASE ORAL EVERY MORNING
Status: DISCONTINUED | OUTPATIENT
Start: 2024-12-04 | End: 2024-12-04

## 2024-12-03 RX ORDER — HYDROMORPHONE HYDROCHLORIDE 1 MG/ML
0.4 INJECTION, SOLUTION INTRAMUSCULAR; INTRAVENOUS; SUBCUTANEOUS EVERY 2 HOUR PRN
Status: DISCONTINUED | OUTPATIENT
Start: 2024-12-03 | End: 2024-12-04

## 2024-12-03 RX ORDER — SODIUM CHLORIDE, SODIUM LACTATE, POTASSIUM CHLORIDE, CALCIUM CHLORIDE 600; 310; 30; 20 MG/100ML; MG/100ML; MG/100ML; MG/100ML
INJECTION, SOLUTION INTRAVENOUS CONTINUOUS
Status: DISCONTINUED | OUTPATIENT
Start: 2024-12-03 | End: 2024-12-03 | Stop reason: HOSPADM

## 2024-12-03 RX ORDER — NICOTINE POLACRILEX 4 MG
30 LOZENGE BUCCAL
Status: DISCONTINUED | OUTPATIENT
Start: 2024-12-03 | End: 2024-12-04

## 2024-12-03 RX ORDER — LISINOPRIL 5 MG/1
5 TABLET ORAL EVERY MORNING
Status: DISCONTINUED | OUTPATIENT
Start: 2024-12-04 | End: 2024-12-04

## 2024-12-03 RX ORDER — FAMOTIDINE 20 MG/1
20 TABLET, FILM COATED ORAL 2 TIMES DAILY
Status: DISCONTINUED | OUTPATIENT
Start: 2024-12-03 | End: 2024-12-03

## 2024-12-03 RX ORDER — DOCUSATE SODIUM 100 MG/1
100 CAPSULE, LIQUID FILLED ORAL 2 TIMES DAILY
Status: DISCONTINUED | OUTPATIENT
Start: 2024-12-03 | End: 2024-12-04

## 2024-12-03 RX ORDER — POLYETHYLENE GLYCOL 3350 17 G/17G
17 POWDER, FOR SOLUTION ORAL DAILY PRN
Status: DISCONTINUED | OUTPATIENT
Start: 2024-12-03 | End: 2024-12-04

## 2024-12-03 RX ORDER — SODIUM PHOSPHATE, DIBASIC AND SODIUM PHOSPHATE, MONOBASIC 7; 19 G/230ML; G/230ML
1 ENEMA RECTAL ONCE AS NEEDED
Status: DISCONTINUED | OUTPATIENT
Start: 2024-12-03 | End: 2024-12-04

## 2024-12-03 RX ORDER — LIDOCAINE HYDROCHLORIDE 10 MG/ML
INJECTION, SOLUTION EPIDURAL; INFILTRATION; INTRACAUDAL; PERINEURAL AS NEEDED
Status: DISCONTINUED | OUTPATIENT
Start: 2024-12-03 | End: 2024-12-03 | Stop reason: SURG

## 2024-12-03 RX ADMIN — EPHEDRINE SULFATE 5 MG: 50 INJECTION, SOLUTION INTRAVENOUS at 15:50:00

## 2024-12-03 RX ADMIN — SODIUM CHLORIDE, SODIUM LACTATE, POTASSIUM CHLORIDE, CALCIUM CHLORIDE: 600; 310; 30; 20 INJECTION, SOLUTION INTRAVENOUS at 14:49:00

## 2024-12-03 RX ADMIN — LIDOCAINE HYDROCHLORIDE 50 MG: 10 INJECTION, SOLUTION EPIDURAL; INFILTRATION; INTRACAUDAL; PERINEURAL at 14:18:00

## 2024-12-03 RX ADMIN — ONDANSETRON 4 MG: 2 INJECTION INTRAMUSCULAR; INTRAVENOUS at 15:41:00

## 2024-12-03 RX ADMIN — SODIUM CHLORIDE, SODIUM LACTATE, POTASSIUM CHLORIDE, CALCIUM CHLORIDE: 600; 310; 30; 20 INJECTION, SOLUTION INTRAVENOUS at 14:14:00

## 2024-12-03 RX ADMIN — EPHEDRINE SULFATE 5 MG: 50 INJECTION, SOLUTION INTRAVENOUS at 14:54:00

## 2024-12-03 RX ADMIN — ROCURONIUM BROMIDE 50 MG: 10 INJECTION, SOLUTION INTRAVENOUS at 14:22:00

## 2024-12-03 RX ADMIN — TRANEXAMIC ACID 1000 MG: 10 INJECTION, SOLUTION INTRAVENOUS at 14:37:00

## 2024-12-03 RX ADMIN — SODIUM CHLORIDE: 9 INJECTION, SOLUTION INTRAVENOUS at 14:27:00

## 2024-12-03 NOTE — ANESTHESIA PREPROCEDURE EVALUATION
Anesthesia PreOp Note    HPI:     Linwood Morales is a 79 year old male who presents for preoperative consultation requested by: Martín Mauro MD    Date of Surgery: 12/3/2024    Procedure(s):  Revision of left hip resurfacing  Indication: Failure of left total hip arthroplasty, initial encounter ([facility]) [T84.011A]    Relevant Problems   No relevant active problems       NPO:  Last Liquid Consumption Date: 12/03/24  Last Liquid Consumption Time: 0730  Last Solid Consumption Date: 12/03/24  Last Solid Consumption Time: 0730 (OJ 1/4 cup)  Last Liquid Consumption Date: 12/03/24          History Review:  Patient Active Problem List    Diagnosis Date Noted    Gastroesophageal reflux disease without esophagitis 07/09/2024    Type 2 diabetes mellitus without complication, without long-term current use of insulin ([facility]) 03/11/2024    Chronic obstructive pulmonary disease, unspecified ([facility]) 08/11/2022    Lumbar post-laminectomy syndrome: L3-5 laminectomy and L4-5 fusion 05/23/2022    Long-term use of Plaquenil 10/20/2021    Bladder stone     Floater, vitreous, left 06/11/2019    right subscapularis mild-mod full thickness inferior tear, right supraspinatus anterior full thickness complete tear, right infraspinatus mild articular surface partial thickness tear 04/25/2019    L5-S1 right severe/left mod foraminal stenosis 05/09/2018    left > right S1 radiculopathies 04/16/2018    Osteopenia 09/13/2017    Diabetes mellitus type 2 without retinopathy ([facility]) 06/06/2017    Urethral stricture 09/09/2016    Essential hypertension 06/09/2016    Mixed hyperlipidemia 06/09/2016    Glaucoma suspect of both eyes 06/09/2015    Pseudophakia of both eyes 06/09/2015    Epiretinal membrane, right 06/09/2015    History of retinal tear 06/09/2015    right C7 radiculopathy 02/13/2015    Elevated PSA 02/04/2015    BPH with obstruction/lower urinary tract symptoms 09/07/2014    Erectile dysfunction 09/07/2014       Past Medical History:     Acute medial meniscus tear of right knee    Angular cheilitis    Back problem    BPH (benign prostatic hyperplasia)    Cancer (HCC)    skin ca    Cheilitis    CMC arthritis    R TH CMC arthritis - R TH [facility] aristospan / lidocaine injection    Cough    Esophageal reflux    Essential hypertension    High blood pressure    High cholesterol    Hyperlipidemia    Lower urinary tract symptoms (LUTS)    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    PPVx, Cryo, GFX, SF6    Myopia with astigmatism and presbyopia    Nocturia    Osteoarthritis    Rotator cuff tear, left    repair    Seasonal allergic rhinitis    Stiffness of joint, hand    bilateral, hand stiffness & weakness post trigger release    Trigger finger    Irf-trigger finger, recurrent - LRF trigger finger release    Type II or unspecified type diabetes mellitus without mention of complication, not stated as uncontrolled    Visual impairment    readers       Past Surgical History:   Procedure Laterality Date    Arthroscopy of joint unlisted  1990    knee    Arthroscopy of joint unlisted Left 2011    rotator cuff repair    Arthroscopy of joint unlisted Left 1992    hip resurfacing    Arthroscopy of joint unlisted Left 12/2008    hip resurfacing    Cataract      Cataract extraction w/  intraocular lens implant Right 09/17/2012    RJ    Cataract extraction w/  intraocular lens implant Left 01/11/2010    RJ    Colonoscopy  04/2015    repeat in 10 years.    Colonoscopy      Eye surgery Right 2013    repair detached retina    Eye surgery Right 02/20/2006    S/ P PPV RD repair (S/P PPVx, cryo, GFX, SF6 OD 4/25/13) Dr. Howe     Forearm/wrist surgery unlisted Left     left wrist surgery    Hand/finger surgery unlisted Left 9/29/2011    LRF trigger finger release    Hc inj epidural steroid lumbar or sacral w img guidance      x2 2019,x2 2017    Hernia surgery      hernia repair, herniorraphy    Laser surgery of eye      Other      bladder surgery x2    Other surgical  history      esophageal dilatation    Other surgical history  6/15/2010    release triggers: RMF, RRF, LMF/ RRF Dupuytren's nodule excision    Repair of biceps tendon at elbow Left 1992    Retinal laser procedure      Spine surgery procedure unlisted  02/23/2022    L3-5 laminectomy, left L4-5 transforaminal lumbar interbody fusion;    Total hip replacement Left 2008    Yag capsulotomy - od - right eye Right 4/30/14    RJM       Prescriptions Prior to Admission[1]  Current Medications and Prescriptions Ordered in Epic[2]    Allergies[3]    Family History   Problem Relation Age of Onset    Other (pulmonary Embolism) Father     Colon Cancer Mother     No Known Problems Brother     Glaucoma Neg     Diabetes Neg     Macular degeneration Neg      Social History     Socioeconomic History    Marital status:    Tobacco Use    Smoking status: Never    Smokeless tobacco: Never    Tobacco comments:     none   Vaping Use    Vaping status: Never Used   Substance and Sexual Activity    Alcohol use: Yes     Alcohol/week: 3.0 standard drinks of alcohol     Types: 2 Cans of beer, 1 Standard drinks or equivalent per week     Comment: 2-3 times per week    Drug use: Never   Other Topics Concern    Caffeine Concern Yes     Comment: soda, 8 oz daily    Exercise No    Pt has a pacemaker No    Reaction to local anesthetic No       Available pre-op labs reviewed.  Lab Results   Component Value Date    WBC 8.0 11/15/2024    RBC 4.22 11/15/2024    HGB 14.4 11/15/2024    HCT 41.5 11/15/2024    MCV 98.3 11/15/2024    MCH 34.1 (H) 11/15/2024    MCHC 34.7 11/15/2024    RDW 13.2 11/15/2024    .0 11/15/2024     Lab Results   Component Value Date     (H) 11/15/2024    K 4.9 11/15/2024     11/15/2024    CO2 30.0 11/15/2024    BUN 12 11/15/2024    CREATSERUM 0.94 11/15/2024     (H) 11/15/2024    PGLU 124 (H) 12/03/2024    CA 10.1 11/15/2024          Vital Signs:  Body mass index is 25.36 kg/m².   height is 1.829 m (6')  and weight is 84.8 kg (187 lb). His oral temperature is 97.7 °F (36.5 °C). His blood pressure is 169/83 (abnormal) and his pulse is 80. His respiration is 16 and oxygen saturation is 95%.   Vitals:    11/25/24 1045 12/03/24 1141   BP:  (!) 169/83   Pulse:  80   Resp:  16   Temp:  97.7 °F (36.5 °C)   TempSrc:  Oral   SpO2:  95%   Weight: 86.2 kg (190 lb) 84.8 kg (187 lb)   Height: 1.829 m (6')         Anesthesia Evaluation      Airway   Mallampati: II  TM distance: >3 FB  Neck ROM: full  Dental - Dentition appears grossly intact     Pulmonary - normal exam   (+) COPD  Cardiovascular - normal exam  (+) hypertension    ROS comment: 2024 TTE:  Conclusions:     1. Left ventricle: The cavity size was normal. Wall thickness was mildly      increased. Systolic function was normal. The estimated ejection fraction      was 55-65%, by biplane method of disks. Wall motion is normal; there are      no regional wall motion abnormalities. Doppler parameters are consistent      with abnormal left ventricular relaxation - grade 1 diastolic      dysfunction.   2. Aortic valve: Transvalvular velocity was minimally increased. The      findings were consistent with mild stenosis. The peak systolic velocity      was 2.22m/sec. The mean systolic gradient was 11mm Hg. The valve area      (VTI) was 1.91cm^2. The valve area (VTI) index was 0.92cm^2/m^2.       Neuro/Psych    (+)  neuromuscular disease,        GI/Hepatic/Renal    (+) GERD    Endo/Other    (+) diabetes mellitus  Abdominal   (-) obese                 Anesthesia Plan:   ASA:  3  Plan:   General  Airway:  ETT  Plan Comments: Pt very nervous about spinal after recent injection, therefore will proceed under GETA  Informed Consent Plan and Risks Discussed With:  Patient  Discussed plan with:  CRNA      I have informed Linwood Morales and/or legal guardian or family member of the nature of the anesthetic plan, benefits, risks including possible dental damage if relevant, major  complications, and any alternative forms of anesthetic management.   All of the patient's questions were answered to the best of my ability. The patient desires the anesthetic management as planned.  Andrea RossiDO  12/3/2024 12:53 PM  Present on Admission:  **None**           [1]   Medications Prior to Admission   Medication Sig Dispense Refill Last Dose/Taking    acetaminophen 500 MG Oral Tab Take 1 tablet (500 mg total) by mouth every 6 (six) hours as needed for Pain.   Past Week    Risedronate Sodium 150 MG Oral Tab Take 1 tablet (150 mg total) by mouth every 30 (thirty) days. 3 tablet 3 11/28/2024    ipratropium 0.03 % Nasal Solution 2 sprays by Nasal route every 12 (twelve) hours. 1 each 5 Past Month    Omeprazole 40 MG Oral Capsule Delayed Release Take 1 capsule (40 mg total) by mouth daily. 90 capsule 3 12/3/2024 at  7:00 AM    NIFEdipine ER 90 MG Oral Tablet 24 Hr Take 1 tablet (90 mg total) by mouth daily. (Patient taking differently: Take 1 tablet (90 mg total) by mouth every morning.) 90 tablet 3 12/2/2024 at  7:00 AM    Lovastatin 40 MG Oral Tab Take 1 tablet (40 mg total) by mouth nightly. 100 tablet 2 12/2/2024 at  8:00 PM    metFORMIN 500 MG Oral Tab Take 1 tablet (500 mg total) by mouth 2 (two) times daily. 180 tablet 3 12/2/2024 at  6:00 PM    lisinopril 5 MG Oral Tab Take 1 tablet (5 mg total) by mouth daily. (Patient taking differently: Take 1 tablet (5 mg total) by mouth every morning.) 90 tablet 3 12/2/2024 at  7:00 AM    Vitamin B-12 1000 MCG Oral Tab Take 1 tablet (1,000 mcg total) by mouth daily.   11/27/2024    Cholecalciferol (VITAMIN D) 1000 UNITS Oral Cap Take by mouth daily.   Past Week    Multiple Vitamins-Minerals (CENTRUM SILVER) Oral Tab Take 1 tablet by mouth daily.     11/27/2024    Cinnamon 500 MG Oral Cap Take 500 mg by mouth daily.   11/25/2024 at  9:00 AM    fluticasone propionate 50 MCG/ACT Nasal Suspension 1 spray by Each Nare route daily as needed. (Patient not taking:  Reported on 11/25/2024) 16 g 2 More than a month    tadalafil 5 MG Oral Tab Take 1 tablet (5 mg total) by mouth daily as needed for Erectile Dysfunction. (Patient not taking: Reported on 11/25/2024) 90 tablet 3 Unknown    ADVAIR DISKUS 100-50 MCG/ACT Inhalation Aerosol Powder, Breath Activated Inhale 1 puff into the lungs 2 (two) times daily. (Patient not taking: Reported on 11/14/2024) 180 each 1 More than a month    diclofenac 1 % External Gel  (Patient not taking: Reported on 11/25/2024)   Not Taking   [2]   Current Facility-Administered Medications Ordered in Epic   Medication Dose Route Frequency Provider Last Rate Last Admin    lactated ringers infusion   Intravenous Continuous Martín Mauro MD 20 mL/hr at 12/03/24 1203 New Bag at 12/03/24 1203    vancomycin (Vancocin) 1.25 g in sodium chloride 0.9% 250mL IVPB premix  15 mg/kg Intravenous Once Martín Mauro .7 mL/hr at 12/03/24 1204 1,250 mg at 12/03/24 1204    ceFAZolin (Ancef) 2g in 10mL IV syringe premix  2 g Intravenous Once Martín Mauro MD         No current [facility]-ordered outpatient medications on file.   [3]   Allergies  Allergen Reactions    Penicillins HIVES     Denies skin peeling/blister, organ damage/failure

## 2024-12-03 NOTE — BRIEF OP NOTE
Pre-Operative Diagnosis: Failure of left total hip arthroplasty, initial encounter ([facility]) [T84.011A]     Post-Operative Diagnosis: Failure of left total hip arthroplasty, initial encounter ([facility]) [T84.011A]      Procedure Performed:   Revision of left hip resurfacing    Surgeons and Role:     * Alexandria Mauro MD - Primary    Assistant(s):  Surgical Assistant.: Cedric Thomas     Surgical Findings: see dx, synovitis     Specimen: path     Estimated Blood Loss: Blood Output: 150 mL (12/3/2024  3:37 PM)      Dictation Number:  [id number]    ALEXANDRIA MAURO MD  12/3/2024  3:45 PM

## 2024-12-03 NOTE — INTERVAL H&P NOTE
Pre-op Diagnosis: Failure of left total hip arthroplasty, initial encounter ([facility]) [T84.011A]    The above referenced H&P was reviewed by ALEXANDRIA AVERY MD on 12/3/2024, the patient was examined and no significant changes have occurred in the patient's condition since the H&P was performed.  I discussed with the patient and/or legal representative the potential benefits, risks and side effects of this procedure; the likelihood of the patient achieving goals; and potential problems that might occur during recuperation.  I discussed reasonable alternatives to the procedure, including risks, benefits and side effects related to the alternatives and risks related to not receiving this procedure.  We will proceed with procedure as planned.

## 2024-12-03 NOTE — ANESTHESIA PROCEDURE NOTES
Airway  Date/Time: 12/3/2024 2:25 PM  Urgency: elective    Airway not difficult    General Information and Staff    Patient location during procedure: OR  Resident/CRNA: Karlee Sampson CRNA  Performed: CRNA   Performed by: Karlee Sampson CRNA  Authorized by: Andrea Rossi DO      Indications and Patient Condition  Indications for airway management: anesthesia  Spontaneous ventilation: present  Sedation level: deep  Preoxygenated: yes  Patient position: sniffing  MILS maintained throughout  Mask difficulty assessment: 0 - not attempted  No planned trial extubation    Final Airway Details  Final airway type: endotracheal airway      Successful airway: ETT  Cuffed: yes   Successful intubation technique: Video laryngoscopy  Endotracheal tube insertion site: oral  Blade: Amelie  Blade size: #4  ETT size (mm): 7.5    Cormack-Lehane Classification: grade I - full view of glottis  Placement verified by: capnometry   Cuff volume (mL): 7  Measured from: lips  ETT to lips (cm): 22  Number of attempts at approach: 1  Number of other approaches attempted: 0

## 2024-12-03 NOTE — ANESTHESIA POSTPROCEDURE EVALUATION
Patient: Linwood Morales    Procedure Summary       Date: 12/03/24 Room / Location: [facility] MAIN OR  / [facility] MAIN OR    Anesthesia Start: 1414 Anesthesia Stop: 1615    Procedure: Revision of left hip resurfacing (Left) Diagnosis:       Failure of left total hip arthroplasty, initial encounter ([facility])      (Failure of left total hip arthroplasty, initial encounter ([facility]) [T84.011A])    Surgeons: Martín Mauro MD Anesthesiologist: Andrea Rossi DO    Anesthesia Type: general ASA Status: 3            Anesthesia Type: general    Vitals Value Taken Time   /64 12/03/24 1614   Temp 98.5F 12/03/24 1618   Pulse 73 12/03/24 1617   Resp 14 12/03/24 1618   SpO2 98 % 12/03/24 1617   Vitals shown include unfiled device data.    [facility] AN Post Evaluation:   Patient Evaluated in PACU  Patient Participation: complete - patient participated  Level of Consciousness: sleepy but conscious  Pain Score: 0  Pain Management: adequate  Airway Patency:patent  Dental exam unchanged from preop  Yes    Nausea/Vomiting: none  Cardiovascular Status: blood pressure returned to baseline, hemodynamically stable and stable  Respiratory Status: acceptable  Postoperative Hydration acceptable      Angie Plunkett CRNA  12/3/2024 4:18 PM

## 2024-12-04 VITALS
SYSTOLIC BLOOD PRESSURE: 128 MMHG | RESPIRATION RATE: 16 BRPM | OXYGEN SATURATION: 96 % | HEIGHT: 72 IN | HEART RATE: 78 BPM | WEIGHT: 187 LBS | BODY MASS INDEX: 25.33 KG/M2 | DIASTOLIC BLOOD PRESSURE: 95 MMHG | TEMPERATURE: 98 F

## 2024-12-04 LAB
DEPRECATED RDW RBC AUTO: 49.1 FL (ref 35.1–46.3)
ERYTHROCYTE [DISTWIDTH] IN BLOOD BY AUTOMATED COUNT: 13.7 % (ref 11–15)
GLUCOSE BLDC GLUCOMTR-MCNC: 120 MG/DL (ref 70–99)
GLUCOSE BLDC GLUCOMTR-MCNC: 141 MG/DL (ref 70–99)
HCT VFR BLD AUTO: 36.7 %
HGB BLD-MCNC: 12.3 G/DL
MCH RBC QN AUTO: 33.2 PG (ref 26–34)
MCHC RBC AUTO-ENTMCNC: 33.5 G/DL (ref 31–37)
MCV RBC AUTO: 98.9 FL
PLATELET # BLD AUTO: 219 10(3)UL (ref 150–450)
RBC # BLD AUTO: 3.71 X10(6)UL
WBC # BLD AUTO: 9.6 X10(3) UL (ref 4–11)

## 2024-12-04 RX ORDER — ASPIRIN 325 MG
325 TABLET, DELAYED RELEASE (ENTERIC COATED) ORAL 2 TIMES DAILY
Qty: 60 TABLET | Refills: 0 | Status: SHIPPED | OUTPATIENT
Start: 2024-12-04

## 2024-12-04 RX ORDER — HYDROCODONE BITARTRATE AND ACETAMINOPHEN 7.5; 325 MG/1; MG/1
1 TABLET ORAL EVERY 6 HOURS PRN
Qty: 40 TABLET | Refills: 0 | Status: SHIPPED | OUTPATIENT
Start: 2024-12-04

## 2024-12-04 RX ORDER — POLYETHYLENE GLYCOL 3350 17 G/17G
17 POWDER, FOR SOLUTION ORAL DAILY PRN
Status: SHIPPED | COMMUNITY
Start: 2024-12-04

## 2024-12-04 RX ORDER — PSEUDOEPHEDRINE HCL 30 MG
100 TABLET ORAL 2 TIMES DAILY
Qty: 20 CAPSULE | Refills: 0 | Status: SHIPPED | OUTPATIENT
Start: 2024-12-04

## 2024-12-04 RX ORDER — FERROUS SULFATE 325(65) MG
325 TABLET, DELAYED RELEASE (ENTERIC COATED) ORAL
Qty: 20 TABLET | Refills: 0 | Status: SHIPPED | OUTPATIENT
Start: 2024-12-05

## 2024-12-04 NOTE — CM/SW NOTE
12/04/24 1400   CM/SW Referral Data   Referral Source Physician   Reason for Referral Discharge planning   Informant Patient   Medical Hx   Does patient have an established PCP? Yes   Patient Info   Patient's Current Mental Status at Time of Assessment Alert;Oriented   Patient's Home Environment House   Number of Levels in Home 3   Number of Stair in Home 3 in 7up to main floor   Patient lives with Spouse/Significant other   Patient Status Prior to Admission   Independent with ADLs and Mobility Yes   Discharge Needs   Anticipated D/C needs Home health care   Choice of Post-Acute Provider   Informed patient of right to choose their preferred provider Yes   List of appropriate post-acute services provided to patient/family with quality data Yes   Patient/family choice [facility]     CM met with pt and spouse at bedside.  Discussed need for HH on dc.  HH choice list provided and reviewed.   [facility] is choice and res via [employer].  [facility] liaison notified of choice and DC today via secure chat.    [facility] home [facility]  P:[phone]  F:[phone]    Plan: [facility]    / to remain available for support and/or discharge planning.   Obdulia Arciniega RN, BSN  Nurse   [phone]

## 2024-12-04 NOTE — OPERATIVE REPORT
[facility]    PATIENT'S NAME: WIL RODRIGUEZ   ATTENDING PHYSICIAN: Martín Mauro MD   OPERATING PHYSICIAN: Martín Mauro MD   PATIENT ACCOUNT#:   [id number]    LOCATION:  [address] [place] [address]  MEDICAL RECORD #:   [id number]       YOB: 1945  ADMISSION DATE:       12/03/2024      OPERATION DATE:  12/03/2024    OPERATIVE REPORT    PREOPERATIVE DIAGNOSIS:  Failed left resurfacing total hip arthroplasty.  POSTOPERATIVE DIAGNOSIS:  Failed left resurfacing total hip arthroplasty.  PROCEDURE:  Revision of left resurfacing total hip arthroplasty to standard total hip arthroplasty with dual mobility acetabular cup and Medacta AMIS femoral stem.    ASSISTANT:  Moisés Em PA-C.    INDICATIONS:  The patient is an 79-year-old man who was having pain 15 years following the left hip resurfacing arthroplasty.  Preoperative workup included elevated cobalt and chromium levels.  After discussion of the options for treatment, I recommended the above procedure.  Our discussion included, but was not limited to, the potential risks of anesthetic complication, infection, DVT or PE, dislocation, leg length inequality, potential need for future revision surgery, nerve or vascular injury, chance for unanticipated perioperative medical or orthopedic complications, etc.  Written consent was obtained.      OPERATIVE TECHNIQUE:  The patient was brought to the operating room and placed under general anesthesia.  He was positioned in the right lateral decubitus position, and the left hip and lower extremity were prepped and draped in usual sterile fashion.  The old incision was anterior to the midline, and I chose a more posterior location for my posterolateral approach to the hip.  I left an adequate skin bridge between the incisions.  Dissection was carried down through the gluteus fascia, and the muscle was split in line with the skin incision.  A Charnley retractor was placed in the wound taking  care not to retract near the sciatic nerve.  The scar and pseudocapsule were elevated from the posterior aspect of the greater trochanter and peeled posteriorly.  It was tagged and reflected posteriorly as the sheath to protect the sciatic nerve.  There was no excess synovial fluid in the joint, but there was abundant black-stained rubbery synovium.  The hip was dislocated.  A femoral neck osteotomy was made at the appropriate level and orientation templating off the templated broach.  The femoral component was removed with a femoral neck osteotomy.  Retractors were placed about the acetabulum.  Synovectomy was performed removing all of the rubbery synovium.  There was no significant osteolysis.  The acetabular shell was well fixed.  The femoral canal was opened with a box chisel, T-handle, and lateralizing reamer and then broached up to accept the AMIS size 7 femoral stem.  I trialed standard and lateral necks with 0 and +3.5 mm heads.  The best combination of length offset and stability restoration came with the lateralized stem and the +3.5 femoral head.  The trial component was then removed and the actual stem was inserted.  A good press-fit was obtained and it seated at the same level as the broach.  The +3.5 mm, 28 mm ceramic head was attached to the clean dry trunnion after it had been assembled with the 54 mm outer diameter, 28 mm inner diameter Vivacit-E highly cross-linked dual mobility polyethylene component on the back table with device.  After the interval was locked onto the Dale taper, the hip was reduced.  There was stability through a full range of motion.  A meticulous capsular repair was performed, and the wound was closed in routine fashion in layers after irrigating with Irrisept.  Estimated blood loss was 150 mL.  There were no intraoperative complications.  Routine specimens were sent to Pathology.  No cultures were taken as the preoperative workup was negative for infection, and there was no  clinical appearance of infection.  Also, a frozen section was done during the procedure which showed no evidence of acute inflammation.  The patient was stable under the care of Anesthesia at the completion of the procedure.    Dictated By Martín Mauro MD  d: 12/03/2024 15:50:59  t: 12/04/2024 03:06:48  [facility] [id number]  JSM/

## 2024-12-04 NOTE — CONSULTS
[facility]    PATIENT'S NAME: WIL RODRIGUEZ   ATTENDING PHYSICIAN: Martín Mauro MD   CONSULTING PHYSICIAN: Barber Elizabeth MD   PATIENT ACCOUNT#:   [id number]    LOCATION:  [address] [place] [address]  MEDICAL RECORD #:   [id number]       YOB: 1945  ADMISSION DATE:       12/03/2024      CONSULT DATE:  12/03/2024    REPORT OF CONSULTATION      REASON FOR ADMISSION:  Post left hip revision arthroplasty.    HISTORY OF PRESENT ILLNESS:  Patient is an 79-year-old  male with history of left hip resurfacing procedure back in 1992 and then left hip hemiarthroplasty in 2008 with progressive pain and limitation of range of motion.  He had failed prosthesis and scheduled today for above-mentioned procedure by his orthopedic surgeon, Dr. Martín Mauro.  Postoperatively, transferred to PACU for further monitoring.     PAST MEDICAL HISTORY:  Generalized osteoarthritis, degenerative joint disease of lumbar spine, benign prostatic hypertrophy, gastroesophageal reflux disease, hypertension, hyperlipidemia, diabetes mellitus type 2.    PAST SURGICAL HISTORY:  Left shoulder rotator cuff repair, right knee arthroscopic procedure, cystoscopy and GreenLight vaporization of prostate, cystoscopy and lithotripsy, lumbar laminectomy and fusion, hernia repair, left hip resurfacing procedure in 1992, left hip hemiarthroplasty in 2008, cataract procedure and left wrist surgery.    MEDICATIONS:  Please see medication reconciliation list.     ALLERGIES:  Penicillin.      SOCIAL HISTORY:  No tobacco or drug use.  Social alcohol.  Lives with his family.  Independent in his basic activities of daily living.     FAMILY HISTORY:  Mother had colon cancer.     REVIEW OF SYSTEMS:  Currently resting in bed.  He had left hip discomfort.  No chest pain.  No shortness of breath.  Other 12-point review of systems is negative.       PHYSICAL EXAMINATION:    GENERAL:  Alert and oriented to time, place and person.  Mild  distress.   VITAL SIGNS:  Temperature 97.8, pulse 62, respiratory rate 18, blood pressure 142/64, pulse ox 97% on room air.  HEENT:  Atraumatic.  Oropharynx clear.  Moist mucous membranes.  Ears and nose normal.  Eyes:  Anicteric sclerae.   NECK:  Supple.  No lymphadenopathy.  Trachea midline.  Full range of motion.   LUNGS:  Clear to auscultation bilaterally.  Normal respiratory effort.    HEART:  Regular rate and rhythm.  S1 and S2 auscultated.  No murmur.    ABDOMEN:  Soft, nondistended.  No tenderness.  Positive bowel sounds.   EXTREMITIES:  No peripheral edema, clubbing or cyanosis.  Left hip Prevena wound VAC dressing.  NEUROLOGIC:  Motor and sensory intact.      ASSESSMENT AND PLAN:    1.   Failure of left hip hemiarthroplasty, status post revision and left hip resurfacing procedure.  Pain control.  Neurovascular checks.  Monitor surgical wound and drain.  Aspirin for DVT prophylaxis.  Physical and occupational therapy.  2.   Diabetes mellitus type 2.  Continue home medications.  Monitor Accu-Cheks.  Sliding scale insulin.  3.   Essential hypertension.  Continue home medications and monitor.  4.   Hyperlipidemia.  Continue home medications.    Dictated By Barber Elizabeth MD  d: 12/03/2024 17:03:03  t: 12/03/2024 18:30:17  Job [id number]  FB/

## 2024-12-04 NOTE — PLAN OF CARE
Patient alert and oriented. Prevena wound vac in place to left hip. Continuous hip precautions. Norco provided as needed for pain. Romero in place.   Plan is PT/OT tomorrow.     Problem: Patient Centered Care  Goal: Patient preferences are identified and integrated in the patient's plan of care  Description: Interventions:  - What would you like us to know as we care for you?   - Provide timely, complete, and accurate information to patient/family  - Incorporate patient and family knowledge, values, beliefs, and cultural backgrounds into the planning and delivery of care  - Encourage patient/family to participate in care and decision-making at the level they choose  - Honor patient and family perspectives and choices  Outcome: Progressing     Problem: Diabetes/Glucose Control  Goal: Glucose maintained within prescribed range  Description: INTERVENTIONS:  - Monitor Blood Glucose as ordered  - Assess for signs and symptoms of hyperglycemia and hypoglycemia  - Administer ordered medications to maintain glucose within target range  - Assess barriers to adequate nutritional intake and initiate nutrition consult as needed  - Instruct patient on self management of diabetes  Outcome: Progressing     Problem: Patient/Family Goals  Goal: Patient/Family Long Term Goal  Description: Patient's Long Term Goal: discharge    Interventions:  - follow md orders  -monitor labs  -discharge planning  -update pt and family on plan of care  - See additional Care Plan goals for specific interventions  Outcome: Progressing  Goal: Patient/Family Short Term Goal  Description: Patient's Short Term Goal: pain management    Interventions:   - follow md orders  -take pain medication asneeded  -non-pharmacologic therapy   -PT/OT  - See additional Care Plan goals for specific interventions  Outcome: Progressing

## 2024-12-04 NOTE — DISCHARGE INSTRUCTIONS
Home care nurse to remove wound vac dressing on Tuesday.  Apply Mepilex dressing.  Change dressing every 3-5 days.  Keep wound dry for 2 weeks.  Ambulate with walker and assist.  Posterior hip precautions.

## 2024-12-04 NOTE — OCCUPATIONAL THERAPY NOTE
OCCUPATIONAL THERAPY EVALUATION - INPATIENT     Room Number: 428/428-A  Evaluation Date: 12/4/2024  Type of Evaluation: Quick Eval   Presenting Problem: s/p L hip revision arthroplasty   Co-Morbidities: Generalized osteoarthritis, degenerative joint disease of lumbar spine, benign prostatic hypertrophy, gastroesophageal reflux disease, hypertension, hyperlipidemia, diabetes mellitus type 2     Physician Order: IP Consult to Occupational Therapy  Reason for Therapy: ADL/IADL Dysfunction and Discharge Planning    OCCUPATIONAL THERAPY ASSESSMENT   Patient is a 79 year old male admitted 12/3/2024 for s/p L hip revision athroplasty.  Prior to admission, patient's baseline is independent with all ADLs and functional mobility.  Patient is currently functioning near baseline with toileting, lower body dressing, transfers, and functional standing tolerance.    PLAN  Patient has been evaluated and presents with no skilled Occupational Therapy needs  at this time.  Patient will be discharged from Occupational Therapy services. Please re-order if a new functional limitation presents during this admission.    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)  Active Problems:    Essential hypertension    Mixed hyperlipidemia    Type 2 diabetes mellitus without complication, without long-term current use of insulin ([facility])    Preop testing    HOME SITUATION  Type of Home: House  Home Layout: Multi-level; Bed/bath upstairs  Lives With: Spouse  Toilet and Equipment: Standard height toilet; 3-in-1 commode  Shower/Tub and Equipment: Walk-in shower; Tub-shower combo  Other Equipment: Hip kit  Occupation/Status: business owner    Stairs in Home: ~ 7 steps up and down to each level of the tri-level home   Use of Assistive Device(s): none     Prior Level of [place]: Prior to admission pt was independent with all ADLs and  functional mobility. 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     SUBJECTIVE  I am off of work until mid-January.     OCCUPATIONAL THERAPY EXAMINATION    OBJECTIVE  Precautions: MARGARET - posterior    WEIGHT BEARING RESTRICTION  L Lower Extremity: Weight Bearing as Tolerated    PAIN ASSESSMENT  Ratin  Location: L hip with activity  Management Techniques: Activity promotion; Relaxation; Repositioning    COGNITION  Overall Cognitive Status:  WFL - within functional limits    RANGE OF MOTION   Upper extremity ROM is within functional limits     STRENGTH ASSESSMENT  Upper extremity strength is within functional limits     COORDINATION  Gross Motor: WFL   Fine Motor: WFL     ACTIVITIES OF DAILY LIVING ASSESSMENT  AM-PAC ‘6-Clicks’ Inpatient Daily Activity Short Form  How much help from another person does the patient currently need…  -   Putting on and taking off regular lower body clothing?: A Little  -   Bathing (including washing, rinsing, drying)?: A Little  -   Toileting, which includes using toilet, bedpan or urinal? : A Little  -   Putting on and taking off regular upper body clothing?: None  -   Taking care of personal grooming such as brushing teeth?: None  -   Eating meals?: None    AM-PAC Score:  Score: 21  Approx Degree of Impairment: 32.79%  Standardized Score (AM-PAC Scale): 44.27  CMS Modifier (G-Code): CJ    FUNCTIONAL TRANSFER ASSESSMENT  Sit to Stand: Chair; Edge of Bed  Edge of Bed: Contact Guard Assist  Chair: Contact Guard Assist  Simulated toilet transfer: Contact Guard Assist     FUNCTIONAL MOBILITY   Pt completed chair to bathroom mobility with CGA using RW for support.     BALANCE ASSESSMENT  Static Sitting: Independent  Static Standing: Contact Guard Assist  Dynamic Sitting: Stand-by assist   Dynamic Standing: Contact Guard Assist     FUNCTIONAL ADL ASSESSMENT  LB Dressing Seated: Stand-by Assist (to don underwear using reacher seated in recliner)   LB Dressing Standing: Contact  Guard Assist (to pull underwear up over bilateral hips)   Toileting Standing: Contact Guard Assist    Skilled Therapy Provided:   RN cleared pt for participation. Pt received sitting in recliner with no visitors present. Pt agreeable to participation in therapy. Provided pt education on hip precautions and activity modification / compensatory strategies for managing LE dressing (return demo use of reacher), bathing, sleeping, car transfer, and functional transfers (simulated toilet transfer) while maintaining hip precautions. Pt benefited from increased time and RW for support.     To assess pt's participation during tasks while also providing intermittent education to maximize participation, OT facilitated the following: LB dressing, simulated toilet transfer, functional mobility, and toileting. Pt completed all tasks with assist levels listed above. Pt presented with no LOB, dizziness, or SOB during session. Pt with good carry over of hip precautions and safety awareness throughout session. Pt verbalizing no concerns about completing ADLs upon return home with support from spouse. Pt given hip precaution handout to refer back to at end of session.       EDUCATION PROVIDED  Patient Education : Role of Occupational Therapy; Plan of Care; Functional Transfer Techniques; Fall Prevention; Proper Body Mechanics; Weight Bear Status; Surgical Precautions; Other (adaptive equipment, compensatory ADL strategies)  Patient's Response to Education: Verbalized Understanding; Returned Demonstration    The patient's Approx Degree of Impairment: 32.79% has been calculated based on documentation in the [facility] '6 clicks' Inpatient Daily Activity Short Form.  Research supports that patients with this level of impairment may benefit from return home.  Final disposition will be made by interdisciplinary medical team.    Patient End of Session: With  staff;Needs met;RN aware of session/findings;All patient questions and concerns  addressed;Hospital anti-slip socks;Other (Comment) (seated EOB, handoff to PT)      Patient Evaluation Complexity Level:   Occupational Profile/Medical History MODERATE - Expanded review of history including review of medical or therapy record   Specific performance deficits impacting engagement in ADL/IADL MODERATE  3 - 5 performance deficits   Client Assessment/Performance Deficits MODERATE - Comorbidities and min to mod modifications of tasks    Clinical Decision Making MODERATE - Analysis of occupational profile, detailed assessments, several treatment options    Overall Complexity MODERATE     OT Session Time: 25 minutes  Self-Care Home Management: 25 minutes

## 2024-12-04 NOTE — CM/SW NOTE
Anticipated therapy need: Home with Home Healthcare    CM requested department  ([facility]) to initiate [facility] referral for HHC. F2F entered. SW/CM will continue to follow.     Obdulia Arciniega RN, BSN  Nurse   [phone]

## 2024-12-04 NOTE — PLAN OF CARE
Post-op day #1. Dressing in place to left hip. No acute changes noted throughout shift. Monitoring blood glucose levels per order. Romero in place. SCDs for DVT prophylaxis. PRN pain medication provided as needed. Fall precautions maintained- bed alarm on, bed locked in lowest position, call light and personal belongings within reach, non-skid socks in place to bilateral feet. Frequent rounding by nursing staff. Plan for PT/OT tomorrow.      Problem: Patient Centered Care  Goal: Patient preferences are identified and integrated in the patient's plan of care  Description: Interventions:  - What would you like us to know as we care for you?   - Provide timely, complete, and accurate information to patient/family  - Incorporate patient and family knowledge, values, beliefs, and cultural backgrounds into the planning and delivery of care  - Encourage patient/family to participate in care and decision-making at the level they choose  - Honor patient and family perspectives and choices  Outcome: Progressing     Problem: Diabetes/Glucose Control  Goal: Glucose maintained within prescribed range  Description: INTERVENTIONS:  - Monitor Blood Glucose as ordered  - Assess for signs and symptoms of hyperglycemia and hypoglycemia  - Administer ordered medications to maintain glucose within target range  - Assess barriers to adequate nutritional intake and initiate nutrition consult as needed  - Instruct patient on self management of diabetes  Outcome: Progressing     Problem: Patient/Family Goals  Goal: Patient/Family Long Term Goal  Description: Patient's Long Term Goal: discharge    Interventions:  - follow md orders  -monitor labs  -discharge planning  -update pt and family on plan of care  - See additional Care Plan goals for specific interventions  Outcome: Progressing  Goal: Patient/Family Short Term Goal  Description: Patient's Short Term Goal: pain management    Interventions:   - follow md orders  -take pain medication  asneeded  -non-pharmacologic therapy   -PT/OT  - See additional Care Plan goals for specific interventions  Outcome: Progressing

## 2024-12-04 NOTE — PLAN OF CARE
Patient is A/Ox4. On RA. Tolerating diet. Voiding via urinal. Up SBA w walker. PRN norco for pain. IV fluids infusing per order. Call light and personal items within reach. Discharge paperwork discussed with patient and spouse, all questions answered. No further needs at this time.       Problem: Patient Centered Care  Goal: Patient preferences are identified and integrated in the patient's plan of care  Description: Interventions:  - What would you like us to know as we care for you?   - Provide timely, complete, and accurate information to patient/family  - Incorporate patient and family knowledge, values, beliefs, and cultural backgrounds into the planning and delivery of care  - Encourage patient/family to participate in care and decision-making at the level they choose  - Honor patient and family perspectives and choices  Outcome: Adequate for Discharge     Problem: Diabetes/Glucose Control  Goal: Glucose maintained within prescribed range  Description: INTERVENTIONS:  - Monitor Blood Glucose as ordered  - Assess for signs and symptoms of hyperglycemia and hypoglycemia  - Administer ordered medications to maintain glucose within target range  - Assess barriers to adequate nutritional intake and initiate nutrition consult as needed  - Instruct patient on self management of diabetes  Outcome: Adequate for Discharge     Problem: Patient/Family Goals  Goal: Patient/Family Long Term Goal  Description: Patient's Long Term Goal: discharge    Interventions:  - follow md orders  -monitor labs  -discharge planning  -update pt and family on plan of care  - See additional Care Plan goals for specific interventions  Outcome: Adequate for Discharge  Goal: Patient/Family Short Term Goal  Description: Patient's Short Term Goal: pain management    Interventions:   - follow md orders  -take pain medication asneeded  -non-pharmacologic therapy   -PT/OT  - See additional Care Plan goals for specific interventions  Outcome:  Adequate for Discharge     Problem: PAIN - ADULT  Goal: Verbalizes/displays adequate comfort level or patient's stated pain goal  Description: INTERVENTIONS:  - Encourage pt to monitor pain and request assistance  - Assess pain using appropriate pain scale  - Administer analgesics based on type and severity of pain and evaluate response  - Implement non-pharmacological measures as appropriate and evaluate response  - Consider cultural and social influences on pain and pain management  - Manage/alleviate anxiety  - Utilize distraction and/or relaxation techniques  - Monitor for opioid side effects  - Notify MD/LIP if interventions unsuccessful or patient reports new pain  - Anticipate increased pain with activity and pre-medicate as appropriate  Outcome: Adequate for Discharge     Problem: DISCHARGE PLANNING  Goal: Discharge to home or other facility with appropriate resources  Description: INTERVENTIONS:  - Identify barriers to discharge w/pt and caregiver  - Include patient/family/discharge partner in discharge planning  - Arrange for needed discharge resources and transportation as appropriate  - Identify discharge learning needs (meds, wound care, etc)  - Arrange for interpreters to assist at discharge as needed  - Consider post-discharge preferences of patient/family/discharge partner  - Complete POLST form as appropriate  - Assess patient's ability to be responsible for managing their own health  - Refer to Case Management Department for coordinating discharge planning if the patient needs post-hospital services based on physician/LIP order or complex needs related to functional status, cognitive ability or social support system  Outcome: Adequate for Discharge     Problem: METABOLIC/FLUID AND ELECTROLYTES - ADULT  Goal: Electrolytes maintained within normal limits  Description: INTERVENTIONS:  - Monitor labs and rhythm and assess patient for signs and symptoms of electrolyte imbalances  - Administer  electrolyte replacement as ordered  - Monitor response to electrolyte replacements, including rhythm and repeat lab results as appropriate  - Fluid restriction as ordered  - Instruct patient on fluid and nutrition restrictions as appropriate  Outcome: Adequate for Discharge

## 2024-12-04 NOTE — CM/SW NOTE
Department  notified of request for HHC, aidin referrals started. Assigned CM/SW to follow up with pt/family on further discharge planning.     Kristi Chandler, DSC

## 2024-12-04 NOTE — PHYSICAL THERAPY NOTE
PHYSICAL THERAPY EVALUATION - INPATIENT     Room Number: 428/428-A  Evaluation Date: 12/4/2024  Type of Evaluation: Initial   Physician Order: PT Eval and Treat    Presenting Problem: L hip revision     Reason for Therapy: Mobility Dysfunction and Discharge Planning    PHYSICAL THERAPY ASSESSMENT   Patient is a 79 year old male admitted 12/3/2024 for L hip revision.  Prior to admission, patient's baseline is independent with RW.  Patient is currently functioning near baseline with bed mobility, transfers, and gait.  Patient is requiring contact guard assist as a result of the following impairments: pain.  Physical Therapy will continue to follow for duration of hospitalization.    Patient will benefit from continued skilled PT Services at discharge to promote prior level of function and safety with additional support and return home with home health PT.    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)  Active Problems:    Essential hypertension    Mixed hyperlipidemia    Type 2 diabetes mellitus without complication, without long-term current use of insulin (HCC)    Preop testing      HOME SITUATION  Type of Home: House  Home Layout: Multi-level                     Lives With: Spouse              Prior Level of [place]: ind    SUBJECTIVE  \"It hurts\"    PHYSICAL THERAPY EXAMINATION   OBJECTIVE  Precautions: MARGARET - posterior       WEIGHT BEARING RESTRICTION  L Lower Extremity: Weight Bearing as Tolerated      PAIN ASSESSMENT  Rating: 10  Location: L hip  Management Techniques: Activity promotion    COGNITION  Overall Cognitive Status:  WFL - within functional  limits    RANGE OF MOTION AND STRENGTH ASSESSMENT  Upper extremity ROM and strength are within functional limits   Lower extremity ROM is within functional limits   Lower extremity strength is within functional limits     BALANCE  Static Sitting: Fair  Dynamic Sitting: Fair  Static Standing: Fair -  Dynamic Standing: Fair -    ADDITIONAL TESTS                                    NEUROLOGICAL FINDINGS                      ACTIVITY TOLERANCE                         O2 WALK       AM-PAC '6-Clicks' INPATIENT SHORT FORM - BASIC MOBILITY  How much difficulty does the patient currently have...  Patient Difficulty: Turning over in bed (including adjusting bedclothes, sheets and blankets)?: A Little   Patient Difficulty: Sitting down on and standing up from a chair with arms (e.g., wheelchair, bedside commode, etc.): A Little   Patient Difficulty: Moving from lying on back to sitting on the side of the bed?: A Little   How much help from another person does the patient currently need...   Help from Another: Moving to and from a bed to a chair (including a wheelchair)?: A Little   Help from Another: Need to walk in hospital room?: A Little   Help from Another: Climbing 3-5 steps with a railing?: A Little     AM-PAC Score:  Raw Score: 18   Approx Degree of Impairment: 46.58%   Standardized Score (AM-PAC Scale): 43.63   CMS Modifier (G-Code): CK    FUNCTIONAL ABILITY STATUS  Functional Mobility/Gait Assessment  Gait Assistance: Minimum assistance  Distance (ft): 150  Assistive Device: Rolling walker  Pattern: L Decreased stance time  Stairs: Stairs  How Many Stairs: 7  Device: 1 Rail  Assist: Contact guard assist  Pattern: Ascend and Descend  Ascend and Descend : Step to  Rolling: supervision  Supine to Sit: minimal assist  Sit to Supine: minimal assist  Sit to Stand: contact guard assist    Exercise/Education Provided:  Bed mobility  Gait training  Transfer training    Skilled Therapy Provided: THP's during bed mobility and  instruction on AA for LLE in/out of bed, gait and stair training.    The patient's Approx Degree of Impairment: 46.58% has been calculated based on documentation in the [facility] '6 clicks' Inpatient Basic Mobility Short Form.  Research supports that patients with this level of impairment may benefit from HH.  Final disposition will be made by interdisciplinary medical team.    Patient End of Session: In bed    CURRENT GOALS  Goals to be met by:   Patient Goal Patient's self-stated goal is: home   Goal #1 Patient is able to demonstrate supine - sit EOB @ level: independent     Goal #1   Current Status    Goal #2 Patient is able to demonstrate transfers Sit to/from Stand at assistance level: independent with walker - rolling     Goal #2  Current Status    Goal #3 Patient is able to ambulate 300 feet with assist device: walker - rolling at assistance level: supervision   Goal #3   Current Status    Goal #4 Patient will negotiate 7 stairs/one curb w/ assistive device and supervision   Goal #4   Current Status    Goal #5 Patient to demonstrate independence with home activity/exercise instructions provided to patient in preparation for discharge.   Goal #5   Current Status    Goal #6    Goal #6  Current Status      Patient Evaluation Complexity Level:  History Low - no personal factors and/or co-morbidities   Examination of body systems Low -  addressing 1-2 elements   Clinical Presentation Low- Stable   Clinical Decision Making  Low Complexity     Gait Trainin minutes

## 2024-12-05 ENCOUNTER — PATIENT OUTREACH (OUTPATIENT)
Dept: CASE MANAGEMENT | Age: 79
End: 2024-12-05

## 2024-12-05 ENCOUNTER — TELEPHONE (OUTPATIENT)
Dept: ORTHOPEDICS CLINIC | Facility: CLINIC | Age: 79
End: 2024-12-05

## 2024-12-05 ENCOUNTER — TELEPHONE (OUTPATIENT)
Dept: INTERNAL MEDICINE CLINIC | Facility: CLINIC | Age: 79
End: 2024-12-05

## 2024-12-05 ENCOUNTER — PATIENT MESSAGE (OUTPATIENT)
Dept: ORTHOPEDICS CLINIC | Facility: CLINIC | Age: 79
End: 2024-12-05

## 2024-12-05 DIAGNOSIS — Z96.642 HISTORY OF REVISION OF TOTAL REPLACEMENT OF LEFT HIP JOINT: Primary | ICD-10-CM

## 2024-12-05 DIAGNOSIS — T84.011A FAILURE OF LEFT TOTAL HIP ARTHROPLASTY, INITIAL ENCOUNTER (HCC): Primary | ICD-10-CM

## 2024-12-05 DIAGNOSIS — Z02.9 ENCOUNTERS FOR ADMINISTRATIVE PURPOSES: ICD-10-CM

## 2024-12-05 PROCEDURE — 1111F DSCHRG MED/CURRENT MED MERGE: CPT

## 2024-12-05 PROCEDURE — 1159F MED LIST DOCD IN RCRD: CPT

## 2024-12-05 NOTE — PROGRESS NOTES
Called pt to schedule a hospital follow-up appt :      PCP Request :    Follow up with [facility] Specialty: Internal Medicine As needed [address] [address]   [phone]         Attempt #1:  Left message on voicemail for patient to call transitions specialist back to schedule follow up appointments. Provided Transitions specialist scheduling phone number [phone].

## 2024-12-05 NOTE — TELEPHONE ENCOUNTER
CAROL, Spoke to patient for TCM today.  Patient states that he is recovering from surgery and declined to schedule an appt with PCP at this time. He has his post op scheduled for 12/23/24 with the surgeon.  TCM appointment recommended by 12/18/24 as patient is a Moderate risk for readmission.      BOOK BY DATE (last date for TCM): 12/18/24

## 2024-12-05 NOTE — PROGRESS NOTES
Transitional Care Management   Discharge Date: 24  Contact Date: 2024    Assessment:  TCM Initial Assessment    General:  Assessment completed with: Patient  Patient Subjective: I'm recovering right now and therapy came today. The pain is pretty rough, when I'm up its been 8/10 but when I'm resting it's fine like /10.  I have therapies that I need to do.  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)  Verify patient name and  with patient/ caregiver: Yes    Hospital Stay/Discharge:  Tell me what you understand of why you were in the hospital or emergency department: hip surgery  Prior to leaving the hospital were your Discharge Instructions reviewed with you?: Yes  Did you receive a copy of your written Discharge Instructions?: Yes  What questions do you have about your Discharge Instructions?: none  Do you feel better or worse since you left the hospital or emergency department?: Better    Follow - Up Appointment:  Do you have a follow-up appointment?: Yes  Date: 24  Physician: Ortho  Are there any barriers to getting to your follow-up appointment?: No    Home Health/DME:  Prior to leaving the hospital was Home Health (HH) arranged for you?: Yes  Has HH been out or set up a visit to see you?: Been out     Prior to leaving the hospital or emergency department was Durable Medical Equipment (DME), medical supplies, or infusions arranged for you?: No  Are DME/medical supply/infusions needs identified by staff during this assessment?: No     Medications/Diet:  Did any of your medications change, during or after your hospital stay or ED visit?: Yes  Do you have your new or updated medications?: Yes  Do you understand what your medications are for and possible side effects?: Yes  Are there any reasons that keep you from taking your medication as prescribed?: No  Any concerns about medication refills?: No    Were you given a different diet per your Discharge Instructions?: No      Questions/Concerns:  Do you have any questions or concerns that have not been discussed?: No       Nursing Interventions: NCM reviewed discharge instructions and when to seek medical attention with the patient. He states that he is doing okay. He states that the pain is pretty high when he is moving around. He rates it at a 8/10. He states that it is fine when he is resting. He states that the wound is covered up but the HH RN was there today and everything was good including his vitals. He states that he has not had a bm yet but is taking medication for constipation. He is passing gas. He states that he is starting to eat more now. He denied having any fever, n/v, sob, lightheadedness, HA Or any new or worsening symptoms. Med review completed. He denied having any questions or concerns at this time.     Medication Review:   Current Outpatient Medications   Medication Sig Dispense Refill    aspirin 325 MG Oral Tab EC Take 1 tablet (325 mg total) by mouth in the morning and 1 tablet (325 mg total) before bedtime. 60 tablet 0    docusate sodium 100 MG Oral Cap Take 100 mg by mouth 2 (two) times daily. 20 capsule 0    ferrous sulfate 325 (65 FE) MG Oral Tab EC Take 1 tablet (325 mg total) by mouth daily with breakfast. 20 tablet 0    HYDROcodone-acetaminophen 7.5-325 MG Oral Tab Take 1 tablet by mouth every 6 (six) hours as needed. 40 tablet 0    polyethylene glycol, PEG 3350, 17 g Oral Powd Pack Take 17 g by mouth daily as needed (constipation).      acetaminophen 500 MG Oral Tab Take 1 tablet (500 mg total) by mouth every 6 (six) hours as needed for Pain.      Risedronate Sodium 150 MG Oral Tab Take 1 tablet (150 mg total) by mouth every 30 (thirty) days. 3 tablet 3    ipratropium 0.03 % Nasal Solution 2 sprays by Nasal route every 12 (twelve) hours. 1 each 5    Omeprazole 40 MG Oral Capsule Delayed Release Take 1 capsule (40 mg total) by mouth daily. 90 capsule 3    NIFEdipine ER 90 MG Oral Tablet 24 Hr Take 1  tablet (90 mg total) by mouth daily. (Patient taking differently: Take 1 tablet (90 mg total) by mouth every morning.) 90 tablet 3    Lovastatin 40 MG Oral Tab Take 1 tablet (40 mg total) by mouth nightly. 100 tablet 2    fluticasone propionate 50 MCG/ACT Nasal Suspension 1 spray by Each Nare route daily as needed. 16 g 2    metFORMIN 500 MG Oral Tab Take 1 tablet (500 mg total) by mouth 2 (two) times daily. 180 tablet 3    lisinopril 5 MG Oral Tab Take 1 tablet (5 mg total) by mouth daily. (Patient taking differently: Take 1 tablet (5 mg total) by mouth every morning.) 90 tablet 3    ADVAIR DISKUS 100-50 MCG/ACT Inhalation Aerosol Powder, Breath Activated Inhale 1 puff into the lungs 2 (two) times daily. 180 each 1    diclofenac 1 % External Gel       Vitamin B-12 1000 MCG Oral Tab Take 1 tablet (1,000 mcg total) by mouth daily.      Cholecalciferol (VITAMIN D) 1000 UNITS Oral Cap Take by mouth daily.      Multiple Vitamins-Minerals (CENTRUM SILVER) Oral Tab Take 1 tablet by mouth daily.        Cinnamon 500 MG Oral Cap Take 500 mg by mouth daily.       Did patient review medications using current pill bottles and not just a medication list?  No  Discharge medications reviewed/discussed/and reconciled against outpatient medications with patient.  Any changes or updates to medications sent to primary care provider.  Patient Acknowledged    SDOH:   Transportation Needs: No Transportation Needs (12/3/2024)    Transportation Needs     Lack of Transportation: No     Car Seat: Not on file             Follow-up Appointments:  Your appointments       Date & Time Appointment Department ([place])    Dec 23, 2024 9:00 AM CST Post Op Visit with Moisés Em PA-C [facility] ([facility])              [facility]  [address] [facility] [id number] [phone]             Transitional Care Clinic  Was TCC Ordered: No      Primary Care Provider (If no TCC appointment)  Does patient already have a PCP appointment scheduled? No  Nurse Care Manager Attempted to schedule PCP office TCM appointment with patient   -If no appointment scheduled: Explain -pt declined at this time. NCM sent TE to PCP office.     Specialist  Does the patient have any other follow-up appointment(s) that need to be scheduled? Yes   -If yes: Nurse Care Manager reviewed upcoming specialist appointments with patient: Yes   -Does the patient need assistance scheduling appointment(s): No, pt seeing ortho on 12/23/24    CCM referral placed:  No    Book By Date: 12/18/24

## 2024-12-06 NOTE — PAYOR COMM NOTE
Discharge Notification    Patient Name: WIL RODRIGUEZ  Payor: [employer]  Subscriber #: [id number]  Authorization Number: [id number]  Admit Date/Time: 12/3/2024 11:26 AM  Discharge Date/Time: 12/4/2024 2:48 PM

## 2024-12-06 NOTE — TELEPHONE ENCOUNTER
Post-op call for Dr. Mauro    Date: 12/5/2024      Time: 6:22 PM   Patient: Linwood Morales      number: [id number]   Surgery and surgery date:12/3/2024       Procedure Laterality Anesthesia   Revision of left hip resurfacing Le         Patient unavailable.  Left message on voicemail to call clinic with questions or concerns.  Number was provided./ SPOKE TO PATIENT  Patient's general feeling since discharge:/a lot of pain/walking is really bad with the walker  Pain control (0-10):/8  Pain Medication:  dose/strength    Medication Quantity Refills Start End   ferrous sulfate 325 (65 FE) MG Oral Tab EC         Medication Quantity Refills Start End   aspirin 325 MG Oral Tab EC         Medication Quantity Refills Start End   HYDROcodone-acetaminophen 7.5-325 MG Oral Tab 40 tablet        Fever:  no  Chills: no  SOB: no  Incision site appearance:  Redness  no  Drainage no  Clean/dry/Intact yes   Calf pain, redness or warmth:  no   Bowel Regimen: yes LBM 12/3/2024  If not, what are you taking stool softener/laxative?/ advised on fresh fiber fruit,miralax and stool softener  Confirmed appointment date for post op:/ 12/23/2024  Other concerns patients may ask: / He is taking 2 hydrocodone a day and will start with 2 500mg 3x a day of tylenol to see if that will help bring down his pain  Bathing and bandages   Patient needs to keep incision clean and dry for the first week./DONE  Enforce rest, ice, compression elevation and use their pain medication accordingly./DONE  If the patient needs more pain medication, please put in a communication.

## 2024-12-06 NOTE — PAYOR COMM NOTE
--------------  ADMISSION REVIEW     Payor: [employer]  Subscriber #:  [id number]  Authorization Number: [id number]    Admit date: 12/3/24  Admit time: 11:26 AM       12/3 ORTHO:    [facility]     PATIENT'S NAME: WIL RODRIGUEZ   ATTENDING PHYSICIAN: Martín Mauro MD   OPERATING PHYSICIAN: Martín Mauro MD   PATIENT ACCOUNT#:   [id number]    LOCATION:  [address] [place] [address]  MEDICAL RECORD #:   [id number]       YOB: 1945  ADMISSION DATE:       12/03/2024      OPERATION DATE:  12/03/2024     OPERATIVE REPORT     PREOPERATIVE DIAGNOSIS:  Failed left resurfacing total hip arthroplasty.  POSTOPERATIVE DIAGNOSIS:  Failed left resurfacing total hip arthroplasty.  PROCEDURE:  Revision of left resurfacing total hip arthroplasty to standard total hip arthroplasty with dual mobility acetabular cup and Medacta AMIS femoral stem.     ASSISTANT:  Moisés Em PA-C.     INDICATIONS:  The patient is an 79-year-old man who was having pain 15 years following the left hip resurfacing arthroplasty.  Preoperative workup included elevated cobalt and chromium levels.  After discussion of the options for treatment, I recommended the above procedure.  Our discussion included, but was not limited to, the potential risks of anesthetic complication, infection, DVT or PE, dislocation, leg length inequality, potential need for future revision surgery, nerve or vascular injury, chance for unanticipated perioperative medical or orthopedic complications, etc.  Written consent was obtained.       OPERATIVE TECHNIQUE:  The patient was brought to the operating room and placed under general anesthesia.  He was positioned in the right lateral decubitus position, and the left hip and lower extremity were prepped and draped in usual sterile fashion.  The old incision was anterior to the midline, and I chose a more posterior location for my posterolateral approach to the hip.  I left an adequate skin  bridge between the incisions.  Dissection was carried down through the gluteus fascia, and the muscle was split in line with the skin incision.  A Charnley retractor was placed in the wound taking care not to retract near the sciatic nerve.  The scar and pseudocapsule were elevated from the posterior aspect of the greater trochanter and peeled posteriorly.  It was tagged and reflected posteriorly as the sheath to protect the sciatic nerve.  There was no excess synovial fluid in the joint, but there was abundant black-stained rubbery synovium.  The hip was dislocated.  A femoral neck osteotomy was made at the appropriate level and orientation templating off the templated broach.  The femoral component was removed with a femoral neck osteotomy.  Retractors were placed about the acetabulum.  Synovectomy was performed removing all of the rubbery synovium.  There was no significant osteolysis.  The acetabular shell was well fixed.  The femoral canal was opened with a box chisel, T-handle, and lateralizing reamer and then broached up to accept the AMIS size 7 femoral stem.  I trialed standard and lateral necks with 0 and +3.5 mm heads.  The best combination of length offset and stability restoration came with the lateralized stem and the +3.5 femoral head.  The trial component was then removed and the actual stem was inserted.  A good press-fit was obtained and it seated at the same level as the broach.  The +3.5 mm, 28 mm ceramic head was attached to the clean dry trunnion after it had been assembled with the 54 mm outer diameter, 28 mm inner diameter Vivacit-E highly cross-linked dual mobility polyethylene component on the back table with device.  After the interval was locked onto the Dale taper, the hip was reduced.  There was stability through a full range of motion.  A meticulous capsular repair was performed, and the wound was closed in routine fashion in layers after irrigating with Irrisept.  Estimated blood  loss was 150 mL.  There were no intraoperative complications.  Routine specimens were sent to Pathology.  No cultures were taken as the preoperative workup was negative for infection, and there was no clinical appearance of infection.  Also, a frozen section was done during the procedure which showed no evidence of acute inflammation.  The patient was stable under the care of Anesthesia at the completion of the procedure.     Dictated By Martín Mauro MD  d:12/03/2024 15:50:59  t:12/04/2024 03:06:48  [id number]  JS/            Last signed by: Martín Mauro MD at 12/4/2024  9:36 AM     Routing History          Vitals (last day) before discharge       Date/Time Temp Pulse Resp BP SpO2 Weight O2 Device O2 Flow Rate (L/min) [facility]    12/04/24 0829 98.3 °F (36.8 °C) 78 16 128/95 96 % -- None (Room air) -- NT    12/04/24 0343 98.1 °F (36.7 °C) 73 16 142/60 97 % -- None (Room air) -- DG    12/03/24 2338 97.7 °F (36.5 °C) 67 16 151/84 95 % -- None (Room air) -- SK    12/03/24 1923 97.4 °F (36.3 °C) 66 14 149/67 99 % -- None (Room air) -- DG    12/03/24 1739 -- 65 14 149/80 100 % -- None (Room air) -- BV    12/03/24 1724 97.9 °F (36.6 °C) 61 -- -- 98 % -- Nasal cannula 2 L/min OR    12/03/24 1714 -- 58 11 144/62 99 % -- Nasal cannula 2 L/min OR    12/03/24 1704 -- 57 10 141/64 99 % -- Nasal cannula 2 L/min OR    12/03/24 1654 -- 62 18 142/64 97 % -- Nasal cannula 2 L/min OR    12/03/24 1644 -- 63 16 148/70 93 % -- None (Room air) -- OR    12/03/24 1634 -- 68 13 148/64 98 % -- None (Room air) -- OR    12/03/24 1624 -- 70 15 146/70 98 % -- None (Room air) -- OR    12/03/24 1614 97.8 °F (36.6 °C) 76 16 157/64 100 % -- None (Room air) -- OR    12/03/24 1141 97.7 °F (36.5 °C) 80 16 169/83 95 % 187 lb (84.8 kg) None (Room air) -- GR

## 2024-12-06 NOTE — PROGRESS NOTES
A TE message was entered on Thursday 12/5 by TCM nurse Concetta Clement RN.  Per this message :    \"Patient states that he is recovering from surgery and declined to schedule an appt with PCP at this time.\"      PCP Request :     Follow up with Mark Gupta Specialty: Internal Medicine As needed [address] [address]   [phone]   DECLINED - \"Patient states that he is recovering from surgery and declined to schedule an appt with PCP at this time.\"        Closing encounter

## 2024-12-10 ENCOUNTER — TELEPHONE (OUTPATIENT)
Dept: ORTHOPEDICS CLINIC | Facility: CLINIC | Age: 79
End: 2024-12-10

## 2024-12-10 NOTE — TELEPHONE ENCOUNTER
Home health agency is calling to let Dr. Mauro know that the patient will be discharged from home health 12/20/2024 and going to outpatient therapy. Also, the home health stated that the wound vac is not working but the wound on patient was checked and looks good. If you need any further info from the agency [phone]. Thanks.

## 2024-12-10 NOTE — TELEPHONE ENCOUNTER
Patient has outpatient physical therapy order and has scheduled for physical therapy.   Patient has PO appointment on 12/23/24.

## 2024-12-11 NOTE — TELEPHONE ENCOUNTER
Spoke with Mayra RN from [facility] and clarified the message further. She states patient was seen on 12/9/24 and wound vac was removed one day early instead of removing on 12/10 but reports that wound looks good, no concerns and was covered with mepilex. Wanted confirmation that ok to discharge patient on week of 12/20 and patient to start outpt physical therapy on 12/23.

## 2024-12-16 ENCOUNTER — TELEPHONE (OUTPATIENT)
Dept: PHYSICAL THERAPY | Facility: HOSPITAL | Age: 79
End: 2024-12-16

## 2024-12-23 ENCOUNTER — HOSPITAL ENCOUNTER (OUTPATIENT)
Dept: GENERAL RADIOLOGY | Facility: HOSPITAL | Age: 79
Discharge: HOME | End: 2024-12-23
Attending: PHYSICIAN ASSISTANT
Payer: MEDICARE

## 2024-12-23 ENCOUNTER — OFFICE VISIT (OUTPATIENT)
Dept: ORTHOPEDICS CLINIC | Facility: CLINIC | Age: 79
End: 2024-12-23
Payer: MEDICARE

## 2024-12-23 ENCOUNTER — OFFICE VISIT (OUTPATIENT)
Dept: PHYSICAL THERAPY | Facility: HOSPITAL | Age: 79
End: 2024-12-23
Attending: PHYSICIAN ASSISTANT
Payer: MEDICARE

## 2024-12-23 DIAGNOSIS — Z47.89 ORTHOPEDIC AFTERCARE: Primary | ICD-10-CM

## 2024-12-23 DIAGNOSIS — Z47.89 ORTHOPEDIC AFTERCARE: ICD-10-CM

## 2024-12-23 DIAGNOSIS — Z96.642 HISTORY OF REVISION OF TOTAL REPLACEMENT OF LEFT HIP JOINT: Primary | ICD-10-CM

## 2024-12-23 PROCEDURE — 1125F AMNT PAIN NOTED PAIN PRSNT: CPT | Performed by: PHYSICIAN ASSISTANT

## 2024-12-23 PROCEDURE — 1159F MED LIST DOCD IN RCRD: CPT | Performed by: PHYSICIAN ASSISTANT

## 2024-12-23 PROCEDURE — 97161 PT EVAL LOW COMPLEX 20 MIN: CPT

## 2024-12-23 PROCEDURE — 73502 X-RAY EXAM HIP UNI 2-3 VIEWS: CPT | Performed by: PHYSICIAN ASSISTANT

## 2024-12-23 PROCEDURE — 1160F RVW MEDS BY RX/DR IN RCRD: CPT | Performed by: PHYSICIAN ASSISTANT

## 2024-12-23 PROCEDURE — 97110 THERAPEUTIC EXERCISES: CPT

## 2024-12-23 PROCEDURE — 1111F DSCHRG MED/CURRENT MED MERGE: CPT | Performed by: PHYSICIAN ASSISTANT

## 2024-12-23 PROCEDURE — 99024 POSTOP FOLLOW-UP VISIT: CPT | Performed by: PHYSICIAN ASSISTANT

## 2024-12-23 NOTE — PROGRESS NOTES
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     History of Current Condition: patient had initial MARGARET in 2008, after which had trouble with pain and clicking. Since that time underwent L revision MARGARET, has been feeling okay but feels limited by soreness and pain specifically with walking. Feels that at this time the L knee is giving him more trouble. Has completed home health and discharged at this time, contiues HEP. History includes lumbar surgery 2001, has previously completed PT for low back and hip pain. Ambulating with straight cane, using at all times; previously using rolling walker.   N/T: none    Imaging (x-ray, 12/23/2024): \"CONCLUSION: 1. Left hip arthroplasty with no radiographic evidence of hardware loosening. 2. Slight to moderate degenerative changes within the right hip\"     Hip/WB Precautions: POSTERIOR; avoid excessive hip flexion, adduction, ER.    Pt describes pain level current 2/10, at best 2/10, at worst 8/10.   Quality/location: aching, dull pain inside hip joint  Relieving: sitting down, icing PRN, not taking medication (tylenol at night to sleep)  Aggravating: walking, stair negotiation     Current functional limitations include sit<>stand transfers, supine<>sit, walking with and without cane, stair negotiation, bending over, and getting in and out of the car.   Home Set Up: Live with wife, tri-level, 7 up/ 7 down. Wife assists with driving.    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 Radon testing,  requires walking throughout the day    Linwood describes prior level of function independent, limited by hip pain and stiffness. Pt goals include being able to walk better and get rid of the pain and soreness.    Past medical history was reviewed with Linwood. 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    PPVx, Cryo, GFX, SF6    Myopia with astigmatism and presbyopia    Nocturia    Osteoarthritis    Rotator cuff tear, left    repair    Seasonal allergic rhinitis    Stiffness of joint, hand    bilateral, hand stiffness & weakness post trigger release    Trigger finger    Irf-trigger finger, recurrent - LRF trigger finger release    Type II or unspecified type diabetes mellitus without mention of complication, not stated as uncontrolled    Visual impairment    readers       ASSESSMENT  Linwood presents to physical therapy evaluation with primary c/o L hip pain s/p L MARGARET and associated L knee pain . The results of the objective tests and measures show decreased ROM, strength deficits, TTP and based on TUG testing and balance assessment patient is considered an increased risk of falls.  Functional deficits include but are not limited to sit<>stand transfers, supine<>sit, walking with and without cane, stair negotiation, bending over, and getting in and out of the car.  Signs and symptoms are consistent with diagnosis of referring diagnosis. Pt and PT discussed evaluation findings, pathology, POC and HEP.  Pt voiced understanding and performs HEP correctly without reported pain.  Skilled Physical Therapy is medically necessary to address the above impairments and reach functional goals.     OBJECTIVE:   Observation/Skin: Presents independently, straight cane on R, forward flexed posturing   Skin check not performed as pt followed up with surgical team this morning  Palpation: soft tissue and TTP at quadriceps, TTP at lateral L hip joint  Sensation: grossly intact BLE, slightly diminished along incision    AROM: (* denotes performed with pain)  Hip Knee    Flexion: R 112; L 90*  Extension: R NT; L NT  Abduction: R 32; L 10*  ER: R 30 ; L NT  IR: R 23; L 15 Flexion: L 119  Extension: L -5*     PROM: (* denotes performed with pain)  Hip   Flexion: L 90*  Extension: R NT; L -5 (s/l)   Abduction: L 30     Accessory motion: Not Tested    Flexibility: (min, mod, max restriction  Hamstrings: R Mod; L Mod/max  Piriformis: R NT; L NT  Quads: R NT; L Mod  Hip Flexor: R WFL, L Mod    Strength/MMT: (* denotes performed with pain)  Hip Knee   Flexion: R 4+/5; L 3+/5  Extension: R NT/5; L NT/5  Abduction: R NT /5; L 3*/5  ER: R 4/5; L NT/5  IR: R 4+/5; L NT/5 Flexion: R 5/5; L 5/5  Extension: R 5/5; L 4+/5        Balance: SLS: R 5 sec, L unable    Functional Mobility:  5x sit<>stand: unable  TUG (AD, time): 15.45 sec, straight cane       Gait: pt ambulates on level ground with  straight cane, forward flexed posturing, decreased L hip and knee extension and antalgic pattern.   Stair Negotiation: Reciprocal with R railing and straight cane, slow gaby and decreased eccentric control with descent noted.      Today’s Treatment and Response:   Pt education was provided on exam findings, treatment diagnosis, treatment plan, expectations, and prognosis. Pt was also provided recommendations for activity modifications, possible soreness after evaluation, modalities as needed [ice/heat], postural corrections, pain science education , importance of remaining active, and strategies to reduce fall risk at  home.  Patient was instructed in and issued a HEP for:   Access Code: [email or web address]  URL: [email or web address]  Date: 12/23/2024  Prepared by: Erum Diamond    Exercises  - Supine Heel Slide  - 1 x daily - 7 x weekly - 3 sets - 10 reps  - Supine Quad Set  - 1 x daily - 7 x weekly - 2 sets - 10 reps - 5-10 second  hold  - Hooklying Isometric Hip Abduction with Belt  - 1 x daily - 7 x weekly - 2 sets - 10 reps - 10 seconds hold    Future Session consider: Joint mobility assessment, Prone stretching    Charges: PT Eval Low Complexity, 1 TE      Total Timed Treatment: 15' min     Total Treatment Time: 45' min     PLAN OF CARE:    Goals: (To be met in 10 visits)   Pt will have improved hip AROM Flex to 105 deg and ABD to 30 deg to be able to don/doff shoes and perform car transfers without difficulty  Pt will improve hip ABD and ER strength to 4/5 to increase ease with standing and walking   Pt will demonstrate improved SLS to >6  seconds MARGARITA to promote safety and decrease risk of falls on uneven surfaces such as grass  Pt will perform TUG in <13 seconds with LRAD, demonstrating improved gait speed for improved participation in ADL such as community ambulation  Pt will be able to squat to  light objects around the house with <1/10 hip pain   Pt will improve functional hip strength to report ability to ascend/descend 1 flight of stairs reciprocally without use of handrail  Pt will be independent and compliant with comprehensive HEP to maintain progress achieved in PT    Frequency / Duration: Patient will be seen for 1-2 x/week or a total of 10 visits over a 90 day period.  Treatment will include: Gait training, Manual Therapy, Neuromuscular Re-education, Therapeutic Activities, Therapeutic Exercise, Home Exercise Program instruction, and Modalities to include: Electrical stimulation (attended) and Ultrasound    Education or treatment limitation: None  Rehab Potential:good    LEFS Score  LEFS  Score: (Patient-Rptd) 15 % (12/16/2024  3:41 PM)      Patient/Family/Caregiver was advised of these findings, precautions, and treatment options and has agreed to actively participate in planning and for this course of care.    Thank you for your referral. Please co-sign or sign and return this letter via fax as soon as possible to [phone]. If you have any questions, please contact me at Dept: [phone]    Sincerely,  Electronically signed by therapist: Erum Diamond PT  Physician's certification required: Yes  I certify the need for these services furnished under this plan of treatment and while under my care.    X___________________________________________________ Date____________________    Certification From: 12/23/2024  To:3/23/2025

## 2024-12-23 NOTE — PROGRESS NOTES
NURSING INTAKE COMMENTS:   Chief Complaint   Patient presents with    Post-Op     1st POV Left total arthroplasty revision 12/3/24, rates pain 3/10 increases with ambulation pt states improving       HPI: This 79 year old male presents today for follow-up on his left hip.  About 3 weeks status post revision left total hip arthroplasty.  Overall he is doing well.  He does have some soreness in the hip and knee at the end of the day.  He completed his home therapy and will be starting outpatient therapy today.  He has been diligent with his home exercises.  He is ambulating with a cane today.    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    PPVx, Cryo, GFX, SF6    Myopia with astigmatism and presbyopia    Nocturia    Osteoarthritis    Rotator cuff tear, left    repair    Seasonal allergic rhinitis    Stiffness of joint, hand    bilateral, hand stiffness & weakness post trigger release    Trigger finger    Irf-trigger finger, recurrent - LRF trigger finger release    Type II or unspecified type diabetes mellitus without mention of complication, not stated as uncontrolled    Visual impairment    readers     Past Surgical History:   Procedure Laterality Date    Arthroscopy of joint unlisted  1990    knee    Arthroscopy of joint unlisted Left 2011    rotator cuff repair    Arthroscopy of joint unlisted Left 1992    hip resurfacing    Arthroscopy of joint unlisted Left 12/2008    hip resurfacing    Cataract      Cataract extraction w/  intraocular lens implant Right 09/17/2012    RJM    Cataract extraction  w/  intraocular lens implant Left 01/11/2010    RJMAVIS    Colonoscopy  04/2015    repeat in 10 years.    Colonoscopy      Eye surgery Right 2013    repair detached retina    Eye surgery Right 02/20/2006    S/ P PPV RD repair (S/P PPVx, cryo, GFX, SF6 OD 4/25/13) Dr. Howe     Forearm/wrist surgery unlisted Left     left wrist surgery    Hand/finger surgery unlisted Left 09/29/2011    LRF trigger finger release    Hc inj epidural steroid lumbar or sacral w img guidance      x2 2019,x2 2017    Hernia surgery      hernia repair, herniorraphy    Laser surgery of eye      Other      bladder surgery x2    Other surgical history      esophageal dilatation    Other surgical history  06/15/2010    release triggers: RMF, RRF, LMF/ RRF Dupuytren's nodule excision    Repair of biceps tendon at elbow Left 1992    Retinal laser procedure      Spine surgery procedure unlisted  02/23/2022    L3-5 laminectomy, left L4-5 transforaminal lumbar interbody fusion;    Total hip replacement Left 2008    Yag capsulotomy - od - right eye Right 04/30/2014    RJMAVIS     Current Outpatient Medications   Medication Sig Dispense Refill    aspirin 325 MG Oral Tab EC Take 1 tablet (325 mg total) by mouth in the morning and 1 tablet (325 mg total) before bedtime. 60 tablet 0    docusate sodium 100 MG Oral Cap Take 100 mg by mouth 2 (two) times daily. 20 capsule 0    ferrous sulfate 325 (65 FE) MG Oral Tab EC Take 1 tablet (325 mg total) by mouth daily with breakfast. 20 tablet 0    HYDROcodone-acetaminophen 7.5-325 MG Oral Tab Take 1 tablet by mouth every 6 (six) hours as needed. 40 tablet 0    polyethylene glycol, PEG 3350, 17 g Oral Powd Pack Take 17 g by mouth daily as needed (constipation).      acetaminophen 500 MG Oral Tab Take 1 tablet (500 mg total) by mouth every 6 (six) hours as needed for Pain.      Risedronate Sodium 150 MG Oral Tab Take 1 tablet (150 mg total) by mouth every 30 (thirty) days. 3 tablet 3    ipratropium 0.03 % Nasal Solution  2 sprays by Nasal route every 12 (twelve) hours. 1 each 5    Omeprazole 40 MG Oral Capsule Delayed Release Take 1 capsule (40 mg total) by mouth daily. 90 capsule 3    NIFEdipine ER 90 MG Oral Tablet 24 Hr Take 1 tablet (90 mg total) by mouth daily. (Patient taking differently: Take 1 tablet (90 mg total) by mouth every morning.) 90 tablet 3    Lovastatin 40 MG Oral Tab Take 1 tablet (40 mg total) by mouth nightly. 100 tablet 2    fluticasone propionate 50 MCG/ACT Nasal Suspension 1 spray by Each Nare route daily as needed. 16 g 2    metFORMIN 500 MG Oral Tab Take 1 tablet (500 mg total) by mouth 2 (two) times daily. 180 tablet 3    lisinopril 5 MG Oral Tab Take 1 tablet (5 mg total) by mouth daily. (Patient taking differently: Take 1 tablet (5 mg total) by mouth every morning.) 90 tablet 3    ADVAIR DISKUS 100-50 MCG/ACT Inhalation Aerosol Powder, Breath Activated Inhale 1 puff into the lungs 2 (two) times daily. 180 each 1    diclofenac 1 % External Gel       Vitamin B-12 1000 MCG Oral Tab Take 1 tablet (1,000 mcg total) by mouth daily.      Cholecalciferol (VITAMIN D) 1000 UNITS Oral Cap Take by mouth daily.      Multiple Vitamins-Minerals (CENTRUM SILVER) Oral Tab Take 1 tablet by mouth daily.        Cinnamon 500 MG Oral Cap Take 500 mg by mouth daily.       Allergies[1]  Family History   Problem Relation Age of Onset    Other (pulmonary Embolism) Father     Colon Cancer Mother     No Known Problems Brother     Glaucoma Neg     Diabetes Neg     Macular degeneration Neg        Social History     Occupational History    Not on file   Tobacco Use    Smoking status: Never    Smokeless tobacco: Never    Tobacco comments:     none   Vaping Use    Vaping status: Never Used   Substance and Sexual Activity    Alcohol use: Yes     Alcohol/week: 3.0 standard drinks of alcohol     Types: 2 Cans of beer, 1 Standard drinks or equivalent per week     Comment: 2-3 times per week    Drug use: Never    Sexual activity: Not on  file        Review of Systems:  GENERAL: feels generally well, no significant weight loss or weight gain  SKIN: no ulcerated or worrisome skin lesions  EYES:denies blurred vision or double vision  HEENT: denies new nasal congestion, sinus pain or ST  LUNGS: denies shortness of breath  CARDIOVASCULAR: denies chest pain  GI: no hematemesis, no worsening heartburn, no diarrhea  : no dysuria, no blood in urine, no difficulty urinating, no incontinence  MUSCULOSKELETAL: no other musculoskeletal complaints other than in HPI  NEURO: no numbness or tingling, no weakness or balance disorder  PSYCHE: no depression or anxiety  HEMATOLOGIC: no hx of blood dyscrasia  ENDOCRINE: no thyroid or diabetes issues  ALL/ASTHMA: no new hx of severe allergy or asthma    Physical Examination:    There were no vitals taken for this visit.  Constitutional: appears well hydrated, alert and responsive, no acute distress noted  Extremities: I removed the strep the dressing.  The incision is healing well.  There are still some Steri-Strips in place.  He was able to get up and ambulate without any difficulty.  Slightly antalgic gait      Imaging: XR HIP W OR WO PELVIS 2 OR 3 VIEWS, LEFT (CPT=73502)    Result Date: 12/3/2024  PROCEDURE: XR HIP W OR WO PELVIS 2 OR 3 VIEWS, LEFT (CPT=73502)  COMPARISON: None.  INDICATIONS: Status post revision left total hip arthroplasty.  TECHNIQUE: Three views Findings and impression:  Status post left hip arthroplasty.  Normal alignment.  No displaced fracture. Finalized by (CST): Tad Lazaro MD on 12/03/2024 at 5:41 PM             Lab Results   Component Value Date    WBC 9.6 12/04/2024    HGB 12.3 (L) 12/04/2024    .0 12/04/2024      Lab Results   Component Value Date     (H) 11/15/2024    BUN 12 11/15/2024    CREATSERUM 0.94 11/15/2024    GFRNAA 81 02/19/2022    GFRAA 94 02/19/2022        Assessment and Plan:  Diagnoses and all orders for this visit:    Orthopedic aftercare  -     XR HIP W OR  WO PELVIS 2 OR 3 VIEWS, LEFT (CPT=73502) [[id number]] - Orthopedic providers only; Future        Assessment: Status post revision left total hip arthroplasty    Plan: Mr. Morales is progressing well following his surgery.  He will be starting outpatient therapy tonight.  I encouraged remain diligent with his home exercise program.  He will remain on the aspirin for another week or so.  He will maintain his posterior hip precautions.  Will see him back in about 6 weeks to assess his progress.  We will recheck his chromium and cobalt levels at his next visit.  Advised him to call if any questions or problems arise in the meantime.    The above note was creating using Dragon speech recognition technology. Please excuse any typos.    This visit was performed under the supervision of Dr. Martín Mauro who formulated the treatment plan and decision making.          [1]   Allergies  Allergen Reactions    Penicillins HIVES     Denies skin peeling/blister, organ damage/failure

## 2024-12-31 RX ORDER — LISINOPRIL 5 MG/1
5 TABLET ORAL DAILY
Qty: 90 TABLET | Refills: 3 | Status: SHIPPED | OUTPATIENT
Start: 2024-12-31

## 2024-12-31 NOTE — TELEPHONE ENCOUNTER
Please Review. Protocol Failed; No Protocol     Requested Prescriptions   Pending Prescriptions Disp Refills    LISINOPRIL 5 MG Oral Tab [Pharmacy Med Name: Lisinopril 5 Mg Tab Solc] 90 tablet 0     Sig: TAKE ONE TABLET BY MOUTH ONE TIME DAILY       Hypertension Medications Protocol Failed - 12/31/2024 10:18 AM        Failed - Last BP reading less than 140/90     BP Readings from Last 1 Encounters:   12/04/24 (!) 128/95               Passed - CMP or BMP in past 12 months        Passed - In person appointment or virtual visit in the past 12 mos or appointment in next 3 mos     Recent Outpatient Visits              1 week ago History of revision of total replacement of left hip joint    [facility] Erum Diamond, PT    Office Visit    1 week ago [facility]opedic aftercare    [facility]    Office Visit    1 month ago Failure of left total hip arthroplasty, subsequent encounter (HCC)    [facility] Moisés Em PA-C    Office Visit    1 month ago Preoperative clearance    [facility] [place] Mark Gupta MD    Office Visit    2 months ago Lumbar post-laminectomy syndrome: L3-5 laminectomy and L4-5 fusion    [facility] [place] Michael Barksdale MD    Office Visit          Future Appointments         Provider Department Appt Notes    In 3 days Lloyd Fox, ANA MARÍA [facility] [employer]  $30 c/p no limit no auth    In 6 days Erum Diamond, PT [facility] [employer]  $30 c/p no limit no auth    In 1 week Lloyd Fox, ANA MARÍA [facility] [employer]  $30 c/p no limit no auth    In 1 week Lloyd Fox PT [facility] [employer]  $30 c/p no limit no auth    In 2 weeks Lloyd Fox, PT  [facility] [employer]  $30 c/p no limit no auth    In 2 weeks Ruddy Lloyd, PT [facility] [employer]  $30 c/p no limit no auth    In 3 weeks [facility] [employer]  $30 c/p no limit no auth    In 3 weeks CresergeyKwesiir, PT [facility] [employer]  $30 c/p no limit no auth    In 1 month RuddyLloyd, PT [facility] [employer]  $30 c/p no limit no auth    In 1 month Cresergey Lloyd, PT [facility] [employer]  $30 c/p no limit no auth    In 1 month Ruddy Lloyd, [facility] [employer]  $30 c/p no limit no auth    In 1 month Moisés Em PA-C [address] POV Left total hip revision 12/3                    Passed - EGFRCR or GFRNAA > 50     GFR Evaluation  EGFRCR: 82 , resulted on 11/15/2024                 Future Appointments         Provider Department Appt Notes    In 3 days Ruddy Lloyd, [facility] [employer]  $30 c/p no limit no auth    In 6 days Erum Diamond, [facility] [employer]  $30 c/p no limit no auth    In 1 week Lloyd Fox, [facility] [employer]  $30 c/p no limit no auth    In 1 week [facility] [employer]  $30 c/p no limit no auth    In 2 weeks [facility], [facility] [employer]  $30 c/p no limit no auth    In 2 weeks Lloyd Fox, [facility] [employer]  $30 c/p no limit no auth    In 3 weeks [facility], [facility] [employer]  $30 c/p no limit no auth    In 3 weeks Cremerius, Lloyd, PT [facility] [employer]  $30 c/p no limit no auth    In 1 month  Lloyd Fox, PT [facility] [employer] Medicare  $30 c/p no limit no auth    In 1 month Lloyd Fox, PT [facility] [employer]  $30 c/p no limit no auth    In 1 month Lloyd Fox, PT [facility] [employer]  $30 c/p no limit no auth    In 1 month Moisés Em PA-C [facility] 2nd POV Left total hip revision 12/3          Recent Outpatient Visits              1 week ago History of revision of total replacement of left hip joint    [facility] Erum Diamond, PT    Office Visit    1 week ago Orthopedic aftercare    [facility] Moisés Em PA-C    Office Visit    1 month ago Failure of left total hip arthroplasty, subsequent encounter (HCC)    [facility] Moisés Em PA-C    Office Visit    1 month ago [facility]    Office Visit    2 months ago Lumbar post-laminectomy syndrome: L3-5 laminectomy and L4-5 fusion    [facility] Michael Barksdale MD    Office Visit

## 2025-01-02 NOTE — PROGRESS NOTES
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 Had trouble yesterday- went shopping and did a lot of walking which slows him down. Says the leg hurts.     Pain: 2/10 L hip at rest; when walking 4-5/10 L hip      Objective: see rx flowsheet    AROM: (* denotes performed with pain)  Hip Knee    Flexion: R 112; L 90*  Extension: R NT; L NT  Abduction: R 32; L 10*  ER: R 30 ; L NT  IR: R 23; L 15 Flexion: L 119  Extension: L -5*     PROM: (* denotes performed with pain)  Hip   Flexion: L 90*  Extension: R NT; L -5 (s/l)   Abduction: L 30     Strength/MMT: (* denotes performed with pain)  Hip Knee   Flexion: R 4+/5; L 3+/5  Extension: R NT/5; L NT/5  Abduction: R NT /5; L 3*/5  ER: R 4/5; L NT/5  IR: R 4+/5; L NT/5 Flexion: R 5/5; L 5/5  Extension: R 5/5; L 4+/5        Balance: SLS: R 5 sec, L unable    Functional Mobility:  5x sit<>stand: unable  TUG (AD, time): 15.45 sec, straight cane       Gait: pt ambulates on level ground with straight cane, forward flexed posturing, decreased L hip and knee extension and antalgic pattern.   Stair Negotiation: Reciprocal with R railing and straight cane, slow gaby and decreased eccentric control with descent noted.       Assessment: Pt with increased tension and tone in the left quad, hip flexors, and ITB. Pt unable to extend his left hip and manual stretching is difficult to perform due to cramping in the thigh. Pt reports a few instances of buckling in the left knee- may be related to this spinal history.       Goals:   Pt will have improved hip AROM Flex to  105 deg and ABD to 30 deg to be able to don/doff shoes and perform car transfers without difficulty  Pt will improve hip ABD and ER strength to 4/5 to increase ease with standing and walking   Pt will demonstrate improved SLS to >6  seconds MARGARITA to promote safety and decrease risk of falls on uneven surfaces such as grass  Pt will perform TUG in <13 seconds with LRAD, demonstrating improved gait speed for improved participation in ADL such as community ambulation  Pt will be able to squat to  light objects around the house with <1/10 hip pain   Pt will improve functional hip strength to report ability to ascend/descend 1 flight of stairs reciprocally without use of handrail  Pt will be independent and compliant with comprehensive HEP to maintain progress achieved in PT    Plan: Patient will be seen for 1-2 x/week or a total of 10 visits over a 90 day period.  Treatment will include: Gait training, Manual Therapy, Neuromuscular Re-education, Therapeutic Activities, Therapeutic Exercise, Home Exercise Program instruction, and Modalities to include: Electrical stimulation (attended) and Ultrasound  Date: 1/3/2025  TX#: 2/10 Date:                 TX#: 3/ Date:                 TX#: 4/ Date:                 TX#: 5/ Date:   Tx#: 6/   MT x15'  STM L quad and ITB  Passive ROM L hip flexion (<90) and minimal ER     TE  x13'  STS x5 (elevated plinth)  Hip flexor stretch on 6\" step (L) 20x5\" hold  Seated fwd bend (avoiding >90 deg flexion hip) x5  Assisted hip flexor stretching off edge of plinth, unable to obtain full hip ext) L    NMR x15'  SAQ with QS 3x5 L  Hooklying hip add 5\" hold x10   Hook lying bilat clam YTB 2x10   Hook lying glute set 5\" hold x10  Hooklying small hip flex x10 L                            HEP:   - Supine Heel Slide  - 1 x daily - 7 x weekly - 3 sets - 10 reps  - Supine Quad Set  - 1 x daily - 7 x weekly - 2 sets - 10 reps - 5-10 second  hold  - Hooklying Isometric Hip Abduction with Belt  - 1 x  daily - 7 x weekly - 2 sets - 10 reps - 10 seconds hold  - Hip Flexor Stretch on Step  - 1-2 x daily - 7 x weekly - 10-20 reps - 5-10 sec hold    Charges: 1 MT 1 TE 1 NMR       Total Timed Treatment: 43 min  Total Treatment Time: 43 min

## 2025-01-03 ENCOUNTER — OFFICE VISIT (OUTPATIENT)
Dept: PHYSICAL THERAPY | Facility: HOSPITAL | Age: 80
End: 2025-01-03
Attending: PHYSICIAN ASSISTANT
Payer: MEDICARE

## 2025-01-03 PROCEDURE — 97112 NEUROMUSCULAR REEDUCATION: CPT

## 2025-01-03 PROCEDURE — 97140 MANUAL THERAPY 1/> REGIONS: CPT

## 2025-01-03 PROCEDURE — 97110 THERAPEUTIC EXERCISES: CPT

## 2025-01-06 ENCOUNTER — OFFICE VISIT (OUTPATIENT)
Dept: PHYSICAL THERAPY | Facility: HOSPITAL | Age: 80
End: 2025-01-06
Attending: PHYSICIAN ASSISTANT
Payer: MEDICARE

## 2025-01-06 PROCEDURE — 97140 MANUAL THERAPY 1/> REGIONS: CPT

## 2025-01-06 PROCEDURE — 97112 NEUROMUSCULAR REEDUCATION: CPT

## 2025-01-06 PROCEDURE — 97110 THERAPEUTIC EXERCISES: CPT

## 2025-01-06 NOTE — PROGRESS NOTES
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 Feels that the hip is sore when he gets up to move but then feels better after it loosens up.     Pain: 2/10 L hip at rest; when walking 4-5/10 L hip      Objective: see rx flowsheet    AROM: (* denotes performed with pain)  Hip Knee    Flexion: R 112; L 90*  Extension: R NT; L NT  Abduction: R 32; L 10*  ER: R 30 ; L NT  IR: R 23; L 15 Flexion: L 119  Extension: L -5*     PROM: (* denotes performed with pain)  Hip   Flexion: L 90*  Extension: R NT; L -5 (s/l)   Abduction: L 30     Strength/MMT: (* denotes performed with pain)  Hip Knee   Flexion: R 4+/5; L 3+/5  Extension: R NT/5; L NT/5  Abduction: R NT /5; L 3*/5  ER: R 4/5; L NT/5  IR: R 4+/5; L NT/5 Flexion: R 5/5; L 5/5  Extension: R 5/5; L 4+/5        Balance: SLS: R 5 sec, L unable    Functional Mobility:  5x sit<>stand: unable  TUG (AD, time): 15.45 sec, straight cane       Gait: pt ambulates on level ground with straight cane, forward flexed posturing, decreased L hip and knee extension and antalgic pattern.   Stair Negotiation: Reciprocal with R railing and straight cane, slow gaby and decreased eccentric control with descent noted.       Assessment: Pt reporting significant hip/knee pain with combined extension, specifically in TKE position when performing 6 in step up. Pt demos significant trendelenburg with LLE stance suggesting weakness through hip abductors, would  benefit from continued strengthening.          Goals:   Pt will have improved hip AROM Flex to 105 deg and ABD to 30 deg to be able to don/doff shoes and perform car transfers without difficulty  Pt will improve hip ABD and ER strength to 4/5 to increase ease with standing and walking   Pt will demonstrate improved SLS to >6  seconds MARGARITA to promote safety and decrease risk of falls on uneven surfaces such as grass  Pt will perform TUG in <13 seconds with LRAD, demonstrating improved gait speed for improved participation in ADL such as community ambulation  Pt will be able to squat to  light objects around the house with <1/10 hip pain   Pt will improve functional hip strength to report ability to ascend/descend 1 flight of stairs reciprocally without use of handrail  Pt will be independent and compliant with comprehensive HEP to maintain progress achieved in PT    Plan: Patient will be seen for 1-2 x/week or a total of 10 visits over a 90 day period.  Treatment will include: Gait training, Manual Therapy, Neuromuscular Re-education, Therapeutic Activities, Therapeutic Exercise, Home Exercise Program instruction, and Modalities to include: Electrical stimulation (attended) and Ultrasound  Date: 1/3/2025  TX#: 2/10 Date:   1/6/2025               TX#: 3/10 Date:                 TX#: 4/ Date:                 TX#: 5/ Date:   Tx#: 6/   MT x15'  STM L quad and ITB  Passive ROM L hip flexion (<90) and minimal ER     TE  x13'  STS x5 (elevated plinth)  Hip flexor stretch on 6\" step (L) 20x5\" hold  Seated fwd bend (avoiding >90 deg flexion hip) x5  Assisted hip flexor stretching off edge of plinth, unable to obtain full hip ext) L    NMR x15'  SAQ with QS 3x5 L  Hooklying hip add 5\" hold x10   Hook lying bilat clam YTB 2x10   Hook lying glute set 5\" hold x10  Hooklying small hip flex x10 L MT: 12'   STM L quad and ITB  Passive ROM L hip flexion (<90), minimal ER, abduction      TE: 10'   Nustep, seat 10, L2 UE/LE, 5'    6 in step up LLE, 1UE, 2x8      NMR: 18'   Hook lying bilat clam YTB 2x10   Hook lying glute set 5\" hold x10  Hooklying small hip flex x10 L  SAQ with QS 3x5 L  Standing TKE RTB, L 10x3s hold (difficult)                              HEP:   - Supine Heel Slide  - 1 x daily - 7 x weekly - 3 sets - 10 reps  - Supine Quad Set  - 1 x daily - 7 x weekly - 2 sets - 10 reps - 5-10 second  hold  - Hooklying Isometric Hip Abduction with Belt  - 1 x daily - 7 x weekly - 2 sets - 10 reps - 10 seconds hold  - Hip Flexor Stretch on Step  - 1-2 x daily - 7 x weekly - 10-20 reps - 5-10 sec hold    Charges: 1 MT 1 TE 1 NMR       Total Timed Treatment: 40 min  Total Treatment Time: 40 min

## 2025-01-09 NOTE — PROGRESS NOTES
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 Thigh muscle is still sore    Pain: 2/10 L hip at rest; when walking 4-5/10 L hip      Objective: see rx flowsheet    AROM: (* denotes performed with pain)  Hip Knee    Flexion: R 112; L 90*  Extension: R NT; L NT  Abduction: R 32; L 10*  ER: R 30 ; L NT  IR: R 23; L 15 Flexion: L 119  Extension: L -5*     PROM: (* denotes performed with pain)  Hip   Flexion: L 90*  Extension: R NT; L -5 (s/l)   Abduction: L 30     Strength/MMT: (* denotes performed with pain)  Hip Knee   Flexion: R 4+/5; L 3+/5  Extension: R NT/5; L NT/5  Abduction: R NT /5; L 3*/5  ER: R 4/5; L NT/5  IR: R 4+/5; L NT/5 Flexion: R 5/5; L 5/5  Extension: R 5/5; L 4+/5        Balance: SLS: R 5 sec, L unable    Functional Mobility:  5x sit<>stand: unable  TUG (AD, time): 15.45 sec, straight cane       Gait: pt ambulates on level ground with straight cane, forward flexed posturing, decreased L hip and knee extension and antalgic pattern.   Stair Negotiation: Reciprocal with R railing and straight cane, slow gaby and decreased eccentric control with descent noted.       Assessment: Pt continues to report significant left quad/ITB tightness and pain when increased knee hip and hip extension. Pt unable to extend left hip past neutral passively, and active unable to obtain neutral extension. This is primarily due to the stenotic posture pt has been demonstrates for several years. Will trial dry needling to the left quad/ITB at next session to reduce tone in the quad and allow improved mobility  and strengthening.          Goals:   Pt will have improved hip AROM Flex to 105 deg and ABD to 30 deg to be able to don/doff shoes and perform car transfers without difficulty  Pt will improve hip ABD and ER strength to 4/5 to increase ease with standing and walking   Pt will demonstrate improved SLS to >6  seconds MARGARITA to promote safety and decrease risk of falls on uneven surfaces such as grass  Pt will perform TUG in <13 seconds with LRAD, demonstrating improved gait speed for improved participation in ADL such as community ambulation  Pt will be able to squat to  light objects around the house with <1/10 hip pain   Pt will improve functional hip strength to report ability to ascend/descend 1 flight of stairs reciprocally without use of handrail  Pt will be independent and compliant with comprehensive HEP to maintain progress achieved in PT    Plan: Patient will be seen for 1-2 x/week or a total of 10 visits over a 90 day period.  Treatment will include: Gait training, Manual Therapy, Neuromuscular Re-education, Therapeutic Activities, Therapeutic Exercise, Home Exercise Program instruction, and Modalities to include: Electrical stimulation (attended) and Ultrasound  Date: 1/3/2025  TX#: 2/10 Date:   1/6/2025               TX#: 3/10 Date:  1/10/25               TX#: 4/10 Date:                 TX#: 5/ Date:   Tx#: 6/   MT x15'  STM L quad and ITB  Passive ROM L hip flexion (<90) and minimal ER     TE  x13'  STS x5 (elevated plinth)  Hip flexor stretch on 6\" step (L) 20x5\" hold  Seated fwd bend (avoiding >90 deg flexion hip) x5  Assisted hip flexor stretching off edge of plinth, unable to obtain full hip ext) L    NMR x15'  SAQ with QS 3x5 L  Hooklying hip add 5\" hold x10   Hook lying bilat clam YTB 2x10   Hook lying glute set 5\" hold x10  Hooklying small hip flex x10 L MT: 12'   STM L quad and ITB  Passive ROM L hip flexion (<90), minimal ER, abduction      TE: 10'   Nustep, seat 10, L2 UE/LE, 5'   6 in step  up LLE, 1UE, 2x8      NMR: 18'   Hook lying bilat clam YTB 2x10   Hook lying glute set 5\" hold x10  Hooklying small hip flex x10 L  SAQ with QS 3x5 L  Standing TKE RTB, L 10x3s hold (difficult)    MT: 25'   STM L quad and ITB  Passive ROM L hip flexion (<90), minimal ER, abduction  Tib-fem AP mob GrII-III L  Patellar mobilization GrIII L  Manual hip flexor stretching in side lying    TE: 20'   STS elevated plinth x10  Shuttle DLP 50# x10  Shuttle SLP 17# x10 L  Mod quad/hip flexor stretching demo  Pt edu                          HEP:   - Supine Heel Slide  - 1 x daily - 7 x weekly - 3 sets - 10 reps  - Supine Quad Set  - 1 x daily - 7 x weekly - 2 sets - 10 reps - 5-10 second  hold  - Hooklying Isometric Hip Abduction with Belt  - 1 x daily - 7 x weekly - 2 sets - 10 reps - 10 seconds hold  - Hip Flexor Stretch on Step  - 1-2 x daily - 7 x weekly - 10-20 reps - 5-10 sec hold    Charges: 2 MT 1 TE       Total Timed Treatment: 45 min  Total Treatment Time: 45 min

## 2025-01-10 ENCOUNTER — OFFICE VISIT (OUTPATIENT)
Dept: PHYSICAL THERAPY | Facility: HOSPITAL | Age: 80
End: 2025-01-10
Attending: PHYSICIAN ASSISTANT
Payer: MEDICARE

## 2025-01-10 PROCEDURE — 97110 THERAPEUTIC EXERCISES: CPT

## 2025-01-10 PROCEDURE — 97140 MANUAL THERAPY 1/> REGIONS: CPT

## 2025-01-13 ENCOUNTER — OFFICE VISIT (OUTPATIENT)
Dept: PHYSICAL THERAPY | Facility: HOSPITAL | Age: 80
End: 2025-01-13
Attending: PHYSICIAN ASSISTANT
Payer: MEDICARE

## 2025-01-13 PROCEDURE — 97140 MANUAL THERAPY 1/> REGIONS: CPT

## 2025-01-13 PROCEDURE — 97110 THERAPEUTIC EXERCISES: CPT

## 2025-01-13 PROCEDURE — 97112 NEUROMUSCULAR REEDUCATION: CPT

## 2025-01-13 NOTE — PROGRESS NOTES
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 Felt down a little bit. Feels better today though.     Pain: 2/10 L hip at rest; when walking 4-5/10 L hip      Objective: see rx flowsheet    AROM: (* denotes performed with pain)  Hip Knee    Flexion: R 112; L 90*  Extension: R NT; L NT  Abduction: R 32; L 10*  ER: R 30 ; L NT  IR: R 23; L 15 Flexion: L 119  Extension: L -5*     PROM: (* denotes performed with pain)  Hip   Flexion: L 90*  Extension: R NT; L -5 (s/l)   Abduction: L 30     Strength/MMT: (* denotes performed with pain)  Hip Knee   Flexion: R 4+/5; L 3+/5  Extension: R NT/5; L NT/5  Abduction: R NT /5; L 3*/5  ER: R 4/5; L NT/5  IR: R 4+/5; L NT/5 Flexion: R 5/5; L 5/5  Extension: R 5/5; L 4+/5        Balance: SLS: R 5 sec, L unable    Functional Mobility:  5x sit<>stand: unable  TUG (AD, time): 15.45 sec, straight cane       Gait: pt ambulates on level ground with straight cane, forward flexed posturing, decreased L hip and knee extension and antalgic pattern.   Stair Negotiation: Reciprocal with R railing and straight cane, slow gaby and decreased eccentric control with descent noted.       Assessment: Good response to dry needling performed by Philipp Villaseñor PT, DPT, though pt is unable to extend his left hip due to joint stiffness and increased pain in the low back. Provided self mobilization exercises to perform at home, but he will benefit from continued mobilization and strengthening to improve posture, ambulation, and daily functional activities.        Goals:    Pt will have improved hip AROM Flex to 105 deg and ABD to 30 deg to be able to don/doff shoes and perform car transfers without difficulty  Pt will improve hip ABD and ER strength to 4/5 to increase ease with standing and walking   Pt will demonstrate improved SLS to >6  seconds MARGARITA to promote safety and decrease risk of falls on uneven surfaces such as grass  Pt will perform TUG in <13 seconds with LRAD, demonstrating improved gait speed for improved participation in ADL such as community ambulation  Pt will be able to squat to  light objects around the house with <1/10 hip pain   Pt will improve functional hip strength to report ability to ascend/descend 1 flight of stairs reciprocally without use of handrail  Pt will be independent and compliant with comprehensive HEP to maintain progress achieved in PT    Plan: Patient will be seen for 1-2 x/week or a total of 10 visits over a 90 day period.  Treatment will include: Gait training, Manual Therapy, Neuromuscular Re-education, Therapeutic Activities, Therapeutic Exercise, Home Exercise Program instruction, and Modalities to include: Electrical stimulation (attended) and Ultrasound  Date: 1/3/2025  TX#: 2/10 Date:   1/6/2025               TX#: 3/10 Date:  1/10/25               TX#: 4/10 Date: 1/13/25                TX#: 5/10 Date:   Tx#: 6/   MT x15'  STM L quad and ITB  Passive ROM L hip flexion (<90) and minimal ER     TE  x13'  STS x5 (elevated plinth)  Hip flexor stretch on 6\" step (L) 20x5\" hold  Seated fwd bend (avoiding >90 deg flexion hip) x5  Assisted hip flexor stretching off edge of plinth, unable to obtain full hip ext) L    NMR x15'  SAQ with QS 3x5 L  Hooklying hip add 5\" hold x10   Hook lying bilat clam YTB 2x10   Hook lying glute set 5\" hold x10  Hooklying small hip flex x10 L MT: 12'   STM L quad and ITB  Passive ROM L hip flexion (<90), minimal ER, abduction      TE: 10'   Nustep, seat 10, L2 UE/LE, 5'   6 in step up LLE, 1UE,  2x8      NMR: 18'   Hook lying bilat clam YTB 2x10   Hook lying glute set 5\" hold x10  Hooklying small hip flex x10 L  SAQ with QS 3x5 L  Standing TKE RTB, L 10x3s hold (difficult)    MT: 25'   STM L quad and ITB  Passive ROM L hip flexion (<90), minimal ER, abduction  Tib-fem AP mob GrII-III L  Patellar mobilization GrIII L  Manual hip flexor stretching in side lying    TE: 20'   STS elevated plinth x10  Shuttle DLP 50# x10  Shuttle SLP 17# x10 L  Mod quad/hip flexor stretching demo  Pt edu MT: 18'   Passive ROM L hip flexion (<90), minimal ER, abduction  Tib-fem AP mob GrII-III L  Gentle OP into left knee extension  Contract relax L HS 3x10\"  Patellar mobilization GrIII L  Manual hip flexor stretching in side lying    TE: 10'   4\" step up x10 L  6\" step up x10 L  Shuttle DLP 50# x15  Shuttle SLP 25# x15 L    NMR x17'  Dry Needling to left lateral rectus to reduce tone and improve motor function to lateral quad x10'  SL clam 2x10 L  SLR (small range) 2x5 L  Supine hip abduction slide (anti-gravity) x10 L  Self mobilization for left hip anterior and lateral glides                          HEP:   - Supine Heel Slide  - 1 x daily - 7 x weekly - 3 sets - 10 reps  - Supine Quad Set  - 1 x daily - 7 x weekly - 2 sets - 10 reps - 5-10 second  hold  - Hooklying Isometric Hip Abduction with Belt  - 1 x daily - 7 x weekly - 2 sets - 10 reps - 10 seconds hold  - Hip Flexor Stretch on Step  - 1-2 x daily - 7 x weekly - 10-20 reps - 5-10 sec hold    Charges: 1 MT 1 TE  1 NMR     Total Timed Treatment: 45 min  Total Treatment Time: 45 min

## 2025-01-16 NOTE — PROGRESS NOTES
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     Pain: 2/10 L hip at rest; when walking 4-5/10 L hip      Objective: see rx flowsheet    AROM: (* denotes performed with pain)  Hip Knee    Flexion: R 112; L 90*  Extension: R NT; L NT  Abduction: R 32; L 10*  ER: R 30 ; L NT  IR: R 23; L 15 Flexion: L 119  Extension: L -5*     PROM: (* denotes performed with pain)  Hip   Flexion: L 90*  Extension: R NT; L -5 (s/l)   Abduction: L 30     Strength/MMT: (* denotes performed with pain)  Hip Knee   Flexion: R 4+/5; L 3+/5  Extension: R NT/5; L NT/5  Abduction: R NT /5; L 3*/5  ER: R 4/5; L NT/5  IR: R 4+/5; L NT/5 Flexion: R 5/5; L 5/5  Extension: R 5/5; L 4+/5        Balance: SLS: R 5 sec, L unable    Functional Mobility:  5x sit<>stand: unable  TUG (AD, time): 15.45 sec, straight cane       Gait: pt ambulates on level ground with straight cane, forward flexed posturing, decreased L hip and knee extension and antalgic pattern.   Stair Negotiation: Reciprocal with R railing and straight cane, slow gaby and decreased eccentric control with descent noted.       Assessment: Decreased tenderness noted in left vastus lateralis, but ongoing ache/pain near the gluteus medius. Pt is able to extend the left hip to neutral in unweighted positions, but in weightbearing is unable due to pain and occasional buckling in the knee.      Goals:   Pt will have improved hip AROM Flex to 105 deg and ABD to 30 deg to be able to don/doff shoes and perform car transfers without difficulty  Pt  will improve hip ABD and ER strength to 4/5 to increase ease with standing and walking   Pt will demonstrate improved SLS to >6  seconds MARGARITA to promote safety and decrease risk of falls on uneven surfaces such as grass  Pt will perform TUG in <13 seconds with LRAD, demonstrating improved gait speed for improved participation in ADL such as community ambulation  Pt will be able to squat to  light objects around the house with <1/10 hip pain   Pt will improve functional hip strength to report ability to ascend/descend 1 flight of stairs reciprocally without use of handrail  Pt will be independent and compliant with comprehensive HEP to maintain progress achieved in PT    Plan: Patient will be seen for 1-2 x/week or a total of 10 visits over a 90 day period.  Treatment will include: Gait training, Manual Therapy, Neuromuscular Re-education, Therapeutic Activities, Therapeutic Exercise, Home Exercise Program instruction, and Modalities to include: Electrical stimulation (attended) and Ultrasound  Assess LLD  Date: 1/3/2025  TX#: 2/10 Date:   1/6/2025               TX#: 3/10 Date:  1/10/25               TX#: 4/10 Date: 1/13/25                TX#: 5/10 Date: 1/17/25  Tx#: 6/10   MT x15'  STM L quad and ITB  Passive ROM L hip flexion (<90) and minimal ER     TE  x13'  STS x5 (elevated plinth)  Hip flexor stretch on 6\" step (L) 20x5\" hold  Seated fwd bend (avoiding >90 deg flexion hip) x5  Assisted hip flexor stretching off edge of plinth, unable to obtain full hip ext) L    NMR x15'  SAQ with QS 3x5 L  Hooklying hip add 5\" hold x10   Hook lying bilat clam YTB 2x10   Hook lying glute set 5\" hold x10  Hooklying small hip flex x10 L MT: 12'   STM L quad and ITB  Passive ROM L hip flexion (<90), minimal ER, abduction      TE: 10'   Nustep, seat 10, L2 UE/LE, 5'   6 in step up LLE, 1UE, 2x8      NMR: 18'   Hook lying bilat clam YTB 2x10   Hook lying glute set 5\" hold x10  Hooklying small hip flex x10 L  SAQ with QS 3x5  L  Standing TKE RTB, L 10x3s hold (difficult)    MT: 25'   STM L quad and ITB  Passive ROM L hip flexion (<90), minimal ER, abduction  Tib-fem AP mob GrII-III L  Patellar mobilization GrIII L  Manual hip flexor stretching in side lying    TE: 20'   STS elevated plinth x10  Shuttle DLP 50# x10  Shuttle SLP 17# x10 L  Mod quad/hip flexor stretching demo  Pt edu MT: 18'   Passive ROM L hip flexion (<90), minimal ER, abduction  Tib-fem AP mob GrII-III L  Gentle OP into left knee extension  Contract relax L HS 3x10\"  Patellar mobilization GrIII L  Manual hip flexor stretching in side lying    TE: 10'   4\" step up x10 L  6\" step up x10 L  Shuttle DLP 50# x15  Shuttle SLP 25# x15 L    NMR x17'  Dry Needling to left lateral rectus to reduce tone and improve motor function to lateral quad x10'  SL clam 2x10 L  SLR (small range) 2x5 L  Supine hip abduction slide (anti-gravity) x10 L  Self mobilization for left hip anterior and lateral glides  MT: 15'   Passive ROM L hip flexion (<90), minimal ER, abduction  Long axis traction L hip GrII-III  Contract relax L HS  Manual hip flexor and ITB stretching L     NMR x25'  Hooklying BKFO RTB 2x10 L stabilize, x10 R stab  Hooklying heel slide (neutral hip) x10 L  Hook lying heel slide (hip ER) x19 L (more difficult)  STS from elevated plinth x10  4\" step down x10 L  Bent over plinth hip ext x10 bilat (glute med lacks strength)  L hip to wall (unable)                         HEP:   - Supine Heel Slide  - 1 x daily - 7 x weekly - 3 sets - 10 reps  - Supine Quad Set  - 1 x daily - 7 x weekly - 2 sets - 10 reps - 5-10 second  hold  - Hooklying Isometric Hip Abduction with Belt  - 1 x daily - 7 x weekly - 2 sets - 10 reps - 10 seconds hold  - Hip Flexor Stretch on Step  - 1-2 x daily - 7 x weekly - 10-20 reps - 5-10 sec hold    Charges: 1 MT 2 NMR     Total Timed Treatment: 40 min  Total Treatment Time: 40 min

## 2025-01-17 ENCOUNTER — OFFICE VISIT (OUTPATIENT)
Dept: PHYSICAL THERAPY | Facility: HOSPITAL | Age: 80
End: 2025-01-17
Attending: PHYSICIAN ASSISTANT
Payer: MEDICARE

## 2025-01-17 PROCEDURE — 97112 NEUROMUSCULAR REEDUCATION: CPT

## 2025-01-17 PROCEDURE — 97140 MANUAL THERAPY 1/> REGIONS: CPT

## 2025-01-20 ENCOUNTER — OFFICE VISIT (OUTPATIENT)
Dept: PHYSICAL THERAPY | Facility: HOSPITAL | Age: 80
End: 2025-01-20
Attending: PHYSICIAN ASSISTANT
Payer: MEDICARE

## 2025-01-20 PROCEDURE — 97110 THERAPEUTIC EXERCISES: CPT

## 2025-01-20 PROCEDURE — 97140 MANUAL THERAPY 1/> REGIONS: CPT

## 2025-01-20 NOTE — PROGRESS NOTES
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 Went to set up the [employer] system (work) today- went okay.   Pt arrives 8 minutes late.   1/21/25: 7 weeks post op    Pain: 2/10 L hip at rest; when walking 4-5/10 L hip      Objective: see rx flowsheet    AROM: (* denotes performed with pain)  Hip Knee    Flexion: R 112; L 90*  Extension: R NT; L NT  Abduction: R 32; L 10*  ER: R 30 ; L NT  IR: R 23; L 15 Flexion: L 119  Extension: L -5*     PROM: (* denotes performed with pain)  Hip   Flexion: L 90*  Extension: R NT; L -5 (s/l)   Abduction: L 30     Strength/MMT: (* denotes performed with pain)  Hip Knee   Flexion: R 4+/5; L 3+/5  Extension: R NT/5; L NT/5  Abduction: R NT /5; L 3*/5  ER: R 4/5; L NT/5  IR: R 4+/5; L NT/5 Flexion: R 5/5; L 5/5  Extension: R 5/5; L 4+/5        Balance: SLS: R 5 sec, L unable    Functional Mobility:  5x sit<>stand: unable  TUG (AD, time): 15.45 sec, straight cane       Gait: pt ambulates on level ground with straight cane, forward flexed posturing, decreased L hip and knee extension and antalgic pattern.   Stair Negotiation: Reciprocal with R railing and straight cane, slow gaby and decreased eccentric control with descent noted.       Assessment: Pt unable to tolerate left hip flexion with IR due to a sharp along near the greater trochanter. Otherwise ROM is progressing well.      Goals:   Pt will have improved hip AROM Flex to 105 deg and ABD to 30 deg to be able to don/doff shoes and perform car transfers without difficulty  Pt will improve hip ABD and ER  strength to 4/5 to increase ease with standing and walking   Pt will demonstrate improved SLS to >6  seconds MARGARITA to promote safety and decrease risk of falls on uneven surfaces such as grass  Pt will perform TUG in <13 seconds with LRAD, demonstrating improved gait speed for improved participation in ADL such as community ambulation  Pt will be able to squat to  light objects around the house with <1/10 hip pain   Pt will improve functional hip strength to report ability to ascend/descend 1 flight of stairs reciprocally without use of handrail  Pt will be independent and compliant with comprehensive HEP to maintain progress achieved in PT    Plan: Patient will be seen for 1-2 x/week or a total of 10 visits over a 90 day period.  Treatment will include: Gait training, Manual Therapy, Neuromuscular Re-education, Therapeutic Activities, Therapeutic Exercise, Home Exercise Program instruction, and Modalities to include: Electrical stimulation (attended) and Ultrasound  Date:   1/6/2025               TX#: 3/10 Date:  1/10/25               TX#: 4/10 Date: 1/13/25                TX#: 5/10 Date: 1/17/25  Tx#: 6/10 Date: 1/20/25  Tx#: 7/10   MT: 12'   STM L quad and ITB  Passive ROM L hip flexion (<90), minimal ER, abduction      TE: 10'   Nustep, seat 10, L2 UE/LE, 5'   6 in step up LLE, 1UE, 2x8      NMR: 18'   Hook lying bilat clam YTB 2x10   Hook lying glute set 5\" hold x10  Hooklying small hip flex x10 L  SAQ with QS 3x5 L  Standing TKE RTB, L 10x3s hold (difficult)    MT: 25'   STM L quad and ITB  Passive ROM L hip flexion (<90), minimal ER, abduction  Tib-fem AP mob GrII-III L  Patellar mobilization GrIII L  Manual hip flexor stretching in side lying    TE: 20'   STS elevated plinth x10  Shuttle DLP 50# x10  Shuttle SLP 17# x10 L  Mod quad/hip flexor stretching demo  Pt edu MT: 18'   Passive ROM L hip flexion (<90), minimal ER, abduction  Tib-fem AP mob GrII-III L  Gentle OP into left knee extension  Contract  relax L HS 3x10\"  Patellar mobilization GrIII L  Manual hip flexor stretching in side lying    TE: 10'   4\" step up x10 L  6\" step up x10 L  Shuttle DLP 50# x15  Shuttle SLP 25# x15 L    NMR x17'  Dry Needling to left lateral rectus to reduce tone and improve motor function to lateral quad x10'  SL clam 2x10 L  SLR (small range) 2x5 L  Supine hip abduction slide (anti-gravity) x10 L  Self mobilization for left hip anterior and lateral glides  MT: 15'   Passive ROM L hip flexion (<90), minimal ER, abduction  Long axis traction L hip GrII-III  Contract relax L HS  Manual hip flexor and ITB stretching L     NMR x25'  Hooklying BKFO RTB 2x10 L stabilize, x10 R stab  Hooklying heel slide (neutral hip) x10 L  Hook lying heel slide (hip ER) x19 L (more difficult)  STS from elevated plinth x10  4\" step down x10 L  Bent over plinth hip ext x10 bilat (glute med lacks strength)  L hip to wall (unable)  MT: 15'   Long axis traction L hip GrII-III  L hip lateral traction, MWM hip ER and IR  PROM L hip flexion, IR, ER    TE x15'  Nustep lvl 3 arms and legs x5'    Supine hip abduction L x10 (slider)   Seated HS stretch 5x10\" L  Slantboard stretch 5x10\" (lowest level)   Tiltboard taps x20 AP, ML    NMR x5'  Hooklying heel slide (neutral hip) x15 L  Hook lying heel slide (hip ER) x19 L (more difficult)                        HEP:   - Supine Heel Slide  - 1 x daily - 7 x weekly - 3 sets - 10 reps  - Supine Quad Set  - 1 x daily - 7 x weekly - 2 sets - 10 reps - 5-10 second  hold  - Hooklying Isometric Hip Abduction with Belt  - 1 x daily - 7 x weekly - 2 sets - 10 reps - 10 seconds hold  - Hip Flexor Stretch on Step  - 1-2 x daily - 7 x weekly - 10-20 reps - 5-10 sec hold    Charges: 1 MT 1 TE     Total Timed Treatment: 35 min  Total Treatment Time: 35 min

## 2025-01-23 NOTE — PROGRESS NOTES
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 His left knee was bothering him more this morning. Thinks sometimes it swells up, once putting the brace on which helps after 20 minutes. Was able to shovel the driveway (light snow).   1/21/25: 7 weeks post op    Pain:  0/10 L hip at rest; when walking 4/10 L hip      Objective: see rx flowsheet    AROM: (* denotes performed with pain)  Hip Knee    Flexion: R 112; L 90*  Extension: R NT; L NT  Abduction: R 32; L 10*  ER: R 30 ; L NT  IR: R 23; L 15 Flexion: L 119  Extension: L -5*     PROM: (* denotes performed with pain)  Hip   Flexion: L 90*  Extension: R NT; L -5 (s/l)   Abduction: L 30     Strength/MMT: (* denotes performed with pain)  Hip Knee   Flexion: R 4+/5; L 3+/5  Extension: R NT/5; L NT/5  Abduction: R NT /5; L 3*/5  ER: R 4/5; L NT/5  IR: R 4+/5; L NT/5 Flexion: R 5/5; L 5/5  Extension: R 5/5; L 4+/5        Balance: SLS: R 5 sec, L unable    Functional Mobility:  5x sit<>stand: unable  TUG (AD, time): 15.45 sec, straight cane       Gait: pt ambulates on level ground with straight cane, forward flexed posturing, decreased L hip and knee extension and antalgic pattern.   Stair Negotiation: Reciprocal with R railing and straight cane, slow gaby and decreased eccentric control with descent noted.       Assessment: Improved symptoms with contract relax to the left hip flexors off the edge of the table but mechanics for hip flexion remain impaired.       Goals:   Pt will have improved hip AROM Flex to 105 deg and  ABD to 30 deg to be able to don/doff shoes and perform car transfers without difficulty  Pt will improve hip ABD and ER strength to 4/5 to increase ease with standing and walking   Pt will demonstrate improved SLS to >6  seconds MARGARITA to promote safety and decrease risk of falls on uneven surfaces such as grass  Pt will perform TUG in <13 seconds with LRAD, demonstrating improved gait speed for improved participation in ADL such as community ambulation  Pt will be able to squat to  light objects around the house with <1/10 hip pain   Pt will improve functional hip strength to report ability to ascend/descend 1 flight of stairs reciprocally without use of handrail  Pt will be independent and compliant with comprehensive HEP to maintain progress achieved in PT    Plan: Patient will be seen for 1-2 x/week or a total of 10 visits over a 90 day period.  Treatment will include: Gait training, Manual Therapy, Neuromuscular Re-education, Therapeutic Activities, Therapeutic Exercise, Home Exercise Program instruction, and Modalities to include: Electrical stimulation (attended) and Ultrasound  Date:  1/10/25               TX#: 4/10 Date: 1/13/25                TX#: 5/10 Date: 1/17/25  Tx#: 6/10 Date: 1/20/25  Tx#: 7/10 Date: 1/24/25  Tx#: 8/10   MT: 25'   STM L quad and ITB  Passive ROM L hip flexion (<90), minimal ER, abduction  Tib-fem AP mob GrII-III L  Patellar mobilization GrIII L  Manual hip flexor stretching in side lying    TE: 20'   STS elevated plinth x10  Shuttle DLP 50# x10  Shuttle SLP 17# x10 L  Mod quad/hip flexor stretching demo  Pt edu MT: 18'   Passive ROM L hip flexion (<90), minimal ER, abduction  Tib-fem AP mob GrII-III L  Gentle OP into left knee extension  Contract relax L HS 3x10\"  Patellar mobilization GrIII L  Manual hip flexor stretching in side lying    TE: 10'   4\" step up x10 L  6\" step up x10 L  Shuttle DLP 50# x15  Shuttle SLP 25# x15 L    NMR x17'  Dry Needling to left lateral rectus to  reduce tone and improve motor function to lateral quad x10'  SL clam 2x10 L  SLR (small range) 2x5 L  Supine hip abduction slide (anti-gravity) x10 L  Self mobilization for left hip anterior and lateral glides  MT: 15'   Passive ROM L hip flexion (<90), minimal ER, abduction  Long axis traction L hip GrII-III  Contract relax L HS  Manual hip flexor and ITB stretching L     NMR x25'  Hooklying BKFO RTB 2x10 L stabilize, x10 R stab  Hooklying heel slide (neutral hip) x10 L  Hook lying heel slide (hip ER) x19 L (more difficult)  STS from elevated plinth x10  4\" step down x10 L  Bent over plinth hip ext x10 bilat (glute med lacks strength)  L hip to wall (unable)  MT: 15'   Long axis traction L hip GrII-III  L hip lateral traction, MWM hip ER and IR  PROM L hip flexion, IR, ER    TE x15'  Nustep lvl 3 arms and legs x5'    Supine hip abduction L x10 (slider)   Seated HS stretch 5x10\" L  Slantboard stretch 5x10\" (lowest level)   Tiltboard taps x20 AP, ML    NMR x5'  Hooklying heel slide (neutral hip) x15 L  Hook lying heel slide (hip ER) x19 L (more difficult) MT: 23'   Long axis traction L hip GrII-III  L hip lateral traction, MWM hip ER and IR  PROM L hip flexion, IR, ER  Contract relax- L hip flexor in jose stretching position    TE x16'  STS from elevated plinth, R foot on yoga block x8  Hook lying hip flexion march (painful)  Repeated after contract relax- improved  Repeated after contract relaxa again- improved  Hip flexion AROM on step (lumbar flexion over hip)- tension in L buttock/proximal HS  Seated HS set- to relieve muscle tension                        HEP:   - Supine Heel Slide  - 1 x daily - 7 x weekly - 3 sets - 10 reps  - Supine Quad Set  - 1 x daily - 7 x weekly - 2 sets - 10 reps - 5-10 second  hold  - Hooklying Isometric Hip Abduction with Belt  - 1 x daily - 7 x weekly - 2 sets - 10 reps - 10 seconds hold  - Hip Flexor Stretch on Step  - 1-2 x daily - 7 x weekly - 10-20 reps - 5-10 sec  hold    Charges: 2 MT 1 TE     Total Timed Treatment: 39 min  Total Treatment Time: 39 min

## 2025-01-24 ENCOUNTER — OFFICE VISIT (OUTPATIENT)
Dept: PHYSICAL THERAPY | Facility: HOSPITAL | Age: 80
End: 2025-01-24
Attending: PHYSICIAN ASSISTANT
Payer: MEDICARE

## 2025-01-24 PROCEDURE — 97110 THERAPEUTIC EXERCISES: CPT

## 2025-01-24 PROCEDURE — 97140 MANUAL THERAPY 1/> REGIONS: CPT

## 2025-01-27 ENCOUNTER — OFFICE VISIT (OUTPATIENT)
Dept: PHYSICAL THERAPY | Facility: HOSPITAL | Age: 80
End: 2025-01-27
Attending: PHYSICIAN ASSISTANT
Payer: MEDICARE

## 2025-01-27 PROCEDURE — 97140 MANUAL THERAPY 1/> REGIONS: CPT

## 2025-01-27 PROCEDURE — 97110 THERAPEUTIC EXERCISES: CPT

## 2025-01-27 NOTE — PROGRESS NOTES
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   1/21/25: 7 weeks post op    Pain:  0/10 L hip at rest; when walking 3-4/10 L hip; 0/10 L knee, when walking 4-5/10      Objective: see rx flowsheet    AROM: (* denotes performed with pain)  Hip Knee    Flexion: R 112; L 90*  Extension: R NT; L NT  Abduction: R 32; L 10*  ER: R 30 ; L NT  IR: R 23; L 15 Flexion: L 119  Extension: L -5*     PROM: (* denotes performed with pain)  Hip   Flexion: L 90*  Extension: R NT; L -5 (s/l)   Abduction: L 30     Strength/MMT: (* denotes performed with pain)  Hip Knee   Flexion: R 4+/5; L 3+/5  Extension: R NT/5; L NT/5  Abduction: R NT /5; L 3*/5  ER: R 4/5; L NT/5  IR: R 4+/5; L NT/5 Flexion: R 5/5; L 5/5  Extension: R 5/5; L 4+/5        Balance: SLS: R 5 sec, L unable    Functional Mobility:  5x sit<>stand: unable  TUG (AD, time): 15.45 sec, straight cane       Gait: pt ambulates on level ground with straight cane, forward flexed posturing, decreased L hip and knee extension and antalgic pattern.   Stair Negotiation: Reciprocal with R railing and straight cane, slow gaby and decreased eccentric control with descent noted.       Assessment: Pt reports improved symptoms in the left hip, but says the knee has been bothering him more lately. Focused on reducing tightness in the left TFL and left hip and knee joint mobility, and progressed LE strengthening and functional training. Improved tolerance and participation with low pain levels today.       Goals:   Pt will have  improved hip AROM Flex to 105 deg and ABD to 30 deg to be able to don/doff shoes and perform car transfers without difficulty  Pt will improve hip ABD and ER strength to 4/5 to increase ease with standing and walking   Pt will demonstrate improved SLS to >6  seconds MARGARITA to promote safety and decrease risk of falls on uneven surfaces such as grass  Pt will perform TUG in <13 seconds with LRAD, demonstrating improved gait speed for improved participation in ADL such as community ambulation  Pt will be able to squat to  light objects around the house with <1/10 hip pain   Pt will improve functional hip strength to report ability to ascend/descend 1 flight of stairs reciprocally without use of handrail  Pt will be independent and compliant with comprehensive HEP to maintain progress achieved in PT    Plan: Patient will be seen for 1-2 x/week or a total of 10 visits over a 90 day period.  Treatment will include: Gait training, Manual Therapy, Neuromuscular Re-education, Therapeutic Activities, Therapeutic Exercise, Home Exercise Program instruction, and Modalities to include: Electrical stimulation (attended) and Ultrasound  Date: 1/13/25                TX#: 5/10 Date: 1/17/25  Tx#: 6/10 Date: 1/20/25  Tx#: 7/10 Date: 1/24/25  Tx#: 8/10 Date: 1/27/25  Tx#: 9/10   MT: 18'   Passive ROM L hip flexion (<90), minimal ER, abduction  Tib-fem AP mob GrII-III L  Gentle OP into left knee extension  Contract relax L HS 3x10\"  Patellar mobilization GrIII L  Manual hip flexor stretching in side lying    TE: 10'   4\" step up x10 L  6\" step up x10 L  Shuttle DLP 50# x15  Shuttle SLP 25# x15 L    NMR x17'  Dry Needling to left lateral rectus to reduce tone and improve motor function to lateral quad x10'  SL clam 2x10 L  SLR (small range) 2x5 L  Supine hip abduction slide (anti-gravity) x10 L  Self mobilization for left hip anterior and lateral glides  MT: 15'   Passive ROM L hip flexion (<90), minimal ER, abduction  Long axis  traction L hip GrII-III  Contract relax L HS  Manual hip flexor and ITB stretching L     NMR x25'  Hooklying BKFO RTB 2x10 L stabilize, x10 R stab  Hooklying heel slide (neutral hip) x10 L  Hook lying heel slide (hip ER) x19 L (more difficult)  STS from elevated plinth x10  4\" step down x10 L  Bent over plinth hip ext x10 bilat (glute med lacks strength)  L hip to wall (unable)  MT: 15'   Long axis traction L hip GrII-III  L hip lateral traction, MWM hip ER and IR  PROM L hip flexion, IR, ER    TE x15'  Nustep lvl 3 arms and legs x5'    Supine hip abduction L x10 (slider)   Seated HS stretch 5x10\" L  Slantboard stretch 5x10\" (lowest level)   Tiltboard taps x20 AP, ML    NMR x5'  Hooklying heel slide (neutral hip) x15 L  Hook lying heel slide (hip ER) x19 L (more difficult) MT: 23'   Long axis traction L hip GrII-III  L hip lateral traction, MWM hip ER and IR  PROM L hip flexion, IR, ER  Contract relax- L hip flexor in jose stretching position    TE x16'  STS from elevated plinth, R foot on yoga block x8  Hook lying hip flexion march (painful)  Repeated after contract relax- improved  Repeated after contract relaxa again- improved  Hip flexion AROM on step (lumbar flexion over hip)- tension in L buttock/proximal HS  Seated HS set- to relieve muscle tension MT: 15'   Patellar glides superior/inferior GrII-III  OP into L knee extension   Tib-fem AP glides GrII-III  Long axis traction L hip GrII-III  PROM L hip flexion, IR, ER  STM L TFL, hip flexor    TE x25'  Bilat clam GTB x20   SAQ 2# 3\" hold 2x10 L  6\" step up x10 L (2UE)  4\" step down x10 L (2UE)  6\" step down x10 L (2UE)  Mod hip flexor stretching on plinth 5\" hold x10 L  Shuttle SLP 30# x10 neutral hip, x10 hip ER L                         HEP:   - Supine Heel Slide  - 1 x daily - 7 x weekly - 3 sets - 10 reps  - Supine Quad Set  - 1 x daily - 7 x weekly - 2 sets - 10 reps - 5-10 second  hold  - Hooklying Isometric Hip Abduction with Belt  - 1 x daily - 7 x  weekly - 2 sets - 10 reps - 10 seconds hold  - Hip Flexor Stretch on Step  - 1-2 x daily - 7 x weekly - 10-20 reps - 5-10 sec hold    Charges: 1 MT 2 TE     Total Timed Treatment: 40 min  Total Treatment Time: 40 min

## 2025-01-31 ENCOUNTER — APPOINTMENT (OUTPATIENT)
Dept: PHYSICAL THERAPY | Facility: HOSPITAL | Age: 80
End: 2025-01-31
Attending: PHYSICIAN ASSISTANT
Payer: MEDICARE

## 2025-01-31 NOTE — PROGRESS NOTES
Discharge Summary  Pt has attended 10 visits in Physical Therapy.    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 Has been working on stepping down WB on the left knee.   2/4/25: 9 weeks post op    Pain:  0/10 L hip at rest; when walking 3-4/10 L hip; 0/10 L knee, when walking 4-5/10      Objective: see rx flowsheet    AROM: (* denotes performed with pain)  Hip Knee    Flexion: R 112; L 90*  Extension: R NT; L NT  Abduction: R 32; L 10*  ER: R 30 ; L NT  IR: R 23; L 15 Flexion: L 119  Extension: L -5*   2/3/25  Flexion: L 102 (after SKTC, 105 active, 109 AAROM)  Extension: L lacking 2 degrees from 0 (standing)  Abduction: L 35 2/3/25  Flexion: L 119  Extension: L -5*     Strength/MMT: (* denotes performed with pain)  Hip Knee   Flexion: R 4+/5; L 3+/5  Extension: R NT/5; L NT/5  Abduction: R NT /5; L 3*/5  ER: R 4/5; L NT/5  IR: R 4+/5; L NT/5 Flexion: R 5/5; L 5/5  Extension: R 5/5; L 4+/5      2/3/25  Flexion: R 5-/5; L 4+/5  Extension:  L 4/5 (standing)  Abduction: L 3+/5  ER: R 4+/5; L 4/5  IR: R 5/5; L 5/5 2/3/25  Extension: L 5/5     Balance: SLS: R 5 sec, L unable  2/3/25: R 5 sec; L <2 sec    Functional Mobility:  5x sit<>stand: unable  TUG (AD, time): 15.45 sec, straight cane  2/3/25: 11.8 sec, SPC; 11.01 sec no AD       Gait: pt ambulates on level ground with straight cane, forward flexed posturing, decreased L hip and knee extension and antalgic pattern.   2/3/25: SPC R side, forward flexed postural, improved hip extension on the  left side (pt has known LLD, left is longer)   Stair Negotiation: Reciprocal with R railing and straight cane, slow gaby and decreased eccentric control with descent noted.   2/3/25: reciprocal pattern, use of UE support for safety, good control to ascending and descending      Assessment: Pt has made good improvements with PT, especially over the last 2-3 weeks. He continues to have mobility and strength restrictions, though these are long-term issues due to chronic back pain. Updated HEP to include stretches and strengthening exercises to gradually improve ROM and strength. Advised pt to call if he has remaining questions or concerns.       Goals:   Pt will have improved hip AROM Flex to 105 deg and ABD to 30 deg to be able to don/doff shoes and perform car transfers without difficulty met  Pt will improve hip ABD and ER strength to 4/5 to increase ease with standing and walking ongoing hip abductor weakness  Pt will demonstrate improved SLS to >6  seconds MARGARITA to promote safety and decrease risk of falls on uneven surfaces such as grass not met  Pt will perform TUG in <13 seconds with LRAD, demonstrating improved gait speed for improved participation in ADL such as community ambulation met  Pt will be able to squat to  light objects around the house with <1/10 hip pain met  Pt will improve functional hip strength to report ability to ascend/descend 1 flight of stairs reciprocally without use of handrail improved requires use of hand rail  Pt will be independent and compliant with comprehensive HEP to maintain progress achieved in PT met  LEFS Score  LEFS Score: (Patient-Rptd) 15 % (12/16/2024  3:41 PM)    Post LEFS Score  Post LEFS Score: (Patient-Rptd) 65 % (2/3/2025  3:05 PM)    50 % improvement    Plan: DC from PT. Continue HEP. Call for remaining questions or concerns.     Certification From: 1/31/2025  To:5/1/2025    Date: 1/17/25  Tx#: 6/10 Date: 1/20/25  Tx#: 7/10 Date: 1/24/25  Tx#: 8/10 Date:  1/27/25  Tx#: 9/10 Date: 2/3/25  Tx#: 10/10   MT: 15'   Passive ROM L hip flexion (<90), minimal ER, abduction  Long axis traction L hip GrII-III  Contract relax L HS  Manual hip flexor and ITB stretching L     NMR x25'  Hooklying BKFO RTB 2x10 L stabilize, x10 R stab  Hooklying heel slide (neutral hip) x10 L  Hook lying heel slide (hip ER) x19 L (more difficult)  STS from elevated plinth x10  4\" step down x10 L  Bent over plinth hip ext x10 bilat (glute med lacks strength)  L hip to wall (unable)  MT: 15'   Long axis traction L hip GrII-III  L hip lateral traction, MWM hip ER and IR  PROM L hip flexion, IR, ER    TE x15'  Nustep lvl 3 arms and legs x5'    Supine hip abduction L x10 (slider)   Seated HS stretch 5x10\" L  Slantboard stretch 5x10\" (lowest level)   Tiltboard taps x20 AP, ML    NMR x5'  Hooklying heel slide (neutral hip) x15 L  Hook lying heel slide (hip ER) x19 L (more difficult) MT: 23'   Long axis traction L hip GrII-III  L hip lateral traction, MWM hip ER and IR  PROM L hip flexion, IR, ER  Contract relax- L hip flexor in jose stretching position    TE x16'  STS from elevated plinth, R foot on yoga block x8  Hook lying hip flexion march (painful)  Repeated after contract relax- improved  Repeated after contract relaxa again- improved  Hip flexion AROM on step (lumbar flexion over hip)- tension in L buttock/proximal HS  Seated HS set- to relieve muscle tension MT: 15'   Patellar glides superior/inferior GrII-III  OP into L knee extension   Tib-fem AP glides GrII-III  Long axis traction L hip GrII-III  PROM L hip flexion, IR, ER  STM L TFL, hip flexor    TE x25'  Bilat clam GTB x20   SAQ 2# 3\" hold 2x10 L  6\" step up x10 L (2UE)  4\" step down x10 L (2UE)  6\" step down x10 L (2UE)  Mod hip flexor stretching on plinth 5\" hold x10 L  Shuttle SLP 30# x10 neutral hip, x10 hip ER L  TE x40'  Reassessment  Hooklying hip flexion with active assist x10 L   Hook lying figure 4 stretch L   Seated hip abduction  YTB 2x10  Side stepping YTB at knees   Resisted fwd/bwd walking YTB x10   SLS steps at step x20 L  Knee flexion stretching L knee flexion x10   HEP update                        HEP:   - Supine Heel Slide  - 1 x daily - 7 x weekly - 3 sets - 10 reps  - Supine Quad Set  - 1 x daily - 7 x weekly - 2 sets - 10 reps - 5-10 second  hold  - Hooklying Isometric Hip Abduction with Belt  - 1 x daily - 7 x weekly - 2 sets - 10 reps - 10 seconds hold  - Hip Flexor Stretch on Step  - 1-2 x daily - 7 x weekly - 10-20 reps - 5-10 sec hold  - Hooklying Single Knee to Chest  - 3-5 x weekly - 2 sets - 10 reps  - Supine Figure 4 Piriformis Stretch  - 2 x daily - 7 x weekly - 10 reps - 5 sec hold  - Seated Hip Abduction with Resistance  - 3-5 x weekly - 2 sets - 10 reps  - Side Stepping with Resistance at Thighs and Counter Support  - 3-5 x weekly - 2 sets - 10 reps  - Forward and Backward Monster Walk with Resistance at Ankles and Counter Support  - 1 x daily - 3-5 x weekly - 2 sets - 10 reps  - Standing Knee Flexion Stretch on Step  - 2 x daily - 7 x weekly - 10 reps    Charges: 3 TE     Total Timed Treatment: 40 min  Total Treatment Time: 40 min

## 2025-02-03 ENCOUNTER — OFFICE VISIT (OUTPATIENT)
Dept: PHYSICAL THERAPY | Facility: HOSPITAL | Age: 80
End: 2025-02-03
Attending: PHYSICIAN ASSISTANT
Payer: MEDICARE

## 2025-02-03 PROCEDURE — 97110 THERAPEUTIC EXERCISES: CPT

## 2025-02-06 ENCOUNTER — APPOINTMENT (OUTPATIENT)
Dept: PHYSICAL THERAPY | Facility: HOSPITAL | Age: 80
End: 2025-02-06
Attending: PHYSICIAN ASSISTANT
Payer: MEDICARE

## 2025-02-10 ENCOUNTER — OFFICE VISIT (OUTPATIENT)
Dept: ORTHOPEDICS CLINIC | Facility: CLINIC | Age: 80
End: 2025-02-10
Payer: MEDICARE

## 2025-02-10 DIAGNOSIS — Z47.89 ORTHOPEDIC AFTERCARE: ICD-10-CM

## 2025-02-10 DIAGNOSIS — M21.70 LEG LENGTH DISCREPANCY: Primary | ICD-10-CM

## 2025-02-10 PROCEDURE — 1160F RVW MEDS BY RX/DR IN RCRD: CPT | Performed by: PHYSICIAN ASSISTANT

## 2025-02-10 PROCEDURE — 99024 POSTOP FOLLOW-UP VISIT: CPT | Performed by: PHYSICIAN ASSISTANT

## 2025-02-10 PROCEDURE — 1159F MED LIST DOCD IN RCRD: CPT | Performed by: PHYSICIAN ASSISTANT

## 2025-02-10 PROCEDURE — 1126F AMNT PAIN NOTED NONE PRSNT: CPT | Performed by: PHYSICIAN ASSISTANT

## 2025-02-10 NOTE — PROGRESS NOTES
NURSING INTAKE COMMENTS:   Chief Complaint   Patient presents with    Post-Op     2nd post op- Left total hip revision, finished PT, denies pain        HPI: This 79 year old male presents today for follow-up on his left hip.  It has been about 10 weeks since his surgery.  He completed his course of outpatient therapy and has been doing exercises on his own.  He is ambulating with a cane when outside of the home.  He still complains of some muscle weakness.  He still notes leg length discrepancy.    Past Surgical History:   Procedure Laterality Date    Arthroscopy of joint unlisted  1990    knee    Arthroscopy of joint unlisted Left 2011    rotator cuff repair    Arthroscopy of joint unlisted Left 1992    hip resurfacing    Arthroscopy of joint unlisted Left 12/2008    hip resurfacing    Cataract      Cataract extraction w/  intraocular lens implant Right 09/17/2012    RJM    Cataract extraction w/  intraocular lens implant Left 01/11/2010    RJM    Colonoscopy  04/2015    repeat in 10 years.    Colonoscopy      Eye surgery Right 2013    repair detached retina    Eye surgery Right 02/20/2006    S/ P PPV RD repair (S/P PPVx, cryo, GFX, SF6 OD 4/25/13) Dr. Howe     Forearm/wrist surgery unlisted Left     left wrist surgery    Hand/finger surgery unlisted Left 09/29/2011    LRF trigger finger release    Hc inj epidural steroid lumbar or sacral w img guidance      x2 2019,x2 2017    Hernia surgery      hernia repair, herniorraphy    Laser surgery of eye      Other      bladder surgery x2    Other surgical history      esophageal dilatation    Other surgical history  06/15/2010    release triggers: RMF, RRF, LMF/ RRF Dupuytren's nodule excision    Repair of biceps tendon at elbow Left 1992    Retinal laser procedure      Spine surgery procedure unlisted  02/23/2022    L3-5 laminectomy, left L4-5 transforaminal lumbar interbody fusion;    Total hip replacement Left 2008    Yag capsulotomy - od - right eye Right  04/30/2014    [facility]     Current Outpatient Medications   Medication Sig Dispense Refill    lisinopril 5 MG Oral Tab Take 1 tablet (5 mg total) by mouth daily. 90 tablet 3    aspirin 325 MG Oral Tab EC Take 1 tablet (325 mg total) by mouth in the morning and 1 tablet (325 mg total) before bedtime. 60 tablet 0    docusate sodium 100 MG Oral Cap Take 100 mg by mouth 2 (two) times daily. 20 capsule 0    ferrous sulfate 325 (65 FE) MG Oral Tab EC Take 1 tablet (325 mg total) by mouth daily with breakfast. 20 tablet 0    HYDROcodone-acetaminophen 7.5-325 MG Oral Tab Take 1 tablet by mouth every 6 (six) hours as needed. 40 tablet 0    polyethylene glycol, PEG 3350, 17 g Oral Powd Pack Take 17 g by mouth daily as needed (constipation).      acetaminophen 500 MG Oral Tab Take 1 tablet (500 mg total) by mouth every 6 (six) hours as needed for Pain.      Risedronate Sodium 150 MG Oral Tab Take 1 tablet (150 mg total) by mouth every 30 (thirty) days. 3 tablet 3    ipratropium 0.03 % Nasal Solution 2 sprays by Nasal route every 12 (twelve) hours. 1 each 5    Omeprazole 40 MG Oral Capsule Delayed Release Take 1 capsule (40 mg total) by mouth daily. 90 capsule 3    NIFEdipine ER 90 MG Oral Tablet 24 Hr Take 1 tablet (90 mg total) by mouth daily. (Patient taking differently: Take 1 tablet (90 mg total) by mouth every morning.) 90 tablet 3    Lovastatin 40 MG Oral Tab Take 1 tablet (40 mg total) by mouth nightly. 100 tablet 2    fluticasone propionate 50 MCG/ACT Nasal Suspension 1 spray by Each Nare route daily as needed. 16 g 2    metFORMIN 500 MG Oral Tab Take 1 tablet (500 mg total) by mouth 2 (two) times daily. 180 tablet 3    ADVAIR DISKUS 100-50 MCG/ACT Inhalation Aerosol Powder, Breath Activated Inhale 1 puff into the lungs 2 (two) times daily. 180 each 1    diclofenac 1 % External Gel       Vitamin B-12 1000 MCG Oral Tab Take 1 tablet (1,000 mcg total) by mouth daily.      Cholecalciferol (VITAMIN D) 1000 UNITS Oral Cap Take  by mouth daily.      Multiple Vitamins-Minerals (CENTRUM SILVER) Oral Tab Take 1 tablet by mouth daily.        Cinnamon 500 MG Oral Cap Take 500 mg by mouth daily.       Allergies[1]  Family History   Problem Relation Age of Onset    Other (pulmonary Embolism) Father     Colon Cancer Mother     No Known Problems Brother     Glaucoma Neg     Diabetes Neg     Macular degeneration Neg        Social History     Occupational History    Not on file   Tobacco Use    Smoking status: Never    Smokeless tobacco: Never    Tobacco comments:     none   Vaping Use    Vaping status: Never Used   Substance and Sexual Activity    Alcohol use: Yes     Alcohol/week: 3.0 standard drinks of alcohol     Types: 2 Cans of beer, 1 Standard drinks or equivalent per week     Comment: 2-3 times per week    Drug use: Never    Sexual activity: Not on file        Review of Systems:  GENERAL: feels generally well, no significant weight loss or weight gain  SKIN: no ulcerated or worrisome skin lesions  EYES:denies blurred vision or double vision  HEENT: denies new nasal congestion, sinus pain or ST  LUNGS: denies shortness of breath  CARDIOVASCULAR: denies chest pain  GI: no hematemesis, no worsening heartburn, no diarrhea  : no dysuria, no blood in urine, no difficulty urinating, no incontinence  MUSCULOSKELETAL: no other musculoskeletal complaints other than in HPI  NEURO: no numbness or tingling, no weakness or balance disorder  PSYCHE: no depression or anxiety  HEMATOLOGIC: no hx of blood dyscrasia  ENDOCRINE: no thyroid or diabetes issues  ALL/ASTHMA: no new hx of severe allergy or asthma    Physical Examination:    There were no vitals taken for this visit.  Constitutional: appears well hydrated, alert and responsive, no acute distress noted  Extremities: The incision is well-healed.  He was able to get up and ambulate without any difficulty.  Good range of motion of the hip.  He has 1/2 inch longer on the left leg compared to the  right.      Imaging: No results found.     Lab Results   Component Value Date    WBC 9.6 12/04/2024    HGB 12.3 (L) 12/04/2024    .0 12/04/2024      Lab Results   Component Value Date     (H) 11/15/2024    BUN 12 11/15/2024    CREATSERUM 0.94 11/15/2024    GFRNAA 81 02/19/2022    GFRAA 94 02/19/2022        Assessment and Plan:  Diagnoses and all orders for this visit:    Leg length discrepancy  -     DME - External    Orthopedic aftercare        Assessment: Status post revision left total hip arthroplasty    Plan: Mr. Morales is progressing well following his surgery.  We discussed the expected recovery time following this type of procedure.  He will continue to exercise on his own.  I gave him an order for shoe lift for his right leg.  Will see him back in June for 6-month postop visit with a new x-ray.  I advised him to call if any questions or problems arise in the meantime.    The above note was creating using Dragon speech recognition technology. Please excuse any typos.    This visit was performed under the supervision of Dr. Martín Mauro who formulated the treatment plan and decision making.        [1]   Allergies  Allergen Reactions    Penicillins HIVES     Denies skin peeling/blister, organ damage/failure

## 2025-02-20 RX ORDER — FLUTICASONE PROPIONATE AND SALMETEROL 100; 50 UG/1; UG/1
1 POWDER RESPIRATORY (INHALATION) 2 TIMES DAILY
Qty: 180 EACH | Refills: 3 | Status: SHIPPED | OUTPATIENT
Start: 2025-02-20

## 2025-02-25 ENCOUNTER — OFFICE VISIT (OUTPATIENT)
Dept: INTERNAL MEDICINE CLINIC | Facility: CLINIC | Age: 80
End: 2025-02-25
Payer: MEDICARE

## 2025-02-25 VITALS
HEIGHT: 72 IN | WEIGHT: 186 LBS | HEART RATE: 81 BPM | DIASTOLIC BLOOD PRESSURE: 74 MMHG | SYSTOLIC BLOOD PRESSURE: 130 MMHG | OXYGEN SATURATION: 98 % | BODY MASS INDEX: 25.19 KG/M2

## 2025-02-25 DIAGNOSIS — E11.9 DIABETES MELLITUS TYPE 2 WITHOUT RETINOPATHY (HCC): Primary | ICD-10-CM

## 2025-02-25 DIAGNOSIS — J41.0 SIMPLE CHRONIC BRONCHITIS (HCC): ICD-10-CM

## 2025-02-25 DIAGNOSIS — I10 ESSENTIAL HYPERTENSION: ICD-10-CM

## 2025-02-25 DIAGNOSIS — M85.80 OSTEOPENIA, UNSPECIFIED LOCATION: ICD-10-CM

## 2025-02-25 PROCEDURE — 1159F MED LIST DOCD IN RCRD: CPT | Performed by: INTERNAL MEDICINE

## 2025-02-25 PROCEDURE — 99214 OFFICE O/P EST MOD 30 MIN: CPT | Performed by: INTERNAL MEDICINE

## 2025-02-25 PROCEDURE — 1126F AMNT PAIN NOTED NONE PRSNT: CPT | Performed by: INTERNAL MEDICINE

## 2025-02-25 PROCEDURE — G2211 COMPLEX E/M VISIT ADD ON: HCPCS | Performed by: INTERNAL MEDICINE

## 2025-02-25 RX ORDER — PREDNISONE 20 MG/1
TABLET ORAL
Qty: 10 TABLET | Refills: 0 | Status: SHIPPED | OUTPATIENT
Start: 2025-02-25

## 2025-02-25 RX ORDER — AZITHROMYCIN 250 MG/1
TABLET, FILM COATED ORAL
Qty: 6 TABLET | Refills: 0 | Status: SHIPPED | OUTPATIENT
Start: 2025-02-25 | End: 2025-03-02

## 2025-02-25 NOTE — PROGRESS NOTES
Linwood Morales is a 79 year old male.  Chief Complaint   Patient presents with    Sinus Problem     Has been having a sinus infection and cough for about three weeks    Cough     HPI:     History of Present Illness  The patient, with a history of diabetes and bronchitis, presents with a sinus problem and a cough that has been ongoing for approximately two and a half weeks. The cough has been productive with dark yellow phlegm. He also reports a recent hip surgery in December, which has been healing well, although he notes that his left leg is now longer, causing some imbalance while walking. He is scheduled to get a shoe lift for his right shoe to help with this issue.    In terms of his chronic conditions, his diabetes has been well-controlled, with a recent reading of 125. He has been using an Advair inhaler twice daily for his bronchitis. He has also been taking risedronate for his bone density, although he does not recall when his last bone density test was done. He believes it was around 2017.     He also mentions a concern about an upcoming colonoscopy, as it has been ten years since his last one. He is unsure if he wants to proceed with it and will think about it.       Current Outpatient Medications   Medication Sig Dispense Refill    azithromycin 250 MG Oral Tab Take 2 tablets (500 mg total) by mouth daily for 1 day, THEN 1 tablet (250 mg total) daily for 4 days. 6 tablet 0    predniSONE 20 MG Oral Tab Take 2 tabs for 3 days, then 1 tab for 4 days 10 tablet 0    fluticasone-salmeterol (ADVAIR DISKUS) 100-50 MCG/ACT Inhalation Aerosol Powder, Breath Activated Inhale 1 puff into the lungs 2 (two) times daily. 180 each 3    lisinopril 5 MG Oral Tab Take 1 tablet (5 mg total) by mouth daily. 90 tablet 3    docusate sodium 100 MG Oral Cap Take 100 mg by mouth 2 (two) times daily. 20 capsule 0    Risedronate Sodium 150 MG Oral Tab Take 1 tablet (150 mg total) by mouth every 30 (thirty) days. 3 tablet 3     Omeprazole 40 MG Oral Capsule Delayed Release Take 1 capsule (40 mg total) by mouth daily. 90 capsule 3    NIFEdipine ER 90 MG Oral Tablet 24 Hr Take 1 tablet (90 mg total) by mouth daily. (Patient taking differently: Take 1 tablet (90 mg total) by mouth every morning.) 90 tablet 3    Lovastatin 40 MG Oral Tab Take 1 tablet (40 mg total) by mouth nightly. 100 tablet 2    fluticasone propionate 50 MCG/ACT Nasal Suspension 1 spray by Each Nare route daily as needed. 16 g 2    metFORMIN 500 MG Oral Tab Take 1 tablet (500 mg total) by mouth 2 (two) times daily. 180 tablet 3    diclofenac 1 % External Gel       Vitamin B-12 1000 MCG Oral Tab Take 1 tablet (1,000 mcg total) by mouth daily.      Multiple Vitamins-Minerals (CENTRUM SILVER) Oral Tab Take 1 tablet by mouth daily.        Cinnamon 500 MG Oral Cap Take 500 mg by mouth daily.      acetaminophen 500 MG Oral Tab Take 1 tablet (500 mg total) by mouth every 6 (six) hours as needed for Pain. (Patient not taking: Reported on 2/25/2025)        Past Medical History:    Acute medial meniscus tear of right knee    Angular cheilitis    Back problem    BPH (benign prostatic hyperplasia)    Cancer (HCC)    skin ca    Cheilitis    [facility] arthritis    [facility] [place] [facility] arthritis - [facility] [place] aristospan / lidocaine injection    Cough    Esophageal reflux    Essential hypertension    High blood pressure    High cholesterol    Hyperlipidemia    Lower urinary tract symptoms (LUTS)    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    PPVx, Cryo, GFX, SF6    Myopia with astigmatism and presbyopia    Nocturia    Osteoarthritis    Rotator cuff tear, left    repair    Seasonal allergic rhinitis    Stiffness of joint, hand    bilateral, hand stiffness & weakness post trigger release    Trigger finger    Irf-trigger finger, recurrent - LRF trigger finger release    Type II or unspecified type diabetes mellitus without mention of complication, not stated as uncontrolled    Visual impairment    readers       Past Surgical History:   Procedure Laterality Date    Arthroscopy of joint unlisted  1990    knee    Arthroscopy of joint unlisted Left 2011    rotator cuff repair    Arthroscopy of joint unlisted Left 1992    hip resurfacing    Arthroscopy of joint unlisted Left 12/2008    hip resurfacing    Cataract      Cataract extraction w/  intraocular lens implant Right 09/17/2012    RJM    Cataract extraction w/  intraocular lens implant Left 01/11/2010    RJM    Colonoscopy  04/2015    repeat in 10 years.    Colonoscopy      Eye surgery Right 2013    repair detached retina    Eye surgery Right 02/20/2006    S/ P PPV RD repair (S/P PPVx, cryo, GFX, SF6 OD 4/25/13) Dr. Howe     Forearm/wrist surgery unlisted Left     left wrist surgery    Hand/finger surgery unlisted Left 09/29/2011    LRF trigger finger release    Hc inj epidural steroid lumbar or sacral w img guidance      x2 2019,x2 2017    Hernia surgery      hernia repair, herniorraphy    Laser surgery of eye      Other      bladder surgery x2    Other surgical history      esophageal dilatation    Other surgical history  06/15/2010    release triggers: RMF, RRF, LMF/ RRF Dupuytren's nodule excision    Repair of biceps tendon at elbow Left 1992    Retinal laser procedure      Spine surgery procedure unlisted  02/23/2022    L3-5 laminectomy, left L4-5 transforaminal lumbar interbody fusion;    Total hip replacement Left 2008    Yag capsulotomy - od - right eye Right 04/30/2014    RJM      Social History:  Social History     Socioeconomic History    Marital status:    Tobacco Use    Smoking status: Never    Smokeless tobacco: Never    Tobacco comments:     none   Vaping Use    Vaping status: Never Used   Substance and Sexual Activity    Alcohol use: Yes     Alcohol/week: 3.0 standard drinks of alcohol     Types: 2 Cans of beer, 1 Standard drinks or equivalent per week     Comment: 2-3 times per week    Drug use: Never   Other Topics Concern    Caffeine Concern  Yes     Comment: soda, 8 oz daily    Exercise No    Pt has a pacemaker No    Reaction to local anesthetic No   Social History Narrative    The patient does  use an assistive device..  Cane 50% of time for knee pain.     The patient does live in a home with stairs.     Social Drivers of Health     Food Insecurity: No Food Insecurity (12/3/2024)    Food Insecurity     Food Insecurity: Never true   Transportation Needs: No Transportation Needs (12/3/2024)    Transportation Needs     Lack of Transportation: No   Housing Stability: Low Risk  (12/3/2024)    Housing Stability     Housing Instability: No      Family History   Problem Relation Age of Onset    Other (pulmonary Embolism) Father     Colon Cancer Mother     No Known Problems Brother     Glaucoma Neg     Diabetes Neg     Macular degeneration Neg       Allergies[1]     REVIEW OF SYSTEMS:   Review of Systems   Review of Systems   Constitutional: Negative for activity change, appetite change and fever.   HENT: Negative for congestion and voice change.    Respiratory: cough and congestion present    Cardiovascular: Negative for chest pain.   Gastrointestinal: Negative for abdominal distention, abdominal pain and vomiting.   Genitourinary: Negative for hematuria.   Skin: Negative for wound.   Psychiatric/Behavioral: Negative for behavioral problems.   Wt Readings from Last 5 Encounters:   02/25/25 186 lb (84.4 kg)   12/03/24 187 lb (84.8 kg)   11/25/24 190 lb 6.4 oz (86.4 kg)   11/14/24 187 lb (84.8 kg)   10/15/24 188 lb (85.3 kg)     Body mass index is 25.23 kg/m².      EXAM:   /74   Pulse 81   Ht 6' (1.829 m)   Wt 186 lb (84.4 kg)   SpO2 98%   BMI 25.23 kg/m²   Physical Exam   Constitutional:       Appearance: Normal appearance.   HENT:      Head: Normocephalic.   Eyes:      Conjunctiva/sclera: Conjunctivae normal.   Cardiovascular:      Rate and Rhythm: Normal rate and regular rhythm.      Heart sounds: Normal heart sounds. No murmur heard.  Pulmonary:       Effort: Pulmonary effort is normal.      Breath sounds: Normal breath sounds. No rhonchi or rales.   Abdominal:      General: Bowel sounds are normal.      Palpations: Abdomen is soft.      Tenderness: There is no abdominal tenderness.   Musculoskeletal:      Cervical back: Neck supple.      Right lower leg: No edema.      Left lower leg: No edema.   Skin:     General: Skin is warm and dry.   Neurological:      General: No focal deficit present.      Mental Status: He is alert and oriented to person, place, and time. Mental status is at baseline.   Psychiatric:         Mood and Affect: Mood normal.         Behavior: Behavior normal.       ASSESSMENT AND PLAN:   1. Diabetes mellitus type 2 without retinopathy (HCC)    - CBC, Platelet; No Differential; Future  - Comp Metabolic Panel (14); Future  - Hemoglobin A1C; Future  - Lipid Panel; Future  - TSH W Reflex To Free T4; Future  - Microalb/Creat Ratio, Random Urine; Future    2. Simple chronic bronchitis (HCC)      3. Osteopenia, unspecified location    - XR DEXA BONE DENSITOMETRY (CPT=77080); Future    4. Essential hypertension  Blood pressure is controlled.  We will continue the current medical regimen.  - CBC, Platelet; No Differential; Future  - Comp Metabolic Panel (14); Future  - Hemoglobin A1C; Future  - Lipid Panel; Future  - TSH W Reflex To Free T4; Future  - Microalb/Creat Ratio, Random Urine; Future    Assessment & Plan  Bronchitis  Persistent cough with dark yellow phlegm for 2.5 weeks. Currently on Advair twice daily.  -Prescribe antibiotics and prednisone for bronchitis flare-up.    Diabetes Mellitus  Well-controlled with recent blood glucose of 125.  -Continue current management and monitor.  Check A1c before next visit.    Osteoporosis  On Risedronate for 7 years, last bone density test in 2017.  -Order bone density test.  -Discontinue Risedronate after last dose.  -Plan for referral to rheumatologist if bone density worsens.    Hip Surgery  Recovery  Recent hip surgery with good recovery but leg length discrepancy.  -Continue current management and monitor.  Continue to follow-up with orthopedics    General Health Maintenance  -Order blood tests in a month.  -Consider colonoscopy, patient to decide.    Follow-up in a few months.     Plan: Labs ordered.  Medication prescribed.  Bone density ordered.  I will see him back in few months for his Medicare physical.    The patient indicates understanding of these issues and agrees to the plan.  No follow-ups on file.    This note was prepared using Dragon Medical voice recognition dictation software. As a result errors may occur. When identified these errors have been corrected. While every attempt is made to correct errors during dictation discrepancies may still exist.       [1]   Allergies  Allergen Reactions    Penicillins HIVES     Denies skin peeling/blister, organ damage/failure

## 2025-03-03 ENCOUNTER — APPOINTMENT (OUTPATIENT)
Dept: PHYSICAL THERAPY | Facility: HOSPITAL | Age: 80
End: 2025-03-03
Attending: PHYSICIAN ASSISTANT
Payer: MEDICARE

## 2025-03-07 ENCOUNTER — APPOINTMENT (OUTPATIENT)
Dept: PHYSICAL THERAPY | Facility: HOSPITAL | Age: 80
End: 2025-03-07
Attending: PHYSICIAN ASSISTANT
Payer: MEDICARE

## 2025-03-10 ENCOUNTER — APPOINTMENT (OUTPATIENT)
Dept: PHYSICAL THERAPY | Facility: HOSPITAL | Age: 80
End: 2025-03-10
Attending: PHYSICIAN ASSISTANT
Payer: MEDICARE

## 2025-03-11 ENCOUNTER — TELEPHONE (OUTPATIENT)
Dept: GASTROENTEROLOGY | Facility: CLINIC | Age: 80
End: 2025-03-11

## 2025-03-11 NOTE — TELEPHONE ENCOUNTER
----- Message from Erum PURCELL sent at 9/25/2015 10:17 AM CDT -----  Regarding: Recall colon   Recall patient for colon in 10 years per PL.

## 2025-03-14 ENCOUNTER — APPOINTMENT (OUTPATIENT)
Dept: PHYSICAL THERAPY | Facility: HOSPITAL | Age: 80
End: 2025-03-14
Attending: PHYSICIAN ASSISTANT
Payer: MEDICARE

## 2025-03-17 ENCOUNTER — APPOINTMENT (OUTPATIENT)
Dept: PHYSICAL THERAPY | Facility: HOSPITAL | Age: 80
End: 2025-03-17
Attending: PHYSICIAN ASSISTANT
Payer: MEDICARE

## 2025-03-21 ENCOUNTER — APPOINTMENT (OUTPATIENT)
Dept: PHYSICAL THERAPY | Facility: HOSPITAL | Age: 80
End: 2025-03-21
Attending: PHYSICIAN ASSISTANT
Payer: MEDICARE

## 2025-04-02 DIAGNOSIS — E11.9 DIABETES MELLITUS TYPE 2 WITHOUT RETINOPATHY (HCC): ICD-10-CM

## 2025-04-04 NOTE — TELEPHONE ENCOUNTER
Refill Per Protocol     Requested Prescriptions   Pending Prescriptions Disp Refills    METFORMIN 500 MG Oral Tab [Pharmacy Med Name: Metformin Hydrochloride 500 Mg Tab [place]] 180 tablet 0     Sig: TAKE ONE TABLET BY MOUTH TWICE DAILY       Diabetes Medication Protocol Passed - 4/4/2025 11:11 AM        Passed - Last A1C < 7.5 and within past 6 months     Lab Results   Component Value Date    A1C 6.3 (H) 12/03/2024             Passed - In person appointment or virtual visit in the past 6 mos or appointment in next 3 mos     Recent Outpatient Visits              1 month ago Diabetes mellitus type 2 without retinopathy (HCC)    [facility] Mark Gupta MD    Office Visit    1 month ago Leg length discrepancy    [facility] Moisés mE PA-C    Office Visit    2 months ago     [facility] Lloyd Fox, PT    Office Visit    2 months ago     [facility] Lloyd Fox, PT    Office Visit    2 months ago     [facility] Lloyd Fox, PT    Office Visit          Future Appointments         Provider Department Appt Notes    In 1 week [facility] DEXA [address] [facility]     In 2 months Mark Gupta MD [facility] annual exam - last MA Supervisit on 1/16/24                    Passed - Microalbumin procedure in past 12 months or taking ACE/ARB        Passed - EGFRCR or GFRNAA > 50     GFR Evaluation  EGFRCR: 82 , resulted on 11/15/2024          Passed - GFR in the past 12 months        Passed - Medication is active on med list

## 2025-04-14 ENCOUNTER — HOSPITAL ENCOUNTER (OUTPATIENT)
Dept: BONE DENSITY | Facility: HOSPITAL | Age: 80
Discharge: HOME | End: 2025-04-14
Attending: INTERNAL MEDICINE
Payer: MEDICARE

## 2025-04-14 DIAGNOSIS — M85.80 OSTEOPENIA, UNSPECIFIED LOCATION: ICD-10-CM

## 2025-04-14 PROCEDURE — 77080 DXA BONE DENSITY AXIAL: CPT | Performed by: INTERNAL MEDICINE

## 2025-05-16 ENCOUNTER — MED REC SCAN ONLY (OUTPATIENT)
Dept: INTERNAL MEDICINE CLINIC | Facility: CLINIC | Age: 80
End: 2025-05-16

## 2025-06-24 ENCOUNTER — LAB ENCOUNTER (OUTPATIENT)
Dept: LAB | Facility: HOSPITAL | Age: 80
End: 2025-06-24
Attending: INTERNAL MEDICINE
Payer: MEDICARE

## 2025-06-24 DIAGNOSIS — E11.9 DIABETES MELLITUS TYPE 2 WITHOUT RETINOPATHY (HCC): ICD-10-CM

## 2025-06-24 DIAGNOSIS — I10 ESSENTIAL HYPERTENSION: ICD-10-CM

## 2025-06-24 LAB
ALBUMIN SERPL-MCNC: 4.3 G/DL (ref 3.2–4.8)
ALBUMIN/GLOB SERPL: 2.3 {RATIO} (ref 1–2)
ALP LIVER SERPL-CCNC: 78 U/L (ref 45–117)
ALT SERPL-CCNC: 10 U/L (ref 10–49)
ANION GAP SERPL CALC-SCNC: 5 MMOL/L (ref 0–18)
AST SERPL-CCNC: 14 U/L (ref ?–34)
BILIRUB SERPL-MCNC: 0.7 MG/DL (ref 0.2–1.1)
BUN BLD-MCNC: 11 MG/DL (ref 9–23)
BUN/CREAT SERPL: 12.2 (ref 10–20)
CALCIUM BLD-MCNC: 9 MG/DL (ref 8.7–10.4)
CHLORIDE SERPL-SCNC: 108 MMOL/L (ref 98–112)
CHOLEST SERPL-MCNC: 130 MG/DL (ref ?–200)
CO2 SERPL-SCNC: 30 MMOL/L (ref 21–32)
CREAT BLD-MCNC: 0.9 MG/DL (ref 0.7–1.3)
CREAT UR-SCNC: 42.6 MG/DL
DEPRECATED RDW RBC AUTO: 46 FL (ref 35.1–46.3)
EGFRCR SERPLBLD CKD-EPI 2021: 86 ML/MIN/1.73M2 (ref 60–?)
ERYTHROCYTE [DISTWIDTH] IN BLOOD BY AUTOMATED COUNT: 12.8 % (ref 11–15)
EST. AVERAGE GLUCOSE BLD GHB EST-MCNC: 131 MG/DL (ref 68–126)
FASTING PATIENT LIPID ANSWER: YES
FASTING STATUS PATIENT QL REPORTED: YES
GLOBULIN PLAS-MCNC: 1.9 G/DL (ref 2–3.5)
GLUCOSE BLD-MCNC: 104 MG/DL (ref 70–99)
HBA1C MFR BLD: 6.2 % (ref ?–5.7)
HCT VFR BLD AUTO: 39.6 % (ref 39–53)
HDLC SERPL-MCNC: 49 MG/DL (ref 40–59)
HGB BLD-MCNC: 13.5 G/DL (ref 13–17.5)
LDLC SERPL CALC-MCNC: 66 MG/DL (ref ?–100)
MCH RBC QN AUTO: 33.3 PG (ref 26–34)
MCHC RBC AUTO-ENTMCNC: 34.1 G/DL (ref 31–37)
MCV RBC AUTO: 97.5 FL (ref 80–100)
MICROALBUMIN UR-MCNC: 0.7 MG/DL
MICROALBUMIN/CREAT 24H UR-RTO: 16.4 UG/MG (ref ?–30)
NONHDLC SERPL-MCNC: 81 MG/DL (ref ?–130)
OSMOLALITY SERPL CALC.SUM OF ELEC: 296 MOSM/KG (ref 275–295)
PLATELET # BLD AUTO: 240 10(3)UL (ref 150–450)
POTASSIUM SERPL-SCNC: 4.2 MMOL/L (ref 3.5–5.1)
PROT SERPL-MCNC: 6.2 G/DL (ref 5.7–8.2)
RBC # BLD AUTO: 4.06 X10(6)UL (ref 3.8–5.8)
SODIUM SERPL-SCNC: 143 MMOL/L (ref 136–145)
TRIGL SERPL-MCNC: 78 MG/DL (ref 30–149)
TSI SER-ACNC: 1.12 UIU/ML (ref 0.55–4.78)
VLDLC SERPL CALC-MCNC: 12 MG/DL (ref 0–30)
WBC # BLD AUTO: 6.6 X10(3) UL (ref 4–11)

## 2025-06-24 PROCEDURE — 82043 UR ALBUMIN QUANTITATIVE: CPT

## 2025-06-24 PROCEDURE — 85027 COMPLETE CBC AUTOMATED: CPT

## 2025-06-24 PROCEDURE — 82570 ASSAY OF URINE CREATININE: CPT

## 2025-06-24 PROCEDURE — 83036 HEMOGLOBIN GLYCOSYLATED A1C: CPT

## 2025-06-24 PROCEDURE — 36415 COLL VENOUS BLD VENIPUNCTURE: CPT

## 2025-06-24 PROCEDURE — 80061 LIPID PANEL: CPT

## 2025-06-24 PROCEDURE — 80053 COMPREHEN METABOLIC PANEL: CPT

## 2025-06-24 PROCEDURE — 84443 ASSAY THYROID STIM HORMONE: CPT

## 2025-07-01 ENCOUNTER — OFFICE VISIT (OUTPATIENT)
Dept: INTERNAL MEDICINE CLINIC | Facility: CLINIC | Age: 80
End: 2025-07-01
Payer: MEDICARE

## 2025-07-01 VITALS
BODY MASS INDEX: 25.19 KG/M2 | TEMPERATURE: 98 F | WEIGHT: 186 LBS | SYSTOLIC BLOOD PRESSURE: 132 MMHG | DIASTOLIC BLOOD PRESSURE: 70 MMHG | HEIGHT: 72 IN | OXYGEN SATURATION: 97 % | HEART RATE: 72 BPM

## 2025-07-01 DIAGNOSIS — E11.9 DIABETES MELLITUS TYPE 2 WITHOUT RETINOPATHY (HCC): Primary | ICD-10-CM

## 2025-07-01 DIAGNOSIS — M85.80 OSTEOPENIA, UNSPECIFIED LOCATION: ICD-10-CM

## 2025-07-01 DIAGNOSIS — N40.1 BPH WITH OBSTRUCTION/LOWER URINARY TRACT SYMPTOMS: ICD-10-CM

## 2025-07-01 DIAGNOSIS — E11.9 TYPE 2 DIABETES MELLITUS WITHOUT COMPLICATION, WITHOUT LONG-TERM CURRENT USE OF INSULIN (HCC): ICD-10-CM

## 2025-07-01 DIAGNOSIS — E78.2 MIXED HYPERLIPIDEMIA: ICD-10-CM

## 2025-07-01 DIAGNOSIS — I10 ESSENTIAL HYPERTENSION: ICD-10-CM

## 2025-07-01 DIAGNOSIS — N13.8 BPH WITH OBSTRUCTION/LOWER URINARY TRACT SYMPTOMS: ICD-10-CM

## 2025-07-01 DIAGNOSIS — J44.9 CHRONIC OBSTRUCTIVE PULMONARY DISEASE, UNSPECIFIED COPD TYPE (HCC): ICD-10-CM

## 2025-07-01 PROBLEM — Z01.818 PREOP TESTING: Status: RESOLVED | Noted: 2024-12-03 | Resolved: 2025-07-01

## 2025-07-01 NOTE — PROGRESS NOTES
Subjective:   Linwood Morales is a 80 year old male who presents for a MA AHA (Medicare Advantage Annual Health Assessment) and Subsequent Annual Wellness visit (Pt already had Initial Annual Wellness) and scheduled follow up of multiple significant but stable problems.   History of Present Illness    80-year-old gentleman here for Medicare advantage super visit.  He reports that he is doing well except having arthralgia in the back and in the hip.  He walks with a cane.  Fall prevention discussed.  No abuse no depression reported.  No significant urinary leakage.  Denies any chest pain or heaviness.  Denies any blood in the stool or blood in the urine.  History/Other:   Fall Risk Assessment:   He has been screened for Falls and is low risk.      Cognitive Assessment:   He had a completely normal cognitive assessment - see flowsheet entries     Functional Ability/Status:   Linwood Morales has some abnormal functions as listed below:  He has Hearing problems based on screening of functional status.He has Walking problems based on screening of functional status. He has problems with Memory based on screening of functional status.       Depression Screening (PHQ):  PHQ-2 SCORE: 0  , done 6/28/2025        <5 minutes spent screening and counseling for depression    Advanced Directives:   He does NOT have a Living Will. [Do you have a living will?: (Patient-Rptd) No]  He does NOT have a Power of  for Health Care. [Do you have a healthcare power of ?: (Patient-Rptd) No]  Discussed Advance Care Planning with patient (and family/surrogate if present). Standard forms made available to patient in After Visit Summary.      Problem List[1]  Allergies:  He is allergic to penicillins.    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  Surgical History:  He  has a past surgical history that includes hernia surgery; other surgical history; hand/finger surgery unlisted (Left, 09/29/2011); other surgical history (06/15/2010); forearm/wrist surgery unlisted (Left); laser surgery of eye; Yag Capsulotomy - OD - Right Eye (Right, 04/30/2014); Retinal laser procedure; total hip replacement (Left, 2008); Eye surgery (Right, 2013); Eye surgery (Right, 02/20/2006); arthroscopy of joint unlisted (1990); arthroscopy of joint unlisted (Left, 2011); arthroscopy of joint unlisted (Left, 1992); arthroscopy of joint unlisted (Left, 12/2008); colonoscopy (04/2015); Cataract extraction w/  intraocular lens implant (Right, 09/17/2012); Cataract extraction w/  intraocular lens implant (Left, 01/11/2010); repair of biceps tendon at elbow (Left, 1992); hc inj epidural steroid lumbar or sacral w img guidance; cataract; other; spine surgery procedure unlisted (02/23/2022); and colonoscopy.   Family History:  His family history includes Colon Cancer in his mother; No Known Problems in his brother; pulmonary Embolism in his father.  Social History:  He  reports that he has never smoked. He has never used smokeless tobacco. He reports current alcohol use of about 3.0 standard drinks of alcohol per week. He reports that he does not  use drugs.    Tobacco:  He has never smoked tobacco.    CAGE Alcohol Screen:   CAGE screening score of 0 on 6/28/2025, showing low risk of alcohol abuse.      Patient Care Team:  Mark Gupta MD as PCP - General (Internal Medicine)  Nikos Arvizu MD as Consulting Physician (NEUROSURGERY)  Iker Campos MD (Physical Medicine)  Kale Kirby MD (OPHTHALMOLOGY)  Miguel Vázquez MD (GASTROENTEROLOGY)  Victorina Sue MD (UROLOGY)  Martín Akins MD (Allergy and Immunology)  Camilo Carlin (UROLOGY)  Lian Muñoz, PT as Physical Therapist (Physical Therapy)  Iker Campos MD as Consulting Physician (Physical Medicine)  Kristen Rasmussen (Physical Therapy)  Brunner, Heather, ANA MARÍA as Physical Therapist (Physical Therapy)  Pablo Gamboa MD (SURGERY, ORTHOPEDIC)  Lloyd Fox, PT as Physical Therapist (Physical Therapy)  Erum Diamond, PT as Physical Therapist (Physical Therapy)    Review of Systems   Constitutional:  Negative for activity change, appetite change and fever.   HENT:  Negative for congestion and voice change.    Respiratory:  Negative for cough and shortness of breath.    Cardiovascular:  Negative for chest pain.   Gastrointestinal:  Negative for abdominal distention, abdominal pain and vomiting.   Genitourinary:  Negative for hematuria.   Musculoskeletal:  Positive for arthralgias.   Skin:  Negative for wound.   Psychiatric/Behavioral:  Negative for behavioral problems.          Objective:   Physical Exam  Constitutional:       Appearance: Normal appearance.   HENT:      Head: Normocephalic.   Eyes:      Conjunctiva/sclera: Conjunctivae normal.   Cardiovascular:      Rate and Rhythm: Normal rate and regular rhythm.      Heart sounds: Normal heart sounds. No murmur heard.  Pulmonary:      Effort: Pulmonary effort is normal.      Breath sounds: Normal breath sounds. No rhonchi or rales.   Abdominal:      General: Bowel sounds are normal.      Palpations: Abdomen is soft.       Tenderness: There is no abdominal tenderness.   Musculoskeletal:      Cervical back: Neck supple.      Right lower leg: No edema.      Left lower leg: No edema.   Skin:     General: Skin is warm and dry.   Neurological:      General: No focal deficit present.      Mental Status: He is alert and oriented to person, place, and time. Mental status is at baseline.   Psychiatric:         Mood and Affect: Mood normal.         Behavior: Behavior normal.         /70   Pulse 72   Temp 97.8 °F (36.6 °C) (Oral)   Ht 6' (1.829 m)   Wt 186 lb (84.4 kg)   SpO2 97%   BMI 25.23 kg/m²  Estimated body mass index is 25.23 kg/m² as calculated from the following:    Height as of this encounter: 6' (1.829 m).    Weight as of this encounter: 186 lb (84.4 kg).    Medicare Hearing Assessment:   Hearing Screening    Time taken: 7/1/2025  9:08 AM  Screening Method: Questionnaire  I have a problem hearing over the telephone: No I have trouble following the conversations when two or more people are talking at the same time: No   I have trouble understanding things on the TV: No I have to strain to understand conversations: No   I have to worry about missing the telephone ring or doorbell: No I have trouble hearing conversations in a noisy background such as a crowded room or restaurant: No   I get confused about where sounds come from: No I misunderstand some words in a sentence and need to ask people to repeat themselves: No   I especially have trouble understanding the speech of women and children: No I have trouble understanding the speaker in a large room such as at a meeting or place of [employer]: No   Many people I talk to seem to mumble (or don't speak clearly): No People get annoyed because I misunderstand what they say: No   I misunderstand what others are saying and make inappropriate responses: No I avoid social activities because I cannot hear well and fear I will reply improperly: No   Family members and friends have told  me they think I may have hearing loss: No                   Assessment & Plan:   Linwood Morales is a 80 year old male who presents for a Medicare Assessment.     1. Diabetes mellitus type 2 without retinopathy (HCC) (Primary) A1c is excellent.  Continue surveillance ophthalmology evaluation.  2. Chronic obstructive pulmonary disease, unspecified COPD type (HCC) stable.  Continue current inhaler regimen.  3. Type 2 diabetes mellitus without complication, without long-term current use of insulin (HCC) A1c is excellent.  Continue statins.  There is no spillage of protein in the urine.  4. Essential hypertension controlled  5. Mixed hyperlipidemia continue statins  6. Osteopenia, unspecified location stable fall prevention discussed  7. BPH with obstruction/lower urinary tract symptoms stable    Discussed regarding PCV 20.  He wants to hold off for now.  Up-to-date on Shingrix vaccine.  Labs reviewed with the patient.  He will continue to follow-up with orthopedics regarding hip arthralgia.  If everything is good, I will see him back in 6 months    Assessment & Plan    The patient indicates understanding of these issues and agrees to the plan.        No follow-ups on file.     Mark Gupta MD, 7/1/2025     Supplementary Documentation:   General Health:  In the past six months, have you lost more than 10 pounds without trying?: (Patient-Rptd) 2 - No  Has your appetite been poor?: (Patient-Rptd) No  Type of Diet: (Patient-Rptd) Balanced  How does the patient maintain a good energy level?: (Patient-Rptd) Appropriate Exercise  How would you describe your daily physical activity?: (Patient-Rptd) Moderate  How would you describe your current health state?: (Patient-Rptd) Good  How do you maintain positive mental well-being?: (Patient-Rptd) Games  On a scale of 0 to 10, with 0 being no pain and 10 being severe pain, what is your pain level?: (Patient-Rptd) 4 - (Moderate)  In the past six months, have you experienced  urine leakage?: (Patient-Rptd) 0-No  At any time do you feel concerned for the safety/well-being of yourself and/or your children, in your home or elsewhere?: (Patient-Rptd) No  Have you had any immunizations at another office such as Influenza, Hepatitis B, Tetanus, or Pneumococcal?: (Patient-Rptd) No    Health Maintenance   Topic Date Due    Annual Well Visit  01/01/2025    Diabetes Care Foot Exam  03/11/2025    PSA  05/15/2025    COVID-19 Vaccine (8 - 2024-25 season) 05/18/2025    Influenza Vaccine (1) 10/01/2025    Diabetes Care Dilated Eye Exam  10/30/2025    Diabetes Care A1C  12/24/2025    Diabetes Care: GFR  06/24/2026    Annual Depression Screening  Completed    Fall Risk Screening (Annual)  Completed    Diabetes Care: Microalb/Creat Ratio (Annual)  Completed    Pneumococcal Vaccine: 50+ Years  Completed    Zoster Vaccines  Completed    Meningococcal B Vaccine  Aged Out            [1]   Patient Active Problem List  Diagnosis    BPH with obstruction/lower urinary tract symptoms    Erectile dysfunction    Elevated PSA    right C7 radiculopathy    Glaucoma suspect of both eyes    Pseudophakia of both eyes    Epiretinal membrane, right    History of retinal tear    Essential hypertension    Mixed hyperlipidemia    Urethral stricture    Diabetes mellitus type 2 without retinopathy (HCC)    Osteopenia    left > right S1 radiculopathies    L5-S1 right severe/left mod foraminal stenosis    right subscapularis mild-mod full thickness inferior tear, right supraspinatus anterior full thickness complete tear, right infraspinatus mild articular surface partial thickness tear    Floater, vitreous, left    Bladder stone    Long-term use of Plaquenil    Lumbar post-laminectomy syndrome: L3-5 laminectomy and L4-5 fusion    Chronic obstructive pulmonary disease, unspecified (HCC)    Type 2 diabetes mellitus without complication, without long-term current use of insulin (HCC)    Gastroesophageal reflux disease without  esophagitis    Failure of left total hip arthroplasty    Orthopedic aftercare   [2]   Outpatient Medications Marked as Taking for the 7/1/25 encounter (Office Visit) with Mark Gupta MD   Medication Sig    metFORMIN 500 MG Oral Tab Take 1 tablet (500 mg total) by mouth 2 (two) times daily.    fluticasone-salmeterol (ADVAIR DISKUS) 100-50 MCG/ACT Inhalation Aerosol Powder, Breath Activated Inhale 1 puff into the lungs 2 (two) times daily.    lisinopril 5 MG Oral Tab Take 1 tablet (5 mg total) by mouth daily.    Omeprazole 40 MG Oral Capsule Delayed Release Take 1 capsule (40 mg total) by mouth daily.    NIFEdipine ER 90 MG Oral Tablet 24 Hr Take 1 tablet (90 mg total) by mouth daily. (Patient taking differently: Take 1 tablet (90 mg total) by mouth every morning.)    Lovastatin 40 MG Oral Tab Take 1 tablet (40 mg total) by mouth nightly.    fluticasone propionate 50 MCG/ACT Nasal Suspension 1 spray by Each Nare route daily as needed.    Vitamin B-12 1000 MCG Oral Tab Take 1 tablet (1,000 mcg total) by mouth daily.    Multiple Vitamins-Minerals (CENTRUM SILVER) Oral Tab Take 1 tablet by mouth daily.      Cinnamon 500 MG Oral Cap Take 500 mg by mouth daily.

## 2025-07-24 ENCOUNTER — PATIENT MESSAGE (OUTPATIENT)
Dept: INTERNAL MEDICINE CLINIC | Facility: CLINIC | Age: 80
End: 2025-07-24

## 2025-07-28 ENCOUNTER — NURSE TRIAGE (OUTPATIENT)
Dept: INTERNAL MEDICINE CLINIC | Facility: CLINIC | Age: 80
End: 2025-07-28

## 2025-07-28 NOTE — TELEPHONE ENCOUNTER
I called patient in response to OrdrIt message received on 7/24/25-->Left message to call back; OrdrIt message sent [1st attempt]

## 2025-07-28 NOTE — TELEPHONE ENCOUNTER
Action Requested: Summary for Provider     []  Critical Lab, Recommendations Needed  [] Need Additional Advice  []   FYI    []   Need Orders  [] Need Medications Sent to Pharmacy  []  Other     SUMMARY: Per protocol advised : Office visit within 3 days    Future Appointments   Date Time Provider Department Center   7/30/2025 10:00 AM Ifeoma Grider APRN [facility]   1/5/2026  9:00 AM Mark Gupta MD [facility]     Reason for call: Acute  Onset: Data Unavailable    Patient calling ( name and date of birth of patient verified ) in regards to MyChart     Left ring finger remains the same but if  he \" hits' it the pain is horrible   Currently the finger is not edematous but after activity pain gets worse and then at times has swelling.  Also hard to  items at times    Has tried Tylenol with no relief   Care Advice    Patient/Caregiver understands and will follow care advice?: Yes, plans to follow advice           Finger Pain-A-OH  Suzanna ENGLAND Mon Jul 28, 2025 04:27 PM      Disposition and First Aid       SEE IN OFFICE WITHIN 3 DAYS:   * You need to be examined.   * Let me give you an appointment.              Overuse Injury       PAIN MEDICINES:  * For pain relief, you can take either acetaminophen, ibuprofen, or naproxen.  * They are over-the-counter (OTC) pain drugs. You can buy them at the drugstore.  * ACETAMINOPHEN - REGULAR STRENGTH TYLENOL: Take 650 mg (two 325 mg pills) by mouth every 4 to 6 hours as needed. Each Regular Strength Tylenol pill has 325 mg of acetaminophen. The most you should take is 10 pills a day (3,250 mg total). Note: In Miky, the maximum is 12 pills a day (3,900 mg total).  * ACETAMINOPHEN - EXTRA STRENGTH TYLENOL: Take 1,000 mg (two 500 mg pills) every 6 to 8 hours as needed. Each Extra Strength Tylenol pill has 500 mg of acetaminophen. The most you should take is 6 pills a day (3,000 mg total). Note: In Miky, the maximum is 8 pills a day (4,000 mg total).  *  IBUPROFEN (E.G., MOTRIN, ADVIL): Take 400 mg (two 200 mg pills) by mouth every 6 hours. The most you should take is 6 pills a day (1,200 mg total).  * NAPROXEN (E.G., ALEVE): Take 220 mg (one 220 mg pill) by mouth every 8 to 12 hours as needed. You may take 440 mg (two 220 mg pills) for your first dose. The most you should take is 3 pills a day (660 mg total). Note: In Miky, the maximum is 2 pills a day (one every 12 hours; 440 mg total).  * Use the lowest amount of medicine that makes your pain better.    REST:  * You should try to avoid any exercise or activity that caused this pain for the next 3 days.              Bruised Funny Bone       CALL BACK IF:  * You become worse                           Patient verbalizes understanding and agrees with treatment  plan.           chan Morales to P Em Rn Triage (supporting Mark Gupta MD)        7/24/25  7:35 PM  Hi Dr. Gupta,  I am having some sever pain in my left ring finger that has been getting worse. Not all the time, but it can be very painful at times. I saw Dr. Mei Aldana back in 2022 for this problem and she prescribed Hydroxychloroquin Sufate 200mg to help with the pain. It seemed to work and I stopped taking the drug. Now the pain has returned. Can I or should I start taking it again, I still have a number of pills left. But I'm a little worried if I am doing the right thing.  Thank you.  Linwood Morales  [birth year]  Reason for Disposition   MODERATE pain (e.g., interferes with normal activities) and present > 3 days    Protocols used: Finger Pain-A-OH

## 2025-07-29 ENCOUNTER — TELEPHONE (OUTPATIENT)
Facility: CLINIC | Age: 80
End: 2025-07-29

## 2025-07-30 ENCOUNTER — OFFICE VISIT (OUTPATIENT)
Dept: INTERNAL MEDICINE CLINIC | Facility: CLINIC | Age: 80
End: 2025-07-30
Payer: MEDICARE

## 2025-07-30 VITALS
RESPIRATION RATE: 20 BRPM | HEIGHT: 72 IN | HEART RATE: 83 BPM | TEMPERATURE: 97 F | DIASTOLIC BLOOD PRESSURE: 60 MMHG | OXYGEN SATURATION: 98 % | WEIGHT: 184.38 LBS | SYSTOLIC BLOOD PRESSURE: 132 MMHG | BODY MASS INDEX: 24.97 KG/M2

## 2025-07-30 DIAGNOSIS — M15.0 PRIMARY OSTEOARTHRITIS INVOLVING MULTIPLE JOINTS: Primary | ICD-10-CM

## 2025-07-30 DIAGNOSIS — Z51.81 THERAPEUTIC DRUG MONITORING: ICD-10-CM

## 2025-07-30 PROBLEM — H40.1130 PRIMARY OPEN-ANGLE GLAUCOMA, BILATERAL, STAGE UNSPECIFIED: Status: ACTIVE | Noted: 2023-01-31

## 2025-07-30 PROBLEM — H26.499 AFTER-CATARACT WITH VISION OBSCURED: Status: ACTIVE | Noted: 2023-01-31

## 2025-07-30 PROBLEM — H33.001 UNSPECIFIED RETINAL DETACHMENT WITH RETINAL BREAK, RIGHT EYE: Status: ACTIVE | Noted: 2023-01-31

## 2025-07-30 PROCEDURE — 99214 OFFICE O/P EST MOD 30 MIN: CPT | Performed by: NURSE PRACTITIONER

## 2025-07-30 PROCEDURE — 1159F MED LIST DOCD IN RCRD: CPT | Performed by: NURSE PRACTITIONER

## 2025-07-30 PROCEDURE — 1160F RVW MEDS BY RX/DR IN RCRD: CPT | Performed by: NURSE PRACTITIONER

## 2025-07-30 PROCEDURE — 1125F AMNT PAIN NOTED PAIN PRSNT: CPT | Performed by: NURSE PRACTITIONER

## 2025-07-30 RX ORDER — HYDROXYCHLOROQUINE SULFATE 200 MG/1
200 TABLET, FILM COATED ORAL DAILY
Qty: 90 TABLET | Refills: 2 | Status: SHIPPED | OUTPATIENT
Start: 2025-07-30 | End: 2026-04-26

## 2025-08-08 ENCOUNTER — PATIENT MESSAGE (OUTPATIENT)
Dept: INTERNAL MEDICINE CLINIC | Facility: CLINIC | Age: 80
End: 2025-08-08

## 2025-08-08 DIAGNOSIS — M79.645 PAIN OF FINGER OF LEFT HAND: Primary | ICD-10-CM

## 2025-08-12 RX ORDER — COLCHICINE 0.6 MG/1
0.6 TABLET ORAL 2 TIMES DAILY PRN
Qty: 7 TABLET | Refills: 0 | Status: SHIPPED | OUTPATIENT
Start: 2025-08-12

## (undated) DIAGNOSIS — M54.50 LUMBAGO: Primary | ICD-10-CM

## (undated) DIAGNOSIS — Z01.818 PREOPERATIVE CLEARANCE: Primary | ICD-10-CM

## (undated) DEVICE — SUTURE MONOCRYL 4-0 Y845G

## (undated) DEVICE — 3M™ TEGADERM™ TRANSPARENT FILM DRESSING, 1626W, 4 IN X 4-3/4 IN (10 CM X 12 CM), 50 EACH/CARTON, 4 CARTON/CASE: Brand: 3M™ TEGADERM™

## (undated) DEVICE — NEEDLE SPNL 18GA L3.5IN PNK QNCKE STYL DISP

## (undated) DEVICE — TECH FEE LASER HOLMIUM HI WATT

## (undated) DEVICE — FLOSEAL HEMOSTATIC MATRIX, 5ML: Brand: FLOSEAL HEMOSTATIC MATRIX

## (undated) DEVICE — SOLUTION IRRIG 1000ML 0.9% NACL USP BTL

## (undated) DEVICE — PLASTC TOOMEY SYRNG DISP

## (undated) DEVICE — WRAP COOLING BACK W/NO PILLOW

## (undated) DEVICE — SOL H2O 1000ML BTL

## (undated) DEVICE — SEALER CAP SELF-SEAL 1.6MM

## (undated) DEVICE — STERILE SURGICAL LUBRICANT, METAL TUBE: Brand: SURGILUBE

## (undated) DEVICE — DRAPE SHEET LG

## (undated) DEVICE — STERILE LATEX POWDER-FREE SURGICAL GLOVESWITH NITRILE COATING: Brand: PROTEXIS

## (undated) DEVICE — GAMMEX® NON-LATEX PI ORTHO SIZE 8.5, STERILE POLYISOPRENE POWDER-FREE SURGICAL GLOVE: Brand: GAMMEX

## (undated) DEVICE — NON-ADHERENT PAD PREPACK: Brand: TELFA

## (undated) DEVICE — 9534HP TRANSPARENT DRSG W/FRAME: Brand: 3M™ TEGADERM™

## (undated) DEVICE — DEV STRATAFIX PDS + SUTR 0 CTX

## (undated) DEVICE — URINE DRAINAGE BAG,NEEDLE SAMPLING, ANTI-REFLUX DEVICE, DRAIN TUBE: Brand: DOVER

## (undated) DEVICE — KIT PATIENT CARE SPINAL TABLE

## (undated) DEVICE — KIT NEG PRSS PREVENA DRAIN 20CM INCIS MGMT PEEL PALACE

## (undated) DEVICE — PACK CDS TOTAL HIP

## (undated) DEVICE — RENTAL LASER GREEN LIGHT XPS

## (undated) DEVICE — SPLINT HIP PEHR ALUM FRME FOAM PD PROCARE

## (undated) DEVICE — ARTHROSCOPY: Brand: MEDLINE INDUSTRIES, INC.

## (undated) DEVICE — 11.1-M5 MULTIMODALITY KIT 5

## (undated) DEVICE — 365 HOLMIUM FIBER-REUSABLE

## (undated) DEVICE — KIT DRN 1/8IN PVC 3 SPRG EVAC

## (undated) DEVICE — CATH URTH LBRCTH 22FR LNG

## (undated) DEVICE — CHLORAPREP 26ML APPLICATOR

## (undated) DEVICE — SUT COAT VCRL+ 1 18IN CTX ABSRB VLT ANTIBACT

## (undated) DEVICE — SUT VCRL 2-0 36IN CT-1 ABSRB UD L36MM 1/2 CIR

## (undated) DEVICE — SOLUTION IV 1000ML 0.9% NACL PRESERVATIVE

## (undated) DEVICE — Device

## (undated) DEVICE — SUCTION CANISTER, 3000CC,SAFELINER: Brand: DEROYAL

## (undated) DEVICE — 3M™ COBAN™ NL STERILE NON-LATEX SELF-ADHERENT WRAP, 2084S, 4 IN X 5 YD (10 CM X 4,5 M), 18 ROLLS/CASE: Brand: 3M™ COBAN™

## (undated) DEVICE — GRAFT DELIVERY SYS MODULE

## (undated) DEVICE — DRAPE,U/ SHT,SPLIT,PLAS,STERIL: Brand: MEDLINE

## (undated) DEVICE — 3M(TM) MEDIPORE(TM) H SOFT CLOTH TAPE 2866: Brand: 3M™ MEDIPORE™

## (undated) DEVICE — C-ARMOR C-ARM EQUIPMENT COVERS CLEAR STERILE UNIVERSAL FIT 12 PER CASE: Brand: C-ARMOR

## (undated) DEVICE — SOL  .9 3000ML

## (undated) DEVICE — CATH SECURING DEVICE STATLOCK

## (undated) DEVICE — 3 ML SYRINGE LUER-LOCK TIP: Brand: MONOJECT

## (undated) DEVICE — 3M™ STERI-STRIP™ REINFORCED ADHESIVE SKIN CLOSURES, R1547, 1/2 IN X 4 IN (12 MM X 100 MM), 6 STRIPS/ENVELOPE: Brand: 3M™ STERI-STRIP™

## (undated) DEVICE — CYSTO PACK: Brand: MEDLINE INDUSTRIES, INC.

## (undated) DEVICE — GAMMEX® PI HYBRID SIZE 9, STERILE POWDER-FREE SURGICAL GLOVE, POLYISOPRENE AND NEOPRENE BLEND: Brand: GAMMEX

## (undated) DEVICE — SUTURE VICRYL 0 CT-1

## (undated) DEVICE — SOL H2O 3000ML IRRIG

## (undated) DEVICE — SUTURE VICRYL 3-0 J761D

## (undated) DEVICE — MEDI-VAC NON-CONDUCTIVE SUCTION TUBING: Brand: CARDINAL HEALTH

## (undated) DEVICE — BLADE SHVR COOLCUT 13CM 4MM

## (undated) DEVICE — SHEET,DRAPE,70X100,STERILE: Brand: MEDLINE

## (undated) DEVICE — DISPOSABLE SLIM BIPOLAR FORCEPS, NON-STICK,: Brand: SPETZLER-MALIS

## (undated) DEVICE — DRAPE SRG 70X60IN SPLT U IMPRV

## (undated) DEVICE — LAMINECTOMY: Brand: MEDLINE INDUSTRIES, INC.

## (undated) DEVICE — NVM5 PROBE SNGL USE STER PKG

## (undated) DEVICE — WIRE FX PRCPT KRSH BVL BLNT

## (undated) DEVICE — HOOD: Brand: FLYTE

## (undated) DEVICE — SOL  .9 1000ML BTL

## (undated) DEVICE — SOLUTION IRRIG 3000ML 0.9% NACL FLX CONT

## (undated) DEVICE — SUTURE VICRYL 0 CT-2

## (undated) DEVICE — 12 ML SYRINGE LUER-LOCK TIP: Brand: MONOJECT

## (undated) DEVICE — GAMMEX® NON-LATEX SIZE 7.5, STERILE NEOPRENE POWDER-FREE SURGICAL GLOVE: Brand: GAMMEX

## (undated) DEVICE — PEN: MARKING STD PT 100/CS: Brand: MEDICAL ACTION INDUSTRIES

## (undated) DEVICE — 1071 S-DRP URO STLE-GAMA 10/BX,4X/C: Brand: STERI-DRAPE™

## (undated) DEVICE — DRAPE SRG 90X60IN BCK TBL CVR

## (undated) DEVICE — 450 ML BOTTLE OF 0.05% CHLORHEXIDINE GLUCONATE IN 99.95% STERILE WATER FOR IRRIGATION, USP AND APPLICATOR.: Brand: IRRISEPT ANTIMICROBIAL WOUND LAVAGE

## (undated) DEVICE — NV CLIP DSC&IN-LINE ACTIVATOR

## (undated) DEVICE — BIPOLAR SEALER 23-112-1 AQM 6.0: Brand: AQUAMANTYS™

## (undated) DEVICE — ZIMMER® STERILE DISPOSABLE TOURNIQUET CUFF WITH PLC, DUAL PORT, SINGLE BLADDER, 34 IN. (86 CM)

## (undated) DEVICE — SOL  .9 1000ML BAG

## (undated) DEVICE — APPLICATOR PREP 26ML CHG 2% ISO ALC 70%

## (undated) DEVICE — SUTURE MONOCRYL 4-0 PS-2

## (undated) DEVICE — 2DE14 2-0 PDO 24 X 24: Brand: 2DE14 2-0 PDO 24 X 24

## (undated) DEVICE — ENCORE® LATEX MICRO SIZE 8, STERILE LATEX POWDER-FREE SURGICAL GLOVE: Brand: ENCORE

## (undated) DEVICE — CONTAINER SPEC COLL AND TRNSPRT W/ WHT CAP

## (undated) DEVICE — DRESSING BIOPATCH 1X4 CNTR

## (undated) DEVICE — GOWN SURG AERO BLUE PERF XLG

## (undated) DEVICE — UROLOGY DRAIN BAG

## (undated) DEVICE — GAMMEX® PI HYBRID SIZE 8, STERILE POWDER-FREE SURGICAL GLOVE, POLYISOPRENE AND NEOPRENE BLEND: Brand: GAMMEX

## (undated) DEVICE — CONTAINER,SPECIMEN,OR STERILE,4OZ: Brand: MEDLINE

## (undated) DEVICE — GAMMEX® NON-LATEX PI ORTHO SIZE 9, STERILE POLYISOPRENE POWDER-FREE SURGICAL GLOVE: Brand: GAMMEX

## (undated) DEVICE — TUBING IRR 16FT CNT WV 3 ASCP

## (undated) DEVICE — STERILE TETRA-FLEX CF, ELASTIC BANDAGE, 4" X 5.5YD: Brand: TETRA-FLEX™CF

## (undated) DEVICE — TOWEL OR BLU 16X26 STRL

## (undated) DEVICE — NV I-PAS III BEVELED TIP STER

## (undated) DEVICE — EXOFIN TISSUE ADHESIVE 1.0ML

## (undated) DEVICE — EYE PADSSTERILENOT MADE WITH NATURAL RUBBER LATEXSINGLE USE ONLYDO NOT USE IF PACKAGE OPENED OR DAMAGED: Brand: CARDINAL HEALTH

## (undated) DEVICE — SPONGE 4X4 10PK

## (undated) DEVICE — PRECISION MATCH HEAD

## (undated) DEVICE — AMBIENT SUPER TURBOVAC 90 IFS: Brand: COBLATION

## (undated) DEVICE — FIBER XPS MOXY

## (undated) NOTE — MR AVS SNAPSHOT
[address] [address]  [phone]               Thank you for choosing us for your health care visit with Sadi Del Rosario MD.  We are glad to serve you and happy to provide you with this s Order:  Ophthalmology - External    [address] [facility]   [address]  [facility] [address] [id number]         Referral Orders      Normal Orders This Visit    OPHTHALMOLOGY - EXTERNAL [869612 CPT(R)]  Order #:  [id number]     Functional Status questions c Take 1 tablet (2.5 mg total) by mouth daily with breakfast.   Commonly known as:  GLIPIZIDE XL           lansoprazole 30 MG Cpdr   Take 1 capsule (30 mg total) by mouth every morning before breakfast.   Commonly known as:  PREVACID           Lovastatin 40 EAT THESE FOODS MORE OFTEN: EAT THESE FOODS LESS OFTEN:   Make half your plate fruits and vegetables Highly refined, white starches including white bread, rice and pasta   Eat plenty of protein, keep the fat content low Sugars:  sodas and sports drinks, ca

## (undated) NOTE — MR AVS SNAPSHOT
[facility] SPECIALTY [facility] - [place] [address] [address] [id number] [phone]               Thank you for choosing us for your health care visit with Liya Marroquin MD.  We are glad to serve you and happy to provide you with this summary of y lansoprazole 30 MG Cpdr   Take 1 capsule (30 mg total) by mouth every morning before breakfast.   Commonly known as:  PREVACID           Lovastatin 40 MG Tabs   Take 1 tablet (40 mg total) by mouth nightly.            NIFEdipine ER 60 MG Tb24   Take 1 tabl

## (undated) NOTE — LETTER
June 7, 2018    Jeancarlos Woodard MD  [address] [place] [address]     Patient: Rosana Hardin   YOB: 1945   Date of Visit: 6/7/2018       Dear Dr. Macie Arnold MD:    Thank you for referring Nereida Pat to me for evaluation.  Here Irf-trigger finger, recurrent - LRF trigger finger release   • Type II or unspecified type diabetes mellitus without mention of complication, not stated as uncontrolled        Surgical History: Maxine Manuel has a past surgical history that includes hernia Fluticasone Propionate 50 MCG/ACT Nasal Suspension 2 sprays by Nasal route daily. Disp: 1 Inhaler Rfl: 4   Mometasone Furo-Formoterol Fum (DULERA) 100-5 MCG/ACT Inhalation Aerosol Inhale 2 puffs into the lungs 2 (two) times daily.  Disp: 1 Inhaler Rfl: 5 ROS     Positive for: Endocrine, Eyes    Negative for: Constitutional, Gastrointestinal, Neurological, Skin, Genitourinary, Musculoskeletal, HENT, Cardiovascular, Respiratory, Psychiatric, Allergic/Imm, Heme/Lymph    Last edited by Mercedez Mccain OT on [employer] [place] [facility] [place]    Right -0.50 +0.50 175 20/20 +2.25 20/20    Left -0.25 +0.50 030 20/20 +2.25 20/20    Type:  Flat top bifocal                 ASSESSMENT/PLAN:     Diagnoses and Plan:     Glaucoma suspect of both eyes  Visual f

## (undated) NOTE — LETTER
[facility] SURGERY SCHEDULING FORM   [address] [place] [address], [place]   [phone] (scheduling phone) [phone] (scheduling fax)     PATIENT INFORMATION   Last Name:      Annette Disla      First Name:    Guevara Rodriguez Allergies: Penicillins         Completed by:    Albert Fuentes      Date:    2/23/2021

## (undated) NOTE — Clinical Note
Dear Dr. Gupta,  I had the opportunity to see your patient Linwood Morales recently. I appreciate your confidence in me to care for your patients. Please feel free call me with any questions at [phone] or contact me through Epic.  Sincerely, William Barksdale MD Board Certified, Physical Medicine and Rehabilitation Specializing in Sports Medicine, Spine Medicine and Electrodiagnostic Medicine [facility]

## (undated) NOTE — MR AVS SNAPSHOT
[facility] SPECIALTY [facility] - [place] [address] [address] [id number] [phone]               Thank you for choosing us for your health care visit with Susan Lal MD.  We are glad to serve you and happy to provide you with this summar It is the patient's responsibility to check with and follow their insurance company's guidelines for prior authorization for this test.  You may be held responsible for payment in full if proper authorization is not acquired.   Please contact the Patient Bu Take 1 capsule (30 mg total) by mouth every morning before breakfast.   Commonly known as:  PREVACID           Lovastatin 40 MG Tabs   Take 1 tablet (40 mg total) by mouth nightly.            NIFEdipine ER 60 MG Tb24   Take 1 tablet (60 mg total) by mouth o

## (undated) NOTE — LETTER
AUTHORIZATION FOR SURGICAL OPERATION OR OTHER PROCEDURE    1.  I [facility]. Gal Singh, [facility] staff assigned to my case to perform the following operation and/or procedure at the [facility], [facility]:    _________________[facility] Stephen Matson Time:  ________ A. M.  P.M.        Patient Name:  ______________________________________________________  (please print)      Patient signature:  ___________________________________________________             Relationship to Patient:

## (undated) NOTE — LETTER
[facility] SURGERY SCHEDULING FORM   [address]  [address] [place] [place] [address] [place] [address]   [phone] (scheduling phone) [phone] (scheduling fax)     PATIENT INFORMATION   Last Name:      Antonio Garcia      First Name:    Brianne Plummer Allergies: Penicillins         Completed by:   Bahman       Date:   10/15/2018

## (undated) NOTE — LETTER
AUTHORIZATION FOR SURGICAL OPERATION OR OTHER PROCEDURE    1. I hereby authorize Dr. Dee Mata  and [address] [place] [address] staff assigned to my case to perform the following operation and/or procedure at the [address] [place] [address]:    Orthovisc injection #1 in Right knee  _______________________________________________________________________________________________      _______________________________________________________________________________________________    2. My physician has explained the nature and purpose of the operation or other procedure, possible alternative methods of treatment, the risks involved, and the possibility of complication to me. I acknowledge that no guarantee has been made as to the result that may be obtained. 3.  I recognize that, during the course of this operation, or other procedure, unforseen conditions may necessitate additional or different procedure than those listed above. I, therefore, further authorize and request that the above named physician, his/her physician assistants or designees perform such procedures as are, in his/her professional opinion, necessary and desirable. 4.  Any tissue or organs removed in the operation or other procedure may be disposed of by and at the discretion of the [address] [place] [address] and [facility]. 5.  I understand that in the event of a medical emergency, I will be transported by local paramedics to [facility] or other hospital emergency department. 6.  I certify that I have read and fully understand the above consent to operation and/or other procedure. 7.  I acknowledge that my physician has explained sedation/analgesia administration to me including the risks and benefits. I consent to the administration of sedation/analgesia as may be necessary or desirable in the judgement of my physician.     Witness signature: ___________________________________________________ Date:  ______/______/_____ Time:  ________ A. M.  P.M. Patient Name:  ______________________________________________________  (please print)      Patient signature:  ___________________________________________________             Relationship to Patient:           []  Parent    Responsible person                          []  Spouse  In case of minor or                    [] Other  _____________   Incompetent name:  __________________________________________________                               (please print)      _____________      Responsible person  In case of minor or  Incompetent signature:  _______________________________________________    Statement of Physician  My signature below affirms that prior to the time of the procedure, I have explained to the patient and/or his/her guardian, the risks and benefits involved in the proposed treatment and any reasonable alternative to the proposed treatment. I have also explained the risks and benefits involved in the refusal of the proposed treatment and have answered the patient's questions.                         Date:  ______/______/_______  Provider                      Signature:  __________________________________________________________       Time:  ___________ A.M    P.M.

## (undated) NOTE — LETTER
AUTHORIZATION FOR SURGICAL OPERATION OR OTHER PROCEDURE    1. I hereby authorize Dr. Gamboa/ PEÑA Leija , and [facility] staff assigned to my case to perform the following operation and/or procedure at the [facility] site:    Orthovisc injection #1 in Right knee   _______________________________________________________________________________________________      _______________________________________________________________________________________________    2.  My physician has explained the nature and purpose of the operation or other procedure, possible alternative methods of treatment, the risks involved, and the possibility of complication to me.  I acknowledge that no guarantee has been made as to the result that may be obtained.  3.  I recognize that, during the course of this operation, or other procedure, unforseen conditions may necessitate additional or different procedure than those listed above.  I, therefore, further authorize and request that the above named physician, his/her physician assistants or designees perform such procedures as are, in his/her professional opinion, necessary and desirable.  4.  Any tissue or organs removed in the operation or other procedure may be disposed of by and at the discretion of the [facility] and [facility].  5.  I understand that in the event of a medical emergency, I will be transported by local paramedics to [facility] or other hospital emergency department.  6.  I certify that I have read and fully understand the above consent to operation and/or other procedure.    7.  I acknowledge that my physician has explained sedation/analgesia administration to me including the risks and benefits.  I consent to the administration of sedation/analgesia as may be necessary or desirable in the judgement of my physician.    Witness signature: ___________________________________________________ Date:   ______/______/_____                    Time:  ________ A.M.  P.M.       Patient Name:  ______________________________________________________  (please print)      Patient signature:  ___________________________________________________             Relationship to Patient:           []  Parent    Responsible person                          []  Spouse  In case of minor or                    [] Other  _____________   Incompetent name:  __________________________________________________                               (please print)      _____________      Responsible person  In case of minor or  Incompetent signature:  _______________________________________________    Statement of Physician  My signature below affirms that prior to the time of the procedure, I have explained to the patient and/or his/her guardian, the risks and benefits involved in the proposed treatment and any reasonable alternative to the proposed treatment.  I have also explained the risks and benefits involved in the refusal of the proposed treatment and have answered the patient's questions.                        Date:  ______/______/_______  Provider                      Signature:  __________________________________________________________       Time:  ___________ A.M    P.M.

## (undated) NOTE — LETTER
AUTHORIZATION FOR SURGICAL OPERATION OR OTHER PROCEDURE    1.  I hereby authorize Dylan Koroma PA-C, and [facility] staff assigned to my case to perform the following operation and/or procedure at the [facility]:    ______Cortisone injec Patient Name:  ______________________________________________________  (please print)      Patient signature:  ___________________________________________________             Relationship to Patient:           []  Parent    Responsible person

## (undated) NOTE — LETTER
Dear Dr. Abner Rubio    This letter is to inform you that Keenan Bran has been attending Physical Therapy with me. See below for my most recent plan of care.        Patient Name: Keenan Bran, : 3/17/1945, MRN: [id number]   Date:  2017  Ref

## (undated) NOTE — LETTER
AUTHORIZATION FOR SURGICAL OPERATION OR OTHER PROCEDURE    1. I hereby authorize Dr. Gamboa/ Gunjan Leija PA-C, and [facility] staff assigned to my case to perform the following operation and/or procedure at the [facility] site:    _______________________________________________________________________________________________    Orthovisc injection #2 to Right knee  _______________________________________________________________________________________________    2.  My physician has explained the nature and purpose of the operation or other procedure, possible alternative methods of treatment, the risks involved, and the possibility of complication to me.  I acknowledge that no guarantee has been made as to the result that may be obtained.  3.  I recognize that, during the course of this operation, or other procedure, unforseen conditions may necessitate additional or different procedure than those listed above.  I, therefore, further authorize and request that the above named physician, his/her physician assistants or designees perform such procedures as are, in his/her professional opinion, necessary and desirable.  4.  Any tissue or organs removed in the operation or other procedure may be disposed of by and at the discretion of the [facility] and [facility].  5.  I understand that in the event of a medical emergency, I will be transported by local paramedics to [facility] or other hospital emergency department.  6.  I certify that I have read and fully understand the above consent to operation and/or other procedure.    7.  I acknowledge that my physician has explained sedation/analgesia administration to me including the risks and benefits.  I consent to the administration of sedation/analgesia as may be necessary or desirable in the judgement of my physician.    Witness signature: ___________________________________________________ Date:   ______/______/_____                    Time:  ________ A.M.  P.M.       Patient Name:  ______________________________________________________  (please print)      Patient signature:  ___________________________________________________             Relationship to Patient:           []  Parent    Responsible person                          []  Spouse  In case of minor or                    [] Other  _____________   Incompetent name:  __________________________________________________                               (please print)      _____________      Responsible person  In case of minor or  Incompetent signature:  _______________________________________________    Statement of Physician  My signature below affirms that prior to the time of the procedure, I have explained to the patient and/or his/her guardian, the risks and benefits involved in the proposed treatment and any reasonable alternative to the proposed treatment.  I have also explained the risks and benefits involved in the refusal of the proposed treatment and have answered the patient's questions.                        Date:  ______/______/_______  Provider                      Signature:  __________________________________________________________       Time:  ___________ A.M    P.M.

## (undated) NOTE — LETTER
No referring provider defined for this encounter. 08/06/18        Patient: Warren Rodrigez   YOB: 1945   Date of Visit: 8/6/2018       Dear  Dr. Nora Skinner MD,      I evaluated    Warren Rodrigez in the office today.    I fully exp

## (undated) NOTE — MR AVS SNAPSHOT
[facility]  [address]  [phone]               Thank you for choosing us for your health care visit with Stacy Peña MD.  We are glad to serve you and happy to provide you with this summ Take 1 tablet (10 mg total) by mouth daily. Take immediately after same meal daily   Commonly known as:  UROXATRAL           * CENTRUM SILVER Tabs   Take 1 tablet by mouth daily. * EYE VITAMINS Caps   Take  by mouth daily.            Diclofenac So PSA, Total W Reflex To Free    Complete by: Feb 01, 2017 (Approximate)    Assoc Dx:  Elevated PSA [R97.20]                 Follow-up Instructions     Return in about 1 year (around 2/1/2018).          [employer]     Visit [employer]  You can access your [employer]

## (undated) NOTE — LETTER
AUTHORIZATION FOR SURGICAL OPERATION OR OTHER PROCEDURE    1. I hereby authorize Dr. Melo Serrano  and [address] [place] [address] staff assigned to my case to perform the following operation and/or procedure at the [address] [place] [address]:    Orthovisc injection #3 in Right knee  _______________________________________________________________________________________________      _______________________________________________________________________________________________    2. My physician has explained the nature and purpose of the operation or other procedure, possible alternative methods of treatment, the risks involved, and the possibility of complication to me. I acknowledge that no guarantee has been made as to the result that may be obtained. 3.  I recognize that, during the course of this operation, or other procedure, unforseen conditions may necessitate additional or different procedure than those listed above. I, therefore, further authorize and request that the above named physician, his/her physician assistants or designees perform such procedures as are, in his/her professional opinion, necessary and desirable. 4.  Any tissue or organs removed in the operation or other procedure may be disposed of by and at the discretion of the [address] [place] [address] and [facility]. 5.  I understand that in the event of a medical emergency, I will be transported by local paramedics to [facility] or other hospital emergency department. 6.  I certify that I have read and fully understand the above consent to operation and/or other procedure. 7.  I acknowledge that my physician has explained sedation/analgesia administration to me including the risks and benefits. I consent to the administration of sedation/analgesia as may be necessary or desirable in the judgement of my physician.     Witness signature: ___________________________________________________ Date:  ______/______/_____ Time:  ________ A. M.  P.M. Patient Name:  ______________________________________________________  (please print)      Patient signature:  ___________________________________________________             Relationship to Patient:           []  Parent    Responsible person                          []  Spouse  In case of minor or                    [] Other  _____________   Incompetent name:  __________________________________________________                               (please print)      _____________      Responsible person  In case of minor or  Incompetent signature:  _______________________________________________    Statement of Physician  My signature below affirms that prior to the time of the procedure, I have explained to the patient and/or his/her guardian, the risks and benefits involved in the proposed treatment and any reasonable alternative to the proposed treatment. I have also explained the risks and benefits involved in the refusal of the proposed treatment and have answered the patient's questions.                         Date:  ______/______/_______  Provider                      Signature:  __________________________________________________________       Time:  ___________ A.M    P.M.

## (undated) NOTE — LETTER
WHERE IS YOUR PAIN NOW?  Adal the areas on your body where you feel the described sensations.  Use the appropriate symbol.  Adal the areas of radiation.  Include all affected areas.  Just to complete the picture, please draw in the face.     ACHE:  ^ ^ ^   NUMBNESS:  0000   PINS & NEEDLES:  = = = =                              ^ ^ ^                       0000              = = = =                                    ^ ^ ^                       0000            = = = =      BURNING:  XXXX   STABBING: ////                  XXXX                ////                         XXXX          ////     Please adal the line below indicating your degree of pain right now  with 0 being no pain 10 being the worst pain possible.                                         0             1             2              3             4              5              6              7             8             9             10         Patient Signature:

## (undated) NOTE — LETTER
[address], [address] [facility]  Dept: [phone]    Babak Campos MD  Physical Medicine    Injection Instructions    We will check with your insurance company for pre-authorization insulin or other diabetic medications. OTHER INFORMATION:  I. You MAY take pain medications which include acetaminophen and/or hydrocodone found in Vicodin, Lortab, Tylenol and Ultram.  II. Most injections are done with local anesthetics.  Some injection

## (undated) NOTE — LETTER
AUTHORIZATION FOR SURGICAL OPERATION OR OTHER PROCEDURE    1. I hereby authorize Dr. Gamboa, and [facility] staff assigned to my case to perform the following operation and/or procedure at the [facility] site:    _______________________________________________________________________________________________    Right knee orthovisc injection  #4  _______________________________________________________________________________________________    2.  My physician has explained the nature and purpose of the operation or other procedure, possible alternative methods of treatment, the risks involved, and the possibility of complication to me.  I acknowledge that no guarantee has been made as to the result that may be obtained.  3.  I recognize that, during the course of this operation, or other procedure, unforseen conditions may necessitate additional or different procedure than those listed above.  I, therefore, further authorize and request that the above named physician, his/her physician assistants or designees perform such procedures as are, in his/her professional opinion, necessary and desirable.  4.  Any tissue or organs removed in the operation or other procedure may be disposed of by and at the discretion of the [facility] and [facility].  5.  I understand that in the event of a medical emergency, I will be transported by local paramedics to [facility] or other hospital emergency department.  6.  I certify that I have read and fully understand the above consent to operation and/or other procedure.    7.  I acknowledge that my physician has explained sedation/analgesia administration to me including the risks and benefits.  I consent to the administration of sedation/analgesia as may be necessary or desirable in the judgement of my physician.    Witness signature: ___________________________________________________ Date:   ______/______/_____                    Time:  ________ A.M.  P.M.       Patient Name:  ______________________________________________________  (please print)      Patient signature:  ___________________________________________________             Relationship to Patient:           []  Parent    Responsible person                          []  Spouse  In case of minor or                    [] Other  _____________   Incompetent name:  __________________________________________________                               (please print)      _____________      Responsible person  In case of minor or  Incompetent signature:  _______________________________________________    Statement of Physician  My signature below affirms that prior to the time of the procedure, I have explained to the patient and/or his/her guardian, the risks and benefits involved in the proposed treatment and any reasonable alternative to the proposed treatment.  I have also explained the risks and benefits involved in the refusal of the proposed treatment and have answered the patient's questions.                        Date:  ______/______/_______  Provider                      Signature:  __________________________________________________________       Time:  ___________ A.M    P.M.

## (undated) NOTE — LETTER
September 10, 2020    Katerine Ayala MD  [address]  [address]     Patient: Jasvir Minaya   YOB: 1945   Date of Visit: 9/10/2020       Dear Dr. Juan Pablo Lugo MD:    Thank you for referring Rhett Carter to me for ev (LRF trigger finger release); other surgical history (6/15/2010) (release triggers: RMF, RRF, LMF/ RRF Dupuytren's nodule excision); forearm/wrist surgery unlisted (Left) (left wrist surgery); laser surgery of eye;  Yag Capsulotomy - OD - Right Eye (Right, • ADVAIR DISKUS 100-50 MCG/DOSE Inhalation Aerosol Powder, Breath Activated Inhale 1 puff into the lungs 2 (two) times daily.  3 each 0   • OMEPRAZOLE 40 MG Oral Capsule Delayed Release TAKE ONE CAPSULE BY MOUTH ONE TIME DAILY  90 capsule 0   • Sildenafil C

## (undated) NOTE — LETTER
AUTHORIZATION FOR SURGICAL OPERATION OR OTHER PROCEDURE    1.  I hereby authorize Dr. Messi Dow and the [facility] staff assigned to my case to perform the following operation and/or procedure at the [facility]:    Right shoulder injection under ultrasoun Relationship to Patient:           []  Parent    Responsible person                          []  Spouse  In case of minor or                    [] Other  _____________   Incompetent name:  __________________________________________________

## (undated) NOTE — LETTER
AUTHORIZATION FOR SURGICAL OPERATION OR OTHER PROCEDURE    1.  I hereby authorize Dr. Laura Leung and [facility] staff assigned to my case to perform the following operation and/or procedure at the [facility]:    Ortho Visc injection #4 in Time:  ________ A. M.  P.M.        Patient Name:  ______________________________________________________  (please print)      Patient signature:  ___________________________________________________             Relationship to Patient:

## (undated) NOTE — LETTER
11/3/2022            Yvonne Anne        [address] [place] [id number]       Dear Alcides Varela,    The visual field test you took on 11/3/2022 indicated normal field of vision in both eyes. An optic nerve analysis showed normal retinal nerve fiber layer in both eyes. Based on previous exams and these tests, I see no evidence of glaucoma, but will continue to monitor you for any signs of developing glaucoma. I would like you to return in October 2023 for a diabetic eye exam and photos. Please do not hesitate to call my office at [phone] if I can be of further assistance.      Thank you,    Meghana Mejia MD    [address] [place] [id number] [phone]    Document electronically generated by:  Sea Oconnor

## (undated) NOTE — LETTER
[facility] SURGERY SCHEDULING FORM   [address] [facility]   [phone] (scheduling phone) [phone] (scheduling fax)     PATIENT INFORMATION   Last Name:      Syeda Lugo      First Name:    Taran Burgos Completed by:    Merlyn Cates      Date:    8/15/2019

## (undated) NOTE — LETTER
AUTHORIZATION FOR SURGICAL OPERATION OR OTHER PROCEDURE    1. I hereby authorize Dr. Gamboa/Gunjan Leija PA-C, and [facility] staff assigned to my case to perform the following operation and/or procedure at the [facility] site:      Right knee Orthovisc injection #3  _______________________________________________________________________________________________      _______________________________________________________________________________________________    2.  My physician has explained the nature and purpose of the operation or other procedure, possible alternative methods of treatment, the risks involved, and the possibility of complication to me.  I acknowledge that no guarantee has been made as to the result that may be obtained.  3.  I recognize that, during the course of this operation, or other procedure, unforseen conditions may necessitate additional or different procedure than those listed above.  I, therefore, further authorize and request that the above named physician, his/her physician assistants or designees perform such procedures as are, in his/her professional opinion, necessary and desirable.  4.  Any tissue or organs removed in the operation or other procedure may be disposed of by and at the discretion of the [facility].  5.  I understand that in the event of a medical emergency, I will be transported by local paramedics to [facility] or other hospital emergency department.  6.  I certify that I have read and fully understand the above consent to operation and/or other procedure.    7.  I acknowledge that my physician has explained sedation/analgesia administration to me including the risks and benefits.  I consent to the administration of sedation/analgesia as may be necessary or desirable in the judgement of my physician.    Witness signature: ___________________________________________________ Date:   ______/______/_____                    Time:  ________ A.M.  P.M.       Patient Name:  ______________________________________________________  (please print)      Patient signature:  ___________________________________________________             Relationship to Patient:           []  Parent    Responsible person                          []  Spouse  In case of minor or                    [] Other  _____________   Incompetent name:  __________________________________________________                               (please print)      _____________      Responsible person  In case of minor or  Incompetent signature:  _______________________________________________    Statement of Physician  My signature below affirms that prior to the time of the procedure, I have explained to the patient and/or his/her guardian, the risks and benefits involved in the proposed treatment and any reasonable alternative to the proposed treatment.  I have also explained the risks and benefits involved in the refusal of the proposed treatment and have answered the patient's questions.                        Date:  ______/______/_______  Provider                      Signature:  __________________________________________________________       Time:  ___________ A.M    P.M.

## (undated) NOTE — LETTER
October 20, 2021    Randolph Esquivel MD  [id number]     Patient: Warren Rodrigez   YOB: 1945   Date of Visit: 10/20/2021       Dear Dr. Lorri Brown MD:    Thank you for referring Carlene Kingsley to me for evaluation.  H Surgical History: Judie Saravia has a past surgical history that includes hernia surgery (hernia repair, herniorraphy); other surgical history (esophageal dilatation); hand/finger surgery unlisted (Left, 9/29/2011) (LRF trigger finger release); other mouth nightly. TAKE AT BEDTIME 90 tablet 0   • Hydroxychloroquine Sulfate 200 MG Oral Tab Take 1 tablet (200 mg total) by mouth daily.  90 tablet 1   • glipiZIDE ER 2.5 MG Oral Tablet 24 Hr Take 1 tablet (2.5 mg total) by mouth daily with breakfast. 90 tabl Extraocular Movement       Right Left     Full, Ortho Full, Ortho          Dilation     Both eyes: 1.0% Mydriacyl and 2.5% Jake Synephrine @ 9:02 AM            Additional Tests     Amsler       Right Left     Wavy lines Normal   Wavy lines vertical on Type: Computer bifocal                 ASSESSMENT/PLAN:     Diagnoses and Plan:     Diabetes mellitus type 2 without retinopathy ([facility] [address]  Diabetes type II: no background of retinopathy, no signs of neovascularization noted.   Discussed ocular and systemic

## (undated) NOTE — LETTER
[facility] SURGERY SCHEDULING FORM   [address] [facility]   [phone] (scheduling phone) [phone] (scheduling fax)     PATIENT INFORMATION   Last Name:      Bobbi Ovalle      First Name:    Jose Burgos Completed by:    Paul Crawford      Date:    2/27/2020

## (undated) NOTE — LETTER
[facility] SURGERY SCHEDULING FORM   [address] [facility]   [phone] (scheduling phone) [phone] (scheduling fax)     PATIENT INFORMATION   Last Name:      Suha Quintana      First Name:    Eros Denson Completed by:    Ann Marie ENGLAND      Date:   1/31/2019

## (undated) NOTE — LETTER
AUTHORIZATION FOR SURGICAL OPERATION OR OTHER PROCEDURE    1. I hereby authorize Dr. Mingo Rivera, Iona Rosas, and [address] [place] [address] staff assigned to my case to perform the following operation and/or procedure at the [address] [place] [address]:    _______________________________________________________________________________________________    Orthovisc Injection #1 to Right Knee  _______________________________________________________________________________________________    2. My physician has explained the nature and purpose of the operation or other procedure, possible alternative methods of treatment, the risks involved, and the possibility of complication to me. I acknowledge that no guarantee has been made as to the result that may be obtained. 3.  I recognize that, during the course of this operation, or other procedure, unforseen conditions may necessitate additional or different procedure than those listed above. I, therefore, further authorize and request that the above named physician, his/her physician assistants or designees perform such procedures as are, in his/her professional opinion, necessary and desirable. 4.  Any tissue or organs removed in the operation or other procedure may be disposed of by and at the discretion of the [address] [place] [address] and [facility]. 5.  I understand that in the event of a medical emergency, I will be transported by local paramedics to [facility] or other hospital emergency department. 6.  I certify that I have read and fully understand the above consent to operation and/or other procedure. 7.  I acknowledge that my physician has explained sedation/analgesia administration to me including the risks and benefits. I consent to the administration of sedation/analgesia as may be necessary or desirable in the judgement of my physician.     Witness signature: ___________________________________________________ Date: ______/______/_____                    Time:  ________ A. M.  P.M. Patient Name:  ______________________________________________________  (please print)      Patient signature:  ___________________________________________________             Relationship to Patient:           []  Parent    Responsible person                          []  Spouse  In case of minor or                    [] Other  _____________   Incompetent name:  __________________________________________________                               (please print)      _____________      Responsible person  In case of minor or  Incompetent signature:  _______________________________________________    Statement of Physician  My signature below affirms that prior to the time of the procedure, I have explained to the patient and/or his/her guardian, the risks and benefits involved in the proposed treatment and any reasonable alternative to the proposed treatment. I have also explained the risks and benefits involved in the refusal of the proposed treatment and have answered the patient's questions.                         Date:  ______/______/_______  Provider                      Signature:  __________________________________________________________       Time:  ___________ A.M    P.M.

## (undated) NOTE — LETTER
AUTHORIZATION FOR SURGICAL OPERATION OR OTHER PROCEDURE    1.  I hereby authorize Dr. Dr Sean Ferguson, and [facility] staff assigned to my case to perform the following operation and/or procedure at the [facility]:    ____________________________ Time:  ________ A. M.  P.M.        Patient Name:  ______________________________________________________  (please print)      Patient signature:  ___________________________________________________             Relationship to Patient:           []  Pa

## (undated) NOTE — LETTER
AUTHORIZATION FOR SURGICAL OPERATION OR OTHER PROCEDURE    1. I hereby authorize , and [address] [place] [address] staff assigned to my case to perform the following operation and/or procedure at the [address] [place] [address]:    2nd Orthovisc Injection      2. Relationship to Patient:           []  Parent    Responsible person                          []  Spouse  In case of minor or                    [] Other  _____________   Incompetent name:  __________________________________________________

## (undated) NOTE — LETTER
AUTHORIZATION FOR SURGICAL OPERATION OR OTHER PROCEDURE    1. I hereby authorize Dr. Kelly Metzger, and [place] [employer] [facility] staff assigned to my case to perform the following operation and/or procedure at the [facility]:    _______________________________________________________________________________________________    Right Knee Orthovisc Injection #2  _______________________________________________________________________________________________    2. My physician has explained the nature and purpose of the operation or other procedure, possible alternative methods of treatment, the risks involved, and the possibility of complication to me. I acknowledge that no guarantee has been made as to the result that may be obtained. 3.  I recognize that, during the course of this operation, or other procedure, unforseen conditions may necessitate additional or different procedure than those listed above. I, therefore, further authorize and request that the above named physician, his/her physician assistants or designees perform such procedures as are, in his/her professional opinion, necessary and desirable. 4.  Any tissue or organs removed in the operation or other procedure may be disposed of by and at the discretion of the [facility], [facility] and [facility]. 5.  I understand that in the event of a medical emergency, I will be transported by local paramedics to [facility] or other hospital emergency department. 6.  I certify that I have read and fully understand the above consent to operation and/or other procedure. 7.  I acknowledge that my physician has explained sedation/analgesia administration to me including the risks and benefits. I consent to the administration of sedation/analgesia as may be necessary or desirable in the judgement of my physician.     Witness signature: ___________________________________________________ Date:  ______/______/_____ Time:  ________ A. M.  P.M. Patient Name:  ______________________________________________________  (please print)      Patient signature:  ___________________________________________________             Relationship to Patient:           []  Parent    Responsible person                          []  Spouse  In case of minor or                    [] Other  _____________   Incompetent name:  __________________________________________________                               (please print)      _____________      Responsible person  In case of minor or  Incompetent signature:  _______________________________________________    Statement of Physician  My signature below affirms that prior to the time of the procedure, I have explained to the patient and/or his/her guardian, the risks and benefits involved in the proposed treatment and any reasonable alternative to the proposed treatment. I have also explained the risks and benefits involved in the refusal of the proposed treatment and have answered the patient's questions.                         Date:  ______/______/_______  Provider                      Signature:  __________________________________________________________       Time:  ___________ A.M    P.M.

## (undated) NOTE — LETTER
AUTHORIZATION FOR SURGICAL OPERATION OR OTHER PROCEDURE    1. I hereby authorize Dr. Irene Ronquillo , and [place] [employer] [facility] staff assigned to my case to perform the following operation and/or procedure at the [place] [employer] [facility] [place]:    _______________________________________________________________________________________________    Orthovisc injection #4 Right knee  _______________________________________________________________________________________________    2. My physician has explained the nature and purpose of the operation or other procedure, possible alternative methods of treatment, the risks involved, and the possibility of complication to me. I acknowledge that no guarantee has been made as to the result that may be obtained. 3.  I recognize that, during the course of this operation, or other procedure, unforseen conditions may necessitate additional or different procedure than those listed above. I, therefore, further authorize and request that the above named physician, his/her physician assistants or designees perform such procedures as are, in his/her professional opinion, necessary and desirable. 4.  Any tissue or organs removed in the operation or other procedure may be disposed of by and at the discretion of the [place] [employer] [facility] and [facility] AT [facility]. 5.  I understand that in the event of a medical emergency, I will be transported by local paramedics to [facility] or other hospital emergency department. 6.  I certify that I have read and fully understand the above consent to operation and/or other procedure. 7.  I acknowledge that my physician has explained sedation/analgesia administration to me including the risks and benefits. I consent to the administration of sedation/analgesia as may be necessary or desirable in the judgement of my physician.     Witness signature: ___________________________________________________ Date:  ______/______/_____ Time:  ________ A. M.  P.M. Patient Name:  ______________________________________________________  (please print)      Patient signature:  ___________________________________________________             Relationship to Patient:           []  Parent    Responsible person                          []  Spouse  In case of minor or                    [] Other  _____________   Incompetent name:  __________________________________________________                               (please print)      _____________      Responsible person  In case of minor or  Incompetent signature:  _______________________________________________    Statement of Physician  My signature below affirms that prior to the time of the procedure, I have explained to the patient and/or his/her guardian, the risks and benefits involved in the proposed treatment and any reasonable alternative to the proposed treatment. I have also explained the risks and benefits involved in the refusal of the proposed treatment and have answered the patient's questions.                         Date:  ______/______/_______  Provider                      Signature:  __________________________________________________________       Time:  ___________ A.M    P.M.

## (undated) NOTE — MR AVS SNAPSHOT
[facility]  [address]  [phone]               Thank you for choosing us for your health care visit with Paradise Aguero MD.  We are glad to serve you and happy to provide you with this [place], [address] [facility] [address] ([address] [facility])  [address] [facility] [address], [address] [facility]  [address]  [facility] [address] [facility],[address], . Nessa Doll  Instructions and Information about Your Health     None      Allergies as of May 30, 2017     Penicillins Hives                   Current Medications          This list is accurate as of: 5/30/17  5:08 PM.  Always use your most recent med list. view more details from this visit by going to [email or web address]. If you've recently had a stay at the Hospital you can access your discharge instructions in [employer] by going to Visits < Admission Summaries.  If you've been to the Emergency Depar

## (undated) NOTE — LETTER
Dear Dr. Susi Manley    This letter is to inform you that Amber Perry has been attending Physical Therapy with me. See below for my most recent plan of care.        Patient Name: Ambre Perry : 3/17/1945, MRN: [id number]   Date:  2017  Re Patient was advised of these findings, precautions, and treatment options and has agreed to actively participate in planning and for this course of care. Thank you for your referral. If you have any questions, please contact me at Dept: [phone].

## (undated) NOTE — LETTER
AUTHORIZATION FOR SURGICAL OPERATION OR OTHER PROCEDURE    1. I hereby authorize Dr. Belén Leung PA-C, and [place] [employer] staff assigned to my case to perform the following operation and/or procedure at the [place] [employer]:    Orthovisc injection #4 to right knee  _______________________________________________________________________________________________      _______________________________________________________________________________________________    2. My physician has explained the nature and purpose of the operation or other procedure, possible alternative methods of treatment, the risks involved, and the possibility of complication to me. I acknowledge that no guarantee has been made as to the result that may be obtained. 3.  I recognize that, during the course of this operation, or other procedure, unforseen conditions may necessitate additional or different procedure than those listed above. I, therefore, further authorize and request that the above named physician, his/her physician assistants or designees perform such procedures as are, in his/her professional opinion, necessary and desirable. 4.  Any tissue or organs removed in the operation or other procedure may be disposed of by and at the discretion of the [place] [facility] and [facility]. 5.  I understand that in the event of a medical emergency, I will be transported by local paramedics to [facility] or other hospital emergency department. 6.  I certify that I have read and fully understand the above consent to operation and/or other procedure. 7.  I acknowledge that my physician has explained sedation/analgesia administration to me including the risks and benefits. I consent to the administration of sedation/analgesia as may be necessary or desirable in the judgement of my physician.     Witness signature: ___________________________________________________ Date: ______/______/_____                    Time:  ________ A. M.  P.M. Patient Name:  ______________________________________________________  (please print)      Patient signature:  ___________________________________________________             Relationship to Patient:           []  Parent    Responsible person                          []  Spouse  In case of minor or                    [] Other  _____________   Incompetent name:  __________________________________________________                               (please print)      _____________      Responsible person  In case of minor or  Incompetent signature:  _______________________________________________    Statement of Physician  My signature below affirms that prior to the time of the procedure, I have explained to the patient and/or his/her guardian, the risks and benefits involved in the proposed treatment and any reasonable alternative to the proposed treatment. I have also explained the risks and benefits involved in the refusal of the proposed treatment and have answered the patient's questions.                         Date:  ______/______/_______  Provider                      Signature:  __________________________________________________________       Time:  ___________ A.M    P.M.

## (undated) NOTE — MR AVS SNAPSHOT
[facility]  [address]  [phone]               Thank you for choosing us for your health care visit with Yenifer Suggs MD.  We are glad to serve you and happy to provide you with this summa document optic nerves. Will have patient back in 1 year for visual field, OCT and diabetic eye exam.     History of retinal tear  Follow up with Dr. Filiberto Hammond in January of 2018 as planned.      Diabetes mellitus type 2 without retinopathy ([facility] [address]  Diabetes t SM CINNAMON 500 MG Caps   Generic drug:  Cinnamon   Take 500 mg by mouth daily. TH COD LIVER OIL Caps   Take  by mouth. Vitamin D 1000 units Caps   Take  by mouth daily. * Notice:   This list has 2 medication(s) that are the s

## (undated) NOTE — LETTER
AUTHORIZATION FOR SURGICAL OPERATION OR OTHER PROCEDURE    1.  I hereby authorize Dr. Anai Alegria  and [facility] staff assigned to my case to perform the following operation and/or procedure at the [facility]:    Ortho Visc injection # 2 Time:  ________ A. M.  P.M.        Patient Name:  ______________________________________________________  (please print)      Patient signature:  ___________________________________________________             Relationship to Patient:

## (undated) NOTE — LETTER
AUTHORIZATION FOR SURGICAL OPERATION OR OTHER PROCEDURE    1.  I hereby authorize Dr. Lorrie Zaragoza  and [facility] staff assigned to my case to perform the following operation and/or procedure at the [facility]:    Cortisone injection in Ri Time:  ________ A. M.  P.M.        Patient Name:  ______________________________________________________  (please print)      Patient signature:  ___________________________________________________             Relationship to Patient:

## (undated) NOTE — LETTER
AUTHORIZATION FOR SURGICAL OPERATION OR OTHER PROCEDURE    1.  I hereby authorize Dr. Kiera Thomas  and [facility] staff assigned to my case to perform the following operation and/or procedure at the [facility]:    Orthovisc injection #4 in Time:  ________ A. M.  P.M.        Patient Name:  ______________________________________________________  (please print)      Patient signature:  ___________________________________________________             Relationship to Patient:           []  P

## (undated) NOTE — LETTER
[facility] SURGERY SCHEDULING FORM   [address] [place] [address] [place] [address]   [phone] (scheduling phone) [phone] (scheduling fax)     PATIENT INFORMATION   Last Name:      Ricarda Merchant      First Name:    Sudeep Hernandez Completed by:    Gray Corey      Date:    10/6/2020

## (undated) NOTE — LETTER
June 11, 2019    Nadia Almaraz PA-C  [address]      Patient: Abdoulaye Griffin   YOB: 1945   Date of Visit: 6/11/2019       Dear Dr. Zully Addison PA-C:    Thank you for referring Kevin Hester to me for evaluation.  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 Disp: 50 tablet Rfl: 11   aspirin 325 MG Oral Tab Take 325 mg by mouth daily.  Disp:  Rfl:    glipiZIDE ER 2.5 MG Oral Tablet 24 Hr TAKE ONE TAB Cinnamon (SM CINNAMON) 500 MG Oral Cap Take 500 mg by mouth daily.  Disp:  Rfl:        Allergies:    Penicillins             HIVES    ROS:     ROS     Positive for: Endocrine, Eyes    Negative for: Constitutional, Gastrointestinal, Neurological, Skin, Genit [facility] [place]    Right -1.00 +0.75 175 20/20 +2.25 20/20    Left -0.50 +0.50 030 20/20 +2.25 20/20          Final Rx       [facility] [place]    Right -1.00 +0.75 175 20/20 +2.25 20/20    Left -0.50 +0.50

## (undated) NOTE — LETTER
August 11, 2020    Jorgito De La Rosa MD  [id number]     Patient: Caryl Larios   YOB: 1945   Date of Visit: 8/11/2020       Dear Dr. Cheng Maldonado MD:    Thank you for referring Chanell Garrett to me for evaluatio • Trigger finger 9/29/2011    Irf-trigger finger, recurrent - LRF trigger finger release   • Type II or unspecified type diabetes mellitus without mention of complication, not stated as uncontrolled    • Visual impairment     readers       Surgical History Comment: 2-3 times per week    Drug use: No      Medications:  Current Outpatient Medications   Medication Sig Dispense Refill   • GLIPIZIDE ER 2.5 MG Oral Tablet 24 Hr TAKE ONE TABLET BY MOUTH EVERY DAY IN THE MORNING with breakfast 90 tablet 0   • NI Last edited by Tita Urena OT on 8/11/2020  8:05 AM. (History)          PHYSICAL EXAM:     Base Eye Exam     Visual Acuity (Snellen - Linear)       Right Left    Dist cc 20/25 -2 20/20 -1    Near cc 20/25 20/20    Correction:  Glasses          Tonometr ASSESSMENT/PLAN:     Diagnoses and Plan:     Diabetes mellitus type 2 without retinopathy ([facility] [address]  Diabetes type II: no background of retinopathy, no signs of neovascularization noted. Discussed ocular and systemic benefits of blood sugar control.   Sonja Nageotte

## (undated) NOTE — LETTER
AUTHORIZATION FOR SURGICAL OPERATION OR OTHER PROCEDURE    1.  I hereby authorize Dr. Jorge Herrera, and [facility] staff assigned to my case to perform the following operation and/or procedure at the [facility]:    ___Third Orthovisc injection ri Patient Name:  ______________________________________________________  (please print)      Patient signature:  ___________________________________________________             Relationship to Patient:           []  Parent    Responsible person

## (undated) NOTE — LETTER
AUTHORIZATION FOR SURGICAL OPERATION OR OTHER PROCEDURE    1. I hereby authorize Dr. Brittni Dennis , and [place] [facility] staff assigned to my case to perform the following operation and/or procedure at the [place] [facility]:    Orthovisc injection #2 in Right knee   _______________________________________________________________________________________________      _______________________________________________________________________________________________    2. My physician has explained the nature and purpose of the operation or other procedure, possible alternative methods of treatment, the risks involved, and the possibility of complication to me. I acknowledge that no guarantee has been made as to the result that may be obtained. 3.  I recognize that, during the course of this operation, or other procedure, unforseen conditions may necessitate additional or different procedure than those listed above. I, therefore, further authorize and request that the above named physician, his/her physician assistants or designees perform such procedures as are, in his/her professional opinion, necessary and desirable. 4.  Any tissue or organs removed in the operation or other procedure may be disposed of by and at the discretion of the [facility] and [facility]. 5.  I understand that in the event of a medical emergency, I will be transported by local paramedics to [facility] or other hospital emergency department. 6.  I certify that I have read and fully understand the above consent to operation and/or other procedure. 7.  I acknowledge that my physician has explained sedation/analgesia administration to me including the risks and benefits. I consent to the administration of sedation/analgesia as may be necessary or desirable in the judgement of my physician.     Witness signature: ___________________________________________________ Date: ______/______/_____                    Time:  ________ A. M.  P.M. Patient Name:  ______________________________________________________  (please print)      Patient signature:  ___________________________________________________             Relationship to Patient:           []  Parent    Responsible person                          []  Spouse  In case of minor or                    [] Other  _____________   Incompetent name:  __________________________________________________                               (please print)      _____________      Responsible person  In case of minor or  Incompetent signature:  _______________________________________________    Statement of Physician  My signature below affirms that prior to the time of the procedure, I have explained to the patient and/or his/her guardian, the risks and benefits involved in the proposed treatment and any reasonable alternative to the proposed treatment. I have also explained the risks and benefits involved in the refusal of the proposed treatment and have answered the patient's questions.                         Date:  ______/______/_______  Provider                      Signature:  __________________________________________________________       Time:  ___________ A.M    P.M.

## (undated) NOTE — MR AVS SNAPSHOT
[facility]  [address]  [phone]               Thank you for choosing us for your health care visit with Anahy Coronel MD.  We are glad to serve you and happy to provide you with this arizmendi 2 sprays by Each [employer] route daily.    Commonly known as:  FLONASE           GlipiZIDE ER 2.5 MG Tb24   Take 1 tablet (2.5 mg total) by mouth daily with breakfast.   Commonly known as:  GLIPIZIDE XL           lansoprazole 30 MG Cpdr   Take 1 capsule (30 mg t Call [phone] for help. [employer] is NOT to be used for urgent needs. For medical emergencies, dial 911.            Visit [facility] online at  [employer]

## (undated) NOTE — LETTER
No referring provider defined for this encounter. 04/03/19        Patient: Cece Kumar   YOB: 1945   Date of Visit: 4/3/2019       Dear  Dr. Wayne Paredes MD,      I evaluated   Hensel José today.     Patient has decided to pr

## (undated) NOTE — LETTER
[facility] SURGERY SCHEDULING FORM   [address]  [address] [facility]   [phone] (scheduling phone) [phone] (scheduling fax)     PATIENT INFORMATION   Patient Name:    Mireya Edwards   :    3/17/1945   Gen Allergies: Penicillins       Completed by:    Pardeep More      Date:    2/16/2018    [facility] will follow its Pre-Admission Assessment and Screening Policy for pre-admission testing.   Physicians that require   additional testing must order this directly and h

## (undated) NOTE — LETTER
AUTHORIZATION FOR SURGICAL OPERATION OR OTHER PROCEDURE    1. I hereby authorize Dr. Joseph Clark and the [facility] staff assigned to my case to perform the following operation and/or procedure at the [facility]:    ultrasound of the right elbow      2. My physician has explained the nature and purpose of the operation or other procedure, possible alternative methods of treatment, the risks involved, and the possibility of complication to me. I acknowledge that no guarantee has been made as to the result that may be obtained. 3.  I recognize that, during the course of this operation, or other procedure, unforseen conditions may necessitate additional or different procedure than those listed above. I, therefore, further authorize and request that the above named physician, his/her physician assistants or designees perform such procedures as are, in his/her professional opinion, necessary and desirable. 4.  Any tissue or organs removed in the operation or other procedure may be disposed of by and at the discretion of the [facility] staff and [facility]. 5.  I understand that in the event of a medical emergency, I will be transported by local paramedics to [facility] or other hospital emergency department. 6.  I certify that I have read and fully understand the above consent to operation and/or other procedure. 7.  I acknowledge that my physician has explained sedation/analgesia administration to me including the risks and benefits. I consent to the administration of sedation/analgesia as may be necessary or desirable in the judgement of my physician. Witness signature: ___________________________________________________ Date:  ______/______/_____                    Time:  ________ A. M.  P.M.        Patient Name:  Emeka Haider  3/17/1945  [id number]         Patient signature:  ___________________________________________________               Statement of Physician  My signature below affirms that prior to the time of the procedure, I have explained to the patient and/or his/her guardian, the risks and benefits involved in the proposed treatment and any reasonable alternative to the proposed treatment. I have also explained the risks and benefits involved in the refusal of the proposed treatment and have answered the patient's questions.                         Date:  ______/______/_______  Provider                      Signature:  __________________________________________________________       Time:  ___________ A.M    P.M.

## (undated) NOTE — Clinical Note
June 6, 2017    Paco Yancey MD  [address] [place] [address]     Patient: Jasvir Minaya   YOB: 1945   Date of Visit: 6/6/2017       Dear Dr. Kary Ramos MD:    Thank you for referring Rhett Carter to me for evaluation.  Here • Cataract 2012, 1998 2012 Phaco w/ PC IOL OD   • Posterior subcapsular cataract    • Posterior subcapsular cataract 2010, 1998 2010 Phaco w/ PC IOL OS   • Capsular opacification 2014     YAG laser OD       Surgical History: Alvino Snellen has past s Fluticasone Furoate-Vilanterol (BREO ELLIPTA) 100-25 MCG/INH Inhalation Aerosol Powder, Breath Activated Inhale 1 puff into the lungs daily.  Disp: 1 each Rfl: 1   GlipiZIDE ER (GLIPIZIDE XL) 2.5 MG Oral Tablet 24 Hr Take 1 tablet (2.5 mg total) by mouth da Pressure 15 16         Pachymetry (11/20/07)      Right Left   Thickness 560/ -1 559/ -1         Pupils      Pupils   Right PERRL   Left PERRL         Visual Fields      Left Right   Result Full Full         Extraocular Movement      Right Left   Result Fu Diabetes type II: no background of retinopathy, no signs of neovascularization noted. Discussed ocular and systemic benefits of blood sugar control. Diagnosis and treatment discussed in detail with patient. Pseudophakia of both eyes  No treatment.

## (undated) NOTE — LETTER
AUTHORIZATION FOR SURGICAL OPERATION OR OTHER PROCEDURE    1.  I hereby authorize Dr. Kellie Reynolds, and [facility] staff assigned to my case to perform the following operation and/or procedure at the [facility]:    _______________________________ Time:  ________ A. M.  P.M.        Patient Name:  ______________________________________________________  (please print)      Patient signature:  ___________________________________________________             Relationship to Patient:           []  Parent

## (undated) NOTE — LETTER
No referring provider defined for this encounter. 08/02/19        Patient: Elaina Brumfield   YOB: 1945   Date of Visit: 8/1/2019       Dear  Dr. Ashish Garcia MD,      I evaluated   Elaina Brumfield in the office.   He has involved a b

## (undated) NOTE — LETTER
AUTHORIZATION FOR SURGICAL OPERATION OR OTHER PROCEDURE    1. I hereby authorize Dr. Sariah Nielsen, Jairo Eagle, and [place] [facility] staff assigned to my case to perform the following operation and/or procedure at the [place] [facility]:    _______________________________________________________________________________________________    Orthovisc injection #3  to Right Knee  _______________________________________________________________________________________________    2. My physician has explained the nature and purpose of the operation or other procedure, possible alternative methods of treatment, the risks involved, and the possibility of complication to me. I acknowledge that no guarantee has been made as to the result that may be obtained. 3.  I recognize that, during the course of this operation, or other procedure, unforseen conditions may necessitate additional or different procedure than those listed above. I, therefore, further authorize and request that the above named physician, his/her physician assistants or designees perform such procedures as are, in his/her professional opinion, necessary and desirable. 4.  Any tissue or organs removed in the operation or other procedure may be disposed of by and at the discretion of the [facility] and [facility]. 5.  I understand that in the event of a medical emergency, I will be transported by local paramedics to [facility] or other hospital emergency department. 6.  I certify that I have read and fully understand the above consent to operation and/or other procedure. 7.  I acknowledge that my physician has explained sedation/analgesia administration to me including the risks and benefits. I consent to the administration of sedation/analgesia as may be necessary or desirable in the judgement of my physician.     Witness signature: ___________________________________________________ Date: ______/______/_____                    Time:  ________ A. M.  P.M. Patient Name:  ______________________________________________________  (please print)      Patient signature:  ___________________________________________________             Relationship to Patient:           []  Parent    Responsible person                          []  Spouse  In case of minor or                    [] Other  _____________   Incompetent name:  __________________________________________________                               (please print)      _____________      Responsible person  In case of minor or  Incompetent signature:  _______________________________________________    Statement of Physician  My signature below affirms that prior to the time of the procedure, I have explained to the patient and/or his/her guardian, the risks and benefits involved in the proposed treatment and any reasonable alternative to the proposed treatment. I have also explained the risks and benefits involved in the refusal of the proposed treatment and have answered the patient's questions.                         Date:  ______/______/_______  Provider                      Signature:  __________________________________________________________       Time:  ___________ A.M    P.M.